# Patient Record
Sex: MALE | Race: BLACK OR AFRICAN AMERICAN | NOT HISPANIC OR LATINO | Employment: OTHER | URBAN - METROPOLITAN AREA
[De-identification: names, ages, dates, MRNs, and addresses within clinical notes are randomized per-mention and may not be internally consistent; named-entity substitution may affect disease eponyms.]

---

## 2018-01-20 ENCOUNTER — HOSPITAL ENCOUNTER (EMERGENCY)
Facility: HOSPITAL | Age: 58
Discharge: HOME | End: 2018-01-20
Attending: FAMILY MEDICINE
Payer: MEDICARE

## 2018-01-20 VITALS
DIASTOLIC BLOOD PRESSURE: 88 MMHG | TEMPERATURE: 98 F | BODY MASS INDEX: 17.61 KG/M2 | RESPIRATION RATE: 20 BRPM | HEIGHT: 72 IN | OXYGEN SATURATION: 100 % | HEART RATE: 97 BPM | WEIGHT: 130 LBS | SYSTOLIC BLOOD PRESSURE: 142 MMHG

## 2018-01-20 DIAGNOSIS — F10.920 ALCOHOLIC INTOXICATION WITHOUT COMPLICATION: Primary | ICD-10-CM

## 2018-01-20 LAB
ALBUMIN SERPL BCP-MCNC: 3.7 G/DL
ALP SERPL-CCNC: 160 U/L
ALT SERPL W/O P-5'-P-CCNC: 21 U/L
AMPHET+METHAMPHET UR QL: NEGATIVE
ANION GAP SERPL CALC-SCNC: 16 MMOL/L
AST SERPL-CCNC: 36 U/L
BARBITURATES UR QL SCN>200 NG/ML: NEGATIVE
BASOPHILS # BLD AUTO: 0.05 K/UL
BASOPHILS NFR BLD: 0.6 %
BENZODIAZ UR QL SCN>200 NG/ML: NEGATIVE
BILIRUB SERPL-MCNC: 0.1 MG/DL
BILIRUB UR QL STRIP: NEGATIVE
BUN SERPL-MCNC: <2 MG/DL
BZE UR QL SCN: NEGATIVE
CALCIUM SERPL-MCNC: 8.4 MG/DL
CANNABINOIDS UR QL SCN: NEGATIVE
CHLORIDE SERPL-SCNC: 102 MMOL/L
CLARITY UR REFRACT.AUTO: CLEAR
CO2 SERPL-SCNC: 21 MMOL/L
COLOR UR AUTO: YELLOW
CREAT SERPL-MCNC: 0.77 MG/DL
CREAT UR-MCNC: 42 MG/DL
DIFFERENTIAL METHOD: ABNORMAL
EOSINOPHIL # BLD AUTO: 0.1 K/UL
EOSINOPHIL NFR BLD: 1.3 %
ERYTHROCYTE [DISTWIDTH] IN BLOOD BY AUTOMATED COUNT: 23.5 %
EST. GFR  (AFRICAN AMERICAN): >60 ML/MIN/1.73 M^2
EST. GFR  (NON AFRICAN AMERICAN): >60 ML/MIN/1.73 M^2
ETHANOL SERPL-MCNC: 269 MG/DL
GLUCOSE SERPL-MCNC: 122 MG/DL
GLUCOSE UR QL STRIP: NEGATIVE
HCT VFR BLD AUTO: 30.7 %
HGB BLD-MCNC: 9.7 G/DL
HGB UR QL STRIP: NEGATIVE
KETONES UR QL STRIP: NEGATIVE
LEUKOCYTE ESTERASE UR QL STRIP: ABNORMAL
LYMPHOCYTES # BLD AUTO: 2.2 K/UL
LYMPHOCYTES NFR BLD: 26 %
MCH RBC QN AUTO: 21.4 PG
MCHC RBC AUTO-ENTMCNC: 31.6 G/DL
MCV RBC AUTO: 68 FL
METHADONE UR QL SCN>300 NG/ML: NEGATIVE
MICROSCOPIC COMMENT: NORMAL
MONOCYTES # BLD AUTO: 0.7 K/UL
MONOCYTES NFR BLD: 8.5 %
NEUTROPHILS # BLD AUTO: 5.2 K/UL
NEUTROPHILS NFR BLD: 63.4 %
NITRITE UR QL STRIP: NEGATIVE
OPIATES UR QL SCN: NEGATIVE
PCP UR QL SCN>25 NG/ML: NEGATIVE
PH UR STRIP: 6 [PH] (ref 5–8)
PLATELET # BLD AUTO: 448 K/UL
PMV BLD AUTO: 8.4 FL
POTASSIUM SERPL-SCNC: 3.5 MMOL/L
PROT SERPL-MCNC: 8.1 G/DL
PROT UR QL STRIP: NEGATIVE
RBC # BLD AUTO: 4.53 M/UL
RBC #/AREA URNS AUTO: 1 /HPF (ref 0–4)
SODIUM SERPL-SCNC: 139 MMOL/L
SP GR UR STRIP: 1.01 (ref 1–1.03)
TOXICOLOGY INFORMATION: NORMAL
URN SPEC COLLECT METH UR: ABNORMAL
UROBILINOGEN UR STRIP-ACNC: NEGATIVE EU/DL
WBC # BLD AUTO: 8.26 K/UL
WBC #/AREA URNS AUTO: 5 /HPF (ref 0–5)

## 2018-01-20 PROCEDURE — 85025 COMPLETE CBC W/AUTO DIFF WBC: CPT

## 2018-01-20 PROCEDURE — 80320 DRUG SCREEN QUANTALCOHOLS: CPT

## 2018-01-20 PROCEDURE — 80053 COMPREHEN METABOLIC PANEL: CPT

## 2018-01-20 PROCEDURE — 99283 EMERGENCY DEPT VISIT LOW MDM: CPT | Mod: 25

## 2018-01-20 PROCEDURE — 96360 HYDRATION IV INFUSION INIT: CPT

## 2018-01-20 PROCEDURE — 81000 URINALYSIS NONAUTO W/SCOPE: CPT | Mod: 59

## 2018-01-20 PROCEDURE — 80307 DRUG TEST PRSMV CHEM ANLYZR: CPT

## 2018-01-20 PROCEDURE — 25000003 PHARM REV CODE 250: Performed by: FAMILY MEDICINE

## 2018-01-20 RX ADMIN — SODIUM CHLORIDE 1000 ML: 0.9 INJECTION, SOLUTION INTRAVENOUS at 04:01

## 2018-01-20 NOTE — ED PROVIDER NOTES
Encounter Date: 1/20/2018       History     Chief Complaint   Patient presents with    Fall     2 falls today, pt denies injury or pain from either fall, and states that both times he fell, he stood up too fast and got dizzy      Patient has history of heavy alcohol consumption, his been having frequent falls yesterday.  She is brought in today for evaluation.  Patient has been drinking alcohol this morning and smells.  Denies any pain anywhere in his body.  No external injuries.      The history is provided by the patient.     Review of patient's allergies indicates:   Allergen Reactions    Asa [aspirin] Nausea Only     Past Medical History:   Diagnosis Date    Alcohol abuse     Hypertension     Seizures      History reviewed. No pertinent surgical history.  History reviewed. No pertinent family history.  Social History   Substance Use Topics    Smoking status: Current Every Day Smoker     Packs/day: 0.50    Smokeless tobacco: Never Used    Alcohol use Yes      Comment: 6 pack every day (3-4)     Review of Systems   Constitutional: Negative for activity change, appetite change, chills and fever.   HENT: Negative for congestion, ear discharge, ear pain, sinus pain and sinus pressure.    Eyes: Negative for pain, discharge, redness and itching.   Respiratory: Negative for cough, chest tightness, shortness of breath and wheezing.    Cardiovascular: Negative for chest pain and palpitations.   Gastrointestinal: Negative for abdominal distention, abdominal pain, diarrhea and vomiting.   Genitourinary: Negative for dysuria, flank pain and frequency.   Musculoskeletal: Negative for back pain, gait problem, neck pain and neck stiffness.   Skin: Negative for rash.   Neurological: Positive for weakness. Negative for dizziness, syncope, facial asymmetry, speech difficulty, light-headedness, numbness and headaches.   Psychiatric/Behavioral: Negative for behavioral problems and confusion.   All other systems reviewed and  are negative.      Physical Exam     Initial Vitals [01/20/18 1516]   BP Pulse Resp Temp SpO2   135/84 (!) 119 20 98.3 °F (36.8 °C) 99 %      MAP       101         Physical Exam    Nursing note and vitals reviewed.  Constitutional: He appears well-developed and well-nourished.   Patient under influence of alcohol.   HENT:   Head: Normocephalic.   Right Ear: External ear normal.   Left Ear: External ear normal.   Nose: Nose normal.   Mouth/Throat: Oropharynx is clear and moist.   Eyes: Conjunctivae and EOM are normal. Pupils are equal, round, and reactive to light.   Neck: Normal range of motion. Neck supple.   Cardiovascular: Normal rate, regular rhythm, normal heart sounds and intact distal pulses.   No murmur heard.  Pulmonary/Chest: Breath sounds normal. No respiratory distress. He has no wheezes. He has no rhonchi. He has no rales. He exhibits no tenderness.   Abdominal: Soft. Bowel sounds are normal. He exhibits no distension. There is no tenderness.   Musculoskeletal: Normal range of motion.   Neurological: He is alert and oriented to person, place, and time. He has normal strength and normal reflexes. He displays normal reflexes. No cranial nerve deficit or sensory deficit. Gait abnormal.   Skin: Skin is warm. Capillary refill takes less than 2 seconds. No rash noted. No erythema.         ED Course   Procedures  Labs Reviewed   CBC W/ AUTO DIFFERENTIAL   COMPREHENSIVE METABOLIC PANEL   ALCOHOL,MEDICAL (ETHANOL)   URINALYSIS   DRUG SCREEN PANEL, URINE EMERGENCY             Medical Decision Making:   ED Management:  Pt Falls contribute to alcoholism. No apparent injury. Advised to be conscious with alcoholism and f/u with PCP.                   ED Course      Clinical Impression:   The encounter diagnosis was Alcoholic intoxication without complication.    Disposition:   Disposition: Discharged  Condition: Jose Oquendo MD  01/25/18 3286

## 2018-01-20 NOTE — ED NOTES
Patient states he had 2 falls today.  He denies any LOC.  He states he just felt weak getting up.  He states he has not taken any of his home meds in over 2 years.  He states he has a hx of seizures, but has not had one is several years.  Daily ETOH use.   Patient admits to drinking approximately 4 beers today.

## 2019-02-07 ENCOUNTER — HOSPITAL ENCOUNTER (EMERGENCY)
Facility: HOSPITAL | Age: 59
Discharge: HOME | End: 2019-02-07
Attending: EMERGENCY MEDICINE
Payer: MEDICARE

## 2019-02-07 VITALS
BODY MASS INDEX: 18.96 KG/M2 | DIASTOLIC BLOOD PRESSURE: 100 MMHG | OXYGEN SATURATION: 100 % | SYSTOLIC BLOOD PRESSURE: 158 MMHG | TEMPERATURE: 99 F | HEIGHT: 72 IN | WEIGHT: 140 LBS | RESPIRATION RATE: 16 BRPM | HEART RATE: 103 BPM

## 2019-02-07 DIAGNOSIS — Z87.19 HISTORY OF ESOPHAGEAL STRICTURE: ICD-10-CM

## 2019-02-07 DIAGNOSIS — R13.10 DYSPHAGIA, UNSPECIFIED TYPE: Primary | ICD-10-CM

## 2019-02-07 PROCEDURE — 99281 EMR DPT VST MAYX REQ PHY/QHP: CPT

## 2019-02-08 NOTE — ED NOTES
DR. PAZ AWARE OF PT'S BLOOD PRESSURE. PT INSTRUCTED TO FOLLOW UP WITH PCP PER VERBAL ORDER PER DR. PAZ. PT V/U.

## 2019-02-08 NOTE — ED PROVIDER NOTES
Encounter Date: 2/7/2019    SCRIBE #1 NOTE: I, Tanner Rosa, am scribing for, and in the presence of,  Dr. Howell. I have scribed the entire note.       History     Chief Complaint   Patient presents with    Dysphagia     Reports difficulty swallowing over past few months. States food feels like it gets stuck in his throat. Denies feeling currently. States had procedure 1.5 years ago to fix same problem.      Time seen by provider: 11:24 PM    This is a 58 y.o. male w/ PMHx of esophogeal stricture who presents with complaint of dysphagia x 5 months. The patient reports he has been having trouble swallowing food as it gets stuck in his throat and he has to throw it back up. H states he is able to drink liquids. Denies abdominal pain or nausea.       The history is provided by the patient.     Review of patient's allergies indicates:   Allergen Reactions    Asa [aspirin] Nausea Only     Past Medical History:   Diagnosis Date    Alcohol abuse     Hypertension     Seizures      Past Surgical History:   Procedure Laterality Date    COLONOSCOPY N/A 5/5/2015    Performed by [facility] at [facility]    ESOPHAGOGASTRODUODENOSCOPY (EGD) N/A 5/5/2015    Performed by [facility] at [facility]     History reviewed. No pertinent family history.  Social History     Tobacco Use    Smoking status: Current Every Day Smoker     Packs/day: 0.50    Smokeless tobacco: Never Used   Substance Use Topics    Alcohol use: Yes     Comment: 6 pack every day (3-4)    Drug use: No     Review of Systems   Constitutional: Negative for fever.   HENT: Positive for trouble swallowing. Negative for facial swelling and sore throat.         Dysphagia   Eyes: Negative for pain and redness.   Respiratory: Negative for shortness of breath.    Cardiovascular: Negative for chest pain.   Gastrointestinal: Positive for vomiting. Negative for abdominal pain, diarrhea and nausea.   Genitourinary: Negative for dysuria and hematuria.    Musculoskeletal: Negative for back pain and neck pain.   Skin: Negative for rash.   Neurological: Negative for seizures and facial asymmetry.       Physical Exam     Initial Vitals [02/07/19 2157]   BP Pulse Resp Temp SpO2   (!) 172/96 100 17 98 °F (36.7 °C) 99 %      MAP       --         Physical Exam    Nursing note and vitals reviewed.  Constitutional: He appears well-developed and well-nourished. He is not diaphoretic. No distress.   HENT:   Head: Normocephalic and atraumatic.   Eyes: Conjunctivae and EOM are normal.   Neck: Normal range of motion. Neck supple.   Cardiovascular: Normal rate, regular rhythm and normal heart sounds.   Pulmonary/Chest: Breath sounds normal. No respiratory distress.   Abdominal: Soft. There is no tenderness.   Musculoskeletal: Normal range of motion. He exhibits no edema or tenderness.   Neurological: He is alert and oriented to person, place, and time. He has normal strength.   Skin: Skin is warm and dry.         ED Course   Procedures  Labs Reviewed - No data to display       Imaging Results    None          Medical Decision Making:   ED Management:  58-year-old male with a history of esophageal strictures, who says he has had difficulty with solid foods for the past few months.  He occasionally vomits when he attempts to eat.  Patient is able to tolerate liquids.  I have explained to him that he needs to see a gastroenterologist to have an EGD scheduled.  I will place an ambulatory referral for this.  In the meantime patient will stay a liquid or pureed diet.                      Clinical Impression:     1. Dysphagia, unspecified type    2. History of esophageal stricture        Disposition:   Disposition: Discharged  Condition: Stable       I, Dr. Fabrice Howell, personally performed the services described in this documentation. All medical record entries made by the scribe were at my direction and in my presence. I have reviewed the chart and agree that the record reflects my  personal performance and is accurate and complete. Fabrice Maher MD.  12:51 AM 02/08/2019                   Fabrice Maher MD  02/08/19 0052

## 2019-02-08 NOTE — ED NOTES
"PT C/O "IT FEELS LIKE MY FOOD GETS STUCK IN MY THROAT WHEN I SWALLOW" X5 MONTHS. PT DENIES SYMPTOMS ARE INTERMITTENT AND DENIES DIFFICULTY SWALLOWING AT THIS TIME. PT DENIES WORSENING OF SYMPTOMS RECENTLY. STATES "NOTHING IS WORSE. I JUST WANT TO GET CHECKED OUT." NO ACUTE DISTRESS NOTED.  "

## 2019-03-01 ENCOUNTER — OFFICE VISIT (OUTPATIENT)
Dept: GASTROENTEROLOGY | Facility: CLINIC | Age: 59
End: 2019-03-01
Payer: MEDICARE

## 2019-03-01 VITALS — WEIGHT: 108.69 LBS | BODY MASS INDEX: 14.74 KG/M2

## 2019-03-01 DIAGNOSIS — R91.1 PULMONARY NODULE: Primary | ICD-10-CM

## 2019-03-01 DIAGNOSIS — R13.10 DYSPHAGIA, UNSPECIFIED TYPE: ICD-10-CM

## 2019-03-01 DIAGNOSIS — R63.4 WEIGHT LOSS: ICD-10-CM

## 2019-03-01 PROCEDURE — 99204 PR OFFICE/OUTPT VISIT, NEW, LEVL IV, 45-59 MIN: ICD-10-PCS | Mod: S$PBB,,, | Performed by: NURSE PRACTITIONER

## 2019-03-01 PROCEDURE — 99215 OFFICE O/P EST HI 40 MIN: CPT | Mod: PBBFAC,PO | Performed by: NURSE PRACTITIONER

## 2019-03-01 PROCEDURE — 99999 PR PBB SHADOW E&M-EST. PATIENT-LVL V: ICD-10-PCS | Mod: PBBFAC,,, | Performed by: NURSE PRACTITIONER

## 2019-03-01 PROCEDURE — 99999 PR PBB SHADOW E&M-EST. PATIENT-LVL V: CPT | Mod: PBBFAC,,, | Performed by: NURSE PRACTITIONER

## 2019-03-01 PROCEDURE — 99204 OFFICE O/P NEW MOD 45 MIN: CPT | Mod: S$PBB,,, | Performed by: NURSE PRACTITIONER

## 2019-03-01 NOTE — PATIENT INSTRUCTIONS
EGD Prep Instructions    [facility] [address] Clinic Office [phone]  Endoscopy Lab [phone]    You are scheduled for an EGD with Dr. Schmidt on 3/4/2019 at [facility].  You will check in at Admit on the first floor of the hospital.    Nothing to eat of drink after midnight before the procedure.  You MAY brush your teeth.    You MAY take your blood pressure, heart, and seizure medication on the morning of the procedure, with a SIP of water.  Hold ALL other medications until after the procedure.    If you are on blood thinners THAT YOU HAVE BEEN INSTRUCTED TO HOLD BY YOUR DOCTOR FOR THIS PROCEDURE, then do NOT take this the morning of your EGD.  Do NOT stop these medications on your own, they must be approved to be held by your doctor.  Your EGD can NOT be done if you are on these medications.  Examples of blood thinners include: Coumadin, Aggrenox, Plavix, Pradaxa, Reapro, Pletal, Xarelto, Ticagrelor, Brilinta, Eliquis, and high dose aspirin (325 mg).  You do not have to stop baby aspirin 81 mg.    You will receive a call 2 days before your EGD to tell you the time to arrive.  If you have not received a call by the day before your procedure, call the Endoscopy Lab at [phone].

## 2019-03-01 NOTE — PROGRESS NOTES
Subjective:       Patient ID: Sachin Calloway is a 58 y.o. male.    Chief Complaint: trouble swallowing    HPI  Reports at least 3 months of dysphagia with food lodging when swallowing.  He will have to drink and have extra swallows for this to pass.  Happening with all solids.    Has had dysphagia in the past with EGD and dilation in 2015:  - Two benign appearing esophageal strictures. One                         at the proximal esophagus(20cm) and another at GE                         junction. Traversed with  gastroscope and                         then dilated up to 10mm with a TTS balloon.                        - White specked mucosa in distal esophagus.                         Biopsied.                        - Two small clean based gastric ulcers. Biopsied.  CT chest at that time:  The soft tissues and vascular structures at the base and neck are unremarkable.  The mediastinum and the heart and great vessels is significant for calcification of the aorta, aortic annulus, and coronary arteries.  The visualized portion of   the intra-abdominal content is unremarkable.  The osseous structures demonstrate mild degenerative change.    The trachea is patent and free of any intraluminal filling defects.  The lungs are well expanded.  There is a moderate amount of bilateral apical pleural thickening and/or scar right greater than left.  There is mild centrilobular emphysema.  There is a   0.3-cm left apical noncalcified pulmonary nodule. Per Fleischner Society guidelines; in a low risk patient, no follow up recommend. In a high risk patient/ smoker, consider 12 month CT chest follow up to exclude neoplasia.   There is no pleural or   pericardial fluid.    He never returned for follow up EGD or CT.  Does not have a PCP.    Denies nausea, vomiting, abdominal pain, SOB, cough, chest pain.  Weight loss of at least 30 pounds over the last 3 years.    No recent labs.    He used ibuprofen daily for headaches.     Denies black stools, blood with bowel movements or change in bowel habit.  Denies diarrhea or constipation.  He drinks alcohol daily and smokes cigarettes      Review of Systems   Constitutional: Positive for appetite change and unexpected weight change. Negative for activity change, fatigue and fever.   HENT: Positive for trouble swallowing. Negative for sore throat.    Respiratory: Negative.  Negative for cough, choking and shortness of breath.    Cardiovascular: Negative.  Negative for chest pain.   Gastrointestinal: Negative for abdominal pain, blood in stool, constipation, diarrhea, nausea and vomiting.   Genitourinary: Negative.  Negative for difficulty urinating.   Musculoskeletal: Negative.  Negative for neck pain and neck stiffness.   Skin: Negative.    Neurological: Positive for headaches. Negative for dizziness, syncope, weakness and light-headedness.   Psychiatric/Behavioral: Negative.        Objective:      Physical Exam   Constitutional: He is oriented to person, place, and time. He has a sickly appearance. No distress.   HENT:   Head: Normocephalic.   Eyes: No scleral icterus.   Neck: No thyromegaly present.   Cardiovascular: Normal rate.   Pulmonary/Chest: Effort normal and breath sounds normal. No stridor. No respiratory distress. He has no wheezes. He exhibits no tenderness.   Abdominal: Soft. Bowel sounds are normal. He exhibits no distension and no mass. There is no tenderness. There is no guarding.   Musculoskeletal: Normal range of motion.   Lymphadenopathy:     He has no cervical adenopathy.   Neurological: He is alert and oriented to person, place, and time.   Skin: Skin is warm and dry. He is not diaphoretic.   Psychiatric: He has a normal mood and affect. His behavior is normal. Judgment and thought content normal.       Assessment:       1. Pulmonary nodule    2. Weight loss    3. Dysphagia, unspecified type        Plan:         Sachin was seen today for trouble swallowing.    Diagnoses  and all orders for this visit:    Pulmonary nodule  -     CT Chest Without Contrast; Future    Weight loss  -     CT Chest Without Contrast; Future  -     CBC auto differential; Future  -     Comprehensive metabolic panel; Future  -     Case request GI: ESOPHAGOGASTRODUODENOSCOPY (EGD)    Dysphagia, unspecified type  -     Case request GI: ESOPHAGOGASTRODUODENOSCOPY (EGD)    Other orders  -     Cancel: Case request GI: ESOPHAGOGASTRODUODENOSCOPY (EGD)    I have explained the planned procedures to the patient.The risks, benefits and alternatives of the procedure were also explained in detail. Patient verbalized understanding, all questions were answered. The patient agrees to proceed as planned    Establish care with a PCP.    Follow up CT.    EGD.    Avoid NSAIDs.

## 2019-03-04 ENCOUNTER — HOSPITAL ENCOUNTER (OUTPATIENT)
Facility: HOSPITAL | Age: 59
Discharge: HOME | End: 2019-03-04
Attending: INTERNAL MEDICINE | Admitting: INTERNAL MEDICINE
Payer: MEDICARE

## 2019-03-04 ENCOUNTER — TELEPHONE (OUTPATIENT)
Dept: GASTROENTEROLOGY | Facility: CLINIC | Age: 59
End: 2019-03-04

## 2019-03-04 ENCOUNTER — ANESTHESIA EVENT (OUTPATIENT)
Dept: ENDOSCOPY | Facility: HOSPITAL | Age: 59
End: 2019-03-04
Payer: MEDICARE

## 2019-03-04 ENCOUNTER — ANESTHESIA (OUTPATIENT)
Dept: ENDOSCOPY | Facility: HOSPITAL | Age: 59
End: 2019-03-04
Payer: MEDICARE

## 2019-03-04 VITALS
OXYGEN SATURATION: 98 % | HEART RATE: 86 BPM | HEIGHT: 72 IN | TEMPERATURE: 98 F | RESPIRATION RATE: 20 BRPM | DIASTOLIC BLOOD PRESSURE: 73 MMHG | SYSTOLIC BLOOD PRESSURE: 117 MMHG | WEIGHT: 130 LBS | BODY MASS INDEX: 17.61 KG/M2

## 2019-03-04 DIAGNOSIS — R13.10 DYSPHAGIA: ICD-10-CM

## 2019-03-04 DIAGNOSIS — R13.10 DYSPHAGIA, UNSPECIFIED TYPE: Primary | ICD-10-CM

## 2019-03-04 PROCEDURE — 25000003 PHARM REV CODE 250: Performed by: INTERNAL MEDICINE

## 2019-03-04 PROCEDURE — C1726 CATH, BAL DIL, NON-VASCULAR: HCPCS | Performed by: INTERNAL MEDICINE

## 2019-03-04 PROCEDURE — 43249 ESOPH EGD DILATION <30 MM: CPT | Performed by: INTERNAL MEDICINE

## 2019-03-04 PROCEDURE — 43249 ESOPH EGD DILATION <30 MM: CPT | Mod: ,,, | Performed by: INTERNAL MEDICINE

## 2019-03-04 PROCEDURE — 37000008 HC ANESTHESIA 1ST 15 MINUTES: Performed by: INTERNAL MEDICINE

## 2019-03-04 PROCEDURE — 43249 PR EGD, FLEX, W/BALL DILATION, < 30MM: ICD-10-PCS | Mod: ,,, | Performed by: INTERNAL MEDICINE

## 2019-03-04 PROCEDURE — 63600175 PHARM REV CODE 636 W HCPCS: Performed by: NURSE ANESTHETIST, CERTIFIED REGISTERED

## 2019-03-04 RX ORDER — SODIUM CHLORIDE 0.9 % (FLUSH) 0.9 %
3 SYRINGE (ML) INJECTION
Status: DISCONTINUED | OUTPATIENT
Start: 2019-03-04 | End: 2019-03-04 | Stop reason: HOSPADM

## 2019-03-04 RX ORDER — PROPOFOL 10 MG/ML
VIAL (ML) INTRAVENOUS
Status: DISCONTINUED | OUTPATIENT
Start: 2019-03-04 | End: 2019-03-04

## 2019-03-04 RX ORDER — LIDOCAINE HCL/PF 100 MG/5ML
SYRINGE (ML) INTRAVENOUS
Status: DISCONTINUED | OUTPATIENT
Start: 2019-03-04 | End: 2019-03-04

## 2019-03-04 RX ORDER — SODIUM CHLORIDE 9 MG/ML
INJECTION, SOLUTION INTRAVENOUS CONTINUOUS
Status: DISCONTINUED | OUTPATIENT
Start: 2019-03-04 | End: 2019-03-04 | Stop reason: HOSPADM

## 2019-03-04 RX ADMIN — PROPOFOL 80 MG: 10 INJECTION, EMULSION INTRAVENOUS at 12:03

## 2019-03-04 RX ADMIN — PROPOFOL 20 MG: 10 INJECTION, EMULSION INTRAVENOUS at 12:03

## 2019-03-04 RX ADMIN — SODIUM CHLORIDE: 0.9 INJECTION, SOLUTION INTRAVENOUS at 11:03

## 2019-03-04 RX ADMIN — LIDOCAINE HYDROCHLORIDE 40 MG: 20 INJECTION, SOLUTION INTRAVENOUS at 12:03

## 2019-03-04 NOTE — PLAN OF CARE
Past Medical History:   Diagnosis Date    Alcohol abuse     Hypertension     Seizures      Dr. Schmidt visited at bedside prior to discharge, discussed findings and recommendations with patient and family member; all questions asked and answered. Verbalized understanding of information give. Handout provided at time of discharge. Recovery complete. Patient recovered to baseline. Discharge instructions reviewed with patient. VSS.

## 2019-03-04 NOTE — ANESTHESIA PREPROCEDURE EVALUATION
03/04/2019  Sachin Calloway is a 58 y.o., male presents for EGD under MAC.    Past Medical History:   Diagnosis Date    Alcohol abuse     Hypertension     Seizures      Past Surgical History:   Procedure Laterality Date    COLONOSCOPY N/A 5/5/2015    Performed by [facility] at [facility]    ESOPHAGOGASTRODUODENOSCOPY (EGD) N/A 5/5/2015    Performed by Mario Martínez MD at [facility] ENDO         Anesthesia Evaluation    I have reviewed the Patient Summary Reports.    I have reviewed the Nursing Notes.   I have reviewed the Medications.     Review of Systems  Anesthesia Hx:  No problems with previous Anesthesia    Cardiovascular:   Hypertension    Pulmonary:  Pulmonary Normal    Renal/:  Renal/ Normal     Hepatic/GI:  Hepatic/GI Normal    Neurological:   Seizures    Endocrine:  Endocrine Normal    Psych:   Psychiatric History          Physical Exam  General:  Well nourished    Airway/Jaw/Neck:  Airway Findings: Mouth Opening: Normal Tongue: Normal  General Airway Assessment: Adult  Mallampati: II  TM Distance: Normal, at least 6 cm       Chest/Lungs:  Chest/Lungs Findings: Clear to auscultation, Normal Respiratory Rate     Heart/Vascular:  Heart Findings: Rate: Normal  Rhythm: Regular Rhythm  Sounds: Normal             Anesthesia Plan  Type of Anesthesia, risks & benefits discussed:  Anesthesia Type:  MAC  Patient's Preference:   Intra-op Monitoring Plan: standard ASA monitors  Intra-op Monitoring Plan Comments:   Post Op Pain Control Plan: per primary service following discharge from PACU  Post Op Pain Control Plan Comments:   Induction:   IV  Beta Blocker:         Informed Consent: Patient understands risks and agrees with Anesthesia plan.  Questions answered. Anesthesia consent signed with patient.  ASA Score: 3     Day of Surgery Review of History & Physical:  There are no significant changes.           Ready For Surgery From Anesthesia Perspective.

## 2019-03-04 NOTE — TRANSFER OF CARE
Anesthesia Transfer of Care Note    Patient: Sachin Calloway    Procedure(s) Performed: Procedure(s) (LRB):  ESOPHAGOGASTRODUODENOSCOPY (EGD) (N/A)    Patient location: GI    Anesthesia Type: MAC    Transport from OR: Transported from OR on room air with adequate spontaneous ventilation    Post pain: adequate analgesia    Post assessment: no apparent anesthetic complications and tolerated procedure well    Post vital signs: stable    Level of consciousness: awake    Nausea/Vomiting: no nausea/vomiting    Complications: none    Transfer of care protocol was followed      Last vitals:   Visit Vitals  /87   Pulse 103   Temp 36.5 °C (97.7 °F)   Resp 19   Ht 6' (1.829 m)   Wt 59 kg (130 lb)   SpO2 99%   BMI 17.63 kg/m²

## 2019-03-04 NOTE — [employer] PATIENT INSTRUCTIONS
Discharge Summary/Instructions after an Endoscopic Procedure  Patient Name: Sachin Calloway  Patient MRN: [id number]  Patient YOB: 1960 Monday, March 04, 2019  Madhu Schmidt MD  RESTRICTIONS:  During your procedure today, you received medications for sedation.  These   medications may affect your judgment, balance and coordination.  Therefore,   for 24 hours, you have the following restrictions:   - DO NOT drive a car, operate machinery, make legal/financial decisions,   sign important papers or drink alcohol.    ACTIVITY:  Today: no heavy lifting, straining or running due to procedural   sedation/anesthesia.  The following day: return to full activity including work.  DIET:  Eat and drink normally unless instructed otherwise.     TREATMENT FOR COMMON SIDE EFFECTS:  - Mild abdominal pain, nausea, belching, bloating or excessive gas:  rest,   eat lightly and use a heating pad.  - Sore Throat: treat with throat lozenges and/or gargle with warm salt   water.  - Because air was used during the procedure, expelling large amounts of air   from your rectum or belching is normal.  - If a bowel prep was taken, you may not have a bowel movement for 1-3 days.    This is normal.  SYMPTOMS TO WATCH FOR AND REPORT TO YOUR PHYSICIAN:  1. Abdominal pain or bloating, other than gas cramps.  2. Chest pain.  3. Back pain.  4. Signs of infection such as: chills or fever occurring within 24 hours   after the procedure.  5. Rectal bleeding, which would show as bright red, maroon, or black stools.   (A tablespoon of blood from the rectum is not serious, especially if   hemorrhoids are present.)  6. Vomiting.  7. Weakness or dizziness.  GO DIRECTLY TO THE NEAREST EMERGENCY ROOM IF YOU HAVE ANY OF THE FOLLOWING:      Difficulty breathing              Chills and/or fever over 101 F   Persistent vomiting and/or vomiting blood   Severe abdominal pain   Severe chest pain   Black, tarry stools   Bleeding- more than one  tablespoon   Any other symptom or condition that you feel may need urgent attention  Your doctor recommends these additional instructions:  If any biopsies were taken, your doctors clinic will contact you in 1 to 2   weeks with any results.  - Discharge patient to home.   - Resume previous diet.   - Continue present medications.   - Repeat upper endoscopy in 1 week for retreatment.  For questions, problems or results please call your physician - Madhu Schmidt MD at Work:  [phone].  EMERGENCY PHONE NUMBER: [phone],  LAB RESULTS: [phone]  IF A COMPLICATION OR EMERGENCY SITUATION ARISES AND YOU ARE UNABLE TO REACH   YOUR PHYSICIAN - GO DIRECTLY TO THE EMERGENCY ROOM.  Madhu Schmidt MD  3/4/2019 12:38:09 PM  This report has been verified and signed electronically.  [employer]

## 2019-03-04 NOTE — H&P
Short Stay Endoscopy History and Physical    PCP - Primary Doctor No  Referring Physician - Merari Mota, YOLANDAP  [address] [id number]    Procedure - egd  ASA - per anesthesia  Mallampati - per anesthesia  History of Anesthesia problems - no  Family history Anesthesia problems -  no   Plan of anesthesia - General    HPI:  This is a 58 y.o. male here for evaluation of: dysphagia    Reflux - no  Dysphagia - no  Abdominal pain - no  Diarrhea - no    ROS:  Constitutional: No fevers, chills, No weight loss  CV: No chest pain  Pulm: No cough, No shortness of breath  Ophtho: No vision changes  GI: see HPI  Derm: No rash    Medical History:  has a past medical history of Alcohol abuse, Hypertension, and Seizures.    Surgical History:  has no past surgical history on file.    Family History: family history is not on file..    Social History:  reports that he has been smoking.  He has been smoking about 0.50 packs per day. he has never used smokeless tobacco. He reports that he drinks alcohol. He reports that he does not use drugs.    Review of patient's allergies indicates:   Allergen Reactions    Asa [aspirin] Nausea Only       Medications:   Medications Prior to Admission   Medication Sig Dispense Refill Last Dose    acetaminophen-codeine 300-30mg (TYLENOL #3) 300-30 mg Tab Take by mouth.   Unknown at Unknown time    ibuprofen (ADVIL,MOTRIN) 600 MG tablet Take 1 tablet (600 mg total) by mouth every 6 (six) hours as needed for Pain (take with food). 20 tablet 0 Unknown at Unknown time    polyethylene glycol (GOLYTELY,NULYTELY) 236-22.74-6.74 gram suspension Take as directed 1 Bottle 0 Not Taking       Physical Exam:    Vital Signs:   Vitals:    03/04/19 1116   BP: 127/87   Pulse: 103   Resp: 19   Temp: 97.7 °F (36.5 °C)       General Appearance: Well appearing in no acute distress    Labs:  Lab Results   Component Value Date    WBC 8.26 01/20/2018    HGB 9.7 (L) 01/20/2018    HCT 30.7 (L)  01/20/2018     (H) 01/20/2018    CHOL 148 03/27/2015    TRIG 94 03/27/2015    HDL 60 03/27/2015    ALT 21 01/20/2018    AST 36 01/20/2018     01/20/2018    K 3.5 01/20/2018     01/20/2018    CREATININE 0.77 01/20/2018    BUN <2 01/20/2018    CO2 21 (L) 01/20/2018    TSH 0.893 03/15/2015    PSA 0.27 03/27/2015    INR 1.0 03/15/2015       I have explained the risks and benefits of this endoscopic procedure to the patient including but not limited to bleeding, inflammation, infection, perforation, and death.      Madhu Schmidt MD

## 2019-03-04 NOTE — ANESTHESIA POSTPROCEDURE EVALUATION
Anesthesia Post Evaluation    Patient: Sachin Calloway    Procedure(s) Performed: Procedure(s) (LRB):  ESOPHAGOGASTRODUODENOSCOPY (EGD) (N/A)    Final Anesthesia Type: MAC  Patient location during evaluation: GI PACU  Patient participation: Yes- Able to Participate  Level of consciousness: awake and alert  Post-procedure vital signs: reviewed and stable  Pain management: adequate  Airway patency: patent  PONV status at discharge: No PONV  Anesthetic complications: no      Cardiovascular status: hemodynamically stable  Respiratory status: unassisted, spontaneous ventilation and room air  Hydration status: euvolemic  Follow-up not needed.        Visit Vitals  /87   Pulse 103   Temp 36.5 °C (97.7 °F)   Resp 19   Ht 6' (1.829 m)   Wt 59 kg (130 lb)   SpO2 99%   BMI 17.63 kg/m²       Pain/Aretha Score: No Data Recorded

## 2019-03-06 ENCOUNTER — HOSPITAL ENCOUNTER (OUTPATIENT)
Dept: RADIOLOGY | Facility: HOSPITAL | Age: 59
Discharge: HOME | End: 2019-03-06
Attending: NURSE PRACTITIONER
Payer: MEDICARE

## 2019-03-06 DIAGNOSIS — R91.1 PULMONARY NODULE: ICD-10-CM

## 2019-03-06 DIAGNOSIS — R63.4 WEIGHT LOSS: ICD-10-CM

## 2019-03-06 PROCEDURE — 71250 CT THORAX DX C-: CPT | Mod: TC

## 2019-03-06 PROCEDURE — 71250 CT CHEST WITHOUT CONTRAST: ICD-10-PCS | Mod: 26,,, | Performed by: RADIOLOGY

## 2019-03-06 PROCEDURE — 71250 CT THORAX DX C-: CPT | Mod: 26,,, | Performed by: RADIOLOGY

## 2019-03-13 ENCOUNTER — TELEPHONE (OUTPATIENT)
Dept: GASTROENTEROLOGY | Facility: CLINIC | Age: 59
End: 2019-03-13

## 2019-03-13 DIAGNOSIS — R18.8 OTHER ASCITES: Primary | ICD-10-CM

## 2019-03-13 NOTE — TELEPHONE ENCOUNTER
Reviewed CT scan with Dr. Pritchard.  He is scheduled for EGD with repeat dilation with Dr. Pritchard next week.  With no abdominal complaints, did not think we needed anything further re: chronic pancreas changes on CT (most likely from alcohol use).    He DOES need to get his labs drawn that I ordered and make an appt asap with a PCP.    I have reviewed with his son.  He will take him for labs.    Please make him an appt with a PCP ASAP(establish care, abnormal chest CT- emphysema, CAD, smoker, chronic alcohol use)  Prefers [facility].

## 2019-03-13 NOTE — TELEPHONE ENCOUNTER
Spoke with patients son and he stated that he would have to check his work schedule first or find someone to bring his dad. Son requested that I call his father to check his schedule. Called patients home number and a male answered and said that pt was not there. Attempted to contact son again and call went to voicemail.

## 2019-03-14 ENCOUNTER — TELEPHONE (OUTPATIENT)
Dept: ENDOSCOPY | Facility: HOSPITAL | Age: 59
End: 2019-03-14

## 2019-03-14 NOTE — TELEPHONE ENCOUNTER
Spoke with patient about arrival time @ 0830*.     NPO status reviewed: Patient must have nothing to eat after midnight.    Medications: Do not take Insulin or oral diabetic medications the day of the procedure.  Take as prescribed: heart, seizure and blood pressure medication in the morning with a sip of water (less than an ounce).  Take any breathing medications and bring inhalers to hospital with you Leave all valuables and jewelry at home.     Wear comfortable clothes to procedure to change into hospital gown You cannot drive for 24 hours after your procedure because you will receive sedation for your procedure to make you comfortable.  A ride must be provided at discharge.

## 2019-03-18 ENCOUNTER — ANESTHESIA EVENT (OUTPATIENT)
Dept: ENDOSCOPY | Facility: HOSPITAL | Age: 59
End: 2019-03-18
Payer: MEDICARE

## 2019-03-18 ENCOUNTER — ANESTHESIA (OUTPATIENT)
Dept: ENDOSCOPY | Facility: HOSPITAL | Age: 59
End: 2019-03-18
Payer: MEDICARE

## 2019-03-18 ENCOUNTER — HOSPITAL ENCOUNTER (OUTPATIENT)
Facility: HOSPITAL | Age: 59
Discharge: HOME | End: 2019-03-18
Attending: INTERNAL MEDICINE | Admitting: INTERNAL MEDICINE
Payer: MEDICARE

## 2019-03-18 VITALS
WEIGHT: 130 LBS | TEMPERATURE: 97 F | OXYGEN SATURATION: 96 % | HEART RATE: 92 BPM | BODY MASS INDEX: 17.61 KG/M2 | SYSTOLIC BLOOD PRESSURE: 145 MMHG | RESPIRATION RATE: 18 BRPM | HEIGHT: 72 IN | DIASTOLIC BLOOD PRESSURE: 93 MMHG

## 2019-03-18 DIAGNOSIS — R13.19 ESOPHAGEAL DYSPHAGIA: Primary | ICD-10-CM

## 2019-03-18 DIAGNOSIS — R13.10 DYSPHAGIA: ICD-10-CM

## 2019-03-18 PROCEDURE — 43249 PR EGD, FLEX, W/BALL DILATION, < 30MM: ICD-10-PCS | Mod: ,,, | Performed by: INTERNAL MEDICINE

## 2019-03-18 PROCEDURE — 37000009 HC ANESTHESIA EA ADD 15 MINS: Performed by: INTERNAL MEDICINE

## 2019-03-18 PROCEDURE — 43249 ESOPH EGD DILATION <30 MM: CPT | Performed by: INTERNAL MEDICINE

## 2019-03-18 PROCEDURE — 37000008 HC ANESTHESIA 1ST 15 MINUTES: Performed by: INTERNAL MEDICINE

## 2019-03-18 PROCEDURE — 43249 ESOPH EGD DILATION <30 MM: CPT | Mod: ,,, | Performed by: INTERNAL MEDICINE

## 2019-03-18 PROCEDURE — 25000003 PHARM REV CODE 250: Performed by: INTERNAL MEDICINE

## 2019-03-18 PROCEDURE — C1726 CATH, BAL DIL, NON-VASCULAR: HCPCS | Performed by: INTERNAL MEDICINE

## 2019-03-18 PROCEDURE — 63600175 PHARM REV CODE 636 W HCPCS: Performed by: NURSE ANESTHETIST, CERTIFIED REGISTERED

## 2019-03-18 RX ORDER — MIDAZOLAM HYDROCHLORIDE 1 MG/ML
INJECTION, SOLUTION INTRAMUSCULAR; INTRAVENOUS
Status: DISCONTINUED | OUTPATIENT
Start: 2019-03-18 | End: 2019-03-18

## 2019-03-18 RX ORDER — SODIUM CHLORIDE 0.9 % (FLUSH) 0.9 %
3 SYRINGE (ML) INJECTION
Status: DISCONTINUED | OUTPATIENT
Start: 2019-03-18 | End: 2019-03-18 | Stop reason: HOSPADM

## 2019-03-18 RX ORDER — PROPOFOL 10 MG/ML
VIAL (ML) INTRAVENOUS
Status: DISCONTINUED | OUTPATIENT
Start: 2019-03-18 | End: 2019-03-18

## 2019-03-18 RX ORDER — SODIUM CHLORIDE 9 MG/ML
INJECTION, SOLUTION INTRAVENOUS CONTINUOUS
Status: DISCONTINUED | OUTPATIENT
Start: 2019-03-18 | End: 2019-03-18 | Stop reason: HOSPADM

## 2019-03-18 RX ORDER — LIDOCAINE HCL/PF 100 MG/5ML
SYRINGE (ML) INTRAVENOUS
Status: DISCONTINUED | OUTPATIENT
Start: 2019-03-18 | End: 2019-03-18

## 2019-03-18 RX ADMIN — PROPOFOL 20 MG: 10 INJECTION, EMULSION INTRAVENOUS at 10:03

## 2019-03-18 RX ADMIN — LIDOCAINE HYDROCHLORIDE 60 MG: 20 INJECTION, SOLUTION INTRAVENOUS at 10:03

## 2019-03-18 RX ADMIN — SODIUM CHLORIDE: 0.9 INJECTION, SOLUTION INTRAVENOUS at 09:03

## 2019-03-18 RX ADMIN — PROPOFOL 50 MG: 10 INJECTION, EMULSION INTRAVENOUS at 10:03

## 2019-03-18 RX ADMIN — MIDAZOLAM 2 MG: 1 INJECTION INTRAMUSCULAR; INTRAVENOUS at 09:03

## 2019-03-18 NOTE — TRANSFER OF CARE
Anesthesia Transfer of Care Note    Patient: Sachin Calloway    Procedure(s) Performed: Procedure(s) (LRB):  ESOPHAGOGASTRODUODENOSCOPY (EGD) (N/A)    Patient location: GI    Anesthesia Type: MAC    Transport from OR: Transported from OR on room air with adequate spontaneous ventilation    Post pain: adequate analgesia    Post assessment: no apparent anesthetic complications and tolerated procedure well    Post vital signs: stable    Level of consciousness: awake, alert and oriented    Nausea/Vomiting: no nausea/vomiting    Complications: none    Transfer of care protocol was followed      Last vitals:   Visit Vitals  BP (!) 173/94 (BP Location: Left arm, Patient Position: Lying)   Pulse 97   Temp 36.2 °C (97.2 °F) (Skin)   Resp 16   Ht 6' (1.829 m)   Wt 59 kg (130 lb)   SpO2 100%   BMI 17.63 kg/m²

## 2019-03-18 NOTE — H&P
Short Stay Endoscopy History and Physical    PCP - Primary Doctor No  Referring Physician - Merari Mota, ESTEFANÍA  [address] [address]    Procedure - EGD/dilation  ASA - per anesthesia  Mallampati - per anesthesia  History of Anesthesia problems - no  Family history Anesthesia problems -  no   Plan of anesthesia - General    HPI:  This is a 58 y.o. male here for evaluation of: dysphagia - esophageal stricture    Reflux - no  Dysphagia - POS - improvement after last EGD, but now recurrence  Abdominal pain - no  Diarrhea - no    ROS:  Constitutional: No fevers, chills, No weight loss  CV: No chest pain  Pulm: No cough, No shortness of breath  Ophtho: No vision changes  GI: see HPI  Derm: No rash    Medical History:  has a past medical history of Alcohol abuse, Hypertension, and Seizures.    Surgical History:  has a past surgical history that includes EGD with dilitation (03/04/2019) and Esophagogastroduodenoscopy (N/A, 3/4/2019).    Family History: family history is not on file..    Social History:  reports that he has been smoking.  He has been smoking about 0.50 packs per day. he has never used smokeless tobacco. He reports that he drinks alcohol. He reports that he does not use drugs.    Review of patient's allergies indicates:   Allergen Reactions    Asa [aspirin] Nausea Only       Medications:   Medications Prior to Admission   Medication Sig Dispense Refill Last Dose    ibuprofen (ADVIL,MOTRIN) 600 MG tablet Take 1 tablet (600 mg total) by mouth every 6 (six) hours as needed for Pain (take with food). 20 tablet 0 Past Month at Unknown time    acetaminophen-codeine 300-30mg (TYLENOL #3) 300-30 mg Tab Take by mouth.   More than a month at Unknown time       Physical Exam:    Vital Signs: There were no vitals filed for this visit.    General Appearance: Well appearing in no acute distress  Eyes:    No scleral icterus  ENT: Neck supple  Lungs: CTA anteriorly  Heart:  Regular rate  Abdomen:  Soft, non tender, non distended with normal bowel sounds.  Extremities: No edema  Skin: No rash    Labs:  Lab Results   Component Value Date    WBC 8.26 01/20/2018    HGB 9.7 (L) 01/20/2018    HCT 30.7 (L) 01/20/2018     (H) 01/20/2018    CHOL 148 03/27/2015    TRIG 94 03/27/2015    HDL 60 03/27/2015    ALT 21 01/20/2018    AST 36 01/20/2018     01/20/2018    K 3.5 01/20/2018     01/20/2018    CREATININE 0.77 01/20/2018    BUN <2 01/20/2018    CO2 21 (L) 01/20/2018    TSH 0.893 03/15/2015    PSA 0.27 03/27/2015    INR 1.0 03/15/2015       I have explained the risks and benefits of this endoscopic procedure to the patient including but not limited to bleeding, inflammation, infection, perforation, and death.      Omer Pritchard MD

## 2019-03-18 NOTE — ANESTHESIA POSTPROCEDURE EVALUATION
Anesthesia Post Evaluation    Patient: Sachin Calloway    Procedure(s) Performed: Procedure(s) (LRB):  ESOPHAGOGASTRODUODENOSCOPY (EGD) (N/A)    Final Anesthesia Type: MAC  Patient location during evaluation: GI PACU  Patient participation: Yes- Able to Participate  Level of consciousness: awake and alert and oriented  Post-procedure vital signs: reviewed and stable  Pain management: adequate  Airway patency: patent  PONV status at discharge: No PONV  Anesthetic complications: no      Cardiovascular status: blood pressure returned to baseline and hemodynamically stable  Respiratory status: unassisted, room air and spontaneous ventilation  Hydration status: euvolemic  Follow-up not needed.        Visit Vitals  BP (!) 173/94 (BP Location: Left arm, Patient Position: Lying)   Pulse 97   Temp 36.2 °C (97.2 °F) (Skin)   Resp 16   Ht 6' (1.829 m)   Wt 59 kg (130 lb)   SpO2 100%   BMI 17.63 kg/m²       Pain/Aretha Score: Aretha Score: 9 (3/18/2019 10:17 AM)

## 2019-03-18 NOTE — [employer] PATIENT INSTRUCTIONS
Discharge Summary/Instructions after an Endoscopic Procedure  Patient Name: Sachin Calloway  Patient MRN: [id number]  Patient YOB: 1960 Monday, March 18, 2019  Omer Pritchard MD  RESTRICTIONS:  During your procedure today, you received medications for sedation.  These   medications may affect your judgment, balance and coordination.  Therefore,   for 24 hours, you have the following restrictions:   - DO NOT drive a car, operate machinery, make legal/financial decisions,   sign important papers or drink alcohol.    ACTIVITY:  Today: no heavy lifting, straining or running due to procedural   sedation/anesthesia.  The following day: return to full activity including work.  DIET:  Eat and drink normally unless instructed otherwise.     TREATMENT FOR COMMON SIDE EFFECTS:  - Mild abdominal pain, nausea, belching, bloating or excessive gas:  rest,   eat lightly and use a heating pad.  - Sore Throat: treat with throat lozenges and/or gargle with warm salt   water.  - Because air was used during the procedure, expelling large amounts of air   from your rectum or belching is normal.  - If a bowel prep was taken, you may not have a bowel movement for 1-3 days.    This is normal.  SYMPTOMS TO WATCH FOR AND REPORT TO YOUR PHYSICIAN:  1. Abdominal pain or bloating, other than gas cramps.  2. Chest pain.  3. Back pain.  4. Signs of infection such as: chills or fever occurring within 24 hours   after the procedure.  5. Rectal bleeding, which would show as bright red, maroon, or black stools.   (A tablespoon of blood from the rectum is not serious, especially if   hemorrhoids are present.)  6. Vomiting.  7. Weakness or dizziness.  GO DIRECTLY TO THE NEAREST EMERGENCY ROOM IF YOU HAVE ANY OF THE FOLLOWING:      Difficulty breathing              Chills and/or fever over 101 F   Persistent vomiting and/or vomiting blood   Severe abdominal pain   Severe chest pain   Black, tarry stools   Bleeding- more than one  tablespoon   Any other symptom or condition that you feel may need urgent attention  Your doctor recommends these additional instructions:  If any biopsies were taken, your doctors clinic will contact you in 1 to 2   weeks with any results.  - Discharge patient to home (ambulatory).   - Resume previous diet.   - Return to primary care physician as previously scheduled.   - Repeat upper endoscopy in 1 week for retreatment. Would consider savary   dilation (to address both proximal and distal rings at same session). Would   consider random esophageal biopsies as well to assess for EOE.  For questions, problems or results please call your physician - Omer Pritchard MD at Work:  [phone].  EMERGENCY PHONE NUMBER: [phone],  LAB RESULTS: [phone]  IF A COMPLICATION OR EMERGENCY SITUATION ARISES AND YOU ARE UNABLE TO REACH   YOUR PHYSICIAN - GO DIRECTLY TO THE EMERGENCY ROOM.  Omer Pritchard MD  3/18/2019 10:34:43 AM  This report has been verified and signed electronically.  [employer]

## 2019-03-18 NOTE — ANESTHESIA PREPROCEDURE EVALUATION
03/18/2019  Sachin Calloway is a 58 y.o., male for EGD under MAC    Past Medical History:   Diagnosis Date    Alcohol abuse     Hypertension     Seizures          Anesthesia Evaluation    I have reviewed the Patient Summary Reports.    I have reviewed the Nursing Notes.   I have reviewed the Medications.     Review of Systems  Social:  Alcohol Use, No Alcohol Use        Physical Exam  General:  Well nourished    Airway/Jaw/Neck:  Airway Findings: Mallampati: II      Chest/Lungs:  Chest/Lungs Clear    Heart/Vascular:  Heart Findings: Normal            Anesthesia Plan  Type of Anesthesia, risks & benefits discussed:  Anesthesia Type:  MAC  Patient's Preference:   Intra-op Monitoring Plan:   Intra-op Monitoring Plan Comments:   Post Op Pain Control Plan:   Post Op Pain Control Plan Comments:   Induction:    Beta Blocker:  Patient is not currently on a Beta-Blocker (No further documentation required).       Informed Consent:  Anesthesia consent signed with patient.  ASA Score: 3     Day of Surgery Review of History & Physical:            Ready For Surgery From Anesthesia Perspective.

## 2019-03-19 ENCOUNTER — TELEPHONE (OUTPATIENT)
Dept: GASTROENTEROLOGY | Facility: CLINIC | Age: 59
End: 2019-03-19

## 2019-03-19 DIAGNOSIS — K22.2 ESOPHAGEAL STENOSIS: Primary | ICD-10-CM

## 2019-03-22 NOTE — TELEPHONE ENCOUNTER
EGD Prep Instructions    [facility] [address] Clinic Office [phone]  Endoscopy Lab [phone]    You are scheduled for an EGD with Dr. Salinas on 03/29/2019 at 1000am at [facility].  You will check in at Admit on the first floor of the hospital.    You may not have anything to eat after 7pm and nothing to drink after midnight.  You can drink clear liquids between 7pm and midnight.    You MAY take your blood pressure, heart, and seizure medication on the morning of the procedure, with a SIP of water.  Hold ALL other medications until after the procedure.    If you are on blood thinners THAT YOU HAVE BEEN INSTRUCTED TO HOLD BY YOUR DOCTOR FOR THIS PROCEDURE, then do NOT take this the morning of your EGD.  Do NOT stop these medications on your own, they must be approved to be held by your doctor.  Your EGD can NOT be done if you are on these medications.  Examples of blood thinners include: Coumadin, Aggrenox, Plavix, Pradaxa, Reapro, Pletal, Xarelto, Ticagrelor, Brilinta, Eliquis, and high dose aspirin (325 mg).  You do not have to stop baby aspirin 81 mg.

## 2019-03-27 ENCOUNTER — TELEPHONE (OUTPATIENT)
Dept: ENDOSCOPY | Facility: HOSPITAL | Age: 59
End: 2019-03-27

## 2019-03-27 NOTE — TELEPHONE ENCOUNTER
Spoke with patient about ___1000__ arrival time. Clear liquids past 7pm the day before the procedure. NPO past midnight. Please take blood pressure, heart seizure medication the morning of the procedure. Hold all diabetic medication the morning of the procedure. Leave all valuable at home.  Please have a ride available the day of the procedure.

## 2019-03-29 ENCOUNTER — ANESTHESIA (OUTPATIENT)
Dept: ENDOSCOPY | Facility: HOSPITAL | Age: 59
End: 2019-03-29
Payer: MEDICARE

## 2019-03-29 ENCOUNTER — ANESTHESIA EVENT (OUTPATIENT)
Dept: ENDOSCOPY | Facility: HOSPITAL | Age: 59
End: 2019-03-29
Payer: MEDICARE

## 2019-03-29 ENCOUNTER — HOSPITAL ENCOUNTER (OUTPATIENT)
Facility: HOSPITAL | Age: 59
Discharge: HOME | End: 2019-03-29
Attending: INTERNAL MEDICINE | Admitting: INTERNAL MEDICINE
Payer: MEDICARE

## 2019-03-29 VITALS
WEIGHT: 138 LBS | HEART RATE: 91 BPM | TEMPERATURE: 97 F | OXYGEN SATURATION: 100 % | SYSTOLIC BLOOD PRESSURE: 130 MMHG | HEIGHT: 72 IN | RESPIRATION RATE: 14 BRPM | DIASTOLIC BLOOD PRESSURE: 88 MMHG | BODY MASS INDEX: 18.69 KG/M2

## 2019-03-29 DIAGNOSIS — R13.19 ESOPHAGEAL DYSPHAGIA: Primary | ICD-10-CM

## 2019-03-29 DIAGNOSIS — K22.2 ESOPHAGEAL STENOSIS: ICD-10-CM

## 2019-03-29 PROCEDURE — 27201089 HC SNARE, DISP (ANY): Performed by: INTERNAL MEDICINE

## 2019-03-29 PROCEDURE — 43249 ESOPH EGD DILATION <30 MM: CPT | Mod: 52,,, | Performed by: INTERNAL MEDICINE

## 2019-03-29 PROCEDURE — 43249 PR EGD, FLEX, W/BALL DILATION, < 30MM: ICD-10-PCS | Mod: 52,,, | Performed by: INTERNAL MEDICINE

## 2019-03-29 PROCEDURE — 88341 TISSUE SPECIMEN TO PATHOLOGY - SURGERY: ICD-10-PCS | Mod: 26,,, | Performed by: PATHOLOGY

## 2019-03-29 PROCEDURE — 88341 IMHCHEM/IMCYTCHM EA ADD ANTB: CPT | Performed by: PATHOLOGY

## 2019-03-29 PROCEDURE — 88312 SPECIAL STAINS GROUP 1: CPT | Mod: 26,,, | Performed by: PATHOLOGY

## 2019-03-29 PROCEDURE — 37000008 HC ANESTHESIA 1ST 15 MINUTES: Performed by: INTERNAL MEDICINE

## 2019-03-29 PROCEDURE — 88342 IMHCHEM/IMCYTCHM 1ST ANTB: CPT | Mod: 26,,, | Performed by: PATHOLOGY

## 2019-03-29 PROCEDURE — 43239 EGD BIOPSY SINGLE/MULTIPLE: CPT | Mod: 59,52,, | Performed by: INTERNAL MEDICINE

## 2019-03-29 PROCEDURE — C1726 CATH, BAL DIL, NON-VASCULAR: HCPCS | Performed by: INTERNAL MEDICINE

## 2019-03-29 PROCEDURE — 43239 PR EGD, FLEX, W/BIOPSY, SGL/MULTI: ICD-10-PCS | Mod: 59,52,, | Performed by: INTERNAL MEDICINE

## 2019-03-29 PROCEDURE — 88305 TISSUE EXAM BY PATHOLOGIST: CPT | Mod: 26,,, | Performed by: PATHOLOGY

## 2019-03-29 PROCEDURE — 88305 TISSUE SPECIMEN TO PATHOLOGY - SURGERY: ICD-10-PCS | Mod: 26,,, | Performed by: PATHOLOGY

## 2019-03-29 PROCEDURE — 43249 ESOPH EGD DILATION <30 MM: CPT | Performed by: INTERNAL MEDICINE

## 2019-03-29 PROCEDURE — 27201012 HC FORCEPS, HOT/COLD, DISP: Performed by: INTERNAL MEDICINE

## 2019-03-29 PROCEDURE — 63600175 PHARM REV CODE 636 W HCPCS: Performed by: NURSE ANESTHETIST, CERTIFIED REGISTERED

## 2019-03-29 PROCEDURE — 25000003 PHARM REV CODE 250: Performed by: INTERNAL MEDICINE

## 2019-03-29 PROCEDURE — 37000009 HC ANESTHESIA EA ADD 15 MINS: Performed by: INTERNAL MEDICINE

## 2019-03-29 PROCEDURE — 88312 TISSUE SPECIMEN TO PATHOLOGY - SURGERY: ICD-10-PCS | Mod: 26,,, | Performed by: PATHOLOGY

## 2019-03-29 PROCEDURE — 43255 EGD CONTROL BLEEDING ANY: CPT | Performed by: INTERNAL MEDICINE

## 2019-03-29 PROCEDURE — 88342 TISSUE SPECIMEN TO PATHOLOGY - SURGERY: ICD-10-PCS | Mod: 26,,, | Performed by: PATHOLOGY

## 2019-03-29 PROCEDURE — 88312 SPECIAL STAINS GROUP 1: CPT | Performed by: PATHOLOGY

## 2019-03-29 PROCEDURE — 88341 IMHCHEM/IMCYTCHM EA ADD ANTB: CPT | Mod: 26,,, | Performed by: PATHOLOGY

## 2019-03-29 PROCEDURE — 43239 EGD BIOPSY SINGLE/MULTIPLE: CPT | Performed by: INTERNAL MEDICINE

## 2019-03-29 PROCEDURE — 88305 TISSUE EXAM BY PATHOLOGIST: CPT | Performed by: PATHOLOGY

## 2019-03-29 RX ORDER — PROPOFOL 10 MG/ML
VIAL (ML) INTRAVENOUS CONTINUOUS PRN
Status: DISCONTINUED | OUTPATIENT
Start: 2019-03-29 | End: 2019-03-29

## 2019-03-29 RX ORDER — SODIUM CHLORIDE 0.9 % (FLUSH) 0.9 %
10 SYRINGE (ML) INJECTION
Status: DISCONTINUED | OUTPATIENT
Start: 2019-03-29 | End: 2019-03-29 | Stop reason: HOSPADM

## 2019-03-29 RX ORDER — PANTOPRAZOLE SODIUM 40 MG/1
40 TABLET, DELAYED RELEASE ORAL DAILY
Qty: 30 TABLET | Refills: 11 | Status: SHIPPED | OUTPATIENT
Start: 2019-03-29 | End: 2019-05-14

## 2019-03-29 RX ORDER — PROPOFOL 10 MG/ML
VIAL (ML) INTRAVENOUS
Status: DISCONTINUED | OUTPATIENT
Start: 2019-03-29 | End: 2019-03-29

## 2019-03-29 RX ORDER — MIDAZOLAM HYDROCHLORIDE 1 MG/ML
INJECTION, SOLUTION INTRAMUSCULAR; INTRAVENOUS
Status: DISCONTINUED | OUTPATIENT
Start: 2019-03-29 | End: 2019-03-29

## 2019-03-29 RX ORDER — SODIUM CHLORIDE 9 MG/ML
INJECTION, SOLUTION INTRAVENOUS CONTINUOUS
Status: DISCONTINUED | OUTPATIENT
Start: 2019-03-29 | End: 2019-03-29 | Stop reason: HOSPADM

## 2019-03-29 RX ORDER — LIDOCAINE HCL/PF 100 MG/5ML
SYRINGE (ML) INTRAVENOUS
Status: DISCONTINUED | OUTPATIENT
Start: 2019-03-29 | End: 2019-03-29

## 2019-03-29 RX ADMIN — MIDAZOLAM 2 MG: 1 INJECTION INTRAMUSCULAR; INTRAVENOUS at 11:03

## 2019-03-29 RX ADMIN — PROPOFOL 70 MG: 10 INJECTION, EMULSION INTRAVENOUS at 12:03

## 2019-03-29 RX ADMIN — SODIUM CHLORIDE: 0.9 INJECTION, SOLUTION INTRAVENOUS at 11:03

## 2019-03-29 RX ADMIN — LIDOCAINE HYDROCHLORIDE 60 MG: 20 INJECTION, SOLUTION INTRAVENOUS at 12:03

## 2019-03-29 RX ADMIN — PROPOFOL 150 MCG/KG/MIN: 10 INJECTION, EMULSION INTRAVENOUS at 12:03

## 2019-03-29 NOTE — TRANSFER OF CARE
Anesthesia Transfer of Care Note    Patient: Sachin Calloway    Procedure(s) Performed: Procedure(s) (LRB):  ESOPHAGOGASTRODUODENOSCOPY (EGD) (N/A)    Patient location: GI    Anesthesia Type: MAC    Transport from OR: Transported from OR on room air with adequate spontaneous ventilation    Post pain: adequate analgesia    Post assessment: no apparent anesthetic complications and tolerated procedure well    Post vital signs: stable    Level of consciousness: awake, alert and oriented    Nausea/Vomiting: no nausea/vomiting    Complications: none    Transfer of care protocol was followed      Last vitals:   Visit Vitals  BP (!) 163/80 (BP Location: Left arm, Patient Position: Lying)   Pulse 91   Temp 36.2 °C (97.2 °F) (Tympanic)   Resp 20   Ht 6' (1.829 m)   Wt 62.6 kg (138 lb)   SpO2 100%   BMI 18.72 kg/m²

## 2019-03-29 NOTE — ANESTHESIA POSTPROCEDURE EVALUATION
Anesthesia Post Evaluation    Patient: Sachin Calloway    Procedure(s) Performed: Procedure(s) (LRB):  ESOPHAGOGASTRODUODENOSCOPY (EGD) (N/A)    Final Anesthesia Type: MAC  Patient location during evaluation: GI PACU  Patient participation: Yes- Able to Participate  Level of consciousness: awake and alert and oriented  Post-procedure vital signs: reviewed and stable  Pain management: adequate  Airway patency: patent  PONV status at discharge: No PONV  Anesthetic complications: no      Cardiovascular status: blood pressure returned to baseline and hemodynamically stable  Respiratory status: unassisted, room air and spontaneous ventilation  Hydration status: euvolemic  Follow-up not needed.          Vitals Value Taken Time   /80 3/29/2019 11:16 AM   Temp 36.2 °C (97.2 °F) 3/29/2019 11:16 AM   Pulse 91 3/29/2019 11:16 AM   Resp 20 3/29/2019 11:16 AM   SpO2 100 % 3/29/2019 11:16 AM         No case tracking events are documented in the log.      Pain/Aretha Score: No data recorded

## 2019-03-29 NOTE — ANESTHESIA PREPROCEDURE EVALUATION
03/29/2019  Sachin Calloway is a 58 y.o., male for EGD under MAC    Past Medical History:   Diagnosis Date    Alcohol abuse     Hypertension     Seizures          Pre-op Assessment    I have reviewed the Patient Summary Reports.     I have reviewed the Nursing Notes.   I have reviewed the Medications.     Review of Systems  Social:  Alcohol Use, No Alcohol Use        Physical Exam  General:  Well nourished    Airway/Jaw/Neck:  Airway Findings: Mallampati: II      Chest/Lungs:  Chest/Lungs Clear    Heart/Vascular:  Heart Findings: Normal            Anesthesia Plan  Type of Anesthesia, risks & benefits discussed:  Anesthesia Type:  MAC  Patient's Preference:   Intra-op Monitoring Plan:   Intra-op Monitoring Plan Comments:   Post Op Pain Control Plan:   Post Op Pain Control Plan Comments:   Induction:    Beta Blocker:  Patient is not currently on a Beta-Blocker (No further documentation required).       Informed Consent:  Anesthesia consent signed with patient.  ASA Score: 3     Day of Surgery Review of History & Physical:            Ready For Surgery From Anesthesia Perspective.

## 2019-03-29 NOTE — DISCHARGE SUMMARY
Discharge Summary/Instructions after an Endoscopic Procedure    Patient Name: Sachin Calloway  Patient MRN: [id number]  Patient YOB: 1960 Friday, March 29, 2019  Dillan Salinas MD    RESTRICTIONS:  During your procedure today, you received medications for sedation.  These medications may affect your judgment, balance and coordination.  Therefore, for 24 hours, you have the following restrictions:     - DO NOT drive a car, operate machinery, make legal/financial decisions, sign important papers or drink alcohol.      ACTIVITY:  Today: no heavy lifting, straining or running due to procedural sedation/anesthesia.  The following day: return to full activity including work.    DIET:  Eat and drink normally unless instructed otherwise.     TREATMENT FOR COMMON SIDE EFFECTS:  - Mild abdominal pain, nausea, belching, bloating or excessive gas:  rest, eat lightly and use a heating pad.  - Sore Throat: treat with throat lozenges and/or gargle with warm salt water.  - Because air was used during the procedure, expelling large amounts of air from your rectum or belching is normal.  - If a bowel prep was taken, you may not have a bowel movement for 1-3 days.  This is normal.      SYMPTOMS TO WATCH FOR AND REPORT TO YOUR PHYSICIAN:  1. Abdominal pain or bloating, other than gas cramps.  2. Chest pain.  3. Back pain.  4. Signs of infection such as: chills or fever occurring within 24 hours after the procedure.  5. Rectal bleeding, which would show as bright red, maroon, or black stools. (A tablespoon of blood from the rectum is not serious, especially if hemorrhoids are present.)  6. Vomiting.  7. Weakness or dizziness.      GO DIRECTLY TO THE NEAREST EMERGENCY ROOM IF YOU HAVE ANY OF THE FOLLOWING:     Difficulty breathing              Chills and/or fever over 101 F   Persistent vomiting and/or vomiting blood   Severe abdominal pain   Severe chest pain   Black, tarry stools   Bleeding- more than one tablespoon   Any  other symptom or condition that you feel may need urgent attention    Your doctor recommends these additional instructions:  If any biopsies were taken, your doctors clinic will contact you in 1 to 2 weeks with any results.    - Discharge patient to home (ambulatory).   - Mechanical soft diet.   - Repeat upper endoscopy in 1 month for retreatment.   - Await pathology results.   - Use Protonix (pantoprazole) 40 mg PO daily.    For questions, problems or results please call your physician - Dillan Salinas MD at Work:  [phone].    EMERGENCY PHONE NUMBER: [phone],  LAB RESULTS: [phone]    IF A COMPLICATION OR EMERGENCY SITUATION ARISES AND YOU ARE UNABLE TO REACH YOUR PHYSICIAN - GO DIRECTLY TO THE EMERGENCY ROOM.

## 2019-03-29 NOTE — [employer] PATIENT INSTRUCTIONS
Discharge Summary/Instructions after an Endoscopic Procedure  Patient Name: Sachin Calloway  Patient MRN: [id number]  Patient YOB: 1960 Friday, March 29, 2019  Dillan Salinas MD  RESTRICTIONS:  During your procedure today, you received medications for sedation.  These   medications may affect your judgment, balance and coordination.  Therefore,   for 24 hours, you have the following restrictions:   - DO NOT drive a car, operate machinery, make legal/financial decisions,   sign important papers or drink alcohol.    ACTIVITY:  Today: no heavy lifting, straining or running due to procedural   sedation/anesthesia.  The following day: return to full activity including work.  DIET:  Eat and drink normally unless instructed otherwise.     TREATMENT FOR COMMON SIDE EFFECTS:  - Mild abdominal pain, nausea, belching, bloating or excessive gas:  rest,   eat lightly and use a heating pad.  - Sore Throat: treat with throat lozenges and/or gargle with warm salt   water.  - Because air was used during the procedure, expelling large amounts of air   from your rectum or belching is normal.  - If a bowel prep was taken, you may not have a bowel movement for 1-3 days.    This is normal.  SYMPTOMS TO WATCH FOR AND REPORT TO YOUR PHYSICIAN:  1. Abdominal pain or bloating, other than gas cramps.  2. Chest pain.  3. Back pain.  4. Signs of infection such as: chills or fever occurring within 24 hours   after the procedure.  5. Rectal bleeding, which would show as bright red, maroon, or black stools.   (A tablespoon of blood from the rectum is not serious, especially if   hemorrhoids are present.)  6. Vomiting.  7. Weakness or dizziness.  GO DIRECTLY TO THE NEAREST EMERGENCY ROOM IF YOU HAVE ANY OF THE FOLLOWING:      Difficulty breathing              Chills and/or fever over 101 F   Persistent vomiting and/or vomiting blood   Severe abdominal pain   Severe chest pain   Black, tarry stools   Bleeding- more than one  tablespoon   Any other symptom or condition that you feel may need urgent attention  Your doctor recommends these additional instructions:  If any biopsies were taken, your doctors clinic will contact you in 1 to 2   weeks with any results.  - Discharge patient to home (ambulatory).   - Mechanical soft diet.   - Repeat upper endoscopy in 1 month for retreatment.   - Await pathology results.   - Use Protonix (pantoprazole) 40 mg PO daily.  For questions, problems or results please call your physician - Dillan Salinas MD at Work:  [phone].  EMERGENCY PHONE NUMBER: [phone],  LAB RESULTS: [phone]  IF A COMPLICATION OR EMERGENCY SITUATION ARISES AND YOU ARE UNABLE TO REACH   YOUR PHYSICIAN - GO DIRECTLY TO THE EMERGENCY ROOM.  Dillan Salinas MD  3/29/2019 12:53:03 PM  This report has been verified and signed electronically.  [employer]

## 2019-03-29 NOTE — DISCHARGE INSTRUCTIONS
Soft Diet  Your healthcare provider has prescribed a soft diet (also called gastrointestinal soft diet). This means eating foods that are soft, low in fiber, and easy to digest. This diet is for people with digestive problems. A soft diet provides foods that are easy to chew and swallow. Foods should be bite-size and very soft or moist. Follow your healthcare providers specific instructions about what foods and drinks you may have. The general guidelines below can help you get started on this diet.       Beverages  OK: Milk, tea, coffee, fruit juices, carbonated beverages, nutrition shakes, and drinks (Note: Thin liquids may be hard to swallow. They may need to be thickened.)  Avoid: None, unless they need to be thickened  Breads and crackers  OK: Refined white, wheat, or seedless rye bread; carmen or soda crackers that have been moistened; plain rolls or bagels; very soft tortillas  Avoid: Whole-grain breads, rolls, or bagels with nuts, raisins, or seeds; crackers, croutons, taco shells  Cereals and grains  OK: Cooked cereals, plain dry cereals that have been moistened, plain macaroni, spaghetti, noodles, rice  Avoid: Whole-grain cereals and granola, or cereals containing bran, raisins, seeds or nuts; coconut; brown or wild rice  Desserts and sweets  OK: Moist cake; soft fruit pie with bottom crust only; soft cookies moistened in milk or other liquid; gelatin, custard, pudding, plain ice cream, plain sherbet, sugar, honey, clear jelly  Avoid: Pastries, desserts, and ice cream that have nuts, coconut, seeds, or dried fruit; popcorn; chips of any kind including potato chips and tortilla chips; jam, marmalade  Eggs and cheese  OK: Poached, soft boiled, or scrambled eggs; cottage cheese, ricotta cheese, cream cheese, cheese sauces, or cheese melted in other dishes  Avoid: Crisp fried eggs, cheese slices and cubes  Fruits  OK: Avocado, banana, baked peeled apple, applesauce, peeled ripe peaches or pears, canned fruit  (apricots, cherries, peaches, pears), melons  Avoid: Raw apple, dried fruits, coconut, pineapple, grapes, fruit jose, fruit snacks  Meat and fish  OK: All fresh meat, poultry, or fish that is cooked until tender  Avoid: Meat, fish, or poultry that is fried; tough or stringy meat including doss, sausage, bratwurst, jerky, corned beef  Other protein foods  OK: Tofu, baked beans, smooth peanut butter or other nut or seed butters  Avoid: Deep-fried tofu; crunchy peanut or other nut or seed butters; nuts or seeds that are whole or chopped  Soups  OK: All soups, but they may need to be thickened. Thin liquid may be too hard to swallow.  Avoid: Soups made with stringy meat pieces or chunky vegetables  Vegetables  OK: Peeled and well-cooked potatoes or sweet potatoes; fresh, cooked, canned, or frozen vegetables without seeds, skin, or coarse fiber  Avoid: Raw vegetables, deep-fried vegetables (such as tempura), and corn  Date Last Reviewed: 8/1/2016  © 4924-6206 [employer]. [address] [place] [address], [place] [address]. All rights reserved. This information is not intended as a substitute for professional medical care. Always follow your healthcare professional's instructions.

## 2019-03-29 NOTE — H&P
History & Physical - Short Stay  Gastroenterology      SUBJECTIVE:     Procedure: EGD    Chief Complaint/Indication for Procedure: Dysphagia    History of Present Illness:  Patient is a 58 y.o. male presents with dysphagia and esophageal stenosis dilated to 10 mm here for retreatment. Stated dysphagia improved.    PTA Medications   Medication Sig    acetaminophen-codeine 300-30mg (TYLENOL #3) 300-30 mg Tab Take by mouth.    ibuprofen (ADVIL,MOTRIN) 600 MG tablet Take 1 tablet (600 mg total) by mouth every 6 (six) hours as needed for Pain (take with food).       Review of patient's allergies indicates:   Allergen Reactions    Asa [aspirin] Nausea Only        Past Medical History:   Diagnosis Date    Alcohol abuse     Hypertension     Seizures      Past Surgical History:   Procedure Laterality Date    COLONOSCOPY      COLONOSCOPY N/A 5/5/2015    [facility] at [facility]    EGD with dilitation  03/04/2019    ESOPHAGOGASTRODUODENOSCOPY (EGD) N/A 3/18/2019    [facility] by [facility]ritchard [facility] at [facility]    ESOPHAGOGASTRODUODENOSCOPY (EGD) N/A 3/4/2019    [facility]formed by Madhu Schmidt [facility] at [facility]    ESOPHAGOGASTRODUODENOSCOPY (EGD) N/A 5/5/2015    Performed by [facility] at [facility]     Family History   Problem Relation Age of Onset    Cancer Father      Social History     Tobacco Use    Smoking status: Current Every Day Smoker     Packs/day: 0.50     Years: 40.00     Pack years: 20.00     Types: Cigarettes    Smokeless tobacco: Never Used   Substance Use Topics    Alcohol use: Yes     Comment: 6 pack every day (3-4)    Drug use: No       Review of Systems:  Constitutional: no fever or chills  Respiratory: no cough or shortness of breath  Cardiovascular: no chest pain or palpitations  Gastrointestinal: no nausea or vomiting, no abdominal pain or change in bowel habits    OBJECTIVE:     Vital Signs (Most Recent)  Temp: 97.2 °F (36.2 °C) (03/29/19 1116)  Pulse: 91 (03/29/19  1116)  Resp: 20 (03/29/19 1116)  BP: (!) 163/80 (03/29/19 1116)  SpO2: 100 % (03/29/19 1116)    Physical Exam:  General: well developed  Lungs:  normal respiratory effort  Heart: regular rate, S1, S2 normal  Abdomen: soft, non-tender non-distented; bowel sounds normal; no masses,  no organomegaly    Laboratory  CBC: No results for input(s): WBC, RBC, HGB, HCT, PLT, MCV, MCH, MCHC in the last 168 hours.  CMP: No results for input(s): GLU, CALCIUM, ALBUMIN, PROT, NA, K, CO2, CL, BUN, CREATININE, ALKPHOS, ALT, AST, BILITOT in the last 168 hours.  Coagulation: No results for input(s): LABPROT, INR, APTT in the last 168 hours.      Diagnostic Results:      ASSESSMENT/PLAN:     Dysphagia  Esophageal stenosis    Plan: EGD and possible esophageal dilation    Anesthesia Plan: MAC    ASA Grade: ASA 3 - Patient with moderate systemic disease with functional limitations     The impression and plan was discussed in detail with the patient and family. All questions have been answered and the patient voices understanding of our plan at this point. The risk of the procedure was discussed in detail which includes but not limited to bleeding, infection, perforation in some cases requiring surgery with its spectrum of complications.

## 2019-03-30 ENCOUNTER — TELEPHONE (OUTPATIENT)
Dept: ENDOSCOPY | Facility: HOSPITAL | Age: 59
End: 2019-03-30

## 2019-03-30 DIAGNOSIS — K22.2 ESOPHAGEAL STENOSIS: Primary | ICD-10-CM

## 2019-04-01 ENCOUNTER — TELEPHONE (OUTPATIENT)
Dept: GASTROENTEROLOGY | Facility: CLINIC | Age: 59
End: 2019-04-01

## 2019-04-01 NOTE — TELEPHONE ENCOUNTER
Post procedure Instructions: Repeat upper endoscopy in 1 month for retreatment. Please contact patient to schedule.

## 2019-04-03 ENCOUNTER — OUTSIDE PLACE OF SERVICE (OUTPATIENT)
Dept: CARDIOLOGY | Facility: CLINIC | Age: 59
End: 2019-04-03
Payer: MEDICARE

## 2019-04-03 PROCEDURE — 93010 ELECTROCARDIOGRAM REPORT: CPT | Mod: ,,, | Performed by: STUDENT IN AN ORGANIZED HEALTH CARE EDUCATION/TRAINING PROGRAM

## 2019-04-03 PROCEDURE — 93010 PR ELECTROCARDIOGRAM REPORT: ICD-10-PCS | Mod: ,,, | Performed by: STUDENT IN AN ORGANIZED HEALTH CARE EDUCATION/TRAINING PROGRAM

## 2019-04-09 ENCOUNTER — TELEPHONE (OUTPATIENT)
Dept: GASTROENTEROLOGY | Facility: CLINIC | Age: 59
End: 2019-04-09

## 2019-04-09 ENCOUNTER — TELEPHONE (OUTPATIENT)
Dept: ENDOSCOPY | Facility: HOSPITAL | Age: 59
End: 2019-04-09

## 2019-04-09 NOTE — TELEPHONE ENCOUNTER
----- Message from Dillan Salinas MD sent at 4/9/2019 10:08 AM CDT -----  Please let the patient know the biopsies of the esophagus are consistence with reflux. No evidence of eosinophilic esophagitis.

## 2019-04-25 ENCOUNTER — OFFICE VISIT (OUTPATIENT)
Dept: INTERNAL MEDICINE | Facility: CLINIC | Age: 59
End: 2019-04-25
Payer: MEDICARE

## 2019-04-25 VITALS
HEART RATE: 96 BPM | WEIGHT: 135.06 LBS | BODY MASS INDEX: 18.31 KG/M2 | SYSTOLIC BLOOD PRESSURE: 132 MMHG | DIASTOLIC BLOOD PRESSURE: 82 MMHG | OXYGEN SATURATION: 97 %

## 2019-04-25 DIAGNOSIS — R35.1 BENIGN PROSTATIC HYPERPLASIA WITH NOCTURIA: ICD-10-CM

## 2019-04-25 DIAGNOSIS — N49.2 CELLULITIS, SCROTUM: ICD-10-CM

## 2019-04-25 DIAGNOSIS — G40.909 SEIZURE DISORDER: ICD-10-CM

## 2019-04-25 DIAGNOSIS — J43.1 PANLOBULAR EMPHYSEMA: ICD-10-CM

## 2019-04-25 DIAGNOSIS — N40.1 BENIGN PROSTATIC HYPERPLASIA WITH NOCTURIA: ICD-10-CM

## 2019-04-25 DIAGNOSIS — R60.1 ANASARCA: Primary | ICD-10-CM

## 2019-04-25 DIAGNOSIS — R60.1 ANASARCA: ICD-10-CM

## 2019-04-25 DIAGNOSIS — F10.99 ALCOHOL RELATED DISORDER: ICD-10-CM

## 2019-04-25 PROCEDURE — 99213 OFFICE O/P EST LOW 20 MIN: CPT | Mod: PBBFAC,PN | Performed by: INTERNAL MEDICINE

## 2019-04-25 PROCEDURE — 99204 PR OFFICE/OUTPT VISIT, NEW, LEVL IV, 45-59 MIN: ICD-10-PCS | Mod: S$PBB,,, | Performed by: INTERNAL MEDICINE

## 2019-04-25 PROCEDURE — G0009 ADMIN PNEUMOCOCCAL VACCINE: HCPCS | Mod: PBBFAC,PN

## 2019-04-25 PROCEDURE — 99999 PR PBB SHADOW E&M-EST. PATIENT-LVL III: ICD-10-PCS | Mod: PBBFAC,,, | Performed by: INTERNAL MEDICINE

## 2019-04-25 PROCEDURE — 99204 OFFICE O/P NEW MOD 45 MIN: CPT | Mod: S$PBB,,, | Performed by: INTERNAL MEDICINE

## 2019-04-25 PROCEDURE — 99999 PR PBB SHADOW E&M-EST. PATIENT-LVL III: CPT | Mod: PBBFAC,,, | Performed by: INTERNAL MEDICINE

## 2019-04-25 RX ORDER — SPIRONOLACTONE 25 MG/1
25 TABLET ORAL DAILY
COMMUNITY
End: 2019-04-25

## 2019-04-25 RX ORDER — FUROSEMIDE 20 MG/1
20 TABLET ORAL 2 TIMES DAILY
COMMUNITY
End: 2019-04-25

## 2019-04-25 RX ORDER — FUROSEMIDE 80 MG/1
80 TABLET ORAL DAILY
Qty: 90 TABLET | Refills: 4 | Status: SHIPPED | OUTPATIENT
Start: 2019-04-25 | End: 2020-07-18

## 2019-04-25 RX ORDER — CEPHALEXIN 500 MG/1
500 CAPSULE ORAL EVERY 12 HOURS
Qty: 14 CAPSULE | Refills: 0 | Status: SHIPPED | OUTPATIENT
Start: 2019-04-25 | End: 2019-04-25 | Stop reason: SDUPTHER

## 2019-04-25 RX ORDER — SPIRONOLACTONE 50 MG/1
50 TABLET, FILM COATED ORAL DAILY
Qty: 90 TABLET | Refills: 3 | Status: SHIPPED | OUTPATIENT
Start: 2019-04-25 | End: 2019-04-25 | Stop reason: SDUPTHER

## 2019-04-25 RX ORDER — SPIRONOLACTONE 50 MG/1
50 TABLET, FILM COATED ORAL DAILY
Qty: 90 TABLET | Refills: 4 | Status: SHIPPED | OUTPATIENT
Start: 2019-04-25 | End: 2019-05-15 | Stop reason: SDUPTHER

## 2019-04-25 RX ORDER — CEPHALEXIN 500 MG/1
500 CAPSULE ORAL EVERY 12 HOURS
Qty: 14 CAPSULE | Refills: 4 | Status: SHIPPED | OUTPATIENT
Start: 2019-04-25 | End: 2019-05-30

## 2019-04-25 RX ORDER — FUROSEMIDE 80 MG/1
80 TABLET ORAL DAILY
Qty: 90 TABLET | Refills: 3 | Status: SHIPPED | OUTPATIENT
Start: 2019-04-25 | End: 2019-04-25 | Stop reason: SDUPTHER

## 2019-04-25 NOTE — PROGRESS NOTES
Subjective:       Patient ID: Sachin Calloway is a 58 y.o. male.    Chief Complaint: Fatigue (in lower extremity )    HPI  Establishing care.  Chart reviewed.  Pt c/o painful and swollen LEs.  Walks with a cane due to weakness.  Denies CP, SOB.  Thinks he has an infection on his scrotum which also is swollen.  Admits to 4 beers/d, smokes 1/2 ppd.  Has nocturia x 4.  No seizures in 2 years.  Review of Systems   All other systems reviewed and are negative.      Objective:      Physical Exam   Constitutional: He appears well-developed. No distress.   Appears cachectic   HENT:   Head: Normocephalic.   Eyes: EOM are normal. No scleral icterus.   Neck: Normal range of motion. No tracheal deviation present.   Cardiovascular: Normal rate, regular rhythm, normal heart sounds and intact distal pulses.   Pulmonary/Chest: Effort normal. No respiratory distress.   Poor BSs   Abdominal: He exhibits distension (ascites).   Genitourinary:   Genitourinary Comments: Scrotum with edema, cellulitis   Musculoskeletal: Normal range of motion. He exhibits edema (3+ LEs).   Neurological: He is alert.   Skin: Skin is warm and dry. No rash noted. He is not diaphoretic. No erythema.   Psychiatric: He has a normal mood and affect. His behavior is normal.   Vitals reviewed.      Assessment:       1. Anasarca    2. Seizure disorder    3. Panlobular emphysema    4. Benign prostatic hyperplasia with nocturia    5. Alcohol related disorder    6. Cellulitis, scrotum        Plan:       Sachin was seen today for fatigue.    Diagnoses and all orders for this visit:    Anasarca  -     CBC auto differential; Future  -     Comprehensive metabolic panel; Future  -     Hepatitis C antibody; Future  -     spironolactone (ALDACTONE) 50 MG tablet; Take 1 tablet (50 mg total) by mouth once daily.  -     furosemide (LASIX) 80 MG tablet; Take 1 tablet (80 mg total) by mouth once daily.    Seizure disorder    Panlobular emphysema  -     Pneumococcal Polysaccharide  Vaccine (23 Valent) (SQ/IM)  -     Ambulatory referral to Smoking Cessation Program    Benign prostatic hyperplasia with nocturia  -     Prostate Specific Antigen, Diagnostic; Future    Alcohol related disorder  -     CT Abdomen Pelvis  Without Contrast; Future    Cellulitis, scrotum  -     cephALEXin (KEFLEX) 500 MG capsule; Take 1 capsule (500 mg total) by mouth every 12 (twelve) hours. for 7 days      Follow up in about 1 week (around 5/2/2019).

## 2019-04-26 ENCOUNTER — HOSPITAL ENCOUNTER (OUTPATIENT)
Dept: RADIOLOGY | Facility: HOSPITAL | Age: 59
Discharge: HOME | End: 2019-04-26
Attending: INTERNAL MEDICINE
Payer: MEDICARE

## 2019-04-26 DIAGNOSIS — F10.99 ALCOHOL RELATED DISORDER: ICD-10-CM

## 2019-04-26 PROCEDURE — 74176 CT ABD & PELVIS W/O CONTRAST: CPT | Mod: TC,PO

## 2019-05-02 ENCOUNTER — OFFICE VISIT (OUTPATIENT)
Dept: INTERNAL MEDICINE | Facility: CLINIC | Age: 59
End: 2019-05-02
Payer: MEDICARE

## 2019-05-02 ENCOUNTER — LAB VISIT (OUTPATIENT)
Dept: LAB | Facility: HOSPITAL | Age: 59
End: 2019-05-02
Attending: INTERNAL MEDICINE
Payer: MEDICARE

## 2019-05-02 ENCOUNTER — TELEPHONE (OUTPATIENT)
Dept: INTERNAL MEDICINE | Facility: CLINIC | Age: 59
End: 2019-05-02

## 2019-05-02 VITALS
HEART RATE: 94 BPM | OXYGEN SATURATION: 96 % | WEIGHT: 127.75 LBS | BODY MASS INDEX: 17.3 KG/M2 | DIASTOLIC BLOOD PRESSURE: 84 MMHG | SYSTOLIC BLOOD PRESSURE: 122 MMHG | HEIGHT: 72 IN

## 2019-05-02 DIAGNOSIS — E87.6 HYPOKALEMIA: ICD-10-CM

## 2019-05-02 DIAGNOSIS — D50.9 MICROCYTIC ANEMIA: ICD-10-CM

## 2019-05-02 DIAGNOSIS — R63.4 WEIGHT LOSS: ICD-10-CM

## 2019-05-02 DIAGNOSIS — K70.31 ALCOHOLIC CIRRHOSIS OF LIVER WITH ASCITES: ICD-10-CM

## 2019-05-02 DIAGNOSIS — Z00.00 ROUTINE GENERAL MEDICAL EXAMINATION AT A HEALTH CARE FACILITY: Primary | ICD-10-CM

## 2019-05-02 DIAGNOSIS — R18.8 OTHER ASCITES: ICD-10-CM

## 2019-05-02 LAB
ALBUMIN SERPL BCP-MCNC: 2.1 G/DL (ref 3.5–5.2)
ALBUMIN SERPL BCP-MCNC: 2.1 G/DL (ref 3.5–5.2)
ALP SERPL-CCNC: 133 U/L (ref 38–126)
ALT SERPL W/O P-5'-P-CCNC: 20 U/L (ref 10–44)
ANION GAP SERPL CALC-SCNC: 6 MMOL/L (ref 8–16)
ANION GAP SERPL CALC-SCNC: 6 MMOL/L (ref 8–16)
ANISOCYTOSIS BLD QL SMEAR: SLIGHT
AST SERPL-CCNC: 28 U/L (ref 15–46)
BASOPHILS # BLD AUTO: 0.02 K/UL (ref 0–0.2)
BASOPHILS NFR BLD: 0.2 % (ref 0–1.9)
BILIRUB SERPL-MCNC: 0.6 MG/DL (ref 0.1–1)
BUN SERPL-MCNC: 7 MG/DL (ref 2–20)
BUN SERPL-MCNC: 7 MG/DL (ref 2–20)
CALCIUM SERPL-MCNC: 7.1 MG/DL (ref 8.7–10.5)
CALCIUM SERPL-MCNC: 7.1 MG/DL (ref 8.7–10.5)
CHLORIDE SERPL-SCNC: 93 MMOL/L (ref 95–110)
CHLORIDE SERPL-SCNC: 93 MMOL/L (ref 95–110)
CO2 SERPL-SCNC: 28 MMOL/L (ref 23–29)
CO2 SERPL-SCNC: 28 MMOL/L (ref 23–29)
CREAT SERPL-MCNC: 0.82 MG/DL (ref 0.5–1.4)
CREAT SERPL-MCNC: 0.82 MG/DL (ref 0.5–1.4)
DIFFERENTIAL METHOD: ABNORMAL
EOSINOPHIL # BLD AUTO: 0 K/UL (ref 0–0.5)
EOSINOPHIL NFR BLD: 0.1 % (ref 0–8)
ERYTHROCYTE [DISTWIDTH] IN BLOOD BY AUTOMATED COUNT: 14.4 % (ref 11.5–14.5)
EST. GFR  (AFRICAN AMERICAN): >60 ML/MIN/1.73 M^2
EST. GFR  (AFRICAN AMERICAN): >60 ML/MIN/1.73 M^2
EST. GFR  (NON AFRICAN AMERICAN): >60 ML/MIN/1.73 M^2
EST. GFR  (NON AFRICAN AMERICAN): >60 ML/MIN/1.73 M^2
GLUCOSE SERPL-MCNC: 91 MG/DL (ref 70–110)
GLUCOSE SERPL-MCNC: 91 MG/DL (ref 70–110)
HCT VFR BLD AUTO: 29.2 % (ref 40–54)
HGB BLD-MCNC: 10.1 G/DL (ref 14–18)
HYPOCHROMIA BLD QL SMEAR: ABNORMAL
INR PPP: 1.3 (ref 0.8–1.2)
IRON SERPL-MCNC: 38 UG/DL (ref 45–160)
LYMPHOCYTES # BLD AUTO: 1.5 K/UL (ref 1–4.8)
LYMPHOCYTES NFR BLD: 14.8 % (ref 18–48)
MCH RBC QN AUTO: 27.2 PG (ref 27–31)
MCHC RBC AUTO-ENTMCNC: 34.6 G/DL (ref 32–36)
MCV RBC AUTO: 79 FL (ref 82–98)
MONOCYTES # BLD AUTO: 0.8 K/UL (ref 0.3–1)
MONOCYTES NFR BLD: 8.1 % (ref 4–15)
NEUTROPHILS # BLD AUTO: 8 K/UL (ref 1.8–7.7)
NEUTROPHILS NFR BLD: 76.8 % (ref 38–73)
PHOSPHATE SERPL-MCNC: 3.5 MG/DL (ref 2.7–4.5)
PLATELET # BLD AUTO: 332 K/UL (ref 150–350)
PMV BLD AUTO: 9.9 FL (ref 9.2–12.9)
POIKILOCYTOSIS BLD QL SMEAR: SLIGHT
POTASSIUM SERPL-SCNC: 2.7 MMOL/L (ref 3.5–5.1)
POTASSIUM SERPL-SCNC: 2.7 MMOL/L (ref 3.5–5.1)
PROT SERPL-MCNC: 5.9 G/DL (ref 6–8.4)
PROTHROMBIN TIME: 14.3 SEC (ref 9–12.5)
RBC # BLD AUTO: 3.72 M/UL (ref 4.6–6.2)
SATURATED IRON: 29 % (ref 20–50)
SMUDGE CELLS BLD QL SMEAR: PRESENT
SODIUM SERPL-SCNC: 127 MMOL/L (ref 136–145)
SODIUM SERPL-SCNC: 127 MMOL/L (ref 136–145)
TARGETS BLD QL SMEAR: ABNORMAL
TOTAL IRON BINDING CAPACITY: 132 UG/DL (ref 250–450)
TRANSFERRIN SERPL-MCNC: 89 MG/DL (ref 200–375)
WBC # BLD AUTO: 10.41 K/UL (ref 3.9–12.7)

## 2019-05-02 PROCEDURE — 80069 RENAL FUNCTION PANEL: CPT | Mod: PO

## 2019-05-02 PROCEDURE — 99214 PR OFFICE/OUTPT VISIT, EST, LEVL IV, 30-39 MIN: ICD-10-PCS | Mod: S$PBB,,, | Performed by: INTERNAL MEDICINE

## 2019-05-02 PROCEDURE — 87340 HEPATITIS B SURFACE AG IA: CPT | Mod: PO

## 2019-05-02 PROCEDURE — 99999 PR PBB SHADOW E&M-EST. PATIENT-LVL IV: ICD-10-PCS | Mod: PBBFAC,,, | Performed by: INTERNAL MEDICINE

## 2019-05-02 PROCEDURE — 86706 HEP B SURFACE ANTIBODY: CPT | Mod: PO

## 2019-05-02 PROCEDURE — 99999 PR PBB SHADOW E&M-EST. PATIENT-LVL IV: CPT | Mod: PBBFAC,,, | Performed by: INTERNAL MEDICINE

## 2019-05-02 PROCEDURE — 83540 ASSAY OF IRON: CPT | Mod: PO

## 2019-05-02 PROCEDURE — 99214 OFFICE O/P EST MOD 30 MIN: CPT | Mod: PBBFAC,PN | Performed by: INTERNAL MEDICINE

## 2019-05-02 PROCEDURE — 80053 COMPREHEN METABOLIC PANEL: CPT | Mod: PO

## 2019-05-02 PROCEDURE — 85025 COMPLETE CBC W/AUTO DIFF WBC: CPT | Mod: PO

## 2019-05-02 PROCEDURE — 86803 HEPATITIS C AB TEST: CPT | Mod: PO

## 2019-05-02 PROCEDURE — 99214 OFFICE O/P EST MOD 30 MIN: CPT | Mod: S$PBB,,, | Performed by: INTERNAL MEDICINE

## 2019-05-02 PROCEDURE — 36415 COLL VENOUS BLD VENIPUNCTURE: CPT | Mod: PO

## 2019-05-02 PROCEDURE — 85610 PROTHROMBIN TIME: CPT | Mod: PO

## 2019-05-02 RX ORDER — POTASSIUM CHLORIDE 20 MEQ/1
20 TABLET, EXTENDED RELEASE ORAL DAILY
Qty: 90 TABLET | Refills: 3 | Status: SHIPPED | OUTPATIENT
Start: 2019-05-02

## 2019-05-02 NOTE — PROGRESS NOTES
Subjective:       Patient ID: Sachin Calloway is a 58 y.o. male.    Chief Complaint: Follow-up (1 week)    [facility]  Wellness check.  Chart reviewed.  Weight down 8 lbs/ 1 week.  Has much less LE edema, abdomen still protuberant.  Feels stronger.  No more EtOH, still smoking.  Review of Systems   All other systems reviewed and are negative.      Objective:      Physical Exam   Constitutional: He appears well-developed. No distress.   HENT:   Head: Normocephalic.   Eyes: EOM are normal. No scleral icterus.   Neck: Normal range of motion. No tracheal deviation present.   Cardiovascular: Normal rate, regular rhythm, normal heart sounds and intact distal pulses.   Pulmonary/Chest: Effort normal and breath sounds normal. No respiratory distress.   Abdominal: Soft. Bowel sounds are normal. He exhibits distension (anasarca). There is no tenderness.   Musculoskeletal: Normal range of motion. He exhibits edema (2+ LEs).   Neurological: He is alert.   Skin: Skin is warm and dry. No rash noted. He is not diaphoretic. No erythema.   Psychiatric: He has a normal mood and affect. His behavior is normal.   Vitals reviewed.      Assessment:       1. Routine general medical examination at a health care facility    2. Alcoholic cirrhosis of liver with ascites    3. Hypokalemia    4. Microcytic anemia        Plan:       Sachin was seen today for follow-up.    Diagnoses and all orders for this visit:    Routine general medical examination at a health care facility    Alcoholic cirrhosis of liver with ascites  -     Hepatitis B surface antibody; Future  -     Hepatitis B surface antigen; Future  -     Hepatitis C antibody; Future  -     Ambulatory referral to Gastroenterology    Hypokalemia  -     Renal function panel; Future    Microcytic anemia  -     CBC auto differential; Future  -     Iron and TIBC; Future      Follow up in about 1 month (around 6/2/2019).

## 2019-05-03 LAB
HBV SURFACE AB SER-ACNC: POSITIVE M[IU]/ML
HBV SURFACE AG SERPL QL IA: NEGATIVE
HCV AB SERPL QL IA: NEGATIVE

## 2019-05-04 ENCOUNTER — HOSPITAL ENCOUNTER (EMERGENCY)
Facility: HOSPITAL | Age: 59
Discharge: HOME | End: 2019-05-05
Attending: EMERGENCY MEDICINE
Payer: MEDICARE

## 2019-05-04 DIAGNOSIS — S76.012A STRAIN OF LEFT HIP, INITIAL ENCOUNTER: Primary | ICD-10-CM

## 2019-05-04 DIAGNOSIS — R52 PAIN: ICD-10-CM

## 2019-05-04 PROCEDURE — 96372 THER/PROPH/DIAG INJ SC/IM: CPT | Mod: ER

## 2019-05-04 PROCEDURE — 99283 EMERGENCY DEPT VISIT LOW MDM: CPT | Mod: 25,ER

## 2019-05-05 VITALS
DIASTOLIC BLOOD PRESSURE: 79 MMHG | BODY MASS INDEX: 18.96 KG/M2 | SYSTOLIC BLOOD PRESSURE: 130 MMHG | HEIGHT: 72 IN | WEIGHT: 140 LBS | HEART RATE: 90 BPM | OXYGEN SATURATION: 99 % | TEMPERATURE: 98 F | RESPIRATION RATE: 16 BRPM

## 2019-05-05 PROCEDURE — 63600175 PHARM REV CODE 636 W HCPCS: Mod: ER | Performed by: EMERGENCY MEDICINE

## 2019-05-05 PROCEDURE — 96372 THER/PROPH/DIAG INJ SC/IM: CPT | Mod: ER

## 2019-05-05 RX ORDER — KETOROLAC TROMETHAMINE 30 MG/ML
30 INJECTION, SOLUTION INTRAMUSCULAR; INTRAVENOUS
Status: COMPLETED | OUTPATIENT
Start: 2019-05-05 | End: 2019-05-05

## 2019-05-05 RX ORDER — KETOROLAC TROMETHAMINE 10 MG/1
10 TABLET, FILM COATED ORAL EVERY 8 HOURS
Qty: 15 TABLET | Refills: 0 | Status: SHIPPED | OUTPATIENT
Start: 2019-05-05 | End: 2019-06-05

## 2019-05-05 RX ADMIN — KETOROLAC TROMETHAMINE 30 MG: 30 INJECTION, SOLUTION INTRAMUSCULAR at 12:05

## 2019-05-05 NOTE — ED NOTES
Pt oob to wheel chair with assist by son and rn. Vss. D/c instructions given to son and patient both verbalize understanding

## 2019-05-05 NOTE — ED PROVIDER NOTES
Encounter Date: 5/4/2019       History     Chief Complaint   Patient presents with    Hip Pain     pt slipped down one step did not fall reports left hip pain exacab when attempting to stand no shortening external rotaion noted + movment + sensation     The history is provided by the patient and the EMS personnel.   Leg Pain    The incident occurred at home. The injury mechanism was torsion. The incident occurred just prior to arrival. The pain is present in the left hip. The quality of the pain is described as aching. The pain is at a severity of 3/10. The pain has been constant since onset. Pertinent negatives include no numbness, no inability to bear weight, no loss of motion, no muscle weakness and no loss of sensation. He reports no foreign bodies present. The symptoms are aggravated by bearing weight. He has tried nothing for the symptoms.     Review of patient's allergies indicates:   Allergen Reactions    Asa [aspirin] Nausea Only     Past Medical History:   Diagnosis Date    Alcohol abuse     Hypertension     Seizures      Past Surgical History:   Procedure Laterality Date    COLONOSCOPY      COLONOSCOPY N/A 5/5/2015    [facility]    EGD with dilitation  03/04/2019    ESOPHAGOGASTRODUODENOSCOPY (EGD) N/A 3/29/2019    [facility]inas [facility] at [facility]    ESOPHAGOGASTRODUODENOSCOPY (EGD) N/A 3/18/2019    [facility]chard [facility] at [facility]    ESOPHAGOGASTRODUODENOSCOPY (EGD) N/A 3/4/2019    [facility]formed by Madhu Schmidt [facility] at [facility]    ESOPHAGOGASTRODUODENOSCOPY (EGD) N/A 5/5/2015    Performed by [facility]nez [facility] at [facility]     Family History   Problem Relation Age of Onset    Cancer Father      Social History     Tobacco Use    Smoking status: Current Every Day Smoker     Packs/day: 0.50     Years: 40.00     Pack years: 20.00     Types: Cigarettes    Smokeless tobacco: Never Used   Substance Use Topics    Alcohol use: Yes      Comment: 6 pack every day (3-4)    Drug use: No     Review of Systems   Musculoskeletal: Positive for arthralgias.   Neurological: Negative for numbness.   All other systems reviewed and are negative.      Physical Exam     Initial Vitals [05/04/19 2341]   BP Pulse Resp Temp SpO2   129/82 95 18 97.8 °F (36.6 °C) 100 %      MAP       --         Physical Exam    Nursing note and vitals reviewed.  Constitutional: He appears well-developed and well-nourished.   HENT:   Head: Normocephalic and atraumatic.   Eyes: Conjunctivae and EOM are normal.   Neck: Normal range of motion. Neck supple.   Cardiovascular: Normal rate, regular rhythm and normal heart sounds.   Pulmonary/Chest: Breath sounds normal. He has no wheezes. He has no rhonchi. He has no rales.   Abdominal: Soft. There is no tenderness. There is no rebound and no guarding.   Musculoskeletal: Normal range of motion.        Left hip: Normal. He exhibits normal range of motion, normal strength, no tenderness, no bony tenderness, no swelling, no crepitus, no deformity and no laceration.   Neurological: He is alert and oriented to person, place, and time. GCS score is 15. GCS eye subscore is 4. GCS verbal subscore is 5. GCS motor subscore is 6.   Skin: Skin is warm and dry. Capillary refill takes less than 2 seconds.   Psychiatric: He has a normal mood and affect. His behavior is normal. Judgment and thought content normal.         ED Course   Procedures  Labs Reviewed - No data to display       Imaging Results          X-Ray Hip 2 View Left (Preliminary result)  Result time 05/05/19 00:20:27    ED Interpretation by Kellie Lee MD (05/05/19 00:20:27, [facility], Emergency Medicine)    No fracture or dislocation                            X-Rays:   Independently Interpreted Readings:   Other Readings:  No fracture or dislocation    Medical Decision Making:   Clinical Tests:   Radiological Study: Ordered and Reviewed                      Clinical  Impression:       ICD-10-CM ICD-9-CM   1. Strain of left hip, initial encounter S76.012A 843.9   2. Pain R52 780.96         Disposition:   Disposition: Discharged  Condition: Stable                        Kellie Lee MD  05/05/19 0035

## 2019-05-09 ENCOUNTER — TELEPHONE (OUTPATIENT)
Dept: ENDOSCOPY | Facility: HOSPITAL | Age: 59
End: 2019-05-09

## 2019-05-09 ENCOUNTER — OFFICE VISIT (OUTPATIENT)
Dept: GASTROENTEROLOGY | Facility: CLINIC | Age: 59
End: 2019-05-09
Payer: MEDICARE

## 2019-05-09 VITALS
DIASTOLIC BLOOD PRESSURE: 71 MMHG | SYSTOLIC BLOOD PRESSURE: 115 MMHG | BODY MASS INDEX: 15.53 KG/M2 | WEIGHT: 114.63 LBS | HEIGHT: 72 IN

## 2019-05-09 DIAGNOSIS — K70.31 ASCITES DUE TO ALCOHOLIC CIRRHOSIS: ICD-10-CM

## 2019-05-09 DIAGNOSIS — R13.10 DYSPHAGIA, UNSPECIFIED TYPE: ICD-10-CM

## 2019-05-09 DIAGNOSIS — K74.60 HEPATIC CIRRHOSIS, UNSPECIFIED HEPATIC CIRRHOSIS TYPE, UNSPECIFIED WHETHER ASCITES PRESENT: Primary | ICD-10-CM

## 2019-05-09 DIAGNOSIS — K22.2 ESOPHAGEAL STENOSIS: ICD-10-CM

## 2019-05-09 PROCEDURE — 99213 PR OFFICE/OUTPT VISIT, EST, LEVL III, 20-29 MIN: ICD-10-PCS | Mod: S$PBB,,, | Performed by: NURSE PRACTITIONER

## 2019-05-09 PROCEDURE — 99213 OFFICE O/P EST LOW 20 MIN: CPT | Mod: PBBFAC,PN | Performed by: NURSE PRACTITIONER

## 2019-05-09 PROCEDURE — 99213 OFFICE O/P EST LOW 20 MIN: CPT | Mod: S$PBB,,, | Performed by: NURSE PRACTITIONER

## 2019-05-09 PROCEDURE — 99999 PR PBB SHADOW E&M-EST. PATIENT-LVL III: CPT | Mod: PBBFAC,,, | Performed by: NURSE PRACTITIONER

## 2019-05-09 PROCEDURE — 99999 PR PBB SHADOW E&M-EST. PATIENT-LVL III: ICD-10-PCS | Mod: PBBFAC,,, | Performed by: NURSE PRACTITIONER

## 2019-05-09 NOTE — PROGRESS NOTES
Subjective:       Patient ID: Sachin Calloway is a 58 y.o. male.    Chief Complaint: Cirrhosis    HPI  Presents with ascites and findings consistent with cirrhosis on recent CT scan.  CT scan:  Thickening within the ascending and transverse colon concerning for infectious or inflammatory colitis.    Large volume of ascites.  Diffuse anasarca    See above for additional findings.  Evaluation of solid organ and vascular pathology is limited due to lack of IV contrast.    Up until one month ago, he drank at least 4 beers daily.  He has not had any alcohol by his report in one month.    He denies known history of liver disease.   He reports recent lower extremity edema improved with the addition of lasix and aldactone.  Potassium was low and supplement added.  He started this yesterday.    Denies nausea, vomiting, abdominal pain, decreased appetite, diarrhea, constipation, blood with bowel movements or black stools.    I saw him earlier this year with dysphagia complaints and he has had multiple EGDs since that time with sequential dilation.  He is scheduled for repeat EGD 5/20/19.  Most recent EGD 3/29/19:  - Normal oropharynx.                        - Benign-appearing esophageal stenosis. Dilated.                        - Abnormal esophageal motility, suspicious for                         aperistalsis.                        - Scarred (post esophagitis) mucosa in the                         esophagus. Biopsied.                        - Benign-appearing esophageal stenosis. Bleeding                         treated with tip of a snare.                        - Small hiatal hernia.                        - Gastritis.                        - Normal examined duodenum.  Pathology:  FINAL PATHOLOGIC DIAGNOSIS  1. Distal esophagus, biopsy:  - Mild acute esophagitis with focal mucosal ulceration, see comment  Comment: The specimen shows acute erosive esophagitis with attached fibrinous exudate. A GMS stain was  performed and  is negative for fungus. Specialized columnar epithelium diagnostic of Smallwood's esophagus is not  identified. Immunohistochemical stains for HSV I and CMV are negative for viral inclusions. There is no evidence  of dysplasia or malignancy.  2. Proximal esophagus, biopsy:  - Squamous mucosa with features of mild gastroesophageal reflux disease  Comment: Evidence of Smallwood's esophagus or eosinophilic esophagitis is not identified. Viral inclusions are not  identified. There is no evidence of dysplasia or malignancy.    He had colonoscopy in 2015 with diverticulosis.    Recent labs with normal WBC,  platelets, LFT.    Labs reviewed.  CBC:   Ref Range & Units 7d ago 13d ago   WBC 3.90 - 12.70 K/uL 10.41  11.64    RBC 4.60 - 6.20 M/uL 3.72Low   3.83Low     Hemoglobin 14.0 - 18.0 g/dL 10.1Low   10.7Low     Hematocrit 40.0 - 54.0 % 29.2Low   30.7Low     Mean Corpuscular Volume 82 - 98 fL 79Low   80Low     Mean Corpuscular Hemoglobin 27.0 - 31.0 pg 27.2  27.9    Mean Corpuscular Hemoglobin Conc 32.0 - 36.0 g/dL 34.6  34.9    RDW 11.5 - 14.5 % 14.4  14.8High     Platelets 150 - 350 K/uL 332  391High     MPV 9.2 - 12.9 fL 9.9  10.1      PT/INR:   Ref Range & Units 7d ago 4yr ago   Prothrombin Time 9.0 - 12.5 sec 14.3High   10.2    INR 0.8 - 1.2 1.3High   1.0 CM     CMP:   7d ago  (5/2/19) 7d ago  (5/2/19)    Sodium 136 - 145 mmol/L 127Low   127Low     Potassium 3.5 - 5.1 mmol/L 2.7Low Panic   2.7Low Panic     Comment: K   critical result(s) called and verbal readback obtained from   Pat Powell (L.P.N.), 05/02/2019 13:33    Chloride 95 - 110 mmol/L 93Low   93Low     CO2 23 - 29 mmol/L 28  28    Glucose 70 - 110 mg/dL 91  91    BUN, Bld 2 - 20 mg/dL 7  7    Creatinine 0.50 - 1.40 mg/dL 0.82  0.82    Calcium 8.7 - 10.5 mg/dL 7.1Low   7.1Low     Total Protein 6.0 - 8.4 g/dL 5.9Low      Albumin 3.5 - 5.2 g/dL 2.1Low   2.1Low     Total Bilirubin 0.1 - 1.0 mg/dL 0.6     Comment: For infants and newborns, interpretation of  results should be based   on gestational age, weight and in agreement with clinical   observations.   Premature Infant recommended reference ranges:   Up to 24 hours.............<8.0 mg/dL   Up to 48 hours............<12.0 mg/dL   3-5 days..................<15.0 mg/dL   6-29 days.................<15.0 mg/dL    Alkaline Phosphatase 38 - 126 U/L 133High      AST 15 - 46 U/L 28     ALT 10 - 44 U/L 20     Anion Gap 8 - 16 mmol/L 6Low   6Low       He and family member present deny any confusion, memory loss, falls.  NO fever.    Review of Systems   Constitutional: Negative.  Negative for appetite change, fatigue, fever and unexpected weight change.   HENT: Positive for trouble swallowing (improved with dilation, occurs less frequently and resolves more quickly).    Respiratory: Negative for shortness of breath.    Cardiovascular: Positive for leg swelling. Negative for chest pain.   Gastrointestinal: Positive for abdominal distention. Negative for abdominal pain, blood in stool, constipation, diarrhea, nausea and vomiting.   Genitourinary: Negative.    Skin: Negative.    Neurological: Negative.    Psychiatric/Behavioral: Negative.        Objective:      Physical Exam   Constitutional: He is oriented to person, place, and time. No distress.   thin   Eyes: No scleral icterus.   Cardiovascular: Normal rate.   Pulmonary/Chest: Effort normal.   Abdominal: Soft. Bowel sounds are normal. He exhibits distension and ascites. He exhibits no mass. There is no tenderness. There is no guarding.   Musculoskeletal: He exhibits edema.   Neurological: He is alert and oriented to person, place, and time.   Skin: Skin is warm and dry. He is not diaphoretic.   Psychiatric: He has a normal mood and affect. His behavior is normal. Thought content normal.   Vitals reviewed.      Assessment:       1. Hepatic cirrhosis, unspecified hepatic cirrhosis type, unspecified whether ascites present    2. Ascites due to alcoholic cirrhosis    3.  Dysphagia, unspecified type    4. Esophageal stenosis        Plan:         Sachin was seen today for cirrhosis.    Diagnoses and all orders for this visit:    Hepatic cirrhosis, unspecified hepatic cirrhosis type, unspecified whether ascites present  -     Comprehensive metabolic panel; Future  -     AFP tumor marker; Future  -     US Abdomen Complete; Future  -     Ambulatory Referral to Hepatology    Ascites due to alcoholic cirrhosis    Dysphagia, unspecified type    Esophageal stenosis    Will consider paracentesis after labs and US.      We have discussed the possibility of cirrhosis, the function of the liver, the need for complete abstinence from alcohol, the potential for liver transplant.    Referral to hepatology to establish with transplant/cirrhosis clinic.

## 2019-05-09 NOTE — LETTER
May 9, 2019      Juan Gatica MD  [address]  [address]  [place] [address]           [place] [facility]  [address]  [place] [address]  Phone: [phone]  Fax: [phone]          Patient: Sachin Calloway   MR Number: [id number]   YOB: 1960   Date of Visit: 5/9/2019       Dear Dr. Juan Gatica:    Thank you for referring Sachin Calloway to me for evaluation. Attached you will find relevant portions of my assessment and plan of care.    If you have questions, please do not hesitate to call me. I look forward to following Sachin Calloway along with you.    Sincerely,    Merari Mota, YOLANDA    Enclosure  CC:  No Recipients    If you would like to receive this communication electronically, please contact [email or web address] or [phone] to request more information on [employer] Link access.    For providers and/or their staff who would like to refer a patient to [facility], please contact us through our one-stop-shop provider referral line, [facility], at [phone].    If you feel you have received this communication in error or would no longer like to receive these types of communications, please e-mail [email or web address]

## 2019-05-09 NOTE — TELEPHONE ENCOUNTER
Left message instructing patient to call dept @ [phone] between 8am-4pm.    Arrival time to be given @ [phone]

## 2019-05-10 ENCOUNTER — TELEPHONE (OUTPATIENT)
Dept: ENDOSCOPY | Facility: HOSPITAL | Age: 59
End: 2019-05-10

## 2019-05-13 ENCOUNTER — ANESTHESIA (OUTPATIENT)
Dept: ENDOSCOPY | Facility: HOSPITAL | Age: 59
End: 2019-05-13
Payer: MEDICARE

## 2019-05-13 ENCOUNTER — HOSPITAL ENCOUNTER (OUTPATIENT)
Facility: HOSPITAL | Age: 59
Discharge: HOME | End: 2019-05-13
Attending: INTERNAL MEDICINE | Admitting: INTERNAL MEDICINE
Payer: MEDICARE

## 2019-05-13 ENCOUNTER — ANESTHESIA EVENT (OUTPATIENT)
Dept: ENDOSCOPY | Facility: HOSPITAL | Age: 59
End: 2019-05-13
Payer: MEDICARE

## 2019-05-13 VITALS
OXYGEN SATURATION: 100 % | WEIGHT: 140 LBS | RESPIRATION RATE: 16 BRPM | SYSTOLIC BLOOD PRESSURE: 134 MMHG | DIASTOLIC BLOOD PRESSURE: 87 MMHG | TEMPERATURE: 98 F | HEART RATE: 105 BPM | BODY MASS INDEX: 18.96 KG/M2 | HEIGHT: 72 IN

## 2019-05-13 DIAGNOSIS — K22.2 ESOPHAGEAL STRICTURE: ICD-10-CM

## 2019-05-13 LAB
ANION GAP SERPL CALC-SCNC: 6 MMOL/L (ref 8–16)
BUN SERPL-MCNC: 7 MG/DL (ref 6–20)
CALCIUM SERPL-MCNC: 8.1 MG/DL (ref 8.7–10.5)
CHLORIDE SERPL-SCNC: 97 MMOL/L (ref 95–110)
CO2 SERPL-SCNC: 24 MMOL/L (ref 23–29)
CREAT SERPL-MCNC: 0.7 MG/DL (ref 0.5–1.4)
EST. GFR  (AFRICAN AMERICAN): >60 ML/MIN/1.73 M^2
EST. GFR  (NON AFRICAN AMERICAN): >60 ML/MIN/1.73 M^2
GLUCOSE SERPL-MCNC: 83 MG/DL (ref 70–110)
POTASSIUM SERPL-SCNC: 4.8 MMOL/L (ref 3.5–5.1)
SODIUM SERPL-SCNC: 127 MMOL/L (ref 136–145)

## 2019-05-13 PROCEDURE — 27200946 HC BRUSH, CYTOLOGY: Performed by: INTERNAL MEDICINE

## 2019-05-13 PROCEDURE — 43239 EGD BIOPSY SINGLE/MULTIPLE: CPT | Mod: ,,, | Performed by: INTERNAL MEDICINE

## 2019-05-13 PROCEDURE — 88112 CYTOLOGY SPECIMEN- MEDICAL CYTOLOGY (FLUID/WASH/BRUSH): ICD-10-PCS | Mod: 26,,, | Performed by: PATHOLOGY

## 2019-05-13 PROCEDURE — 25000003 PHARM REV CODE 250: Performed by: INTERNAL MEDICINE

## 2019-05-13 PROCEDURE — 27201012 HC FORCEPS, HOT/COLD, DISP: Performed by: INTERNAL MEDICINE

## 2019-05-13 PROCEDURE — 43239 PR EGD, FLEX, W/BIOPSY, SGL/MULTI: ICD-10-PCS | Mod: ,,, | Performed by: INTERNAL MEDICINE

## 2019-05-13 PROCEDURE — 88305 TISSUE EXAM BY PATHOLOGIST: CPT | Performed by: PATHOLOGY

## 2019-05-13 PROCEDURE — 63600175 PHARM REV CODE 636 W HCPCS: Performed by: NURSE ANESTHETIST, CERTIFIED REGISTERED

## 2019-05-13 PROCEDURE — 88112 CYTOPATH CELL ENHANCE TECH: CPT | Performed by: PATHOLOGY

## 2019-05-13 PROCEDURE — 43239 EGD BIOPSY SINGLE/MULTIPLE: CPT | Performed by: INTERNAL MEDICINE

## 2019-05-13 PROCEDURE — 88305 TISSUE EXAM BY PATHOLOGIST: CPT | Mod: 26,,, | Performed by: PATHOLOGY

## 2019-05-13 PROCEDURE — 80048 BASIC METABOLIC PNL TOTAL CA: CPT

## 2019-05-13 PROCEDURE — 43235 EGD DIAGNOSTIC BRUSH WASH: CPT | Performed by: INTERNAL MEDICINE

## 2019-05-13 PROCEDURE — 88112 CYTOPATH CELL ENHANCE TECH: CPT | Mod: 26,,, | Performed by: PATHOLOGY

## 2019-05-13 PROCEDURE — 37000008 HC ANESTHESIA 1ST 15 MINUTES: Performed by: INTERNAL MEDICINE

## 2019-05-13 PROCEDURE — 88305 TISSUE SPECIMEN TO PATHOLOGY - SURGERY: ICD-10-PCS | Mod: 26,,, | Performed by: PATHOLOGY

## 2019-05-13 PROCEDURE — 36415 COLL VENOUS BLD VENIPUNCTURE: CPT

## 2019-05-13 PROCEDURE — 37000009 HC ANESTHESIA EA ADD 15 MINS: Performed by: INTERNAL MEDICINE

## 2019-05-13 RX ORDER — PROPOFOL 10 MG/ML
VIAL (ML) INTRAVENOUS
Status: DISCONTINUED | OUTPATIENT
Start: 2019-05-13 | End: 2019-05-13

## 2019-05-13 RX ORDER — PANTOPRAZOLE SODIUM 40 MG/1
40 TABLET, DELAYED RELEASE ORAL DAILY
Qty: 30 TABLET | Refills: 11 | Status: SHIPPED | OUTPATIENT
Start: 2019-05-13 | End: 2020-05-12

## 2019-05-13 RX ORDER — SODIUM CHLORIDE 0.9 % (FLUSH) 0.9 %
10 SYRINGE (ML) INJECTION
Status: DISCONTINUED | OUTPATIENT
Start: 2019-05-13 | End: 2019-05-13 | Stop reason: HOSPADM

## 2019-05-13 RX ORDER — SODIUM CHLORIDE 9 MG/ML
INJECTION, SOLUTION INTRAVENOUS CONTINUOUS
Status: DISCONTINUED | OUTPATIENT
Start: 2019-05-13 | End: 2019-05-13 | Stop reason: HOSPADM

## 2019-05-13 RX ORDER — PROPOFOL 10 MG/ML
VIAL (ML) INTRAVENOUS CONTINUOUS PRN
Status: DISCONTINUED | OUTPATIENT
Start: 2019-05-13 | End: 2019-05-13

## 2019-05-13 RX ORDER — MIDAZOLAM HYDROCHLORIDE 1 MG/ML
INJECTION, SOLUTION INTRAMUSCULAR; INTRAVENOUS
Status: DISCONTINUED | OUTPATIENT
Start: 2019-05-13 | End: 2019-05-13

## 2019-05-13 RX ORDER — LIDOCAINE HCL/PF 100 MG/5ML
SYRINGE (ML) INTRAVENOUS
Status: DISCONTINUED | OUTPATIENT
Start: 2019-05-13 | End: 2019-05-13

## 2019-05-13 RX ADMIN — SODIUM CHLORIDE: 0.9 INJECTION, SOLUTION INTRAVENOUS at 01:05

## 2019-05-13 RX ADMIN — MIDAZOLAM 1 MG: 1 INJECTION INTRAMUSCULAR; INTRAVENOUS at 01:05

## 2019-05-13 RX ADMIN — PROPOFOL 50 MG: 10 INJECTION, EMULSION INTRAVENOUS at 01:05

## 2019-05-13 RX ADMIN — PROPOFOL 125 MCG/KG/MIN: 10 INJECTION, EMULSION INTRAVENOUS at 01:05

## 2019-05-13 RX ADMIN — LIDOCAINE HYDROCHLORIDE 50 MG: 20 INJECTION, SOLUTION INTRAVENOUS at 01:05

## 2019-05-13 NOTE — PLAN OF CARE
PIV discontinued with catheter intact. PIV insertion site clean, dry, and intact with no signs/symptoms of redness or swelling. Pressure dressing applied with gauze and coban. Instructed patient to keep dressing on for at least 20-30 minutes and he verbalized understanding.

## 2019-05-13 NOTE — ANESTHESIA PREPROCEDURE EVALUATION
05/13/2019  Sachin Calloway is a 58 y.o., male for EGD under MAC    Past Medical History:   Diagnosis Date    Alcohol abuse     Hypertension     Seizures    cirrhosis  Ascites  anemia    Vitals:    05/13/19 1311   BP: 127/83   Pulse: (!) 113   Resp: 16   Temp: 36.4 °C (97.5 °F)         Pre-op Assessment    I have reviewed the Patient Summary Reports.     I have reviewed the Nursing Notes.   I have reviewed the Medications.     Review of Systems  Anesthesia Hx:   Denies Personal Hx of Anesthesia complications.   Social:  Alcohol Use, No Alcohol Use        Physical Exam  General:  Well nourished    Airway/Jaw/Neck:  Airway Findings: Mallampati: II      Chest/Lungs:  Chest/Lungs Clear    Heart/Vascular:  Heart Findings: Normal          Lab Results   Component Value Date    WBC 10.41 05/02/2019    HGB 10.1 (L) 05/02/2019    HCT 29.2 (L) 05/02/2019     05/02/2019    CHOL 148 03/27/2015    TRIG 94 03/27/2015    HDL 60 03/27/2015    ALT 20 05/02/2019    AST 28 05/02/2019     (L) 05/13/2019    K 4.8 05/13/2019    CL 97 05/13/2019    CREATININE 0.7 05/13/2019    BUN 7 05/13/2019    CO2 24 05/13/2019    TSH 0.893 03/15/2015    PSA 0.27 03/27/2015    INR 1.3 (H) 05/02/2019         Anesthesia Plan  Type of Anesthesia, risks & benefits discussed:  Anesthesia Type:  MAC  Patient's Preference:   Intra-op Monitoring Plan:   Intra-op Monitoring Plan Comments:   Post Op Pain Control Plan:   Post Op Pain Control Plan Comments:   Induction:    Beta Blocker:  Patient is not currently on a Beta-Blocker (No further documentation required).       Informed Consent: Patient understands risks and agrees with Anesthesia plan.  Questions answered. Anesthesia consent signed with patient.  ASA Score: 3     Day of Surgery Review of History & Physical:        Anesthesia Plan Notes: Pt has been hyponatremic for several weeks,  proceed with procedure as pt's stricture is interfering with oral intake. Pt will follow up with PCP today or tomorrow to address hyponatremia.  Potassium is normal.        Ready For Surgery From Anesthesia Perspective.

## 2019-05-13 NOTE — DISCHARGE SUMMARY
Discharge Summary/Instructions after an Endoscopic Procedure    Patient Name: Sachin Calloway  Patient MRN: [id number]  Patient YOB: 1960    Monday, May 13, 2019  Dillan Salinas MD    RESTRICTIONS:  During your procedure today, you received medications for sedation.  These medications may affect your judgment, balance and coordination.  Therefore, for 24 hours, you have the following restrictions:     - DO NOT drive a car, operate machinery, make legal/financial decisions, sign important papers or drink alcohol.      ACTIVITY:  Today: no heavy lifting, straining or running due to procedural sedation/anesthesia.  The following day: return to full activity including work.    DIET:  Eat and drink normally unless instructed otherwise.     TREATMENT FOR COMMON SIDE EFFECTS:  - Mild abdominal pain, nausea, belching, bloating or excessive gas:  rest, eat lightly and use a heating pad.  - Sore Throat: treat with throat lozenges and/or gargle with warm salt water.  - Because air was used during the procedure, expelling large amounts of air from your rectum or belching is normal.  - If a bowel prep was taken, you may not have a bowel movement for 1-3 days.  This is normal.      SYMPTOMS TO WATCH FOR AND REPORT TO YOUR PHYSICIAN:  1. Abdominal pain or bloating, other than gas cramps.  2. Chest pain.  3. Back pain.  4. Signs of infection such as: chills or fever occurring within 24 hours after the procedure.  5. Rectal bleeding, which would show as bright red, maroon, or black stools. (A tablespoon of blood from the rectum is not serious, especially if hemorrhoids are present.)  6. Vomiting.  7. Weakness or dizziness.      GO DIRECTLY TO THE NEAREST EMERGENCY ROOM IF YOU HAVE ANY OF THE FOLLOWING:     Difficulty breathing              Chills and/or fever over 101 F   Persistent vomiting and/or vomiting blood   Severe abdominal pain   Severe chest pain   Black, tarry stools   Bleeding- more than one tablespoon   Any  other symptom or condition that you feel may need urgent attention    Your doctor recommends these additional instructions:  If any biopsies were taken, your doctors clinic will contact you in 1 to 2 weeks with any results.    - Discharge patient to home (ambulatory).   - Await pathology results.   - No aspirin, ibuprofen, naproxen, or other non-steroidal anti-inflammatory drugs.   - Use Protonix (pantoprazole) 40 mg PO daily.   - Mechanical soft diet.   - Repeat upper endoscopy in 12 weeks to check healing.   - Awaiting brushing for Candidiasis.    For questions, problems or results please call your physician - Dillan Salinas MD at Work:  [phone].    EMERGENCY PHONE NUMBER: [phone],  LAB RESULTS: [phone]    IF A COMPLICATION OR EMERGENCY SITUATION ARISES AND YOU ARE UNABLE TO REACH YOUR PHYSICIAN - GO DIRECTLY TO THE EMERGENCY ROOM.

## 2019-05-13 NOTE — TRANSFER OF CARE
Anesthesia Transfer of Care Note    Patient: Sachin Calloway    Procedure(s) Performed: Procedure(s) (LRB):  EGD (ESOPHAGOGASTRODUODENOSCOPY) (N/A)    Patient location: GI    Anesthesia Type: MAC    Transport from OR: Transported from OR on room air with adequate spontaneous ventilation    Post pain: adequate analgesia    Post assessment: no apparent anesthetic complications    Post vital signs: stable    Level of consciousness: awake    Nausea/Vomiting: no nausea/vomiting    Complications: none    Transfer of care protocol was followed      Last vitals:   Visit Vitals  /83   Pulse (!) 113   Temp 36.4 °C (97.5 °F)   Resp 16   Ht 6' (1.829 m)   Wt 63.5 kg (140 lb)   SpO2 100%   BMI 18.99 kg/m²

## 2019-05-13 NOTE — [employer] PATIENT INSTRUCTIONS
Discharge Summary/Instructions after an Endoscopic Procedure  Patient Name: Sachin Calloway  Patient MRN: [id number]  Patient YOB: 1960  Monday, May 13, 2019  Dillan Salinas MD  RESTRICTIONS:  During your procedure today, you received medications for sedation.  These   medications may affect your judgment, balance and coordination.  Therefore,   for 24 hours, you have the following restrictions:   - DO NOT drive a car, operate machinery, make legal/financial decisions,   sign important papers or drink alcohol.    ACTIVITY:  Today: no heavy lifting, straining or running due to procedural   sedation/anesthesia.  The following day: return to full activity including work.  DIET:  Eat and drink normally unless instructed otherwise.     TREATMENT FOR COMMON SIDE EFFECTS:  - Mild abdominal pain, nausea, belching, bloating or excessive gas:  rest,   eat lightly and use a heating pad.  - Sore Throat: treat with throat lozenges and/or gargle with warm salt   water.  - Because air was used during the procedure, expelling large amounts of air   from your rectum or belching is normal.  - If a bowel prep was taken, you may not have a bowel movement for 1-3 days.    This is normal.  SYMPTOMS TO WATCH FOR AND REPORT TO YOUR PHYSICIAN:  1. Abdominal pain or bloating, other than gas cramps.  2. Chest pain.  3. Back pain.  4. Signs of infection such as: chills or fever occurring within 24 hours   after the procedure.  5. Rectal bleeding, which would show as bright red, maroon, or black stools.   (A tablespoon of blood from the rectum is not serious, especially if   hemorrhoids are present.)  6. Vomiting.  7. Weakness or dizziness.  GO DIRECTLY TO THE NEAREST EMERGENCY ROOM IF YOU HAVE ANY OF THE FOLLOWING:      Difficulty breathing              Chills and/or fever over 101 F   Persistent vomiting and/or vomiting blood   Severe abdominal pain   Severe chest pain   Black, tarry stools   Bleeding- more than one tablespoon   Any  other symptom or condition that you feel may need urgent attention  Your doctor recommends these additional instructions:  If any biopsies were taken, your doctors clinic will contact you in 1 to 2   weeks with any results.  - Discharge patient to home (ambulatory).   - Await pathology results.   - No aspirin, ibuprofen, naproxen, or other non-steroidal anti-inflammatory   drugs.   - Use Protonix (pantoprazole) 40 mg PO daily.   - Mechanical soft diet.   - Repeat upper endoscopy in 12 weeks to check healing.   - Awaiting brushing for Candidiasis.  For questions, problems or results please call your physician - Dillan Salinas MD at Work:  [phone].  EMERGENCY PHONE NUMBER: [phone],  LAB RESULTS: [phone]  IF A COMPLICATION OR EMERGENCY SITUATION ARISES AND YOU ARE UNABLE TO REACH   YOUR PHYSICIAN - GO DIRECTLY TO THE EMERGENCY ROOM.  Dillan Salinas MD  5/13/2019 2:07:26 PM  This report has been verified and signed electronically.  [employer]

## 2019-05-13 NOTE — PLAN OF CARE
Recovery complete. Patient recovered to baseline. Discharge instructions reviewed with patient and his family. All verbalized understanding. Patient's VSS and no complaints of pain or discomfort noted. Dr. Salinas spoke with the patient at bedside. Patient ready for discharge.

## 2019-05-13 NOTE — ANESTHESIA POSTPROCEDURE EVALUATION
Anesthesia Post Evaluation    Patient: Sachin Calloway    Procedure(s) Performed: Procedure(s) (LRB):  EGD (ESOPHAGOGASTRODUODENOSCOPY) (N/A)    Final Anesthesia Type: MAC  Patient location during evaluation: GI PACU  Patient participation: Yes- Able to Participate  Level of consciousness: awake and alert  Post-procedure vital signs: reviewed and stable  Pain management: adequate  Airway patency: patent  PONV status at discharge: No PONV  Anesthetic complications: no      Cardiovascular status: blood pressure returned to baseline and hemodynamically stable  Respiratory status: unassisted, spontaneous ventilation and room air  Hydration status: euvolemic  Follow-up not needed.          Vitals Value Taken Time   /83 5/13/2019  1:11 PM   Temp 36.4 °C (97.5 °F) 5/13/2019  1:11 PM   Pulse 113 5/13/2019  1:11 PM   Resp 16 5/13/2019  1:11 PM   SpO2 100 % 5/13/2019  1:11 PM         No case tracking events are documented in the log.      Pain/Aretha Score: No data recorded

## 2019-05-13 NOTE — H&P
History & Physical - Short Stay  Gastroenterology      SUBJECTIVE:     Procedure: EGD    Chief Complaint/Indication for Procedure: Dysphagia    History of Present Illness:  Patient is a 58 y.o. male presents with severe esophageal stenosis here for retreatment.     PTA Medications   Medication Sig    acetaminophen-codeine 300-30mg (TYLENOL #3) 300-30 mg Tab Take by mouth.    cephALEXin (KEFLEX) 500 MG capsule Take 1 capsule (500 mg total) by mouth every 12 (twelve) hours.    furosemide (LASIX) 80 MG tablet Take 1 tablet (80 mg total) by mouth once daily.    ketorolac (TORADOL) 10 mg tablet Take 1 tablet (10 mg total) by mouth every 8 (eight) hours.    pantoprazole (PROTONIX) 40 MG tablet Take 1 tablet (40 mg total) by mouth once daily.    potassium chloride SA (K-DUR,KLOR-CON) 20 MEQ tablet Take 1 tablet (20 mEq total) by mouth once daily.    spironolactone (ALDACTONE) 50 MG tablet Take 1 tablet (50 mg total) by mouth once daily.       Review of patient's allergies indicates:   Allergen Reactions    Asa [aspirin] Nausea Only        Past Medical History:   Diagnosis Date    Alcohol abuse     Hypertension     Seizures      Past Surgical History:   Procedure Laterality Date    COLONOSCOPY      COLONOSCOPY N/A 5/5/2015    [facility]    EGD with dilitation  03/04/2019    ESOPHAGOGASTRODUODENOSCOPY (EGD) N/A 3/29/2019    [facility] at [facility] END[facility]    ESOPHAGOGASTRODUODENOSCOPY (EGD) N/A 3/18/2019    [facility] by [facility]ritchard [facility] at [facility] END[facility]    ESOPHAGOGASTRODUODENOSCOPY (EGD) N/A 3/4/2019    [facility]formed by Madhu Schmidt [facility] at [facility] END[facility]    ESOPHAGOGASTRODUODENOSCOPY (EGD) N/A 5/5/2015    Per[facility] by [facility] at [facility] ENDO     Family History   Problem Relation Age of Onset    Cancer Father      Social History     Tobacco Use    Smoking status: Current Every Day Smoker     Packs/day: 0.50     Years: 40.00     Pack years: 20.00     Types:  Cigarettes    Smokeless tobacco: Never Used   Substance Use Topics    Alcohol use: Yes     Comment: 6 pack every day (3-4)    Drug use: No       Review of Systems:  Respiratory: no cough or shortness of breath  Cardiovascular: no chest pain or palpitations    OBJECTIVE:     Vital Signs (Most Recent)       Physical Exam:  General: appears older than stated age, cachectic  Lungs:  normal respiratory effort  Heart: regular rate, S1, S2 normal  Abdomen: normal findings: soft, non-tender and abnormal findings:  distended    Laboratory  CBC: No results for input(s): WBC, RBC, HGB, HCT, PLT, MCV, MCH, MCHC in the last 168 hours.  CMP:   Recent Labs   Lab 05/13/19  1212   GLU 83   CALCIUM 8.1*   *   K 4.8   CO2 24   CL 97   BUN 7   CREATININE 0.7     Coagulation: No results for input(s): LABPROT, INR, APTT in the last 168 hours.      Diagnostic Results:      ASSESSMENT/PLAN:     Esophageal stenosis    Plan: EGD    Anesthesia Plan: MAC    ASA Grade: ASA 3 - Patient with moderate systemic disease with functional limitations     The impression and plan was discussed in detail with the patient and family. All questions have been answered and the patient voices understanding of our plan at this point. The risk of the procedure was discussed in detail which includes but not limited to bleeding, infection, perforation in some cases requiring surgery with its spectrum of complications.

## 2019-05-14 ENCOUNTER — OFFICE VISIT (OUTPATIENT)
Dept: INTERNAL MEDICINE | Facility: CLINIC | Age: 59
End: 2019-05-14
Payer: MEDICARE

## 2019-05-14 VITALS
SYSTOLIC BLOOD PRESSURE: 108 MMHG | DIASTOLIC BLOOD PRESSURE: 64 MMHG | BODY MASS INDEX: 14.11 KG/M2 | WEIGHT: 104.19 LBS | HEART RATE: 100 BPM | HEIGHT: 72 IN | OXYGEN SATURATION: 98 %

## 2019-05-14 DIAGNOSIS — J43.1 PANLOBULAR EMPHYSEMA: ICD-10-CM

## 2019-05-14 DIAGNOSIS — G40.909 SEIZURE DISORDER: ICD-10-CM

## 2019-05-14 DIAGNOSIS — R60.1 ANASARCA: ICD-10-CM

## 2019-05-14 DIAGNOSIS — K70.31 ALCOHOLIC CIRRHOSIS OF LIVER WITH ASCITES: Primary | ICD-10-CM

## 2019-05-14 DIAGNOSIS — L89.152 PRESSURE INJURY OF SACRAL REGION, STAGE 2: ICD-10-CM

## 2019-05-14 PROCEDURE — 99213 OFFICE O/P EST LOW 20 MIN: CPT | Mod: PBBFAC,PN | Performed by: INTERNAL MEDICINE

## 2019-05-14 PROCEDURE — 99214 OFFICE O/P EST MOD 30 MIN: CPT | Mod: S$PBB,,, | Performed by: INTERNAL MEDICINE

## 2019-05-14 PROCEDURE — 99999 PR PBB SHADOW E&M-EST. PATIENT-LVL III: ICD-10-PCS | Mod: PBBFAC,,, | Performed by: INTERNAL MEDICINE

## 2019-05-14 PROCEDURE — 99999 PR PBB SHADOW E&M-EST. PATIENT-LVL III: CPT | Mod: PBBFAC,,, | Performed by: INTERNAL MEDICINE

## 2019-05-14 PROCEDURE — 99214 PR OFFICE/OUTPT VISIT, EST, LEVL IV, 30-39 MIN: ICD-10-PCS | Mod: S$PBB,,, | Performed by: INTERNAL MEDICINE

## 2019-05-14 NOTE — PROGRESS NOTES
Subjective:       Patient ID: Sachin Calloway is a 58 y.o. male.    Chief Complaint: Follow-up    HPI  Recheck. Chart reviewed.  Underwent an EGD yesterday,gastric bx pending.  No varices seen.  Weight down 23 lbs/ 12 d.  C/O development of a bedsore and blisters on R foot.  Feels weak.  No EtOH.  No T-C activity.  Review of Systems   All other systems reviewed and are negative.      Objective:      Physical Exam   Constitutional: No distress.   cachectic   HENT:   Head: Normocephalic.   Eyes: EOM are normal. No scleral icterus.   Neck: Normal range of motion. No tracheal deviation present.   Cardiovascular: Normal rate, regular rhythm, normal heart sounds and intact distal pulses.   Pulmonary/Chest: Effort normal. No respiratory distress.   Poor BSs   Abdominal: He exhibits distension (ascites).   Musculoskeletal: Normal range of motion. He exhibits edema (3+ to thighs).   Neurological: He is alert.   Skin: Skin is warm and dry. No rash noted. He is not diaphoretic. No erythema.   Stage 2 sacral decubitus  Burst and intact blisters R foot     Psychiatric: He has a normal mood and affect. His behavior is normal.   Vitals reviewed.      Assessment:       1. Alcoholic cirrhosis of liver with ascites    2. Anasarca    3. Seizure disorder    4. Panlobular emphysema    5. Pressure injury of sacral region, stage 2        Plan:       Sachin was seen today for follow-up.    Diagnoses and all orders for this visit:    Alcoholic cirrhosis of liver with ascites   Per GI    Anasarca   Cont rx    Seizure disorder   Quiescent    Panlobular emphysema   Stable    Pressure injury of sacral region, stage 2  -     Ambulatory referral to Home Health      Follow up if symptoms worsen or fail to improve.

## 2019-05-15 ENCOUNTER — TELEPHONE (OUTPATIENT)
Dept: INTERNAL MEDICINE | Facility: CLINIC | Age: 59
End: 2019-05-15

## 2019-05-15 ENCOUNTER — TELEPHONE (OUTPATIENT)
Dept: ENDOSCOPY | Facility: HOSPITAL | Age: 59
End: 2019-05-15

## 2019-05-15 DIAGNOSIS — R60.1 ANASARCA: ICD-10-CM

## 2019-05-15 DIAGNOSIS — R13.19 ESOPHAGEAL DYSPHAGIA: Primary | ICD-10-CM

## 2019-05-15 RX ORDER — SPIRONOLACTONE 50 MG/1
50 TABLET, FILM COATED ORAL DAILY
Qty: 90 TABLET | Refills: 4 | Status: ON HOLD | OUTPATIENT
Start: 2019-05-15 | End: 2019-06-25 | Stop reason: HOSPADM

## 2019-05-16 RX ORDER — FLUCONAZOLE 40 MG/ML
200 POWDER, FOR SUSPENSION ORAL DAILY
Qty: 70 ML | Refills: 0 | Status: SHIPPED | OUTPATIENT
Start: 2019-05-16 | End: 2019-05-30

## 2019-05-17 ENCOUNTER — TELEPHONE (OUTPATIENT)
Dept: ENDOSCOPY | Facility: HOSPITAL | Age: 59
End: 2019-05-17

## 2019-05-17 ENCOUNTER — HOSPITAL ENCOUNTER (OUTPATIENT)
Dept: RADIOLOGY | Facility: HOSPITAL | Age: 59
Discharge: HOME | End: 2019-05-17
Attending: NURSE PRACTITIONER
Payer: MEDICARE

## 2019-05-17 DIAGNOSIS — K74.60 HEPATIC CIRRHOSIS, UNSPECIFIED HEPATIC CIRRHOSIS TYPE, UNSPECIFIED WHETHER ASCITES PRESENT: ICD-10-CM

## 2019-05-17 PROCEDURE — 76700 US EXAM ABDOM COMPLETE: CPT | Mod: TC,PO

## 2019-05-17 NOTE — TELEPHONE ENCOUNTER
----- Message from Dillan Salinas MD sent at 5/17/2019  9:47 AM CDT -----  Please let the patient know the biopsies of the ulcer showed inflammation. The biopsies of the esophagus showed reflux esophagitis.

## 2019-05-17 NOTE — TELEPHONE ENCOUNTER
----- Message from Dillan Salinas MD sent at 5/16/2019 12:19 PM CDT -----  Please let the patient know the esophagus brushing showed Candida. I send a prescription for Diflucan 200 mg (5 ml ) orally for 14 days to [facility].  Thanks  Dillan Salinas MD

## 2019-05-21 ENCOUNTER — TELEPHONE (OUTPATIENT)
Dept: GASTROENTEROLOGY | Facility: CLINIC | Age: 59
End: 2019-05-21

## 2019-05-21 DIAGNOSIS — K70.31 ASCITES DUE TO ALCOHOLIC CIRRHOSIS: Primary | ICD-10-CM

## 2019-05-21 NOTE — TELEPHONE ENCOUNTER
Please call to give him number to schedule paracentesis.      Also, I referred to hepatology for cirrhosis.  Does he have that appt yet?  He needs to schedule if we can help him.

## 2019-05-22 ENCOUNTER — TELEPHONE (OUTPATIENT)
Dept: GASTROENTEROLOGY | Facility: CLINIC | Age: 59
End: 2019-05-22

## 2019-05-22 NOTE — TELEPHONE ENCOUNTER
Spoke with patients daughter and was able to give her the US results. Paracentesis test scheduled for Monday. Scheduling rep called patient and set up.

## 2019-05-23 ENCOUNTER — DOCUMENTATION ONLY (OUTPATIENT)
Dept: TRANSPLANT | Facility: CLINIC | Age: 59
End: 2019-05-23

## 2019-05-23 NOTE — LETTER
May 23, 2019    Sachin Calloway      Dear Dr. Calloway    Patient: Sachin Calloway   MR Number: [id number]   YOB: 1960     Thank you for the referral of Sachin Calloway to the [facility]. An initial appointment will be scheduled for your patient with one of our Hepatologists.      Thank you again for your trust in our program.  If there is anything we can do for you or your staff, please feel free to contact us.        Sincerely,        [facility]  [address] [place] [id number]  [phone]

## 2019-05-23 NOTE — NURSING
Pt records reviewed.   Pt will be referred to Hepatology.    Initial referral received  from the workque.   Referring Provider/diagnosis   COLLIN ROMAN   Diagnosis: Hepatic cirrhosis, unspecified hepatic cirrhosis type, unspecified whether ascites present     MELD        Referral letter sent to patient.

## 2019-05-24 ENCOUNTER — TELEPHONE (OUTPATIENT)
Dept: INTERVENTIONAL RADIOLOGY/VASCULAR | Facility: HOSPITAL | Age: 59
End: 2019-05-24

## 2019-05-24 ENCOUNTER — TELEPHONE (OUTPATIENT)
Dept: TRANSPLANT | Facility: CLINIC | Age: 59
End: 2019-05-24

## 2019-05-24 ENCOUNTER — TELEPHONE (OUTPATIENT)
Dept: FAMILY MEDICINE | Facility: CLINIC | Age: 59
End: 2019-05-24

## 2019-05-24 NOTE — TELEPHONE ENCOUNTER
----- Message from Georgette Singh sent at 5/24/2019 12:46 PM CDT -----  Regarding: REFERRAL  All, Mr. Calloway states that he does not have transportation to get to any of our [facility] locations offering Hep appointments, therefore; he declined scheduling at this time.    Georgette,    ----- Message -----  From: Alvina Mcintosh  Sent: 5/23/2019   4:19 PM  To: Hepatology Scheduling    Pt records reviewed.   Pt will be referred to Hepatology.    Initial referral received  from the workque.   Referring Provider/diagnosis   COLLIN ROMAN  Diagnosis: Hepatic cirrhosis, unspecified hepatic cirrhosis type, unspecified whether ascites present    MELD        Referral letter sent to patient.

## 2019-05-24 NOTE — TELEPHONE ENCOUNTER
----- Message from Amadeo Sheriff sent at 5/24/2019  8:13 AM CDT -----  Contact: lindsay with [facility] [phone]  Caller requested to speak with the nurse about changing the wound care orders for the patient right foot because its getting worse. Please call.

## 2019-05-27 ENCOUNTER — HOSPITAL ENCOUNTER (OUTPATIENT)
Facility: HOSPITAL | Age: 59
Discharge: HOME | End: 2019-05-27
Attending: INTERNAL MEDICINE | Admitting: INTERNAL MEDICINE
Payer: MEDICARE

## 2019-05-27 VITALS
HEART RATE: 105 BPM | HEIGHT: 72 IN | RESPIRATION RATE: 15 BRPM | WEIGHT: 104 LBS | DIASTOLIC BLOOD PRESSURE: 59 MMHG | TEMPERATURE: 98 F | SYSTOLIC BLOOD PRESSURE: 133 MMHG | BODY MASS INDEX: 14.09 KG/M2 | OXYGEN SATURATION: 100 %

## 2019-05-27 DIAGNOSIS — K70.31 ASCITES DUE TO ALCOHOLIC CIRRHOSIS: ICD-10-CM

## 2019-05-27 LAB
APPEARANCE FLD: NORMAL
BODY FLD TYPE: NORMAL
COLOR FLD: YELLOW
GRAM STN SPEC: NORMAL
GRAM STN SPEC: NORMAL
LYMPHOCYTES NFR FLD MANUAL: 14 %
MONOS+MACROS NFR FLD MANUAL: 54 %
NEUTROPHILS NFR FLD MANUAL: 32 %
WBC # FLD: 113 /CU MM

## 2019-05-27 PROCEDURE — 88112 CYTOLOGY SPECIMEN- MEDICAL CYTOLOGY (FLUID/WASH/BRUSH): ICD-10-PCS | Mod: 26,,, | Performed by: PATHOLOGY

## 2019-05-27 PROCEDURE — 89051 BODY FLUID CELL COUNT: CPT

## 2019-05-27 PROCEDURE — 87116 MYCOBACTERIA CULTURE: CPT

## 2019-05-27 PROCEDURE — 87102 FUNGUS ISOLATION CULTURE: CPT

## 2019-05-27 PROCEDURE — 88305 CYTOLOGY SPECIMEN- MEDICAL CYTOLOGY (FLUID/WASH/BRUSH): ICD-10-PCS | Mod: 26,,, | Performed by: PATHOLOGY

## 2019-05-27 PROCEDURE — 88305 TISSUE EXAM BY PATHOLOGIST: CPT | Performed by: PATHOLOGY

## 2019-05-27 PROCEDURE — 87206 SMEAR FLUORESCENT/ACID STAI: CPT

## 2019-05-27 PROCEDURE — 88112 CYTOPATH CELL ENHANCE TECH: CPT | Mod: 26,,, | Performed by: PATHOLOGY

## 2019-05-27 PROCEDURE — 87075 CULTR BACTERIA EXCEPT BLOOD: CPT

## 2019-05-27 PROCEDURE — 87070 CULTURE OTHR SPECIMN AEROBIC: CPT

## 2019-05-27 PROCEDURE — 88305 TISSUE EXAM BY PATHOLOGIST: CPT | Mod: 26,,, | Performed by: PATHOLOGY

## 2019-05-27 PROCEDURE — 87205 SMEAR GRAM STAIN: CPT

## 2019-05-27 NOTE — DISCHARGE SUMMARY
Interventional Radiology Discharge Summary      Hospital Course: No complications    Admit Date: 5/27/2019  Discharge Date: 05/27/2019     Instructions Given to Patient: Yes  Diet: Resume prior diet  Activity: Activity as tolerated    Description of Condition on Discharge: Stable  Vital Signs (Most Recent): Temp: 97.7 °F (36.5 °C) (05/27/19 0926)  Pulse: 105 (05/27/19 1005)  Resp: 15 (05/27/19 1005)  BP: (!) 133/59 (05/27/19 1005)  SpO2: 100 % (05/27/19 1005)    Discharge Disposition: Home    Discharge Diagnosis: Ascites s/p paracentesis today     Follow-up: With referring provider      [facility]  Pager [phone]

## 2019-05-27 NOTE — NURSING
Discharge instructions given to pt, given opportunity for questions, no questions asked. Pt discharged to home with belongings accompanied by family.

## 2019-05-27 NOTE — H&P
Interventional Radiology Pre-Procedure History & Physical      Chief Complaint/Reason for Referral: Ascites    History of Present Illness:  Sachin Calloway is a 58 y.o. male who presents with ascites. Referred to IR for paracentesis.    Past Medical History:   Diagnosis Date    Alcohol abuse     Cirrhosis     Edema     Hypertension     Seizures     Stricture esophagus      Past Surgical History:   Procedure Laterality Date    COLONOSCOPY      COLONOSCOPY N/A 5/5/2015    [facility] at [facility]    EGD (ESOPHAGOGASTRODUODENOSCOPY) N/A 5/13/2019    Per[facility] by [facility]inas MD at [facility] ENDO    EGD with dilitation  03/04/2019    ESOPHAGOGASTRODUODENOSCOPY (EGD) N/A 3/29/2019    Per[facility] by [facility] at [facility] ENDO    ESOPHAGOGASTRODUODENOSCOPY (EGD) N/A 3/18/2019    Performed by [facility]ritchard MD at [facility] ENDO    ESOPHAGOGASTRODUODENOSCOPY (EGD) N/A 3/4/2019    Performed by Madhu Schmidt MD at [facility] ENDO    ESOPHAGOGASTRODUODENOSCOPY (EGD) N/A 5/5/2015    Performed by Mario [facility]nez MD at [facility] ENDO       Allergies:   Review of patient's allergies indicates:   Allergen Reactions    Asa [aspirin] Nausea Only       Home Meds:   Prior to Admission medications    Medication Sig Start Date End Date Taking? Authorizing Provider   acetaminophen-codeine 300-30mg (TYLENOL #3) 300-30 mg Tab Take by mouth.    Historical Provider, MD   cephALEXin (KEFLEX) 500 MG capsule Take 1 capsule (500 mg total) by mouth every 12 (twelve) hours. 4/25/19 5/30/19  Juan Gatica MD   fluconazole 40 mg/ml (DIFLUCAN) 40 mg/mL suspension Take 5 mLs (200 mg total) by mouth once daily. for 14 days 5/16/19 5/30/19  Dillan Salinas MD   furosemide (LASIX) 80 MG tablet Take 1 tablet (80 mg total) by mouth once daily. 4/25/19 7/18/20  Juan Gatica MD   ketorolac (TORADOL) 10 mg tablet Take 1 tablet (10 mg total) by mouth every 8 (eight) hours. 5/5/19   Kellie Lee MD   pantoprazole (PROTONIX) 40  MG tablet Take 1 tablet (40 mg total) by mouth once daily. 5/13/19 5/12/20  Dillan Salinas MD   potassium chloride SA (K-DUR,KLOR-CON) 20 MEQ tablet Take 1 tablet (20 mEq total) by mouth once daily. 5/2/19   Juan Gatica MD   spironolactone (ALDACTONE) 50 MG tablet Take 1 tablet (50 mg total) by mouth once daily. 5/15/19 8/7/20  Juan Gatica MD       Anticoagulation/Antiplatelet Meds: no anticoagulation    Review of Systems:   Hematological: no known coagulopathies  Respiratory: no shortness of breath  Cardiovascular: no chest pain  Gastrointestinal: no abdominal pain  Genitourinary: no dysuria  Musculoskeletal: negative  Neurological: no TIA or stroke symptoms     Physical Exam:       General: WNWD, NAD  HEENT: Normocephalic, sclera anicteric, oropharynx clear  Neck: Supple, no palpable lymphadenopathy  Heart: RRR  Lungs: Symmetric excursions, breathing unlabored  Abd: Moderately distended, NT, soft  Extremities: BLE edema  Neuro: Gross nonfocal    Laboratory:  Lab Results   Component Value Date    INR 1.3 (H) 05/02/2019       Lab Results   Component Value Date    WBC 10.41 05/02/2019    HGB 10.1 (L) 05/02/2019    HCT 29.2 (L) 05/02/2019    MCV 79 (L) 05/02/2019     05/02/2019      Lab Results   Component Value Date    GLU 83 05/13/2019     (L) 05/13/2019    K 4.8 05/13/2019    CL 97 05/13/2019    CO2 24 05/13/2019    BUN 7 05/13/2019    CREATININE 0.7 05/13/2019    CALCIUM 8.1 (L) 05/13/2019    MG 2.1 03/18/2015    ALT 20 05/02/2019    AST 28 05/02/2019    ALBUMIN 2.1 (L) 05/02/2019    ALBUMIN 2.1 (L) 05/02/2019    BILITOT 0.6 05/02/2019     Assessment/Plan:  58 y.o. male with ascites for paracentesis today.    Sedation plan: None    Risks (including, but not limited to, pain, bleeding, infection, damage to nearby structures, failure to remove all of the fluid, and the need for additional procedures), benefits, and alternatives were discussed with the patient. All questions were answered to  the best of my abilities. The patient wishes to proceed with fluid. Written informed consent was obtained.      Roberto Carlos Diaz MD  [facility]  Pager [phone]

## 2019-05-27 NOTE — PROCEDURES
Interventional Radiology Post-Procedure Note    Pre Op Diagnosis: Ascitew  Post Op Diagnosis: Same    Procedure: Paracentesis    Procedure performed by: Joe    Written Informed Consent Obtained: Yes  Specimen Sent: Yes  Estimated Blood Loss: Minimal    Findings:   Moderate ascites. 2050 mL cloudy light yellow fluid removed from the RLQ. Specimen sent.    No immediate complications. Patient tolerated procedure well. Please see full dictated procedure report for additional details and recommendations.      Roberto Carlos Diaz MD  [facility]  Pager [phone]

## 2019-05-29 ENCOUNTER — TELEPHONE (OUTPATIENT)
Dept: GASTROENTEROLOGY | Facility: CLINIC | Age: 59
End: 2019-05-29

## 2019-05-29 LAB — BACTERIA SPEC AEROBE CULT: NO GROWTH

## 2019-05-29 NOTE — TELEPHONE ENCOUNTER
Cirrhosis with recent paracentesis.  Referral made to hepatology.  He can not get to this appt.  Please make him a FU appt at next available in [facility] for cirrhosis with ascites.

## 2019-05-31 ENCOUNTER — TELEPHONE (OUTPATIENT)
Dept: ADMINISTRATIVE | Facility: CLINIC | Age: 59
End: 2019-05-31

## 2019-06-03 LAB — BACTERIA SPEC ANAEROBE CULT: NORMAL

## 2019-06-05 ENCOUNTER — TELEPHONE (OUTPATIENT)
Dept: CARDIOLOGY | Facility: CLINIC | Age: 59
End: 2019-06-05

## 2019-06-05 ENCOUNTER — TELEPHONE (OUTPATIENT)
Dept: FAMILY MEDICINE | Facility: CLINIC | Age: 59
End: 2019-06-05

## 2019-06-05 ENCOUNTER — OFFICE VISIT (OUTPATIENT)
Dept: FAMILY MEDICINE | Facility: CLINIC | Age: 59
End: 2019-06-05
Payer: MEDICARE

## 2019-06-05 ENCOUNTER — EXTERNAL HOME HEALTH (OUTPATIENT)
Dept: HOME HEALTH SERVICES | Facility: HOSPITAL | Age: 59
End: 2019-06-05

## 2019-06-05 VITALS
BODY MASS INDEX: 14.09 KG/M2 | WEIGHT: 104.06 LBS | SYSTOLIC BLOOD PRESSURE: 84 MMHG | OXYGEN SATURATION: 95 % | TEMPERATURE: 99 F | HEART RATE: 94 BPM | DIASTOLIC BLOOD PRESSURE: 62 MMHG | HEIGHT: 72 IN

## 2019-06-05 DIAGNOSIS — K22.2 ESOPHAGEAL STENOSIS: ICD-10-CM

## 2019-06-05 DIAGNOSIS — S91.331D PENETRATING WOUND OF RIGHT FOOT, SUBSEQUENT ENCOUNTER: Primary | ICD-10-CM

## 2019-06-05 DIAGNOSIS — R60.1 ANASARCA: ICD-10-CM

## 2019-06-05 DIAGNOSIS — E43 SEVERE PROTEIN-CALORIE MALNUTRITION: ICD-10-CM

## 2019-06-05 DIAGNOSIS — I73.9 PAD (PERIPHERAL ARTERY DISEASE): ICD-10-CM

## 2019-06-05 DIAGNOSIS — R13.10 DYSPHAGIA, UNSPECIFIED TYPE: ICD-10-CM

## 2019-06-05 DIAGNOSIS — K70.31 ALCOHOLIC CIRRHOSIS OF LIVER WITH ASCITES: ICD-10-CM

## 2019-06-05 DIAGNOSIS — J43.1 PANLOBULAR EMPHYSEMA: ICD-10-CM

## 2019-06-05 DIAGNOSIS — L89.150 PRESSURE INJURY OF SACRAL REGION, UNSTAGEABLE: ICD-10-CM

## 2019-06-05 DIAGNOSIS — S42.034S CLOSED NONDISPLACED FRACTURE OF ACROMIAL END OF RIGHT CLAVICLE, SEQUELA: ICD-10-CM

## 2019-06-05 DIAGNOSIS — D50.9 MICROCYTIC ANEMIA: ICD-10-CM

## 2019-06-05 DIAGNOSIS — K22.2 ESOPHAGEAL STRICTURE: ICD-10-CM

## 2019-06-05 PROCEDURE — 99214 PR OFFICE/OUTPT VISIT, EST, LEVL IV, 30-39 MIN: ICD-10-PCS | Mod: S$GLB,,, | Performed by: FAMILY MEDICINE

## 2019-06-05 PROCEDURE — 99214 OFFICE O/P EST MOD 30 MIN: CPT | Mod: S$GLB,,, | Performed by: FAMILY MEDICINE

## 2019-06-05 NOTE — TELEPHONE ENCOUNTER
Good Morning!  Dr. Crane does not see patients for PAD, we do have two other providers, Dr. Gonzales and Dr. Allison, who we can refer the patient to for PAD    Is there any way we can fit the patient in to be seen sooner?

## 2019-06-05 NOTE — TELEPHONE ENCOUNTER
Good morning!    Dr. Jones has referred this patient for an evaluation for PAD. I was able to schedule the pt for 6/20/19, but would like to know if you have anything sooner available. If so, please contact pt's caregiver Meaghna at [phone]

## 2019-06-05 NOTE — TELEPHONE ENCOUNTER
There was a cancellation and I was able to get the pt scheduled to see Dr. Gonzales in [facility] pt message advising him about clinical vianey w/ Dr. Gonzales on 6/13

## 2019-06-05 NOTE — TELEPHONE ENCOUNTER
Thank you for your assistance! There is also a photo of the pt's right foot within today's office note.

## 2019-06-05 NOTE — PROGRESS NOTES
Subjective:      Patient ID: Sachin Calloway is a 58 y.o. male.    Chief Complaint: Foot Swelling (Alcoholic cirrhosis of liver with ascites, numbness & tingling in legs)      HPI   Here with deandreer in law; this pt worked at [employer] for 20 years, gone for 9 years;  Numb from knees down, both legs  Sees Dr Gatica; has appt with Dr Arias for first time  Had paracentesis last week by radiologist  Last visit with Dr Gatica May 14  Has home health [facility]  Has skin breakdown  Off alcohol  Numb from knees down  Knee cap apin, feels like lightening pain both  Can't put pressure on right feet due to bleeding seepage and swelling  Needs wound care  Has been using compression therapy  Smokes  Lives with son and this pt for years  Has wife living with a daughter, she works  drnks ensure and chicken noodle soup; appetite went down after paracentesis  Voids, has BM  Losing weight  SOB easily, no oxygen  Neck hurts  No MI or CVA  Vision ok  No cancer            Review of Systems   Constitutional: Negative for appetite change, fatigue, fever and unexpected weight change.   HENT: Positive for trouble swallowing. Negative for congestion, ear pain, sinus pressure and sore throat.    Eyes: Negative for pain and visual disturbance.   Respiratory: Positive for shortness of breath.    Cardiovascular: Positive for leg swelling. Negative for chest pain.   Gastrointestinal: Negative for abdominal pain, constipation and diarrhea.   Endocrine: Negative for polyuria.   Genitourinary: Negative for difficulty urinating and frequency.   Musculoskeletal: Positive for gait problem, joint swelling, neck pain and neck stiffness. Negative for arthralgias, back pain and myalgias.   Skin: Positive for color change, pallor, rash and wound.   Allergic/Immunologic: Negative.    Neurological: Positive for seizures and speech difficulty. Negative for syncope, weakness and headaches.   Hematological: Does not bruise/bleed easily.   Psychiatric/Behavioral:  Negative.  Negative for dysphoric mood and suicidal ideas. The patient is not nervous/anxious.    All other systems reviewed and are negative.    Objective:     Physical Exam   Constitutional: He is oriented to person, place, and time. He appears well-developed and well-nourished. No distress.   HENT:   Head: Normocephalic and atraumatic.   Right Ear: External ear normal.   Left Ear: External ear normal.   Mouth/Throat: Oropharynx is clear and moist. No oropharyngeal exudate.   Eyes: Pupils are equal, round, and reactive to light. Conjunctivae and EOM are normal. Right eye exhibits no discharge. Left eye exhibits no discharge. No scleral icterus.   Neck: Normal range of motion. Neck supple. No JVD present. No tracheal deviation present. No thyromegaly present.   Cardiovascular: Normal rate and regular rhythm. Exam reveals no gallop and no friction rub.   No murmur heard.  Pulmonary/Chest: Effort normal and breath sounds normal. No stridor. No respiratory distress. He has no wheezes. He has no rales. He exhibits no tenderness.   Abdominal: Soft. He exhibits no distension and no mass. There is no tenderness. There is no rebound and no guarding.   Musculoskeletal: He exhibits edema, tenderness and deformity.   See right foot photo   Lymphadenopathy:     He has no cervical adenopathy.   Neurological: He is alert and oriented to person, place, and time. He has normal reflexes. He displays normal reflexes. No cranial nerve deficit. He exhibits normal muscle tone. Coordination normal.   Skin: Skin is warm and dry. No rash noted. He is not diaphoretic. No erythema. No pallor.   Psychiatric: He has a normal mood and affect. His behavior is normal. Judgment and thought content normal.   Nursing note and vitals reviewed.    Assessment:     1. Penetrating wound of right foot, subsequent encounter    2. Panlobular emphysema    3. Alcoholic cirrhosis of liver with ascites    4. Anasarca    5. Esophageal stenosis    6. Dysphagia,  unspecified type    7. Esophageal stricture    8. Closed nondisplaced fracture of acromial end of right clavicle, sequela    9. Microcytic anemia    10. Pressure injury of sacral region, unstageable    11. PAD (peripheral artery disease)    12. Severe protein-calorie malnutrition       Plan:        Medication List           Accurate as of 6/5/19 11:59 PM. If you have any questions, ask your nurse or doctor.               CONTINUE taking these medications    acetaminophen-codeine 300-30mg 300-30 mg Tab  Commonly known as:  TYLENOL #3     furosemide 80 MG tablet  Commonly known as:  LASIX  Take 1 tablet (80 mg total) by mouth once daily.     pantoprazole 40 MG tablet  Commonly known as:  PROTONIX  Take 1 tablet (40 mg total) by mouth once daily.     potassium chloride SA 20 MEQ tablet  Commonly known as:  K-DUR,KLOR-CON  Take 1 tablet (20 mEq total) by mouth once daily.     spironolactone 50 MG tablet  Commonly known as:  ALDACTONE  Take 1 tablet (50 mg total) by mouth once daily.        STOP taking these medications    ketorolac 10 mg tablet  Commonly known as:  TORADOL  Stopped by:  Kana Jones MD          Penetrating wound of right foot, subsequent encounter  -     Ambulatory referral to Wound Clinic  -     Vitamin B12; Future; Expected date: 06/05/2019  -     Folate; Future; Expected date: 06/05/2019  -     TSH; Future; Expected date: 06/05/2019  -      Lower Extremity Arteries Right; Future; Expected date: 06/05/2019    Panlobular emphysema  -     Vitamin B12; Future; Expected date: 06/05/2019  -     Folate; Future; Expected date: 06/05/2019  -     TSH; Future; Expected date: 06/05/2019  -      Lower Extremity Arteries Right; Future; Expected date: 06/05/2019    Alcoholic cirrhosis of liver with ascites  -     Vitamin B12; Future; Expected date: 06/05/2019  -     Folate; Future; Expected date: 06/05/2019  -     TSH; Future; Expected date: 06/05/2019  -      Lower Extremity Arteries Right; Future;  Expected date: 06/05/2019    Anasarca  -     Vitamin B12; Future; Expected date: 06/05/2019  -     Folate; Future; Expected date: 06/05/2019  -     TSH; Future; Expected date: 06/05/2019  -      Lower Extremity Arteries Right; Future; Expected date: 06/05/2019    Esophageal stenosis  -     Vitamin B12; Future; Expected date: 06/05/2019  -     Folate; Future; Expected date: 06/05/2019  -     TSH; Future; Expected date: 06/05/2019  -      Lower Extremity Arteries Right; Future; Expected date: 06/05/2019    Dysphagia, unspecified type  -     Vitamin B12; Future; Expected date: 06/05/2019  -     Folate; Future; Expected date: 06/05/2019  -     TSH; Future; Expected date: 06/05/2019  -      Lower Extremity Arteries Right; Future; Expected date: 06/05/2019    Esophageal stricture  -     Vitamin B12; Future; Expected date: 06/05/2019  -     Folate; Future; Expected date: 06/05/2019  -     TSH; Future; Expected date: 06/05/2019  -      Lower Extremity Arteries Right; Future; Expected date: 06/05/2019    Closed nondisplaced fracture of acromial end of right clavicle, sequela  -     Vitamin B12; Future; Expected date: 06/05/2019  -     Folate; Future; Expected date: 06/05/2019  -     TSH; Future; Expected date: 06/05/2019  -      Lower Extremity Arteries Right; Future; Expected date: 06/05/2019    Microcytic anemia  -     Vitamin B12; Future; Expected date: 06/05/2019  -     Folate; Future; Expected date: 06/05/2019  -     TSH; Future; Expected date: 06/05/2019  -      Lower Extremity Arteries Right; Future; Expected date: 06/05/2019    Pressure injury of sacral region, unstageable  -     Vitamin B12; Future; Expected date: 06/05/2019  -     Folate; Future; Expected date: 06/05/2019  -     TSH; Future; Expected date: 06/05/2019  -      Lower Extremity Arteries Right; Future; Expected date: 06/05/2019    PAD (peripheral artery disease)  -     Vitamin B12; Future; Expected date: 06/05/2019  -     Folate; Future;  Expected date: 06/05/2019  -     TSH; Future; Expected date: 06/05/2019  -      Lower Extremity Arteries Right; Future; Expected date: 06/05/2019  -     Ambulatory referral to Cardiology    Severe protein-calorie malnutrition   -     Vitamin B12; Future; Expected date: 06/05/2019  -     Folate; Future; Expected date: 06/05/2019

## 2019-06-06 ENCOUNTER — HOSPITAL ENCOUNTER (OUTPATIENT)
Dept: RADIOLOGY | Facility: HOSPITAL | Age: 59
Discharge: HOME | End: 2019-06-06
Attending: FAMILY MEDICINE
Payer: MEDICARE

## 2019-06-06 DIAGNOSIS — R13.10 DYSPHAGIA, UNSPECIFIED TYPE: ICD-10-CM

## 2019-06-06 DIAGNOSIS — I73.9 PAD (PERIPHERAL ARTERY DISEASE): ICD-10-CM

## 2019-06-06 DIAGNOSIS — S91.331D PENETRATING WOUND OF RIGHT FOOT, SUBSEQUENT ENCOUNTER: ICD-10-CM

## 2019-06-06 DIAGNOSIS — K22.2 ESOPHAGEAL STRICTURE: ICD-10-CM

## 2019-06-06 DIAGNOSIS — L89.150 PRESSURE INJURY OF SACRAL REGION, UNSTAGEABLE: ICD-10-CM

## 2019-06-06 DIAGNOSIS — R60.1 ANASARCA: ICD-10-CM

## 2019-06-06 DIAGNOSIS — J43.1 PANLOBULAR EMPHYSEMA: ICD-10-CM

## 2019-06-06 DIAGNOSIS — K70.31 ALCOHOLIC CIRRHOSIS OF LIVER WITH ASCITES: ICD-10-CM

## 2019-06-06 DIAGNOSIS — K22.2 ESOPHAGEAL STENOSIS: ICD-10-CM

## 2019-06-06 DIAGNOSIS — S42.034S CLOSED NONDISPLACED FRACTURE OF ACROMIAL END OF RIGHT CLAVICLE, SEQUELA: ICD-10-CM

## 2019-06-06 DIAGNOSIS — D50.9 MICROCYTIC ANEMIA: ICD-10-CM

## 2019-06-06 PROCEDURE — 93926 LOWER EXTREMITY STUDY: CPT | Mod: TC,PO,RT

## 2019-06-06 NOTE — TELEPHONE ENCOUNTER
That's a good question; not tylenol;   I guess aleve 220 mg twice a day with food should be safe for the time being;

## 2019-06-07 ENCOUNTER — TELEPHONE (OUTPATIENT)
Dept: FAMILY MEDICINE | Facility: CLINIC | Age: 59
End: 2019-06-07

## 2019-06-07 DIAGNOSIS — I70.229 CRITICAL LOWER LIMB ISCHEMIA: Primary | ICD-10-CM

## 2019-06-07 NOTE — TELEPHONE ENCOUNTER
----- Message from Kana Jones MD sent at 6/7/2019  1:44 PM CDT -----  No flow to right leg; pt needs to be soon by cardiology; notify them of this report and help him get in soon

## 2019-06-07 NOTE — TELEPHONE ENCOUNTER
Royal Flores - Anyway to get a sooner cardiology apt?      Attempted to call pt.  No answer.  Unable to leave a message.

## 2019-06-10 ENCOUNTER — TELEPHONE (OUTPATIENT)
Dept: CARDIOLOGY | Facility: CLINIC | Age: 59
End: 2019-06-10

## 2019-06-10 ENCOUNTER — HOSPITAL ENCOUNTER (INPATIENT)
Facility: HOSPITAL | Age: 59
LOS: 13 days | Discharge: HOSPICE | DRG: 270 | End: 2019-06-25
Attending: INTERNAL MEDICINE | Admitting: INTERNAL MEDICINE
Payer: MEDICARE

## 2019-06-10 DIAGNOSIS — L89.153 PRESSURE INJURY OF SACRAL REGION, STAGE 3: ICD-10-CM

## 2019-06-10 DIAGNOSIS — D50.8 OTHER IRON DEFICIENCY ANEMIA: ICD-10-CM

## 2019-06-10 DIAGNOSIS — E83.51 HYPOCALCEMIA: ICD-10-CM

## 2019-06-10 DIAGNOSIS — D64.9 ANEMIA, UNSPECIFIED TYPE: ICD-10-CM

## 2019-06-10 DIAGNOSIS — K92.2 GASTROINTESTINAL HEMORRHAGE, UNSPECIFIED GASTROINTESTINAL HEMORRHAGE TYPE: ICD-10-CM

## 2019-06-10 DIAGNOSIS — D64.9 ANEMIA: ICD-10-CM

## 2019-06-10 DIAGNOSIS — K70.31 ALCOHOLIC CIRRHOSIS OF LIVER WITH ASCITES: ICD-10-CM

## 2019-06-10 DIAGNOSIS — S42.034S CLOSED NONDISPLACED FRACTURE OF ACROMIAL END OF RIGHT CLAVICLE, SEQUELA: ICD-10-CM

## 2019-06-10 DIAGNOSIS — E87.20 LACTIC ACIDOSIS: ICD-10-CM

## 2019-06-10 DIAGNOSIS — K92.2 GASTROINTESTINAL BLEEDING, LOWER: ICD-10-CM

## 2019-06-10 DIAGNOSIS — L89.152 PRESSURE INJURY OF SACRAL REGION, STAGE 2: ICD-10-CM

## 2019-06-10 DIAGNOSIS — R00.0 TACHYCARDIA: ICD-10-CM

## 2019-06-10 DIAGNOSIS — I73.9 PAD (PERIPHERAL ARTERY DISEASE): ICD-10-CM

## 2019-06-10 DIAGNOSIS — Z51.5 PALLIATIVE CARE ENCOUNTER: ICD-10-CM

## 2019-06-10 DIAGNOSIS — I70.229 CRITICAL LOWER LIMB ISCHEMIA: ICD-10-CM

## 2019-06-10 DIAGNOSIS — Z01.810 PREOP CARDIOVASCULAR EXAM: ICD-10-CM

## 2019-06-10 DIAGNOSIS — K92.0 GASTROINTESTINAL HEMORRHAGE WITH HEMATEMESIS: ICD-10-CM

## 2019-06-10 DIAGNOSIS — R94.31 QT PROLONGATION: ICD-10-CM

## 2019-06-10 DIAGNOSIS — Z71.89 GOALS OF CARE, COUNSELING/DISCUSSION: ICD-10-CM

## 2019-06-10 DIAGNOSIS — I46.9 CARDIAC ARREST: ICD-10-CM

## 2019-06-10 DIAGNOSIS — I96 GANGRENE OF RIGHT FOOT: Primary | ICD-10-CM

## 2019-06-10 LAB
ABO + RH BLD: NORMAL
ANION GAP SERPL CALC-SCNC: 11 MMOL/L (ref 8–16)
BASOPHILS # BLD AUTO: 0.03 K/UL (ref 0–0.2)
BASOPHILS NFR BLD: 0.2 % (ref 0–1.9)
BLD GP AB SCN CELLS X3 SERPL QL: NORMAL
BUN SERPL-MCNC: 16 MG/DL (ref 6–20)
CALCIUM SERPL-MCNC: 7.9 MG/DL (ref 8.7–10.5)
CHLORIDE SERPL-SCNC: 100 MMOL/L (ref 95–110)
CO2 SERPL-SCNC: 26 MMOL/L (ref 23–29)
CREAT SERPL-MCNC: 0.7 MG/DL (ref 0.5–1.4)
DIFFERENTIAL METHOD: ABNORMAL
EOSINOPHIL # BLD AUTO: 0 K/UL (ref 0–0.5)
EOSINOPHIL NFR BLD: 0 % (ref 0–8)
ERYTHROCYTE [DISTWIDTH] IN BLOOD BY AUTOMATED COUNT: 16.3 % (ref 11.5–14.5)
EST. GFR  (AFRICAN AMERICAN): >60 ML/MIN/1.73 M^2
EST. GFR  (NON AFRICAN AMERICAN): >60 ML/MIN/1.73 M^2
GLUCOSE SERPL-MCNC: 90 MG/DL (ref 70–110)
HCT VFR BLD AUTO: 29.7 % (ref 40–54)
HGB BLD-MCNC: 9.7 G/DL (ref 14–18)
LYMPHOCYTES # BLD AUTO: 1.7 K/UL (ref 1–4.8)
LYMPHOCYTES NFR BLD: 11.6 % (ref 18–48)
MCH RBC QN AUTO: 25.1 PG (ref 27–31)
MCHC RBC AUTO-ENTMCNC: 32.7 G/DL (ref 32–36)
MCV RBC AUTO: 77 FL (ref 82–98)
MONOCYTES # BLD AUTO: 1 K/UL (ref 0.3–1)
MONOCYTES NFR BLD: 6.5 % (ref 4–15)
NEUTROPHILS # BLD AUTO: 11.8 K/UL (ref 1.8–7.7)
NEUTROPHILS NFR BLD: 80.9 % (ref 38–73)
PLATELET # BLD AUTO: 405 K/UL (ref 150–350)
PMV BLD AUTO: 9.6 FL (ref 9.2–12.9)
POTASSIUM SERPL-SCNC: 3.2 MMOL/L (ref 3.5–5.1)
RBC # BLD AUTO: 3.87 M/UL (ref 4.6–6.2)
SODIUM SERPL-SCNC: 137 MMOL/L (ref 136–145)
WBC # BLD AUTO: 14.58 K/UL (ref 3.9–12.7)

## 2019-06-10 PROCEDURE — 75716 ARTERY X-RAYS ARMS/LEGS: CPT | Mod: 26,ICN,, | Performed by: INTERNAL MEDICINE

## 2019-06-10 PROCEDURE — 36247 INS CATH ABD/L-EXT ART 3RD: CPT | Mod: RT | Performed by: INTERNAL MEDICINE

## 2019-06-10 PROCEDURE — 85025 COMPLETE CBC W/AUTO DIFF WBC: CPT

## 2019-06-10 PROCEDURE — 63600175 PHARM REV CODE 636 W HCPCS: Performed by: INTERNAL MEDICINE

## 2019-06-10 PROCEDURE — 75625 CONTRAST EXAM ABDOMINL AORTA: CPT | Mod: 26,ICN,, | Performed by: INTERNAL MEDICINE

## 2019-06-10 PROCEDURE — 25500020 PHARM REV CODE 255: Performed by: INTERNAL MEDICINE

## 2019-06-10 PROCEDURE — C1769 GUIDE WIRE: HCPCS | Performed by: INTERNAL MEDICINE

## 2019-06-10 PROCEDURE — 75625 PR  ANGIO AORTOGRAM ABD SERIAL: ICD-10-PCS | Mod: 26,ICN,, | Performed by: INTERNAL MEDICINE

## 2019-06-10 PROCEDURE — 75625 CONTRAST EXAM ABDOMINL AORTA: CPT | Performed by: INTERNAL MEDICINE

## 2019-06-10 PROCEDURE — 93005 ELECTROCARDIOGRAM TRACING: CPT

## 2019-06-10 PROCEDURE — 36247 INS CATH ABD/L-EXT ART 3RD: CPT | Mod: RT,ICN,, | Performed by: INTERNAL MEDICINE

## 2019-06-10 PROCEDURE — 75716 PR  ANGIO EXTERMITY BILAT: ICD-10-PCS | Mod: 26,ICN,, | Performed by: INTERNAL MEDICINE

## 2019-06-10 PROCEDURE — 25000003 PHARM REV CODE 250: Performed by: INTERNAL MEDICINE

## 2019-06-10 PROCEDURE — 80048 BASIC METABOLIC PNL TOTAL CA: CPT

## 2019-06-10 PROCEDURE — 86901 BLOOD TYPING SEROLOGIC RH(D): CPT

## 2019-06-10 PROCEDURE — 75716 ARTERY X-RAYS ARMS/LEGS: CPT | Performed by: INTERNAL MEDICINE

## 2019-06-10 PROCEDURE — C1894 INTRO/SHEATH, NON-LASER: HCPCS | Performed by: INTERNAL MEDICINE

## 2019-06-10 PROCEDURE — 36247 PR PLACE CATH SUBSUBSELECT ART,ABD/PEL: ICD-10-PCS | Mod: RT,ICN,, | Performed by: INTERNAL MEDICINE

## 2019-06-10 RX ORDER — POTASSIUM CHLORIDE 20 MEQ/1
20 TABLET, EXTENDED RELEASE ORAL DAILY
Status: DISCONTINUED | OUTPATIENT
Start: 2019-06-11 | End: 2019-06-11

## 2019-06-10 RX ORDER — IODIXANOL 320 MG/ML
INJECTION, SOLUTION INTRAVASCULAR
Status: DISCONTINUED | OUTPATIENT
Start: 2019-06-10 | End: 2019-06-10 | Stop reason: HOSPADM

## 2019-06-10 RX ORDER — CLOPIDOGREL BISULFATE 75 MG/1
300 TABLET ORAL ONCE
Status: COMPLETED | OUTPATIENT
Start: 2019-06-10 | End: 2019-06-10

## 2019-06-10 RX ORDER — PANTOPRAZOLE SODIUM 40 MG/1
40 TABLET, DELAYED RELEASE ORAL DAILY
Status: DISCONTINUED | OUTPATIENT
Start: 2019-06-11 | End: 2019-06-17

## 2019-06-10 RX ORDER — HEPARIN SODIUM 1000 [USP'U]/ML
INJECTION, SOLUTION INTRAVENOUS; SUBCUTANEOUS
Status: DISCONTINUED | OUTPATIENT
Start: 2019-06-10 | End: 2019-06-10 | Stop reason: HOSPADM

## 2019-06-10 RX ORDER — MIDAZOLAM HYDROCHLORIDE 1 MG/ML
INJECTION, SOLUTION INTRAMUSCULAR; INTRAVENOUS
Status: DISCONTINUED | OUTPATIENT
Start: 2019-06-10 | End: 2019-06-10 | Stop reason: HOSPADM

## 2019-06-10 RX ORDER — ACETAMINOPHEN 325 MG/1
650 TABLET ORAL EVERY 4 HOURS PRN
Status: DISCONTINUED | OUTPATIENT
Start: 2019-06-10 | End: 2019-06-13

## 2019-06-10 RX ORDER — ONDANSETRON 2 MG/ML
4 INJECTION INTRAMUSCULAR; INTRAVENOUS EVERY 12 HOURS PRN
Status: DISCONTINUED | OUTPATIENT
Start: 2019-06-10 | End: 2019-06-25 | Stop reason: HOSPADM

## 2019-06-10 RX ORDER — FENTANYL CITRATE 50 UG/ML
INJECTION, SOLUTION INTRAMUSCULAR; INTRAVENOUS
Status: DISCONTINUED | OUTPATIENT
Start: 2019-06-10 | End: 2019-06-10 | Stop reason: HOSPADM

## 2019-06-10 RX ORDER — CLOPIDOGREL BISULFATE 75 MG/1
75 TABLET ORAL DAILY
Status: DISCONTINUED | OUTPATIENT
Start: 2019-06-11 | End: 2019-06-17

## 2019-06-10 RX ADMIN — SODIUM CHLORIDE 500 ML: 0.9 INJECTION, SOLUTION INTRAVENOUS at 03:06

## 2019-06-10 RX ADMIN — CLOPIDOGREL BISULFATE 300 MG: 75 TABLET ORAL at 04:06

## 2019-06-10 NOTE — Clinical Note
Angiography of the right lower extremity selectively performed with the catheter   and hand injected with 30 mL of contrast.

## 2019-06-10 NOTE — Clinical Note
100 ml injected throughout the case. 1480 mL total wasted during the case. 1580 mL total used in the case.

## 2019-06-10 NOTE — Clinical Note
The catheter is inserted to the  proximal right popliteal artery. is inserted in to the Protection filter deployed successfully  .

## 2019-06-10 NOTE — Clinical Note
The sheath is inserted into the left femoral artery.  Discontinue Regimen: Fluocinolone Plan: Stable overall. Worsens with use of shin guards on lower legs. Soccer will be ending in a few months. Will continue to monitor at this time. Follow up in 3 months. Initiate Treatment: Eucrisa bid (eyelid, facial skin, and body) Continue Regimen: Vanicream recommended bid\\nFree and clear regimen \\nLiquid hydroxyzine 1-2 TSP QHS\\nChildren’s Claritin or Zyrtec qam\\nTAC BID to affected areas on trunk and extremities PRN flares Detail Level: Simple

## 2019-06-10 NOTE — PLAN OF CARE
16:00 2 mL of air removed from radial vasc band.  No hematoma or bleeding noted.  +2 jonathon radial pulses palpated. Skin normal in color, warm to touch, < 3 sec cap refill.   Will continue to monitor pt.

## 2019-06-10 NOTE — PLAN OF CARE
15:05 Patient transferred to recovery cath lab slot 7 via stretcher with side rails up x2 .  Pt drowsy but able to follow commands. Pt is stable when connecting to cardiac monitors.  VSS. Right radial vasc band in place with 11ml of air in band c.d.i. no bleeding or hematoma noted. +2 jonathon radial pulses palpated.   Skin normal in color and warm to touch, < 3 sec cap refill.  Fall risk precautions given and patient acknowledges.  AIDET completed to pt.  Will continue to monitor patient.  Updated pt's wife in sx waiting room.

## 2019-06-10 NOTE — PROCEDURES
: Fabien Gonzales MD  Pre-procedure diagnosis: CLI  Post-procedure diagnosis: CLI    Post Procedure Note: Peripheral angiogram    The pt was brought to the cath lab and under sterile technique, right radial access was obtained without difficulty. Images were obtained in multiple views and on the right ostial SFA  with reconstitution at mid portion with single vessel r/o via peroneal with proximal stenosis and weak collaterals to DP and plantar arch. On the right there is a mid SFA  with 2 vessel r/o to foot with occluded PT. Please see full report for details. The pt tolerated the procedure well without complications. Radial band device used with successful hemostasis.     Vitals:    06/10/19 1350   BP: 128/78   BP Location: Left arm   Patient Position: Lying   Pulse: (!) 114   Resp: 20   Temp: 98 °F (36.7 °C)   TempSrc: Oral   SpO2: 98%   Weight: 47.2 kg (104 lb)   Height: 6' (1.829 m)         Gen: NAD  Ext: 2+ radial pulse, no evidence of hematoma  Estimated blood loss: < 50 cc    Plan:  -Post cath care per protocol  -plavix, no ASA due to allergy, and no statin due to liver cirrhosis  -staged intervention of RSFA  tomorrow

## 2019-06-10 NOTE — NURSING
Pt arrived to room via stretcher awake, alert and oriented x4. Pt denies pain at this time. No distress noted. Applied tele monitor.Vasc band removed by SHAINA Malcolm at bedside. Tegaderm and gauze dry and intact. No oozing nor hematoma noted. Discussed POC with pt.All questions answered. Verbalized understanding. Oriented to surroundings. Bed in lowest position. Side rails up x2. Instructed to call with any needs/concerns. Verbalized understanding. Will continue to monitor.

## 2019-06-10 NOTE — Clinical Note
Angiography performed of the middle suprarenal . Angiography performed via power injection 30 mL contrast at 15 mL/s.

## 2019-06-10 NOTE — PLAN OF CARE
17:00 Remaining air removed from right radial vasc band. Gauze and tegaderm dressing applied c.d.i.   No bleeding or hematoma noted. +2 jonathon radial pulses palpated. Skin normal in color, warm to touch, < 3 sec cap refill.   Pt tolerated well.  Will continue to monitor pt.

## 2019-06-10 NOTE — Clinical Note
Angiography of the left lower extremity selectively performed with the catheter   and hand injected with 30 mL of contrast.

## 2019-06-10 NOTE — PLAN OF CARE
17:05 Patient transferred to floor 430 on cardiac monitor.  VSS. No c/o pain.   Patient attached to tele monitor upon arrival in room. Right radial gauze/tegaderm site CDI. No bleeding or hematoma noted, +2 jonathon radial pulses and reviewed with ALICE Palomares at bedside.notified Alice Palomares patient reports pills need to be crushed.  Pt has difficulty swallowing pills. Wound pink dressing to sacrum and left hip from pre hospital both CDI.  All questions answered. Updated pt's wife in sx waiting room.

## 2019-06-10 NOTE — Clinical Note
The catheter is removed from the  proximal right popliteal artery. is inserted in to the Filter removed..

## 2019-06-10 NOTE — Clinical Note
200 ml injected throughout the case. 100 mL total wasted during the case. 300 mL total used in the case.

## 2019-06-10 NOTE — TELEPHONE ENCOUNTER
----- Message from Rita Shrestha sent at 6/10/2019 11:10 AM CDT -----  Patient's daughter-in-law, Meaghan, called.   No. [phone]     Patient called to confirm procedure today at 12:00.   Please call.

## 2019-06-10 NOTE — H&P
[facility]  Cardiology  History and Physical     Patient Name: Sachin Calloway  MRN: [id number]  Admission Date: 6/10/2019  Code Status: Prior   Attending Provider: Fabien Gonzales MD   Primary Care Physician: Juan Gatica MD  Principal Problem:CLI    Patient information was obtained from patient and spouse/SO.     Subjective:     Chief Complaint:  Non healing right foot wound     HPI:   57 y/o male with hx of alcoholic liver cirrhosis, HTN, former EtOH abuse, active tobacco use, PAD who presents for peripheral angiogram. Has had worsening right dorsal foot ulcer with necrotic appearing tissue and toes. LE arterial doppler with PAD. Denies CP, orthopnea, PND, syncope. Does not ambulate much and received paracentesis recently.     Past Medical History:   Diagnosis Date    Alcohol abuse     Cirrhosis     Edema     Hypertension     Seizures     Stricture esophagus        Past Surgical History:   Procedure Laterality Date    COLONOSCOPY      COLONOSCOPY N/A 5/5/2015    Performed by [facility]nez [facility] at [facility]    EGD (ESOPHAGOGASTRODUODENOSCOPY) N/A 5/13/2019    Performed by [facility]illan Salinas MD at [facility]    EGD with dilitation  03/04/2019    ESOPHAGOGASTRODUODENOSCOPY (EGD) N/A 3/29/2019    Performed by [facility]an [facility]inas [facility] at [facility] END[facility]    ESOPHAGOGASTRODUODENOSCOPY (EGD) N/A 3/18/2019    Performed by Omer Pritchard MD at [facility] END[facility]    ESOPHAGOGASTRODUODENOSCOPY (EGD) N/A 3/4/2019    Performed by Madhu Schmidt MD at [facility] END[facility]    ESOPHAGOGASTRODUODENOSCOPY (EGD) N/A 5/5/2015    Performed by Mario Martínez MD at [facility] ENDO       Review of patient's allergies indicates:   Allergen Reactions    Asa [aspirin] Nausea Only       No current facility-administered medications on file prior to encounter.      Current Outpatient Medications on File Prior to Encounter   Medication Sig    acetaminophen-codeine 300-30mg (TYLENOL #3) 300-30 mg Tab Take by mouth.    furosemide (LASIX) 80 MG  tablet Take 1 tablet (80 mg total) by mouth once daily.    pantoprazole (PROTONIX) 40 MG tablet Take 1 tablet (40 mg total) by mouth once daily.    potassium chloride SA (K-DUR,KLOR-CON) 20 MEQ tablet Take 1 tablet (20 mEq total) by mouth once daily.    spironolactone (ALDACTONE) 50 MG tablet Take 1 tablet (50 mg total) by mouth once daily.     Family History     Problem Relation (Age of Onset)    Cancer Father        Tobacco Use    Smoking status: Current Every Day Smoker     Packs/day: 0.50     Years: 40.00     Pack years: 20.00     Types: Cigarettes    Smokeless tobacco: Never Used   Substance and Sexual Activity    Alcohol use: Yes     Comment: 6 pack every day (3-4)    Drug use: No    Sexual activity: Not on file     Review of Systems   Constitution: Positive for malaise/fatigue.   HENT: Negative for congestion.    Eyes: Negative for blurred vision.   Cardiovascular: Positive for dyspnea on exertion and leg swelling. Negative for chest pain, claudication, cyanosis, irregular heartbeat, near-syncope, orthopnea, palpitations, paroxysmal nocturnal dyspnea and syncope.   Respiratory: Negative for shortness of breath.    Endocrine: Negative for polyuria.   Hematologic/Lymphatic: Negative for bleeding problem.   Skin: Positive for color change and poor wound healing. Negative for itching and rash.   Musculoskeletal: Negative for joint swelling, muscle cramps and muscle weakness.   Gastrointestinal: Positive for abdominal pain. Negative for hematemesis, hematochezia, melena, nausea and vomiting.   Genitourinary: Negative for dysuria and hematuria.   Neurological: Positive for weakness. Negative for dizziness, focal weakness, headaches, light-headedness and loss of balance.   Psychiatric/Behavioral: Negative for depression. The patient is not nervous/anxious.      Objective:     Vital Signs (Most Recent):  Temp: 98 °F (36.7 °C) (06/10/19 1350)  Pulse: (!) 114 (06/10/19 1350)  Resp: 20 (06/10/19 1350)  BP:  128/78 (06/10/19 1350)  SpO2: 98 %(room air.) (06/10/19 1350) Vital Signs (24h Range):  Temp:  [98 °F (36.7 °C)] 98 °F (36.7 °C)  Pulse:  [114] 114  Resp:  [20] 20  SpO2:  [98 %] 98 %  BP: (128)/(78) 128/78     Weight: 47.2 kg (104 lb)  Body mass index is 14.1 kg/m².    SpO2: 98 %(room air.)       No intake or output data in the 24 hours ending 06/10/19 1548    Lines/Drains/Airways     Peripheral Intravenous Line                 Peripheral IV - Single Lumen 06/10/19 1345 Left Forearm less than 1 day                Physical Exam   Constitutional: He is oriented to person, place, and time. He appears well-developed and well-nourished.   HENT:   Head: Normocephalic and atraumatic.   Neck: Neck supple. No JVD present.   Cardiovascular: Regular rhythm and normal heart sounds. Tachycardia present.   Pulses:       Carotid pulses are 2+ on the right side, and 2+ on the left side.       Radial pulses are 2+ on the right side, and 2+ on the left side.        Femoral pulses are 2+ on the right side, and 2+ on the left side.       Dorsalis pedis pulses are 2+ on the right side, and 2+ on the left side.        Posterior tibial pulses are 2+ on the right side, and 2+ on the left side.   Pulmonary/Chest: Effort normal and breath sounds normal.   Abdominal: Soft. Bowel sounds are normal.   Musculoskeletal: He exhibits no edema.   Neurological: He is alert and oriented to person, place, and time.   Skin: Skin is warm and dry.   Right large dorsal foot ulcer with necrotic appearing areas on toes   Psychiatric: He has a normal mood and affect. His behavior is normal. Thought content normal.       Significant Labs:   BMP:   Recent Labs   Lab 06/10/19  1327   GLU 90      K 3.2*      CO2 26   BUN 16   CREATININE 0.7   CALCIUM 7.9*   , CMP   Recent Labs   Lab 06/10/19  1327      K 3.2*      CO2 26   GLU 90   BUN 16   CREATININE 0.7   CALCIUM 7.9*   ANIONGAP 11   ESTGFRAFRICA >60   EGFRNONAA >60   , CBC   Recent  Labs   Lab 06/10/19  1327   WBC 14.58*   HGB 9.7*   HCT 29.7*   *   , INR No results for input(s): INR, PROTIME in the last 48 hours., Lipid Panel No results for input(s): CHOL, HDL, LDLCALC, TRIG, CHOLHDL in the last 48 hours. and Troponin No results for input(s): TROPONINI in the last 48 hours.      Assessment and Plan:     Active Diagnoses:    Diagnosis Date Noted POA    Critical lower limb ischemia [I99.8] 06/10/2019 Yes      Problems Resolved During this Admission:     Peripheral angiogram -diagnostic only  Right radial access  -The procedure was discussed with the pt along with the risks/benefits and the pt voiced understanding. All questions were answered and written consents obtained.            VTE Risk Mitigation (From admission, onward)        Ordered     heparin (porcine) injection  As needed (PRN)      06/10/19 5614          Fabien Gonzales MD  Cardiology   [facility]

## 2019-06-11 ENCOUNTER — CLINICAL SUPPORT (OUTPATIENT)
Dept: SMOKING CESSATION | Facility: CLINIC | Age: 59
End: 2019-06-11
Payer: COMMERCIAL

## 2019-06-11 DIAGNOSIS — F17.210 CIGARETTE SMOKER: Primary | ICD-10-CM

## 2019-06-11 PROBLEM — L89.223 PRESSURE ULCER OF LEFT HIP, STAGE 3: Status: ACTIVE | Noted: 2019-06-11

## 2019-06-11 PROBLEM — L89.159 SACRAL DECUBITUS ULCER: Status: ACTIVE | Noted: 2019-06-11

## 2019-06-11 PROBLEM — I96 GANGRENE OF RIGHT FOOT: Status: ACTIVE | Noted: 2019-06-11

## 2019-06-11 LAB
ALBUMIN SERPL BCP-MCNC: 1.4 G/DL (ref 3.5–5.2)
ALBUMIN SERPL BCP-MCNC: 1.4 G/DL (ref 3.5–5.2)
ALP SERPL-CCNC: 280 U/L (ref 55–135)
ALP SERPL-CCNC: 284 U/L (ref 55–135)
ALT SERPL W/O P-5'-P-CCNC: 33 U/L (ref 10–44)
ALT SERPL W/O P-5'-P-CCNC: 33 U/L (ref 10–44)
ANION GAP SERPL CALC-SCNC: 10 MMOL/L (ref 8–16)
ANION GAP SERPL CALC-SCNC: 10 MMOL/L (ref 8–16)
AST SERPL-CCNC: 29 U/L (ref 10–40)
AST SERPL-CCNC: 35 U/L (ref 10–40)
BILIRUB SERPL-MCNC: 0.7 MG/DL (ref 0.1–1)
BILIRUB SERPL-MCNC: 1.1 MG/DL (ref 0.1–1)
BUN SERPL-MCNC: 13 MG/DL (ref 6–20)
BUN SERPL-MCNC: 13 MG/DL (ref 6–20)
CALCIUM SERPL-MCNC: 7.6 MG/DL (ref 8.7–10.5)
CALCIUM SERPL-MCNC: 7.9 MG/DL (ref 8.7–10.5)
CHLORIDE SERPL-SCNC: 101 MMOL/L (ref 95–110)
CHLORIDE SERPL-SCNC: 106 MMOL/L (ref 95–110)
CO2 SERPL-SCNC: 22 MMOL/L (ref 23–29)
CO2 SERPL-SCNC: 27 MMOL/L (ref 23–29)
CREAT SERPL-MCNC: 0.6 MG/DL (ref 0.5–1.4)
CREAT SERPL-MCNC: 0.7 MG/DL (ref 0.5–1.4)
EST. GFR  (AFRICAN AMERICAN): >60 ML/MIN/1.73 M^2
EST. GFR  (AFRICAN AMERICAN): >60 ML/MIN/1.73 M^2
EST. GFR  (NON AFRICAN AMERICAN): >60 ML/MIN/1.73 M^2
EST. GFR  (NON AFRICAN AMERICAN): >60 ML/MIN/1.73 M^2
GLUCOSE SERPL-MCNC: 105 MG/DL (ref 70–110)
GLUCOSE SERPL-MCNC: 82 MG/DL (ref 70–110)
POC ACTIVATED CLOTTING TIME K: 180 SEC (ref 74–137)
POC ACTIVATED CLOTTING TIME K: 202 SEC (ref 74–137)
POC ACTIVATED CLOTTING TIME K: 208 SEC (ref 74–137)
POC ACTIVATED CLOTTING TIME K: 219 SEC (ref 74–137)
POC ACTIVATED CLOTTING TIME K: 224 SEC (ref 74–137)
POTASSIUM SERPL-SCNC: 3.9 MMOL/L (ref 3.5–5.1)
POTASSIUM SERPL-SCNC: 4.4 MMOL/L (ref 3.5–5.1)
PROT SERPL-MCNC: 5.8 G/DL (ref 6–8.4)
PROT SERPL-MCNC: 5.8 G/DL (ref 6–8.4)
SAMPLE: ABNORMAL
SODIUM SERPL-SCNC: 138 MMOL/L (ref 136–145)
SODIUM SERPL-SCNC: 138 MMOL/L (ref 136–145)

## 2019-06-11 PROCEDURE — 37228 PR TIB/PER REVASC W/TLA: ICD-10-PCS | Mod: 51,RT,, | Performed by: INTERNAL MEDICINE

## 2019-06-11 PROCEDURE — 37225 HC FEM/POPL REVAS W/ATHER: CPT | Mod: RT | Performed by: INTERNAL MEDICINE

## 2019-06-11 PROCEDURE — 25000003 PHARM REV CODE 250: Performed by: NURSE PRACTITIONER

## 2019-06-11 PROCEDURE — C1725 CATH, TRANSLUMIN NON-LASER: HCPCS | Performed by: INTERNAL MEDICINE

## 2019-06-11 PROCEDURE — 37228 HC TIB/PER REVASC W/TLA: CPT | Mod: RT | Performed by: INTERNAL MEDICINE

## 2019-06-11 PROCEDURE — C1753 CATH, INTRAVAS ULTRASOUND: HCPCS | Performed by: INTERNAL MEDICINE

## 2019-06-11 PROCEDURE — 99152 MOD SED SAME PHYS/QHP 5/>YRS: CPT | Mod: ,,, | Performed by: INTERNAL MEDICINE

## 2019-06-11 PROCEDURE — 99152 MOD SED SAME PHYS/QHP 5/>YRS: CPT | Performed by: INTERNAL MEDICINE

## 2019-06-11 PROCEDURE — 37225 PR FEM/POPL REVAS W/ATHERECTOMY: CPT | Mod: RT,,, | Performed by: INTERNAL MEDICINE

## 2019-06-11 PROCEDURE — 99152 PR MOD CONSCIOUS SEDATION, SAME PHYS, 5+ YRS, FIRST 15 MIN: ICD-10-PCS | Mod: ,,, | Performed by: INTERNAL MEDICINE

## 2019-06-11 PROCEDURE — 85347 COAGULATION TIME ACTIVATED: CPT | Performed by: INTERNAL MEDICINE

## 2019-06-11 PROCEDURE — C1887 CATHETER, GUIDING: HCPCS | Performed by: INTERNAL MEDICINE

## 2019-06-11 PROCEDURE — 25000003 PHARM REV CODE 250: Performed by: INTERNAL MEDICINE

## 2019-06-11 PROCEDURE — 25500020 PHARM REV CODE 255: Performed by: INTERNAL MEDICINE

## 2019-06-11 PROCEDURE — 37252 INTRVASC US NONCORONARY 1ST: CPT | Mod: RT,,, | Performed by: INTERNAL MEDICINE

## 2019-06-11 PROCEDURE — 80053 COMPREHEN METABOLIC PANEL: CPT

## 2019-06-11 PROCEDURE — 37252 PR IVUS, NON-CORONARY, 1ST VESSEL: ICD-10-PCS | Mod: RT,,, | Performed by: INTERNAL MEDICINE

## 2019-06-11 PROCEDURE — 99153 MOD SED SAME PHYS/QHP EA: CPT | Performed by: INTERNAL MEDICINE

## 2019-06-11 PROCEDURE — 63600175 PHARM REV CODE 636 W HCPCS: Performed by: INTERNAL MEDICINE

## 2019-06-11 PROCEDURE — C1884 EMBOLIZATION PROTECT SYST: HCPCS | Performed by: INTERNAL MEDICINE

## 2019-06-11 PROCEDURE — C2623 CATH, TRANSLUMIN, DRUG-COAT: HCPCS | Performed by: INTERNAL MEDICINE

## 2019-06-11 PROCEDURE — 99407 BEHAV CHNG SMOKING > 10 MIN: CPT | Mod: S$GLB,,,

## 2019-06-11 PROCEDURE — 99407 PR TOBACCO USE CESSATION INTENSIVE >10 MINUTES: ICD-10-PCS | Mod: S$GLB,,,

## 2019-06-11 PROCEDURE — 37225 PR FEM/POPL REVAS W/ATHERECTOMY: ICD-10-PCS | Mod: RT,,, | Performed by: INTERNAL MEDICINE

## 2019-06-11 PROCEDURE — 37252 INTRVASC US NONCORONARY 1ST: CPT | Mod: RT | Performed by: INTERNAL MEDICINE

## 2019-06-11 PROCEDURE — 36415 COLL VENOUS BLD VENIPUNCTURE: CPT

## 2019-06-11 PROCEDURE — C1894 INTRO/SHEATH, NON-LASER: HCPCS | Performed by: INTERNAL MEDICINE

## 2019-06-11 PROCEDURE — 27201423 OPTIME MED/SURG SUP & DEVICES STERILE SUPPLY: Performed by: INTERNAL MEDICINE

## 2019-06-11 PROCEDURE — C1769 GUIDE WIRE: HCPCS | Performed by: INTERNAL MEDICINE

## 2019-06-11 PROCEDURE — 37228 PR TIB/PER REVASC W/TLA: CPT | Mod: 51,RT,, | Performed by: INTERNAL MEDICINE

## 2019-06-11 RX ORDER — POTASSIUM CHLORIDE 20 MEQ/1
60 TABLET, EXTENDED RELEASE ORAL ONCE
Status: DISCONTINUED | OUTPATIENT
Start: 2019-06-11 | End: 2019-06-11

## 2019-06-11 RX ORDER — ATORVASTATIN CALCIUM 20 MG/1
20 TABLET, FILM COATED ORAL DAILY
Status: DISCONTINUED | OUTPATIENT
Start: 2019-06-11 | End: 2019-06-19

## 2019-06-11 RX ORDER — SPIRONOLACTONE 25 MG/1
50 TABLET ORAL DAILY
Status: DISCONTINUED | OUTPATIENT
Start: 2019-06-11 | End: 2019-06-18

## 2019-06-11 RX ORDER — FENTANYL CITRATE 50 UG/ML
INJECTION, SOLUTION INTRAMUSCULAR; INTRAVENOUS
Status: DISCONTINUED | OUTPATIENT
Start: 2019-06-11 | End: 2019-06-11 | Stop reason: HOSPADM

## 2019-06-11 RX ORDER — VERAPAMIL HYDROCHLORIDE 2.5 MG/ML
INJECTION, SOLUTION INTRAVENOUS
Status: DISCONTINUED | OUTPATIENT
Start: 2019-06-11 | End: 2019-06-11 | Stop reason: HOSPADM

## 2019-06-11 RX ORDER — POTASSIUM CHLORIDE 20 MEQ/15ML
20 SOLUTION ORAL DAILY
Status: DISCONTINUED | OUTPATIENT
Start: 2019-06-11 | End: 2019-06-19

## 2019-06-11 RX ORDER — POTASSIUM CHLORIDE 20 MEQ/15ML
60 SOLUTION ORAL ONCE
Status: COMPLETED | OUTPATIENT
Start: 2019-06-11 | End: 2019-06-11

## 2019-06-11 RX ORDER — HEPARIN SODIUM 1000 [USP'U]/ML
INJECTION, SOLUTION INTRAVENOUS; SUBCUTANEOUS
Status: DISCONTINUED | OUTPATIENT
Start: 2019-06-11 | End: 2019-06-11 | Stop reason: HOSPADM

## 2019-06-11 RX ORDER — IODIXANOL 320 MG/ML
INJECTION, SOLUTION INTRAVASCULAR
Status: DISCONTINUED | OUTPATIENT
Start: 2019-06-11 | End: 2019-06-11 | Stop reason: HOSPADM

## 2019-06-11 RX ORDER — DIPHENHYDRAMINE HYDROCHLORIDE 50 MG/ML
INJECTION INTRAMUSCULAR; INTRAVENOUS
Status: DISCONTINUED | OUTPATIENT
Start: 2019-06-11 | End: 2019-06-11 | Stop reason: HOSPADM

## 2019-06-11 RX ORDER — MIDAZOLAM HYDROCHLORIDE 1 MG/ML
INJECTION, SOLUTION INTRAMUSCULAR; INTRAVENOUS
Status: DISCONTINUED | OUTPATIENT
Start: 2019-06-11 | End: 2019-06-11 | Stop reason: HOSPADM

## 2019-06-11 RX ADMIN — SODIUM CHLORIDE 250 ML: 0.9 INJECTION, SOLUTION INTRAVENOUS at 05:06

## 2019-06-11 RX ADMIN — POTASSIUM CHLORIDE 60 MEQ: 20 SOLUTION ORAL at 09:06

## 2019-06-11 RX ADMIN — POTASSIUM CHLORIDE 20 MEQ: 20 SOLUTION ORAL at 09:06

## 2019-06-11 RX ADMIN — CLOPIDOGREL BISULFATE 75 MG: 75 TABLET ORAL at 09:06

## 2019-06-11 RX ADMIN — SPIRONOLACTONE 50 MG: 25 TABLET, FILM COATED ORAL at 09:06

## 2019-06-11 RX ADMIN — PANTOPRAZOLE SODIUM 40 MG: 40 TABLET, DELAYED RELEASE ORAL at 09:06

## 2019-06-11 NOTE — PLAN OF CARE
Problem: Skin Injury Risk Increased  Goal: Skin Health and Integrity  Outcome: Ongoing (interventions implemented as appropriate)  Continue to turn q2 hours via shift. No s/s new pressure ulcer by \the end of shift.

## 2019-06-11 NOTE — NURSING
Pt arrived to room via stretcher from Cath Lab awake, alert and oriented x4. Pt denies pain at this time. No distress noted. Applied tele monitor. Dressing to right wrist and left groin dry and intact, no hematoma nor oozing noted.Discussed POC with pt and spouse. All questions answered. Verbalized understanding. Bed in lowest position. Side rails up x2. Instructed to call with any needs/concerns. Verbalized understanding. Will continue to monitor.

## 2019-06-11 NOTE — CONSULTS
[facility]  Podiatry  Consult Note    Patient Name: Sachin Calloway  MRN: [id number]  Admission Date: 6/10/2019  Hospital Length of Stay: 0 days  Attending Physician: Fabien Gonzales MD  Primary Care Provider: Juan Gatica MD     Inpatient consult to Podiatry  Consult performed by: Zurdo Monroe DPM  Consult ordered by: Fabien Gonzales MD        Subjective:     History of Present Illness:  57 y/o male with hx of alcoholic liver cirrhosis, HTN, former EtOH abuse, active tobacco use, PAD who was admitted for peripheral angiogram per Dr. Gonzales. Accompanied by his wife at bedside who acts as historian for patient. Patient has history of large amount of fluid in abdomen that caused his right lower extremity arteries to be occluded. Wound right foot began as a blister which was treated per a community physician. Patient was seen by Dr. Gomez and referred to Dr. Gonzales for revascularization. Relates his right foot does not hurt today.    Scheduled Meds:   clopidogrel  75 mg Oral Daily    pantoprazole  40 mg Oral Daily    potassium chloride SA  20 mEq Oral Daily     Continuous Infusions:  PRN Meds:acetaminophen, ondansetron    Review of patient's allergies indicates:   Allergen Reactions    Asa [aspirin] Nausea Only        Past Medical History:   Diagnosis Date    Alcohol abuse     Cirrhosis     Edema     Hypertension     Seizures     Stricture esophagus      Past Surgical History:   Procedure Laterality Date    COLONOSCOPY      COLONOSCOPY N/A 5/5/2015    Performed by [facility] at [facility] ENDO    EGD (ESOPHAGOGASTRODUODENOSCOPY) N/A 5/13/2019    Performed by Dillan Salinas MD at [facility] ENDO    EGD with dilitation  03/04/2019    ESOPHAGOGASTRODUODENOSCOPY (EGD) N/A 3/29/2019    Performed by [facility]an [facility] at [facility] ENDO    ESOPHAGOGASTRODUODENOSCOPY (EGD) N/A 3/18/2019    Performed by Omer Pritchard MD at [facility] ENDO    ESOPHAGOGASTRODUODENOSCOPY (EGD) N/A 3/4/2019     Performed by Madhu [facility] at [facility] ENDO    ESOPHAGOGASTRODUODENOSCOPY (EGD) N/A 5/5/2015    Performed by Mario [facility]ínez [facility] at [facility] ENDO       Family History     Problem Relation (Age of Onset)    Cancer Father        Tobacco Use    Smoking status: Current Every Day Smoker     Packs/day: 0.50     Years: 40.00     Pack years: 20.00     Types: Cigarettes    Smokeless tobacco: Never Used   Substance and Sexual Activity    Alcohol use: Yes     Comment: 6 pack every day (3-4)    Drug use: No    Sexual activity: Not on file     Review of Systems   Constitutional: Positive for fatigue. Negative for chills and fever.   HENT: Negative for congestion and hearing loss.    Respiratory: Negative for cough and shortness of breath.    Cardiovascular: Negative for chest pain.   Gastrointestinal: Negative for diarrhea, nausea and vomiting.   Skin: Positive for color change and wound.   Neurological: Positive for weakness. Negative for dizziness.   Psychiatric/Behavioral: Negative for agitation and confusion.     Objective:     Vital Signs (Most Recent):  Temp: 97.5 °F (36.4 °C) (06/11/19 0519)  Pulse: 99 (06/11/19 0519)  Resp: 16 (06/11/19 0519)  BP: 128/84 (06/11/19 0519)  SpO2: 95 % (06/11/19 0519) Vital Signs (24h Range):  Temp:  [97 °F (36.1 °C)-98.1 °F (36.7 °C)] 97.5 °F (36.4 °C)  Pulse:  [] 99  Resp:  [14-20] 16  SpO2:  [95 %-100 %] 95 %  BP: (119-153)/(64-93) 128/84     Weight: 48.9 kg (107 lb 12.9 oz)  Body mass index is 14.62 kg/m².    Foot Exam    General  General Appearance: appears stated age and healthy   Orientation: alert and oriented to person, place, and time   Affect: appropriate       Right Foot/Ankle     Inspection and Palpation  Skin Exam: skin changes;     Neurovascular  Dorsalis pedis: absent  Posterior tibial: absent      Left Foot/Ankle      Inspection and Palpation  Skin Exam: skin intact; no callus, no drainage, no maceration, no ulcer and no erythema     Neurovascular  Dorsalis pedis:  absent  Posterior tibial: 1+            Laboratory:  A1C: No results for input(s): HGBA1C in the last 4320 hours.  Blood Cultures: No results for input(s): LABBLOO in the last 48 hours.  CBC:   Recent Labs   Lab 06/10/19  1327   WBC 14.58*   RBC 3.87*   HGB 9.7*   HCT 29.7*   *   MCV 77*   MCH 25.1*   MCHC 32.7     CMP:   Recent Labs   Lab 06/10/19  1327   GLU 90   CALCIUM 7.9*      K 3.2*   CO2 26      BUN 16   CREATININE 0.7     CRP: No results for input(s): CRP in the last 168 hours.  ESR: No results for input(s): SEDRATE in the last 168 hours.  Wound Cultures:   Recent Labs   Lab 05/27/19  0953   LABAERO No growth       Diagnostic Results:  I have reviewed all pertinent imaging results/findings within the past 24 hours.    Clinical Findings:  There was eschar formation and cyanosis of toes 1-5 right consistent with gangrene. Note necrotic appearing skin extending along dorsum of the foot to ankle level with clear zone of demarcation. No surrounding erythema, no fluctuance or crepitance.                Assessment/Plan:     Gangrene of right foot  Betadine wet to dry dressing daily per nursing. Moderate amount of serous drainage was noted concerning for diabetic wet gangrene. Due to large amount of tissue loss patient is high risk for infection and would recommend ID consultation. X-ray right foot ordered to assess for gas or bony destructive changes. Post revascularization recommend observation to further assess zone of demarcation prior to any definitive amputation.    Critical lower limb ischemia  Post angiogram 6/10/19 with plan for revascularization attempt per Dr. Gonzales 6/12/19.        Thank you for your consult. I will follow-up with patient. Please contact us if you have any additional questions.    Zurdo Monroe DPM  Podiatry  [facility]

## 2019-06-11 NOTE — PLAN OF CARE
Consulted for dry vs wet gangrene in a patient with PAD  He has a leucocytosis and his wound appears to have a component of wet gangrene in addition to dry  Would draw blood cultures and then initiate broad spectrum antibiotics with Vanc and Piptazo   Podiatry is already following    Full consult to follow

## 2019-06-11 NOTE — HPI
59 y/o male with hx of alcoholic liver cirrhosis, HTN, former EtOH abuse, active tobacco use, PAD who was admitted for peripheral angiogram per Dr. Gonzales. Accompanied by his wife at bedside who acts as historian for patient. Patient has history of large amount of fluid in abdomen that caused his right lower extremity arteries to be occluded. Wound right foot began as a blister which was treated per a community physician. Patient was seen by Dr. Gomez and referred to Dr. Gonzales for revascularization. Relates his right foot does not hurt today.

## 2019-06-11 NOTE — PLAN OF CARE
1545 pt transferred to recovery cath lab, bay 4 via stretcher, side-rails up x2.  Pt connected to cardiac monitor.  Vital signs stable.  No complaints of pain.  Left groin 6 [place] sheath in place connected to pressure line. Site covered with gauze/tegaderm, intact/dry with no bleeding or hematoma.  Doppler bilateral pedal pulses.  Skin warm to touch, normal in color.  Neuro intact:  Sleepy, but arrousable .  Updated patient's wife.  Will continue to monitor patient.

## 2019-06-11 NOTE — SUBJECTIVE & OBJECTIVE
Scheduled Meds:   clopidogrel  75 mg Oral Daily    pantoprazole  40 mg Oral Daily    potassium chloride SA  20 mEq Oral Daily     Continuous Infusions:  PRN Meds:acetaminophen, ondansetron    Review of patient's allergies indicates:   Allergen Reactions    Asa [aspirin] Nausea Only        Past Medical History:   Diagnosis Date    Alcohol abuse     Cirrhosis     Edema     Hypertension     Seizures     Stricture esophagus      Past Surgical History:   Procedure Laterality Date    COLONOSCOPY      COLONOSCOPY N/A 5/5/2015    [facility]formed by [facility]    EGD (ESOPHAGOGASTRODUODENOSCOPY) N/A 5/13/2019    Performed by [facility]an [facility]inas [facility] at [facility] ENDO    EGD with dilitation  03/04/2019    ESOPHAGOGASTRODUODENOSCOPY (EGD) N/A 3/29/2019    Performed by [facility]an [facility] at [facility] ENDO    ESOPHAGOGASTRODUODENOSCOPY (EGD) N/A 3/18/2019    Performed by Omer Pritchard MD at [facility] ENDO    ESOPHAGOGASTRODUODENOSCOPY (EGD) N/A 3/4/2019    Performed by Madhu Schmidt MD at [facility] ENDO    ESOPHAGOGASTRODUODENOSCOPY (EGD) N/A 5/5/2015    Performed by Mario [facility]ínez MD at [facility] ENDO       Family History     Problem Relation (Age of Onset)    Cancer Father        Tobacco Use    Smoking status: Current Every Day Smoker     Packs/day: 0.50     Years: 40.00     Pack years: 20.00     Types: Cigarettes    Smokeless tobacco: Never Used   Substance and Sexual Activity    Alcohol use: Yes     Comment: 6 pack every day (3-4)    Drug use: No    Sexual activity: Not on file     Review of Systems   Constitutional: Positive for fatigue. Negative for chills and fever.   HENT: Negative for congestion and hearing loss.    Respiratory: Negative for cough and shortness of breath.    Cardiovascular: Negative for chest pain.   Gastrointestinal: Negative for diarrhea, nausea and vomiting.   Skin: Positive for color change and wound.   Neurological: Positive for weakness. Negative for dizziness.    Psychiatric/Behavioral: Negative for agitation and confusion.     Objective:     Vital Signs (Most Recent):  Temp: 97.5 °F (36.4 °C) (06/11/19 0519)  Pulse: 99 (06/11/19 0519)  Resp: 16 (06/11/19 0519)  BP: 128/84 (06/11/19 0519)  SpO2: 95 % (06/11/19 0519) Vital Signs (24h Range):  Temp:  [97 °F (36.1 °C)-98.1 °F (36.7 °C)] 97.5 °F (36.4 °C)  Pulse:  [] 99  Resp:  [14-20] 16  SpO2:  [95 %-100 %] 95 %  BP: (119-153)/(64-93) 128/84     Weight: 48.9 kg (107 lb 12.9 oz)  Body mass index is 14.62 kg/m².    Foot Exam    General  General Appearance: appears stated age and healthy   Orientation: alert and oriented to person, place, and time   Affect: appropriate       Right Foot/Ankle     Inspection and Palpation  Skin Exam: skin changes;     Neurovascular  Dorsalis pedis: absent  Posterior tibial: absent      Left Foot/Ankle      Inspection and Palpation  Skin Exam: skin intact; no callus, no drainage, no maceration, no ulcer and no erythema     Neurovascular  Dorsalis pedis: absent  Posterior tibial: 1+            Laboratory:  A1C: No results for input(s): HGBA1C in the last 4320 hours.  Blood Cultures: No results for input(s): LABBLOO in the last 48 hours.  CBC:   Recent Labs   Lab 06/10/19  1327   WBC 14.58*   RBC 3.87*   HGB 9.7*   HCT 29.7*   *   MCV 77*   MCH 25.1*   MCHC 32.7     CMP:   Recent Labs   Lab 06/10/19  1327   GLU 90   CALCIUM 7.9*      K 3.2*   CO2 26      BUN 16   CREATININE 0.7     CRP: No results for input(s): CRP in the last 168 hours.  ESR: No results for input(s): SEDRATE in the last 168 hours.  Wound Cultures:   Recent Labs   Lab 05/27/19  0953   LABAERO No growth       Diagnostic Results:  I have reviewed all pertinent imaging results/findings within the past 24 hours.    Clinical Findings:  There was eschar formation and cyanosis of toes 1-5 right consistent with gangrene. Note necrotic appearing skin extending along dorsum of the foot to ankle level with clear zone  of demarcation. No surrounding erythema, no fluctuance or crepitance.

## 2019-06-11 NOTE — PROCEDURES
: Fabien Gonzales MD  Pre-procedure diagnosis: CLI  Post-procedure diagnosis: CLI    Post Procedure Note: atherectomy and PTA with DCB    The pt was brought to the cath lab and under sterile technique, LCFA access was obtained without difficulty. Images were obtained in multiple views and successful RSFA  atherectomy followed by PTA with DCB (also to Pop). Also successful PTA to proximal peroneal with excellent results. Please see full report for details. The pt tolerated the procedure well without complications. Manual pressure held with successful hemostasis.     Vitals:    06/11/19 0754 06/11/19 0757 06/11/19 1152 06/11/19 1158   BP:  (!) 140/75 121/79    BP Location:   Left arm    Patient Position:  Lying Lying    Pulse: 99 101 (!) 118 104   Resp:  19 18    Temp:  96.8 °F (36 °C)     TempSrc:  Oral     SpO2:       Weight:       Height:             Gen: NAD  Ext: 1+ fem pulses, no evidence of hematoma  Estimated blood loss: < 50 cc    Plan:  -Post cath care per protocol  -Cont plavix, will consider ASA  -Start moderate dose statin  -Will likely need intervention of AT/DP

## 2019-06-11 NOTE — INTERVAL H&P NOTE
The patient has been examined and the H&P has been reviewed:    I concur with the findings and no changes have occurred since H&P was written.    Anesthesia/Surgery risks, benefits and alternative options discussed and understood by patient/family.          Active Hospital Problems    Diagnosis  POA    Critical lower limb ischemia [I99.8]  Yes     Priority: High    Gangrene of right foot [I96]  Yes    Alcoholic cirrhosis of liver with ascites [K70.31]  Yes      Resolved Hospital Problems   No resolved problems to display.

## 2019-06-11 NOTE — PLAN OF CARE
VN cued into pt's room for introduction with pt's permission.  VN role explained and informed pt and wife that VN would be working with bedside nurse and rest of care team.  Fall risk and bed alarm protocol education provided.  Instructed pt to call for assistance.  Pt aware and agreeable.  Allowed time for questions.  No acute distress noted.  Will continue to be available as needed.

## 2019-06-11 NOTE — CONSULTS
Podiatry is following pt. In surgery now with Christian, possible treatment tomorrow with Mabel. Dr. Monroe has wound care orders in place.

## 2019-06-11 NOTE — PROGRESS NOTES
Consult is required sacral and hip wound, these wounds not addressed on original consult order.     Pt in procedure, will assess tomorrow, wound care orders given per MAGGY Grullon NP.

## 2019-06-12 PROBLEM — E88.09 HYPOALBUMINEMIA: Status: ACTIVE | Noted: 2019-06-12

## 2019-06-12 PROBLEM — E43 SEVERE PROTEIN-CALORIE MALNUTRITION: Status: ACTIVE | Noted: 2019-06-12

## 2019-06-12 LAB
AORTIC ROOT ANNULUS: 2.42 CM
AORTIC VALVE CUSP SEPERATION: 1.04 CM
AV INDEX (PROSTH): 1.14
AV MEAN GRADIENT: 1.54 MMHG
AV PEAK GRADIENT: 3.53 MMHG
AV VALVE AREA: 3.56 CM2
AV VELOCITY RATIO: 0.92
BASOPHILS # BLD AUTO: 0.02 K/UL (ref 0–0.2)
BASOPHILS NFR BLD: 0.1 % (ref 0–1.9)
BSA FOR ECHO PROCEDURE: 1.55 M2
CHOLEST SERPL-MCNC: 68 MG/DL (ref 120–199)
CHOLEST/HDLC SERPL: 3.4 {RATIO} (ref 2–5)
CV ECHO LV RWT: 0.44 CM
DIFFERENTIAL METHOD: ABNORMAL
DOP CALC AO PEAK VEL: 0.94 M/S
DOP CALC AO VTI: 13.5 CM
DOP CALC LVOT AREA: 3.11 CM2
DOP CALC LVOT DIAMETER: 1.99 CM
DOP CALC LVOT PEAK VEL: 0.87 M/S
DOP CALC LVOT STROKE VOLUME: 48 CM3
DOP CALCLVOT PEAK VEL VTI: 15.44 CM
ECHO LV POSTERIOR WALL: 0.9 CM (ref 0.6–1.1)
EOSINOPHIL # BLD AUTO: 0 K/UL (ref 0–0.5)
EOSINOPHIL NFR BLD: 0 % (ref 0–8)
ERYTHROCYTE [DISTWIDTH] IN BLOOD BY AUTOMATED COUNT: 16.5 % (ref 11.5–14.5)
FRACTIONAL SHORTENING: 21 % (ref 28–44)
HCT VFR BLD AUTO: 25.9 % (ref 40–54)
HDLC SERPL-MCNC: 20 MG/DL (ref 40–75)
HDLC SERPL: 29.4 % (ref 20–50)
HGB BLD-MCNC: 8.3 G/DL (ref 14–18)
INTERVENTRICULAR SEPTUM: 0.67 CM (ref 0.6–1.1)
LDLC SERPL CALC-MCNC: 33.8 MG/DL (ref 63–159)
LEFT ATRIUM SIZE: 1.21 CM
LEFT INTERNAL DIMENSION IN SYSTOLE: 3.26 CM (ref 2.1–4)
LEFT VENTRICLE DIASTOLIC VOLUME INDEX: 45.46 ML/M2
LEFT VENTRICLE DIASTOLIC VOLUME: 74.48 ML
LEFT VENTRICLE MASS INDEX: 58.2 G/M2
LEFT VENTRICLE SYSTOLIC VOLUME INDEX: 26.1 ML/M2
LEFT VENTRICLE SYSTOLIC VOLUME: 42.69 ML
LEFT VENTRICULAR INTERNAL DIMENSION IN DIASTOLE: 4.11 CM (ref 3.5–6)
LEFT VENTRICULAR MASS: 95.3 G
LYMPHOCYTES # BLD AUTO: 2.1 K/UL (ref 1–4.8)
LYMPHOCYTES NFR BLD: 13.6 % (ref 18–48)
MCH RBC QN AUTO: 25 PG (ref 27–31)
MCHC RBC AUTO-ENTMCNC: 32 G/DL (ref 32–36)
MCV RBC AUTO: 78 FL (ref 82–98)
MONOCYTES # BLD AUTO: 0.9 K/UL (ref 0.3–1)
MONOCYTES NFR BLD: 5.6 % (ref 4–15)
NEUTROPHILS # BLD AUTO: 12.1 K/UL (ref 1.8–7.7)
NEUTROPHILS NFR BLD: 79.7 % (ref 38–73)
NONHDLC SERPL-MCNC: 48 MG/DL
PISA TR MAX VEL: 2.43 M/S
PLATELET # BLD AUTO: 354 K/UL (ref 150–350)
PMV BLD AUTO: 9.6 FL (ref 9.2–12.9)
RBC # BLD AUTO: 3.32 M/UL (ref 4.6–6.2)
RIGHT VENTRICULAR END-DIASTOLIC DIMENSION: 2.7 CM
TR MAX PG: 23.62 MMHG
TRIGL SERPL-MCNC: 71 MG/DL (ref 30–150)
WBC # BLD AUTO: 15.17 K/UL (ref 3.9–12.7)

## 2019-06-12 PROCEDURE — 97166 OT EVAL MOD COMPLEX 45 MIN: CPT

## 2019-06-12 PROCEDURE — 97802 MEDICAL NUTRITION INDIV IN: CPT

## 2019-06-12 PROCEDURE — 36415 COLL VENOUS BLD VENIPUNCTURE: CPT

## 2019-06-12 PROCEDURE — 25000003 PHARM REV CODE 250: Performed by: NURSE PRACTITIONER

## 2019-06-12 PROCEDURE — 87040 BLOOD CULTURE FOR BACTERIA: CPT

## 2019-06-12 PROCEDURE — 97530 THERAPEUTIC ACTIVITIES: CPT

## 2019-06-12 PROCEDURE — 97161 PT EVAL LOW COMPLEX 20 MIN: CPT

## 2019-06-12 PROCEDURE — 80061 LIPID PANEL: CPT

## 2019-06-12 PROCEDURE — 85025 COMPLETE CBC W/AUTO DIFF WBC: CPT

## 2019-06-12 PROCEDURE — 25000003 PHARM REV CODE 250: Performed by: INTERNAL MEDICINE

## 2019-06-12 PROCEDURE — 63600175 PHARM REV CODE 636 W HCPCS: Performed by: NURSE PRACTITIONER

## 2019-06-12 PROCEDURE — 11000001 HC ACUTE MED/SURG PRIVATE ROOM

## 2019-06-12 RX ORDER — ONDANSETRON 4 MG/1
4 TABLET, ORALLY DISINTEGRATING ORAL EVERY 6 HOURS PRN
Status: DISCONTINUED | OUTPATIENT
Start: 2019-06-12 | End: 2019-06-25 | Stop reason: HOSPADM

## 2019-06-12 RX ORDER — VANCOMYCIN HCL IN 5 % DEXTROSE 1G/250ML
20 PLASTIC BAG, INJECTION (ML) INTRAVENOUS
Status: DISCONTINUED | OUTPATIENT
Start: 2019-06-12 | End: 2019-06-12

## 2019-06-12 RX ORDER — ENOXAPARIN SODIUM 100 MG/ML
40 INJECTION SUBCUTANEOUS EVERY 24 HOURS
Status: DISCONTINUED | OUTPATIENT
Start: 2019-06-12 | End: 2019-06-17

## 2019-06-12 RX ORDER — POLYETHYLENE GLYCOL 3350 17 G/17G
17 POWDER, FOR SOLUTION ORAL 2 TIMES DAILY PRN
Status: DISCONTINUED | OUTPATIENT
Start: 2019-06-12 | End: 2019-06-25 | Stop reason: HOSPADM

## 2019-06-12 RX ORDER — SIMETHICONE 125 MG
125 TABLET,CHEWABLE ORAL EVERY 6 HOURS PRN
Status: DISCONTINUED | OUTPATIENT
Start: 2019-06-12 | End: 2019-06-25 | Stop reason: HOSPADM

## 2019-06-12 RX ORDER — MAG HYDROX/ALUMINUM HYD/SIMETH 200-200-20
30 SUSPENSION, ORAL (FINAL DOSE FORM) ORAL EVERY 6 HOURS PRN
Status: DISCONTINUED | OUTPATIENT
Start: 2019-06-12 | End: 2019-06-25 | Stop reason: HOSPADM

## 2019-06-12 RX ORDER — ASPIRIN 81 MG/1
81 TABLET ORAL DAILY
Status: DISCONTINUED | OUTPATIENT
Start: 2019-06-12 | End: 2019-06-14

## 2019-06-12 RX ORDER — OXYCODONE HYDROCHLORIDE 5 MG/1
5 TABLET ORAL EVERY 4 HOURS PRN
Status: DISCONTINUED | OUTPATIENT
Start: 2019-06-12 | End: 2019-06-25 | Stop reason: HOSPADM

## 2019-06-12 RX ADMIN — POTASSIUM CHLORIDE 20 MEQ: 20 SOLUTION ORAL at 10:06

## 2019-06-12 RX ADMIN — PANTOPRAZOLE SODIUM 40 MG: 40 TABLET, DELAYED RELEASE ORAL at 10:06

## 2019-06-12 RX ADMIN — ENOXAPARIN SODIUM 40 MG: 100 INJECTION SUBCUTANEOUS at 06:06

## 2019-06-12 RX ADMIN — OXYCODONE HYDROCHLORIDE 5 MG: 5 TABLET ORAL at 08:06

## 2019-06-12 RX ADMIN — SPIRONOLACTONE 50 MG: 25 TABLET, FILM COATED ORAL at 10:06

## 2019-06-12 RX ADMIN — VANCOMYCIN HYDROCHLORIDE 750 MG: 750 INJECTION, POWDER, LYOPHILIZED, FOR SOLUTION INTRAVENOUS at 10:06

## 2019-06-12 RX ADMIN — PIPERACILLIN AND TAZOBACTAM 4.5 G: 4; .5 INJECTION, POWDER, LYOPHILIZED, FOR SOLUTION INTRAVENOUS; PARENTERAL at 11:06

## 2019-06-12 RX ADMIN — PIPERACILLIN AND TAZOBACTAM 4.5 G: 4; .5 INJECTION, POWDER, LYOPHILIZED, FOR SOLUTION INTRAVENOUS; PARENTERAL at 06:06

## 2019-06-12 RX ADMIN — CLOPIDOGREL BISULFATE 75 MG: 75 TABLET ORAL at 10:06

## 2019-06-12 RX ADMIN — VANCOMYCIN HYDROCHLORIDE 1250 MG: 1.25 INJECTION, POWDER, LYOPHILIZED, FOR SOLUTION INTRAVENOUS at 10:06

## 2019-06-12 RX ADMIN — ASPIRIN 81 MG: 81 TABLET, COATED ORAL at 11:06

## 2019-06-12 RX ADMIN — ATORVASTATIN CALCIUM 20 MG: 20 TABLET, FILM COATED ORAL at 10:06

## 2019-06-12 NOTE — PT/OT/SLP EVAL
Physical Therapy Evaluation    Patient Name:  Sachin Calloway   MRN:  [id number]    Recommendations:     Discharge Recommendations:  home with home health   Discharge Equipment Recommendations: none   Barriers to discharge: None    Assessment:     Sachin Calloway is a 58 y.o. male admitted with a medical diagnosis of Critical lower limb ischemia.  He presents with the following impairments/functional limitations:  weakness, gait instability, decreased upper extremity function, decreased lower extremity function, decreased ROM, impaired cardiopulmonary response to activity, impaired functional mobilty, impaired self care skills, pain, impaired skin, impaired balance, impaired endurance(need to check Wt bearing status R LE) . Patient able to come from supine <> sit with mod to min assist. EOB sitting ~ 12 min fair static balance. Patient able to assist with scooting with wt bearing thru LLE sideway at EOB. Will need to check wt bearing status RLE prior to transfer or gait training.    Rehab Prognosis: Fair; patient would benefit from acute skilled PT services to address these deficits and reach maximum level of function.    Recent Surgery: Procedure(s) (LRB):  PTA, PERIPHERAL VESSEL (N/A)  Atherectomy, Peripheral Blood Vessel (N/A)  ULTRASOUND, INTRAVASCULAR (N/A)  Filter Protection, Peripheral  PTA, Superficial Femoral Artery  PTA, Peroneal 1 Day Post-Op    Plan:     During this hospitalization, patient to be seen 5 x/week to address the identified rehab impairments via gait training, therapeutic activities, therapeutic exercises and progress toward the following goals:    · Plan of Care Expires:  07/12/19    Subjective     Chief Complaint: generalize pain greater in both LE  Patient/Family Comments/goals: get well and go home  Pain/Comfort:  · Pain Rating 1: other (see comments)(did not rate on scale body aches generalized)  · Location - Orientation 1: generalized  · Pain Addressed 1: Distraction, Reposition, Cessation  of Activity    Patients cultural, spiritual, Tenriism conflicts given the current situation:      Living Environment:  Lives with spouse and other family [facility]   Prior to admission, patients level of function was needs assistance.  Equipment used at home: wheelchair, bedside commode, walker, rolling, hospital bed.  DME owned (not currently used): none.  Upon discharge, patient will have assistance from family.    Objective:     Communicated with primary nurse prior to session.  Patient found HOB elevated with telemetry, oxygen, peripheral IV  upon PT entry to room.    General Precautions: Standard, fall   Orthopedic Precautions:N/A   Braces: N/A     Exams:  · RLE ROM: WFL except knee and ankle  · RLE Strength: isolated mvts intact at all joints 3/5 to +3/5 by obsersation   · LLE ROM:  WFL except knee and ankle  · LLE Strength: isolated mvts intact at all joints 3/5 to -4/5 by observation    Functional Mobility:  · Bed Mobility:     · Scooting: minimum assistance and moderate assistance  · Supine to Sit: minimum assistance  · Sit to Supine: minimum assistance  · Balance: Static sitting fair       Therapeutic Activities and Exercises:   Sat EOB able to work on scooting with wt bearing on LLE to assist with buttock lift. Needed min to mod assist      AM-PAC 6 CLICK MOBILITY  Total Score:10     Patient left HOB elevated with all lines intact, call button in reach and family present.    GOALS:   Multidisciplinary Problems     Physical Therapy Goals        Problem: Physical Therapy Goal    Goal Priority Disciplines Outcome Goal Variances Interventions   Physical Therapy Goal     PT, PT/OT Ongoing (interventions implemented as appropriate)     Description:  Goals to be met by: 2019     Patient will increase functional independence with mobility by performin. Supine to sit with Modified [place]  2. Sit to stand transfer with Minimal Assistance  3. Bed to chair transfer with Contact Guard Assistance using  Rolling Walker  4. Gait  x 20 feet with Contact Guard Assistance using Rolling Walker.                       History:     Past Medical History:   Diagnosis Date    Alcohol abuse     Cirrhosis     Edema     Hypertension     Seizures     Stricture esophagus        Past Surgical History:   Procedure Laterality Date    Angiogram, Lower Arterial, Bilateral Bilateral 6/10/2019    Performed [facility] Fabien Gonzales [facility]EP    Aortogram, Abdominal N/A 6/10/2019    Performed by Fabien Gonzales [facility] at [facility] CATH LAB/EP    Atherectomy, Peripheral Blood Vessel N/A 6/11/2019    Performed by Fabien Gonzales MD at [facility] CATH LAB/EP    COLONOSCOPY      COLONOSCOPY N/A 5/5/2015    Performed by Mario Martínez MD at [facility] ENDO    EGD (ESOPHAGOGASTRODUODENOSCOPY) N/A 5/13/2019    Performed by Dillan Salinas MD at [facility] ENDO    EGD with dilitation  03/04/2019    ESOPHAGOGASTRODUODENOSCOPY (EGD) N/A 3/29/2019    Performed by Dillan Salinas MD at [facility] ENDO    ESOPHAGOGASTRODUODENOSCOPY (EGD) N/A 3/18/2019    Performed by Omer Pritchard MD at [facility] ENDO    ESOPHAGOGASTRODUODENOSCOPY (EGD) N/A 3/4/2019    Performed by Madhu Schmidt MD at [facility] ENDO    ESOPHAGOGASTRODUODENOSCOPY (EGD) N/A 5/5/2015    Performed by Mario Martínez MD at [facility] ENDO    Filter Protection, Peripheral  6/11/2019    Performed by Fabien Gonzales MD at [facility] CATH LAB/EP    PTA, PERIPHERAL VESSEL N/A 6/11/2019    Performed by Fabien Gonzales MD at [facility] CATH LAB/EP    PTA, Peroneal  6/11/2019    Performed by Fabien Gonzales MD at [facility] CATH LAB/EP    PTA, Superficial Femoral Artery  6/11/2019    Performed by Fabien Gonzales MD at [facility] CATH LAB/EP    ULTRASOUND, INTRAVASCULAR N/A 6/11/2019    Performed by Fabien Gonzales MD at [facility] CATH LAB/EP       Time Tracking:     PT Received On: 06/12/19  PT Start Time: 1100     PT Stop Time: 1125  PT Total Time (min): 25 min     Billable Minutes: Evaluation 10 and Therapeutic  Activity 15      Juan Díaz, PT  06/12/2019

## 2019-06-12 NOTE — PT/OT/SLP EVAL
"Occupational Therapy   Evaluation/treatment    Name: Sachin Calloway  MRN: [id number]  Admitting Diagnosis:  Critical lower limb ischemia 1 Day Post-Op   s/p Procedure(s):  PTA, PERIPHERAL VESSEL  Atherectomy, Peripheral Blood Vessel  ULTRASOUND, INTRAVASCULAR  Filter Protection, Peripheral  PTA, Superficial Femoral Artery  PTA, Peroneal     Recommendations:     Discharge Recommendations: home with home health, home health OT  Discharge Equipment Recommendations:  none  Barriers to discharge:  None    Assessment:     Sachin Calloway is a 58 y.o. male with a medical diagnosis of Critical lower limb ischemia.  He presents with general weakness and frail disposition.  He would benefit from HHOT at discharge.  DME  In place at home.  Continue with OT POc.   . Performance deficits affecting function: weakness, impaired endurance, impaired self care skills, impaired functional mobilty, gait instability, impaired balance, decreased upper extremity function, decreased lower extremity function, decreased safety awareness, impaired skin, impaired coordination.      Rehab Prognosis: Good; patient would benefit from acute skilled OT services to address these deficits and reach maximum level of function.       Plan:     Patient to be seen 5 x/week to address the above listed problems via self-care/home management, therapeutic exercises, therapeutic activities  · Plan of Care Expires: 07/12/19  · Plan of Care Reviewed with: patient, spouse    Subjective     Chief Complaint: none  Patient/Family Comments/goals: home with    Occupational Profile:  Living Environment: lives with son in [facility] with 1 step; plans on getting "ramp" constructed per wife's report; has tub/shower combo but sponge bathes 2/2 R  foot wound.  Previous level of function: wife reported pt ambulated with rollator bed>bathroom> den area with no assist; pt. performed toileting without help but required assist for sponge bathe and dressing tasks 2/2 R foot wound.  Son " does all IADLS and drives; wife goes over to son's house to assist pt but says she works and cannot help all the time.  He is home with son's rad' while son at work during the day. Pt gets HHOT/HHPT and HH.  Roles and Routines: sedentary.  Equipment Used at Home:  hospital bed, bedside commode, wheelchair, rollator  Assistance upon Discharge: son, his firima' and pt.'s wife    Pain/Comfort:  · Pain Rating 1: 0/10  · Pain Rating Post-Intervention 1: 0/10    Patients cultural, spiritual, Druze conflicts given the current situation: no    Objective:     Communicated with: nurse prior to session.  Patient found right sideline with wedge with bed alarm, peripheral IV, oxygen, telemetry upon OT entry to room.    General Precautions: Standard, fall   Orthopedic Precautions:N/A   Braces: N/A     Occupational Performance:    Bed Mobility:    · Patient completed Rolling/Turning to Left with  minimum assistance and with side rail  · Patient completed Rolling/Turning to Right with minimum assistance and with side rail  · Patient completed Scooting/Bridging with minimum assistance  · Patient completed Supine to Sit with minimum assistance  · Patient completed Sit to Supine with minimum assistance and with side rail    Functional Mobility/Transfers:  · Patient completed Sit <> Stand Transfer with moderate assistance  with  rolling walker   · Functional Mobility: ambulated with min assist with RW for few steps forward, backwards, sideways toward HOB; pt. very frail  and weak; R foot with football dressing and shoe cover on foot and slight decreased WB'ing to foot 2/2 discomfort.     Activities of Daily Living:  · Feeding:  setup opening containers .  · Grooming: NT .  · Lower Body Dressing: total assistance sock L foot; shoe cover R foot 2/2 football dressing  · Toileting: incontinent of BM in bed with rolling activity total assist     Cognitive/Visual Perceptual:  Cognitive/Psychosocial Skills:     -       Oriented to:  Person, Place and Time   -       Follows Commands/attention:Follows one-step commands  -       Communication: clear/fluent  -       Memory: NT  -       Safety awareness/insight to disability: impaired   -       Mood/Affect/Coping skills/emotional control: Cooperative  Visual/Perceptual:      -Intact .    Physical Exam:  Balance:    -       sitting:  static:  fair plus  dynamic;  fair -  standing;  poor   dynamic: absent  Postural examination/scapula alignment:    -       Rounded shoulders  -       Forward head  -       Posterior pelvic tilt  -       Abnormal trunk flexion  Skin integrity: Wound sacral, L hip with mepilex; R foot football dressing; quarter size scabbed wound R knee and reddened are L knee  Dominant hand:    -       Right    Upper Extremity Range of Motion:     -       Right Upper Extremity:  Limited shld AROM ~ 80 otherwise distal AROM wfl  -       Left Upper Extremity:  same  Upper Extremity Strength:    -       Right Upper Extremity: Deficits: shld 2+/5; distally 4-/5  -       Left Upper Extremity: Deficits: sme   Strength:    -       Right Upper Extremity: Deficits: 4-/5  -       Left Upper Extremity: Deficits: 4-/5    AMPAC 6 Click ADL:  AMPAC Total Score: 14    Treatment & Education:  Role of OT and POC  fx ambulation with Rw next to bed with min assist; rolling in bed with rail and min assist.  Education:    Patient left right sidelying with all lines intact, call button in reach, bed alarm on, nusre notified and wife and infectious disease MD present    GOALS:   Multidisciplinary Problems     Occupational Therapy Goals        Problem: Occupational Therapy Goal    Goal Priority Disciplines Outcome Interventions   Occupational Therapy Goal     OT, PT/OT Ongoing (interventions implemented as appropriate)    Description:  Goals to be met by: 7/12/2019    Patient will increase functional independence with ADLs by performing:    Grooming while standing at sink with Stand-by  Assistance.  Toileting from bedside commode with Stand-by Assistance for hygiene and clothing management.   Rolling to Bilateral with Modified [place].   Supine to sit with Modified [place].  Step transfer with Stand-by Assistance  Toilet transfer to bedside commode with Stand-by Assistance.  Increased functional strength to WFL for ADLS.  Upper extremity exercise program 10 reps per handout, with supervision.                      History:     Past Medical History:   Diagnosis Date    Alcohol abuse     Cirrhosis     Edema     Hypertension     Seizures     Stricture esophagus        Past Surgical History:   Procedure Laterality Date    Angiogram, Lower Arterial, Bilateral Bilateral 6/10/2019    Performed [facility] Gonzales [facility] at [facility] CATH LAB/EP    Aortogram, Abdominal N/A 6/10/2019    Performed by [facility] at [facility] CATH LAB/EP    Atherectomy, Peripheral Blood Vessel N/A 6/11/2019    Performed by [facility] at [facility] CATH LAB/EP    COLONOSCOPY      COLONOSCOPY N/A 5/5/2015    Performed by Mario Martínez MD at [facility] ENDO    EGD (ESOPHAGOGASTRODUODENOSCOPY) N/A 5/13/2019    Performed by Dillan Salinas MD at [facility] ENDO    EGD with dilitation  03/04/2019    ESOPHAGOGASTRODUODENOSCOPY (EGD) N/A 3/29/2019    Performed by Dillan [facility]inas [facility] at [facility] ENDO    ESOPHAGOGASTRODUODENOSCOPY (EGD) N/A 3/18/2019    Performed by Omer Pritchard MD at [facility] ENDO    ESOPHAGOGASTRODUODENOSCOPY (EGD) N/A 3/4/2019    Performed by Madhu Schmidt MD at [facility] ENDO    ESOPHAGOGASTRODUODENOSCOPY (EGD) N/A 5/5/2015    Performed by Mario Martínez MD at [facility] ENDO    Filter Protection, Peripheral  6/11/2019    Performed by Fabien Gonzales [facility] at [facility] CATH LAB/EP    PTA, PERIPHERAL VESSEL N/A 6/11/2019    Performed by Fabien Gonzales MD at [facility] CATH LAB/EP    PTA, Peroneal  6/11/2019    Performed by Fabien Gonzales MD at [facility] CATH LAB/EP    PTA, Superficial Femoral Artery  6/11/2019     Performed by [facility] at [facility] CATH LAB/EP    ULTRASOUND, INTRAVASCULAR N/A 6/11/2019    Performed by Fabien Gonzales MD at [facility] CATH LAB/EP       Time Tracking:     OT Date of Treatment: 06/12/19  OT Start Time: 1444  OT Stop Time: 1507  OT Total Time (min): 23 min    Billable Minutes:Evaluation 15  Therapeutic Activity 8  Total Time 23    Loyda Pearson OT  6/12/2019

## 2019-06-12 NOTE — PLAN OF CARE
Problem: Adult Inpatient Plan of Care  Goal: Plan of Care Review  Outcome: Ongoing (interventions implemented as appropriate)  Recommendation:   1. Add Darrian bid & beneprotein tid.  2. Encourage intake of meals & Boost Plus.    Goals:   Pt will consume at least 50-75% intake at meals.   2. Pt will gain weight  Nutrition Goal Status: new

## 2019-06-12 NOTE — CONSULTS
[facility]  Adult Nutrition  Consult Note    SUMMARY     Recommendations    Recommendation:   1. Add Darrian bid & beneprotein tid.  2. Encourage intake of meals & Boost Plus.    Goals:   Pt will consume at least 50-75% intake at meals.   2. Pt will gain weight  Nutrition Goal Status: new    Reason for Assessment  Reason For Assessment: consult(wt loss, wounds)  Diagnosis: (critical lower limb ischemia)  Relevant Medical History: seizures, HTN, alcohol abuse, esophagus stricture, cirrhosis, edema  General Information Comments: Pt on Cardiac diet with Boost Plus. many family members in room. Was bale to speak to wife. Reports used to be ~230#. Had 3 different durgeries fro difficluty swallowing. Drinks Ensure at home. Reprots eating good at meals & drinkig some supplements. Pt asleep at visit. NFPE completed today - severe temple, calvicle & upper & lower extrmity wasting with moderate orbital wasting.   Nutrition Discharge Planning: pt to d/c on Cardiac diett    Nutrition Risk Screen  Nutrition Risk Screen: large or nonhealing wound, burn or pressure injury    Nutrition/Diet History  Food Preferences: no Congregation or cultural food prefs identified  Spiritual, Cultural Beliefs, Judaism Practices, Values that Affect Care: no  Factors Affecting Nutritional Intake: decreased appetite    Anthropometrics  Temp: 97.3 °F (36.3 °C)  Height Method: Stated  Height: 6' (182.9 cm)  Height (inches): 72 in  Weight Method: Bed Scale  Weight: 48.9 kg (107 lb 12.9 oz)  Weight (lb): 107.81 lb  Ideal Body Weight (IBW), Male: 178 lb  % Ideal Body Weight, Male (lb): 60.57 lb  BMI (Calculated): 14.7  BMI Grade: less than 16 protein-energy malnutrition grade III  Usual Body Weight (UBW), k kg  % Usual Body Weight: 47.12  % Weight Change From Usual Weight: -52.98 %     Lab/Procedures/Meds  Pertinent Labs Reviewed: reviewed  Pertinent Labs Comments: Ca 7.6L, Alb 1.4L  Pertinent Medications Reviewed:  reviewed  Pertinent Medications Comments: aspirin    Estimated/Assessed Needs  Weight Used For Calorie Calculations: 80.9 kg (178 lb 5.6 oz)(IBW)  Energy Calorie Requirements (kcal): 4088-7657 (25-30 kcal/kg)  Energy Need Method: Kcal/kg  Protein Requirements: 81g (1.0g/kg)  Weight Used For Protein Calculations: 80.9 kg (178 lb 5.6 oz)(IBW)  Estimated Fluid Requirement Method: RDA Method  RDA Method (mL): 2022     Nutrition Prescription Ordered  Current Diet Order: Cardiac  Oral Nutrition Supplement: Boost Plus    Evaluation of Received Nutrient/Fluid Intake  I/O: reviewed  Energy Calories Required: not meeting needs  Protein Required: not meeting needs  Fluid Required: not meeting needs  Comments: LBM 6/10  % Intake of Estimated Energy Needs: 25 - 50 %  % Meal Intake: 25 - 50 %    Nutrition Risk  Level of Risk/Frequency of Follow-up: (2xweekly)     Assessment and Plan    Severe protein-calorie malnutrition  Malnutrition in the context of Chronic Illness/Injury    Related to (etiology):  dysphagia    Signs and Symptoms (as evidenced by):  Energy Intake: <50% of estimated energy requirement for several months  Body Fat Depletion: severe depletion of orbitals, triceps and thoracic and lumbar region   Muscle Mass Depletion: severe depletion of temples, clavicle region, scapular region, interosseous muscle and lower extremities   Weight Loss: greater than 20% in 1 year     Interventions:  Commercial beverage    Nutrition Diagnosis Status:  New       Monitor and Evaluation  Food and Nutrient Intake: food and beverage intake  Food and Nutrient Adminstration: diet order  Physical Activity and Function: nutrition-related ADLs and IADLs  Anthropometric Measurements: weight  Biochemical Data, Medical Tests and Procedures: electrolyte and renal panel  Nutrition-Focused Physical Findings: overall appearance     Malnutrition Assessment  Malnutrition Type: chronic illness  Weight Loss (Malnutrition): greater than 20% in 1  year  Energy Intake (Malnutrition): less than or equal to 50% for greater than or equal to 5 days   Orbital Region (Subcutaneous Fat Loss): moderate depletion  Upper Arm Region (Subcutaneous Fat Loss): severe depletion  Thoracic and Lumbar Region: severe depletion   Red Level Region (Muscle Loss): severe depletion  Clavicle Bone Region (Muscle Loss): severe depletion  Clavicle and Acromion Bone Region (Muscle Loss): severe depletion  Scapular Bone Region (Muscle Loss): severe depletion  Patellar Region (Muscle Loss): severe depletion  Anterior Thigh Region (Muscle Loss): severe depletion  Posterior Calf Region (Muscle Loss): severe depletion   Subcutaneous Fat Loss (Final Summary): severe protein-calorie malnutrition  Muscle Loss Evaluation (Final Summary): severe protein-calorie malnutrition    Severe Weight Loss (Malnutrition): greater than 20% in 1 year    Nutrition Follow-Up  RD Follow-up?: Yes

## 2019-06-12 NOTE — PLAN OF CARE
Problem: Physical Therapy Goal  Goal: Physical Therapy Goal  Goals to be met by: 2019     Patient will increase functional independence with mobility by performin. Supine to sit with Modified [place]  2. Sit to stand transfer with Minimal Assistance  3. Bed to chair transfer with Contact Guard Assistance using Rolling Walker  4. Gait  x 20 feet with Contact Guard Assistance using Rolling Walker.     Outcome: Ongoing (interventions implemented as appropriate)  Recommend Home with HH  No DME needs apparent

## 2019-06-12 NOTE — PLAN OF CARE
D/c planning discussed, pt lives at his son's home where he receives home care through [facility] for wound care three times a week, PT/OT and aide twice a week.  Pt has a hospital bed at home and needs assistance to transfer from bed to w/c, does not ambulate. At time of d/c he plans on returning home with continued wound care with home mickey and assistance from family.  He will require no additional DME.    Discharge planning brochure provided. White board updated with CM name & contact info.  Pt encouraged to call with any questions or needs. CM will continue to follow patient throughout the transitions of care, and assist with any discharge needs.       06/12/19 9678   Discharge Assessment   Assessment Type Discharge Planning Assessment   Confirmed/corrected address and phone number on facesheet? Yes   Assessment information obtained from? Patient;Caregiver   Expected Length of Stay (days) 4   Communicated expected length of stay with patient/caregiver yes   Prior to hospitilization cognitive status: Alert/Oriented   Prior to hospitalization functional status: Wheelchair Bound;Assistive Equipment;Needs Assistance   Current cognitive status: Alert/Oriented   Current Functional Status: Needs Assistance;Assistive Equipment;Wheelchair Bound   Lives With child(eduardo), adult   Able to Return to Prior Arrangements yes   Is patient able to care for self after discharge? No   Who are your caregiver(s) and their phone number(s)?   (lupe spouse [phone])   Patient's perception of discharge disposition home health   Readmission Within the Last 30 Days no previous admission in last 30 days   Patient currently being followed by outpatient case management? No   Patient currently receives any other outside agency services? No   Equipment Currently Used at Home hospital bed;walker, rolling;bedside commode;wheelchair   Do you have any problems affording any of your prescribed medications? No   Is the patient taking  medications as prescribed? yes   Does the patient have transportation home? Yes   Transportation Anticipated family or friend will provide   Does the patient receive services at the [facility]madin Clinic? No   Discharge Plan A Home with family;Home Health   DME Needed Upon Discharge  none   Patient/Family in Agreement with Plan yes       Estrella Ontiveros RN    [phone]

## 2019-06-12 NOTE — PROGRESS NOTES
[facility]  Cardiology  Progress Note    Patient Name: Sachin Calloway  MRN: [id number]  Admission Date: 6/10/2019  Hospital Length of Stay: 0 days  Code Status: Prior   Attending Physician: Fabien Gonzales MD   Primary Care Physician: Juan Gatica MD  Expected Discharge Date:   Principal Problem:Critical lower limb ischemia    Subjective:     Hospital Course:   6/10/2019 Admitted for elective LE angiogram due to nonhealing wound to right foot. Taken to cath lab for the procedure via right radial access with following results:    · Severe bilateral PAD with CLI of right foot  · On the right there is an ostial SFA  which is calcified and reconstitutes via profunda collaterals distally. There is single vessel runoff via peroneal which has a severe proximal stenosis and distally supplies collaterals to a plantar arch. No identifiable DP, although there is a proximal portion of AT  · On the left there is a severe stenosis at proximal SFA with mid SFA  with reconstitution distally. There is two vessel runoff via AT into a DP and peroneal which supplies collaterals to a plantar arch  · Staged intervention of LSFA and peroneal first. Will then consider staged intervention of RATA and if unsuccessful will consider AV flow reversal     6/11/2019 NPO for RLE intervention today   6/12/2019 Taken to cath lab yesterday for intervention via LCFA access with following results:    · LCFA antegrade access with crossover sheath used  · Successful RSFA  intervention with atherectomy followed by IVUS guided PTA with 4.0 balloon in POP and mid to distal SFA and 5.0 balloon in ostial and proximal SFA. This was followed by PTA with DCB to POP/mid and distal SFA with 4.0 balloon and 5.0 balloon ostial/mid SFA all with excellent results  · Successful PTA to proximal peroneal with 3.0 x 60 balloon with excellent result  · Will likely need staged intervention of AT/DP and if unsuccessful, AV flow  reversal  · Plavix/ASA/statin  · Continue aggressive wound care and management per Podiatry    Tolerated procedure well and recovered on telemetry unit. LCFA access site with no active bleeding, hematoma or ecchymosis. Podiatry consulted with plans for surgery once demarcation occurs. ID consulted with recs for blood cultures along with empiric antibiotics with IV Vanc and Zosyn         Review of Systems   Constitution: Negative for chills, decreased appetite, diaphoresis and fever.   Cardiovascular: Negative for chest pain, claudication, cyanosis, dyspnea on exertion, irregular heartbeat, leg swelling, near-syncope, orthopnea, palpitations, paroxysmal nocturnal dyspnea and syncope.   Respiratory: Negative for cough, hemoptysis, shortness of breath and wheezing.    Gastrointestinal: Negative for bloating, abdominal pain, constipation, diarrhea, melena, nausea and vomiting.   Neurological: Negative for dizziness and weakness.     Objective:     Vital Signs (Most Recent):  Temp: 98.3 °F (36.8 °C) (06/12/19 0804)  Pulse: (!) 116 (06/12/19 1151)  Resp: 18 (06/12/19 0804)  BP: 119/74 (06/12/19 0804)  SpO2: 100 % (06/12/19 0804) Vital Signs (24h Range):  Temp:  [95.2 °F (35.1 °C)-98.9 °F (37.2 °C)] 98.3 °F (36.8 °C)  Pulse:  [] 116  Resp:  [13-25] 18  SpO2:  [97 %-100 %] 100 %  BP: (107-177)/() 119/74     Weight: 48.9 kg (107 lb 12.9 oz)  Body mass index is 14.62 kg/m².     SpO2: 100 %  O2 Device (Oxygen Therapy): nasal cannula      Intake/Output Summary (Last 24 hours) at 6/12/2019 1207  Last data filed at 6/12/2019 0730  Gross per 24 hour   Intake --   Output 250 ml   Net -250 ml       Lines/Drains/Airways     Peripheral Intravenous Line                 Peripheral IV - Single Lumen 06/10/19 1345 Left Forearm 1 day                Physical Exam   Constitutional: He is oriented to person, place, and time. He appears cachectic. He has a sickly appearance. He appears ill.   Cardiovascular: Normal rate and regular  rhythm. Exam reveals no gallop.   No murmur heard.  Pulmonary/Chest: Effort normal and breath sounds normal. No respiratory distress. He has no wheezes.   Abdominal: Soft. Bowel sounds are normal. He exhibits no distension. There is no tenderness.   Musculoskeletal: He exhibits no edema.   Neurological: He is alert and oriented to person, place, and time.   Skin: Skin is warm and dry.       Significant Labs:     Recent Labs   Lab 06/12/19  0814   WBC 15.17*   RBC 3.32*   HGB 8.3*   HCT 25.9*   *   MCV 78*   MCH 25.0*   MCHC 32.0       Significant Imaging:     TTE 6/12/2019 ordered with pending results       Assessment and Plan:     Brief HPI: Seen on AM NP rounds with wife at the bedside. Complains of pain to his buttocks with lying flat. Denies any chest pain or SOB. Reviewed angiogram findings from yesterday. Discussed POC as detailed below with patient and wife-all verbalized understanding and agrees with POC     * Critical lower limb ischemia  -BLE angiogram with bilateral SFA CTOs with 2 vessel r/o on the left and single vessel via peroneal on right  -successful revascularization of RLE 6/11 with RSFA, right pop and right peroneal PTA with DCB; will likely need PTA of right AT and PT but will hold for now given back to back contrast exposure  -continue statin and Plavix; previous documented allergy to Aspirin with nausea and stomach irritation; deemed to be side effect rather than allergy therefore will start on baby ASA      Hypoalbuminemia  -related to poor nutritional status  -albumin 1.4  -will consult dietian; will order boost TID     Severe protein-calorie malnutrition  -consult dietian  -will liberalize diet     Pressure ulcer of left hip, stage 3  -wound care RN and General surgery consulted     Sacral decubitus ulcer  -POA  -related to debility and prolonged bedrest  -Wound care RN consulted as well as General surgery     Gangrene of right foot  -Podiatry and ID consulted  -plan for amputation  once demarcation occurs per Podiatry note  -blood cultures today and started on IV Vanc and Zosyn empirically per ID recs  -await further recs from ID and Podiatry; anticipate need for placement upon discharge     Alcoholic cirrhosis of liver with ascites  -AST/ALT WNL with elevated alk phos  -continue Aldactone at home dose        VTE Risk Mitigation (From admission, onward)    None          MAURICE French, ANP  Cardiology  [facility]

## 2019-06-12 NOTE — PROGRESS NOTES
Individual Follow-Up Form    6/12/2019 (Late entry.  Date of encounter: 6/12/2019)    Quit Date: To be determined    Clinical Status of Patient: Inpatient    Length of Service: 30 minutes    Comments: Smoking cessation education and materials provided.  Pt states that he started smoking at the age of 16 and that he smokes 0.5 packs of cigarettes per day on average.  He also states that he is ready to quit smoking, and he was enrolled I the [employer] during this encounter.     Diagnosis: F17.210    Next Visit: Ambulatory referral to Smoking Cessation program following hospital discharge.

## 2019-06-12 NOTE — ASSESSMENT & PLAN NOTE
-POA  -related to debility and prolonged bedrest  -Wound care RN consulted as well as General surgery

## 2019-06-12 NOTE — PLAN OF CARE
18:45-18:51  Patient transferred to floor 430 on cardiac monitor.  VSS. No c/o pain.   Patient attached to tele monitor upon arrival in room. Left groin gauze/tegaderm site CDI. No bleeding or hematoma noted, +2 jonathon radial pulses dopplered at bedside with Rivera and reviewed with SHAINA Palomares at bedside.  All questions answered. Updated pt's wife in  room.

## 2019-06-12 NOTE — NURSING
Dr Crane paged to nurse Malgorzata's phone for nurse stated difficult to get pulseox. Will be available if needed

## 2019-06-12 NOTE — NURSING
Cued into patient's room. Patient is alert and oriented with wife at bedside. No shortness of breathe notified. Does not appear to be in distress.  O2 is in progress. Will monitor and be available as needed

## 2019-06-12 NOTE — PLAN OF CARE
Problem: Adult Inpatient Plan of Care  Goal: Plan of Care Review  Outcome: Revised  Hourly's ended 22:45 last night. Pt stable. Bear warmer removed Temp WDL. No hematoma or bleeding noted. Pt position off wounds. Bed alarm on, call light in reach and spouse at the bedside.

## 2019-06-12 NOTE — PLAN OF CARE
VN rounds: VN cued into pt's room with pt's permission. Family at bedside. Pt resting in bed. VN instructed pt to call for assistance. NAD noted. Allowed time for questions. Bedside nurse in room administering medications. Will cont to be available and intervene as needed.        06/12/19 1006   Type of Frequent Check   Type Patient Rounds;Other (see comments)  (VN rounds)   Safety/Activity   Patient Rounds visualized patient;call light in patient/parent reach   Safety Promotion/Fall Prevention Fall Risk reviewed with patient/family;assistive device/personal item within reach;instructed to call staff for mobility;family to remain at bedside   Positioning   Body Position side-lying, left   Pain/Comfort/Sleep   Preferred Pain Scale FACES (Angelo-Mckeon FACES Pain Rating Scale)   FACES Pain Rating: Rest 0-->no hurt   Sleep/Rest/Relaxation awake;no problem identified   Assessments (Pre/Post)   Level of Consciousness (AVPU) alert

## 2019-06-12 NOTE — ASSESSMENT & PLAN NOTE
-Podiatry and ID consulted  -plan for amputation once demarcation occurs per Podiatry note  -blood cultures today and started on IV Vanc and Zosyn empirically per ID recs  -await further recs from ID and Podiatry; anticipate need for placement upon discharge

## 2019-06-12 NOTE — PLAN OF CARE
Problem: Occupational Therapy Goal  Goal: Occupational Therapy Goal  Goals to be met by: 7/12/2019    Patient will increase functional independence with ADLs by performing:    Grooming while standing at sink with Stand-by Assistance.  Toileting from bedside commode with Stand-by Assistance for hygiene and clothing management.   Rolling to Bilateral with Modified [place].   Supine to sit with Modified [place].  Step transfer with Stand-by Assistance  Toilet transfer to bedside commode with Stand-by Assistance.  Increased functional strength to WFL for ADLS.  Upper extremity exercise program 10 reps per handout, with supervision.    Outcome: Ongoing (interventions implemented as appropriate)  OT initial eval completed and treatment initiated.  Pt. presents with general weakness and frail disposition.  He would benefit from HHOT at discharge.  DME  In place at home.  Continue with OT POc.

## 2019-06-12 NOTE — NURSING
Cued into patient's room for evening rounds. Patient is alert and oriented. Denies pain. At this time. Bed alarm is activated and is on at this time. Education on signs and symptoms of bleeding and to notify the nurse immediately. Education on fall precautions. Verbalized understanding. Plan of care reviewed. Time allowed for any questions concerning care. Will continue to assist as needed

## 2019-06-12 NOTE — ASSESSMENT & PLAN NOTE
-BLE angiogram with bilateral SFA CTOs with 2 vessel r/o on the left and single vessel via peroneal on right  -successful revascularization of RLE 6/11 with RSFA, right pop and right peroneal PTA with DCB; will likely need PTA of right AT and PT but will hold for now given back to back contrast exposure  -continue statin and Plavix; previous documented allergy to Aspirin with nausea and stomach irritation; deemed to be side effect rather than allergy therefore will start on baby ASA

## 2019-06-12 NOTE — HOSPITAL COURSE
6/10/2019 Admitted for elective LE angiogram due to nonhealing wound to right foot. Taken to cath lab for the procedure via right radial access with following results:    · Severe bilateral PAD with CLI of right foot  · On the right there is an ostial SFA  which is calcified and reconstitutes via profunda collaterals distally. There is single vessel runoff via peroneal which has a severe proximal stenosis and distally supplies collaterals to a plantar arch. No identifiable DP, although there is a proximal portion of AT  · On the left there is a severe stenosis at proximal SFA with mid SFA  with reconstitution distally. There is two vessel runoff via AT into a DP and peroneal which supplies collaterals to a plantar arch  · Staged intervention of LSFA and peroneal first. Will then consider staged intervention of RATA and if unsuccessful will consider AV flow reversal     6/11/2019 NPO for RLE intervention today   6/12/2019 Taken to cath lab yesterday for intervention via LCFA access with following results:    · LCFA antegrade access with crossover sheath used  · Successful RSFA  intervention with atherectomy followed by IVUS guided PTA with 4.0 balloon in POP and mid to distal SFA and 5.0 balloon in ostial and proximal SFA. This was followed by PTA with DCB to POP/mid and distal SFA with 4.0 balloon and 5.0 balloon ostial/mid SFA all with excellent results  · Successful PTA to proximal peroneal with 3.0 x 60 balloon with excellent result  · Will likely need staged intervention of AT/DP and if unsuccessful, AV flow reversal  · Plavix/ASA/statin  · Continue aggressive wound care and management per Podiatry    Tolerated procedure well and recovered on telemetry unit. LCFA access site with no active bleeding, hematoma or ecchymosis. Podiatry consulted with plans for surgery once demarcation occurs. ID consulted with recs for blood cultures along with empiric antibiotics with IV Vanc and Zosyn   6/13/2019 H&H 7.7  & 24.3 with WBC up to 16.11 from 15.17. BMP with unchanged creatinine. Blood cultures with prelim results NGTD. HR and BP stable. Dietian consulted with recs noted-will liberalize diet and order supplements. Remains on IV Vanc and Zosyn for now. PT/OT on board   6/14/2019 WBC down slightly to 15.4 this AM. H&H down slightly to 7.1&22.8. BP stable. Episode of ST with HR up to 140s this AM nonsustained and patient asymptomatic. ASA discontinued. Will consult GI due to downtrending H&H   6/15/2019 PAD with CLI requiring intervention and likely surgery. Continue medical management. No intervention planned until patient more stable. Continue antibiotics for gangrene and possible Osteo-blood cultures with NGTD. ID and podiatry following. Anemia. GI onboard and plan EGD/Colonscopy Monday. hgb 7.3 today. Transfuse if <7.   6/16/2019 Remains on IV abx currently. MRI with no evidence of osteomyelitis. ID and podiatry following. H&H down to 6.9 & 22.0 will transfuse with 2units PRBCs. Plan for EGD/Colonscopy Monday 6/17/2019 WBCs up to 18K this AM. H&H improved to 10.4&32.0 this AM. BMP stable. NPO for EGD/colonoscopy today   6/18/2019 PEA arrest yesterday post EGD with ROSC after CPR and a few rounds of CPR. Given additional 2 units of PRBCs in Endoscopy given findings on EGD and based on discussion with GI. Intubated per anesthesia and admitted to ICU with SBP 120s-130s upon arrival. Post admission more alert and pulling at ETT therefore placed on Precedex drip. BP trended down to 90s with initiation of Neosynephrine drip. Lactic acid 4.5 and repeat H&H 15.4&46.0 likely erroneous immediately after PRBC. Serial H&H overnight with H&H 12.5&36.1 and down to 10.2&30.3 this AM. Remains intubated-Pulmonary consulted. GI on board with plans for CT of abdomen with IR/TIPS procedure today by IR. onversation with wife per IR in regards to procedure with high risk for hemodynamic instability during the procedure although no other  alternative currently and family with desire for aggressive treatment.   6/19/2019 Remains intubated on Precedex and Aaron. IR procedure yesterday with DIPS. GI note recommend surgical evaluation. Additional PRBCs yesterday. H&H this MA 11.2&33.1 with slight trend down to 10.7&31.4 later in morning. HR and BP stable on pressors. K+ 3.6 BUN 9 creatinine .8 Mg 1.7  6/20/2019 Remains intubated and sedated. On Aaron with stable BP currently-wean in process. H&H 10.9&32.8 this AM and stable over the past 24 hours-will change H&Hs to Q12hrs. BMP with K+ 3.4 BUN 10 creatinine .7  down from 383  down from 206 albumin 1.4. Negative 6.9 liters since admission. General surgery (Dr. 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 BRENNON Kimball-consult placed. Pulmonary on board with evaluation for extubation in process   6/21/2019 Extubated yesterday and stable for respiratory standpoint overnight. Remains on IV Aaron with wean in process-down to .5mcg/kg/min from 1.25mcg/kg/min yesterday. Serial H&H in process with decrease in H&H this morning to 8.2&25.4 from 10.0&31.4 yesterday. BMP WNL. AST and ALT trending down. IR reconsulted for evaluation of repeat procedure for duodenal varix. Will consult [facility] Medicine for assistance with management of multiple medical issues   6/22/2019 transfused 1 u 6/21/019, H/H stable, IM/palliative care service had long discussion regarding his overall prognosis. Family meeting will take place today. Continue with serial H/H. Abx per ID. No longer requiring pressors. Palliative paracentesis if needed.   6/23/2019: patient agreed to DNR status. Transfer to floor then DC home with comfort care

## 2019-06-12 NOTE — ASSESSMENT & PLAN NOTE
Malnutrition in the context of Chronic Illness/Injury    Related to (etiology):  dysphagia    Signs and Symptoms (as evidenced by):  Energy Intake: <50% of estimated energy requirement for several months  Body Fat Depletion: severe depletion of orbitals, triceps and thoracic and lumbar region   Muscle Mass Depletion: severe depletion of temples, clavicle region, scapular region, interosseous muscle and lower extremities   Weight Loss: greater than 20% in 1 year     Interventions:  Commercial beverage    Nutrition Diagnosis Status:  Continues

## 2019-06-12 NOTE — CONSULTS
[facility] Consult     Primary Team: Cardiology   Consultant Attending: Tammie   Date of Admit: 6/10/2019    Reason for Consult     Wet vs dry gangrene to the foot     History of Present Illness   Sachin Calloway is a 58 y.o. male with a relevant history of alcoholic liver cirrhosis, HTN, former EtOH abuse, active tobacco use, PAD who presents for peripheral angiogram    The patient presented to [facility] on 6/10/2019 with a primary complaint of non healing right foot wound with doppler showing PAD. Had arthrectomy and PTA with good results on 6/11. Was assessed by podiatry who felt that the right foot appearance was concerning for gangrene with superimposed infection    Although he was afebrile and had no pain, his WBC was elevated and has remained elevated. Broad spec antibiotics were started and blood cultures drawn     Review of Systems (positives in bold):  Constitutional: wt loss, fever, chills, night sweats, fatigue, malaise  HEENT: dysphonia, epistaxis, tinnitus, vertigo, otalgia, otorrhea, visual changes, lacrimation, changes in hearing, rhinorrhea, sore throat  Urogenital: frequency, nocturia, dysuria, hematuria, retention, incontinence, discharge  Neurological: headaches, syncope, weakness, numbness, paresthesia, dysequilibrium, falls, amnesia, dysarthria, facial assymetry  Gastrointestinal: anorexia, nausea, vomiting, melena, hematochezia, abdominal pain, hematemesis, diarrhea, constipation, dyspepsia, dysphagia, odynophagia, dysgeusia  Respiratory: dyspnea, cough, sputum production, wheeze, hemoptysis, cyanosis, apnea  Integumentary: hypo/hyperpigmentation, rash, lesions, pruritus, alopecia, nail changes  Cardiovascular: chest pain, orthopnea, cyanosis, edema, claudication, syncope, palpitations  Hematological: bleeding, bruising, lymphadenopathy  Psych: insomnia, mood changes, hallucinations, anxiety  Reproductive: erectile dysfunction in men, abnormal uterine bleeding in women, changes in  libido  Immune/Allergy: urticaria, localized pain/erythema/warmth/swelling  Musculoskeletal: arthralgia, myalgia, joint swelling, stiffness, wasting, deformity, weakness, back pain  Endocrine: gynecomastia, galactorrhea, weight gain, heat/cold intolerance, polyphagia, polydipsia, polyuria    Allergies:  Review of patient's allergies indicates:  No Known Allergies    Medications:   In-Hospital Scheduled Medications:   aspirin  81 mg Oral Daily    atorvastatin  20 mg Oral Daily    clopidogrel  75 mg Oral Daily    collagenase   Topical (Top) Daily    enoxaparin  40 mg Subcutaneous Daily    pantoprazole  40 mg Oral Daily    piperacillin-tazobactam (ZOSYN) IVPB  4.5 g Intravenous Q8H    potassium chloride 10%  20 mEq Oral Daily    spironolactone  50 mg Oral Daily    vancomycin (VANCOCIN) IVPB  750 mg Intravenous Q12H      In-Hospital PRN Medications:  acetaminophen, aluminum-magnesium hydroxide-simethicone, ondansetron, ondansetron, oxyCODONE, polyethylene glycol, simethicone   In-Hospital IV Infusion Medications:     Home Medications:  Prior to Admission medications    Medication Sig Start Date End Date Taking? Authorizing Provider   acetaminophen-codeine 300-30mg (TYLENOL #3) 300-30 mg Tab Take by mouth.   Yes Historical Provider, MD   furosemide (LASIX) 80 MG tablet Take 1 tablet (80 mg total) by mouth once daily. 4/25/19 7/18/20 Yes Juan Gatica MD   pantoprazole (PROTONIX) 40 MG tablet Take 1 tablet (40 mg total) by mouth once daily. 5/13/19 5/12/20 Yes Dillan Salinas MD   potassium chloride SA (K-DUR,KLOR-CON) 20 MEQ tablet Take 1 tablet (20 mEq total) by mouth once daily. 5/2/19  Yes Juan Gatica MD   spironolactone (ALDACTONE) 50 MG tablet Take 1 tablet (50 mg total) by mouth once daily. 5/15/19 8/7/20 Yes Juan Gatica MD     Antibiotics and Day Number of Therapy:  Piptazo and Vanc for 1 day     Past Medical History:  Past Medical History:   Diagnosis Date    Alcohol abuse     Cirrhosis      Edema     Hypertension     Seizures     Stricture esophagus      Past Surgical History/ObGyn Hx if gender appropriate:  Past Surgical History:   Procedure Laterality Date    Angiogram, Lower Arterial, Bilateral Bilateral 6/10/2019    [facility]formed by [facility] CATH LAB/EP    Aortogram, Abdominal N/A 6/10/2019    Performed by [facility] at [facility] CATH LAB/EP    Atherectomy, Peripheral Blood Vessel N/A 6/11/2019    Performed by [facility] at [facility] CATH LAB/EP    COLONOSCOPY      COLONOSCOPY N/A 5/5/2015    Performed by Mario Martínez MD at [facility] ENDO    EGD (ESOPHAGOGASTRODUODENOSCOPY) N/A 5/13/2019    Performed by Dillan Salinas [facility] at [facility] ENDO    EGD with dilitation  03/04/2019    ESOPHAGOGASTRODUODENOSCOPY (EGD) N/A 3/29/2019    Performed by [facility]illan Salinas [facility] at [facility] ENDO    ESOPHAGOGASTRODUODENOSCOPY (EGD) N/A 3/18/2019    Performed by Omer Pritchard MD at [facility] ENDO    ESOPHAGOGASTRODUODENOSCOPY (EGD) N/A 3/4/2019    Performed by Madhu Schmidt MD at [facility] ENDO    ESOPHAGOGASTRODUODENOSCOPY (EGD) N/A 5/5/2015    Performed by Mario Martínez MD at [facility] ENDO    Filter Protection, Peripheral  6/11/2019    Performed by Fab[facility] Gonzales [facility] at [facility] CATH LAB/EP    PTA, PERIPHERAL VESSEL N/A 6/11/2019    Performed by Fabien Gonzales [facility] at [facility] CATH LAB/EP    PTA, Peroneal  6/11/2019    Performed by Fabien Gonzales [facility] at [facility] CATH LAB/EP    PTA, Superficial Femoral Artery  6/11/2019    Performed by Fab[facility] Gonzales [facility] at [facility] CATH LAB/EP    ULTRASOUND, INTRAVASCULAR N/A 6/11/2019    Performed by Fab[facility] Gonzales [facility] at [facility] CATH LAB/EP     Family History:  Family History   Problem Relation Age of Onset    Cancer Father      Social History:  Social History     Tobacco Use    Smoking status: Current Every Day Smoker     Packs/day: 0.50     Years: 43.00     Pack years: 21.50     Types: Cigarettes     Start date: 1976    Smokeless tobacco: Never Used     Tobacco comment: Pt enrolled in [employer].  Ambulatory referral to Smoking Cessation program.   Substance Use Topics    Alcohol use: Yes     Comment: 6 pack every day (3-4)    Drug use: No     Objective   Last 24 Hour Vital Signs:  BP  Min: 107/65  Max: 169/106  Temp  Av.1 °F (36.2 °C)  Min: 95.2 °F (35.1 °C)  Max: 98.9 °F (37.2 °C)  Pulse  Av.1  Min: 98  Max: 123  Resp  Av.5  Min: 13  Max: 25  SpO2  Av.5 %  Min: 97 %  Max: 100 %  Height  Av' (182.9 cm)  Min: 6' (182.9 cm)  Max: 6' (182.9 cm)  Weight  Av.9 kg (107 lb 12.9 oz)  Min: 48.9 kg (107 lb 12.9 oz)  Max: 48.9 kg (107 lb 12.9 oz)    Lines, Drains, Airway:  None     Physical Examination:  Examination  General: ill appearing, comfortable   HEENT: normal oral mucosa, normal dentition, conjunctiva normal, pupils normal, extraocular motion normal  Neck: no thyromegaly, no JVD   Cardiac: no murmurs, pulse regular    Pulmonary/Chest: chest clear, no respiratory distress   GI/Rectal: no organomegaly, no masses, non tender, normal bowel sounds, rectal exam deferred   : deferred   Msk: normal motor screening exam, shallow clean ulcers on hip and sacrum   Vascular: normal peripheral perfusion   Lymph nodes: none palpated  Skin/ Extremities: right foot dry necrotic extremities with wet areas proximally and malodor    Neurology/ Mental status: alert oriented     Laboratory:  CBC:   Lab Results   Component Value Date    WBC 15.17 (H) 2019    HGB 8.3 (L) 2019     (H) 2019    MCV 78 (L) 2019    RDW 16.5 (H) 2019     Estimated Creatinine Clearance: 91.7 mL/min (based on SCr of 0.6 mg/dL).    Microbiology Data:  Negative blood cultures     Other Results:  EKG    Radiology:  No gas noted in xray of right foot     Assessment     Sachin Calloway is a 58 y.o. male with alcoholic liver cirrhosis and PAD s/p right foot angioplasty who presented with gangrene of the right foot and leukocytosis. Vancomycin and  piptazo have been started.     In a patient with cirrhosis who requires paracentesis, SBP would be another possible cause of infection leading to an elevated WBC but this seems less likely than the more obvious source of the right foot.      Recommendations     Gangrene rt foot  - continue piptazo and vancomycin for now  - if leukocytosis trends down and blood cultures negative, can deescalate  - will await surgical plan and imaging to help decide on route and duration of antibiotic therapy    Thank you for this consult. We will follow.      Kimber Flood  Fellow, [facility]

## 2019-06-12 NOTE — HPI
57 y/o male with hx of alcoholic liver cirrhosis, HTN, former EtOH abuse, active tobacco use, PAD who presents for peripheral angiogram. Has had worsening right dorsal foot ulcer with necrotic appearing tissue and toes. LE arterial doppler with PAD. Denies CP, orthopnea, PND, syncope. Does not ambulate much and received paracentesis recently.

## 2019-06-12 NOTE — PLAN OF CARE
Pt resting comfortably. Nurse performing hourly assessment. Not able to obtain O2. Pt is cool to touch. Respiratory at the bedside to assist. Nurse spoke with Dr. rCane for order to place bear warmer. Will continue to monitor.

## 2019-06-12 NOTE — SUBJECTIVE & OBJECTIVE
Review of Systems   Constitution: Negative for chills, decreased appetite, diaphoresis and fever.   Cardiovascular: Negative for chest pain, claudication, cyanosis, dyspnea on exertion, irregular heartbeat, leg swelling, near-syncope, orthopnea, palpitations, paroxysmal nocturnal dyspnea and syncope.   Respiratory: Negative for cough, hemoptysis, shortness of breath and wheezing.    Gastrointestinal: Negative for bloating, abdominal pain, constipation, diarrhea, melena, nausea and vomiting.   Neurological: Negative for dizziness and weakness.     Objective:     Vital Signs (Most Recent):  Temp: 98.3 °F (36.8 °C) (06/12/19 0804)  Pulse: (!) 116 (06/12/19 1151)  Resp: 18 (06/12/19 0804)  BP: 119/74 (06/12/19 0804)  SpO2: 100 % (06/12/19 0804) Vital Signs (24h Range):  Temp:  [95.2 °F (35.1 °C)-98.9 °F (37.2 °C)] 98.3 °F (36.8 °C)  Pulse:  [] 116  Resp:  [13-25] 18  SpO2:  [97 %-100 %] 100 %  BP: (107-177)/() 119/74     Weight: 48.9 kg (107 lb 12.9 oz)  Body mass index is 14.62 kg/m².     SpO2: 100 %  O2 Device (Oxygen Therapy): nasal cannula      Intake/Output Summary (Last 24 hours) at 6/12/2019 1207  Last data filed at 6/12/2019 0730  Gross per 24 hour   Intake --   Output 250 ml   Net -250 ml       Lines/Drains/Airways     Peripheral Intravenous Line                 Peripheral IV - Single Lumen 06/10/19 1345 Left Forearm 1 day                Physical Exam   Constitutional: He is oriented to person, place, and time. He appears cachectic. He has a sickly appearance. He appears ill.   Cardiovascular: Normal rate and regular rhythm. Exam reveals no gallop.   No murmur heard.  Pulmonary/Chest: Effort normal and breath sounds normal. No respiratory distress. He has no wheezes.   Abdominal: Soft. Bowel sounds are normal. He exhibits no distension. There is no tenderness.   Musculoskeletal: He exhibits no edema.   Neurological: He is alert and oriented to person, place, and time.   Skin: Skin is warm and  dry.       Significant Labs:     Recent Labs   Lab 06/12/19  0814   WBC 15.17*   RBC 3.32*   HGB 8.3*   HCT 25.9*   *   MCV 78*   MCH 25.0*   MCHC 32.0       Significant Imaging:     TTE 6/12/2019 ordered with pending results

## 2019-06-13 LAB
ANION GAP SERPL CALC-SCNC: 9 MMOL/L (ref 8–16)
BASOPHILS # BLD AUTO: 0.02 K/UL (ref 0–0.2)
BASOPHILS NFR BLD: 0.1 % (ref 0–1.9)
BUN SERPL-MCNC: 12 MG/DL (ref 6–20)
CALCIUM SERPL-MCNC: 7.6 MG/DL (ref 8.7–10.5)
CHLORIDE SERPL-SCNC: 104 MMOL/L (ref 95–110)
CO2 SERPL-SCNC: 25 MMOL/L (ref 23–29)
CREAT SERPL-MCNC: 0.8 MG/DL (ref 0.5–1.4)
DIFFERENTIAL METHOD: ABNORMAL
EOSINOPHIL # BLD AUTO: 0 K/UL (ref 0–0.5)
EOSINOPHIL NFR BLD: 0 % (ref 0–8)
ERYTHROCYTE [DISTWIDTH] IN BLOOD BY AUTOMATED COUNT: 17.8 % (ref 11.5–14.5)
EST. GFR  (AFRICAN AMERICAN): >60 ML/MIN/1.73 M^2
EST. GFR  (NON AFRICAN AMERICAN): >60 ML/MIN/1.73 M^2
GLUCOSE SERPL-MCNC: 77 MG/DL (ref 70–110)
HCT VFR BLD AUTO: 24.3 % (ref 40–54)
HGB BLD-MCNC: 7.7 G/DL (ref 14–18)
LYMPHOCYTES # BLD AUTO: 2.3 K/UL (ref 1–4.8)
LYMPHOCYTES NFR BLD: 14 % (ref 18–48)
MCH RBC QN AUTO: 25.3 PG (ref 27–31)
MCHC RBC AUTO-ENTMCNC: 31.7 G/DL (ref 32–36)
MCV RBC AUTO: 80 FL (ref 82–98)
MONOCYTES # BLD AUTO: 1.3 K/UL (ref 0.3–1)
MONOCYTES NFR BLD: 7.8 % (ref 4–15)
NEUTROPHILS # BLD AUTO: 12.4 K/UL (ref 1.8–7.7)
NEUTROPHILS NFR BLD: 77.2 % (ref 38–73)
PLATELET # BLD AUTO: 342 K/UL (ref 150–350)
PMV BLD AUTO: 9.8 FL (ref 9.2–12.9)
POTASSIUM SERPL-SCNC: 4.2 MMOL/L (ref 3.5–5.1)
RBC # BLD AUTO: 3.04 M/UL (ref 4.6–6.2)
SODIUM SERPL-SCNC: 138 MMOL/L (ref 136–145)
VANCOMYCIN TROUGH SERPL-MCNC: 12 UG/ML (ref 10–22)
WBC # BLD AUTO: 16.11 K/UL (ref 3.9–12.7)

## 2019-06-13 PROCEDURE — 63600175 PHARM REV CODE 636 W HCPCS: Performed by: NURSE PRACTITIONER

## 2019-06-13 PROCEDURE — 36415 COLL VENOUS BLD VENIPUNCTURE: CPT

## 2019-06-13 PROCEDURE — 97535 SELF CARE MNGMENT TRAINING: CPT

## 2019-06-13 PROCEDURE — 25000003 PHARM REV CODE 250: Performed by: INTERNAL MEDICINE

## 2019-06-13 PROCEDURE — 97116 GAIT TRAINING THERAPY: CPT

## 2019-06-13 PROCEDURE — 92610 EVALUATE SWALLOWING FUNCTION: CPT

## 2019-06-13 PROCEDURE — 85025 COMPLETE CBC W/AUTO DIFF WBC: CPT

## 2019-06-13 PROCEDURE — 63600175 PHARM REV CODE 636 W HCPCS: Performed by: INTERNAL MEDICINE

## 2019-06-13 PROCEDURE — 97530 THERAPEUTIC ACTIVITIES: CPT

## 2019-06-13 PROCEDURE — 80048 BASIC METABOLIC PNL TOTAL CA: CPT

## 2019-06-13 PROCEDURE — 99232 PR SUBSEQUENT HOSPITAL CARE,LEVL II: ICD-10-PCS | Mod: ,,, | Performed by: INTERNAL MEDICINE

## 2019-06-13 PROCEDURE — 80202 ASSAY OF VANCOMYCIN: CPT

## 2019-06-13 PROCEDURE — 99232 SBSQ HOSP IP/OBS MODERATE 35: CPT | Mod: ,,, | Performed by: INTERNAL MEDICINE

## 2019-06-13 PROCEDURE — 25000003 PHARM REV CODE 250: Performed by: NURSE PRACTITIONER

## 2019-06-13 PROCEDURE — 11000001 HC ACUTE MED/SURG PRIVATE ROOM

## 2019-06-13 RX ORDER — ACETAMINOPHEN 650 MG/20.3ML
650 LIQUID ORAL EVERY 4 HOURS PRN
Status: DISPENSED | OUTPATIENT
Start: 2019-06-13 | End: 2019-06-20

## 2019-06-13 RX ADMIN — SPIRONOLACTONE 50 MG: 25 TABLET, FILM COATED ORAL at 08:06

## 2019-06-13 RX ADMIN — OXYCODONE HYDROCHLORIDE 5 MG: 5 TABLET ORAL at 09:06

## 2019-06-13 RX ADMIN — PIPERACILLIN AND TAZOBACTAM 4.5 G: 4; .5 INJECTION, POWDER, LYOPHILIZED, FOR SOLUTION INTRAVENOUS; PARENTERAL at 01:06

## 2019-06-13 RX ADMIN — ATORVASTATIN CALCIUM 20 MG: 20 TABLET, FILM COATED ORAL at 08:06

## 2019-06-13 RX ADMIN — PIPERACILLIN AND TAZOBACTAM 4.5 G: 4; .5 INJECTION, POWDER, LYOPHILIZED, FOR SOLUTION INTRAVENOUS; PARENTERAL at 05:06

## 2019-06-13 RX ADMIN — POTASSIUM CHLORIDE 20 MEQ: 20 SOLUTION ORAL at 08:06

## 2019-06-13 RX ADMIN — VANCOMYCIN HYDROCHLORIDE 1250 MG: 1.25 INJECTION, POWDER, LYOPHILIZED, FOR SOLUTION INTRAVENOUS at 11:06

## 2019-06-13 RX ADMIN — OXYCODONE HYDROCHLORIDE 5 MG: 5 TABLET ORAL at 05:06

## 2019-06-13 RX ADMIN — CLOPIDOGREL BISULFATE 75 MG: 75 TABLET ORAL at 08:06

## 2019-06-13 RX ADMIN — ASPIRIN 81 MG: 81 TABLET, COATED ORAL at 08:06

## 2019-06-13 RX ADMIN — ENOXAPARIN SODIUM 40 MG: 100 INJECTION SUBCUTANEOUS at 05:06

## 2019-06-13 RX ADMIN — PANTOPRAZOLE SODIUM 40 MG: 40 TABLET, DELAYED RELEASE ORAL at 08:06

## 2019-06-13 RX ADMIN — ACETAMINOPHEN 650 MG: 650 SOLUTION ORAL at 01:06

## 2019-06-13 RX ADMIN — PIPERACILLIN AND TAZOBACTAM 4.5 G: 4; .5 INJECTION, POWDER, LYOPHILIZED, FOR SOLUTION INTRAVENOUS; PARENTERAL at 08:06

## 2019-06-13 NOTE — ASSESSMENT & PLAN NOTE
-POA  -related to debility and prolonged bedrest  -Wound care RN consulted as well as General surgery (recs noted)

## 2019-06-13 NOTE — PLAN OF CARE
Problem: Occupational Therapy Goal  Goal: Occupational Therapy Goal  Goals to be met by: 7/12/2019    Patient will increase functional independence with ADLs by performing:    Grooming while standing at sink with Stand-by Assistance.  Toileting from bedside commode with Stand-by Assistance for hygiene and clothing management.   Rolling to Bilateral with Modified [place].   Supine to sit with Modified [place].  Step transfer with Stand-by Assistance  Toilet transfer to bedside commode with Stand-by Assistance.  Increased functional strength to WFL for ADLS.  Upper extremity exercise program 10 reps per handout, with supervision.     Outcome: Ongoing (interventions implemented as appropriate)  Pt progressing towards goals but demonstrates limited endurance in stance. Cont OT  POC

## 2019-06-13 NOTE — PLAN OF CARE
Problem: Adult Inpatient Plan of Care  Goal: Plan of Care Review  Outcome: Ongoing (interventions implemented as appropriate)  Plan of care reviewed with patient and wife.Pt [id number]. Continues to let staff know his needs. Continues on IV ABT  With 0 adverse side effects noted. Dressing in place to right foot gangrene foot. Pt denies pain when asked. Wife at bedside and assist with care with pt.Verbalizes to staff understanding. NSR on monitor with 0 red alarms noted.  No acute distress observed at this time. Side rails x2, bed in low position, call bell within reach. Bed alarm in place for patient safety. Will relay to oncoming nurse to monitor for changes in condition.

## 2019-06-13 NOTE — PT/OT/SLP PROGRESS
Occupational Therapy   Treatment    Name: Sachin Calloway  MRN: [id number]  Admitting Diagnosis:  Critical lower limb ischemia  2 Days Post-Op    Recommendations:     Discharge Recommendations: home with home health, home health OT  Discharge Equipment Recommendations:  none  Barriers to discharge:       Assessment:     Sachin Calloway is a 58 y.o. male with a medical diagnosis of Critical lower limb ischemia.  He presents with deconditioning, generalized weakness and limited AROM BUE shoulder flex. Performance deficits affecting function are weakness, impaired endurance, impaired self care skills, impaired sensation, impaired functional mobilty, gait instability, impaired balance, decreased coordination, decreased upper extremity function, decreased lower extremity function, decreased ROM, impaired cardiopulmonary response to activity, orthopedic precautions.     Rehab Prognosis:  Fair; patient would benefit from acute skilled OT services to address these deficits and reach maximum level of function.       Plan:     Patient to be seen 5 x/week to address the above listed problems via self-care/home management, therapeutic activities, therapeutic exercises  · Plan of Care Expires: 07/12/19  · Plan of Care Reviewed with: patient, spouse, family    Subjective     Pain/Comfort:  · Pain Rating 1: 0/10    Objective:     Communicated with: nsg2 prior to session.  Patient found HOB elevated with bed alarm, oxygen, telemetry upon OT entry to room.    General Precautions: Standard, fall   Orthopedic Precautions:(BLE WBAT)   Braces: N/A     Occupational Performance:     Bed Mobility:    · Patient completed Rolling/Turning to Left with  minimum assistance  · Patient completed Scooting/Bridging with minimum assistance  · Patient completed Supine to Sit with minimum assistance  · Patient completed Sit to Supine with minimum assistance     Functional Mobility/Transfers:  · Patient completed Sit <> Stand Transfer with contact guard  assistance  with  rolling walker   Functional Mobility: Pt with fair - dynamic seated and standing balance.      Activities of Daily Living:  · Lower Body Dressing: total assistance to don L sock and R scrub bootie      [facility] 6 Click ADL: 14    Treatment & Education:  Pt performing skills as listed above. Pt completed 1x10 reps shoulder flexion seated EOB through available AROM ~0-110*. He demonstrates limited endurance and required increased v/c for RW management to reduce WB to RLE as pt c/o that it was uncomfortable to walk on RLE.  Patient left HOB elevated with all lines intact, call button in reach, bed alarm on, nsg notified and family presentEducation:      GOALS:   Multidisciplinary Problems     Occupational Therapy Goals        Problem: Occupational Therapy Goal    Goal Priority Disciplines Outcome Interventions   Occupational Therapy Goal     OT, PT/OT Ongoing (interventions implemented as appropriate)    Description:  Goals to be met by: 7/12/2019    Patient will increase functional independence with ADLs by performing:    Grooming while standing at sink with Stand-by Assistance.  Toileting from bedside commode with Stand-by Assistance for hygiene and clothing management.   Rolling to Bilateral with Modified [place].   Supine to sit with Modified [place].  Step transfer with Stand-by Assistance  Toilet transfer to bedside commode with Stand-by Assistance.  Increased functional strength to WFL for ADLS.  Upper extremity exercise program 10 reps per handout, with supervision.                      Time Tracking:     OT Date of Treatment: 06/13/19  OT Start Time: 1333  OT Stop Time: 1357  OT Total Time (min): 24 min    Billable Minutes:Therapeutic Activity 12 Co Tx PT    Alanna Fairchild OT  6/13/2019

## 2019-06-13 NOTE — SUBJECTIVE & OBJECTIVE
Review of Systems   Constitution: Positive for chills and malaise/fatigue. Negative for decreased appetite, diaphoresis and fever.   Cardiovascular: Negative for chest pain, claudication, cyanosis, dyspnea on exertion, irregular heartbeat, leg swelling, near-syncope, orthopnea, palpitations, paroxysmal nocturnal dyspnea and syncope.   Respiratory: Negative for cough, hemoptysis, shortness of breath and wheezing.    Gastrointestinal: Negative for bloating, abdominal pain, constipation, diarrhea, melena, nausea and vomiting.   Neurological: Negative for dizziness and weakness.     Objective:     Vital Signs (Most Recent):  Temp: 96.4 °F (35.8 °C) (06/13/19 1334)  Pulse: 94 (06/13/19 1334)  Resp: 18 (06/13/19 1334)  BP: 139/89 (06/13/19 1334)  SpO2: 100 % (06/13/19 0837) Vital Signs (24h Range):  Temp:  [96.3 °F (35.7 °C)-98 °F (36.7 °C)] 96.4 °F (35.8 °C)  Pulse:  [] 94  Resp:  [16-18] 18  SpO2:  [100 %] 100 %  BP: (105-183)/(60-89) 139/89     Weight: 48.9 kg (107 lb 12.9 oz)  Body mass index is 14.62 kg/m².     SpO2: 100 %  O2 Device (Oxygen Therapy): nasal cannula      Intake/Output Summary (Last 24 hours) at 6/13/2019 1353  Last data filed at 6/12/2019 2244  Gross per 24 hour   Intake --   Output 250 ml   Net -250 ml       Lines/Drains/Airways     Peripheral Intravenous Line                 Peripheral IV - Single Lumen 06/10/19 1345 Left Forearm 3 days                Physical Exam   Constitutional: He appears cachectic. He has a sickly appearance. He appears ill.   Cardiovascular: Normal rate and regular rhythm. Exam reveals no gallop.   No murmur heard.  Pulmonary/Chest: Effort normal and breath sounds normal.       Significant Labs:     Recent Labs   Lab 06/13/19 0454   WBC 16.11*   RBC 3.04*   HGB 7.7*   HCT 24.3*      MCV 80*   MCH 25.3*   MCHC 31.7*     Recent Labs   Lab 06/13/19  0454      K 4.2      CO2 25   BUN 12   CREATININE 0.8       Significant Imaging:     TTE  6/12/2019      · Low normal left ventricular systolic function. The estimated ejection fraction is 50%  · Indeterminate left ventricular diastolic function.  · Normal right ventricular systolic function.  · Poor endocardial windows. Limited visualization

## 2019-06-13 NOTE — PLAN OF CARE
Problem: SLP Goal  Goal: SLP Goal  Short Term Goals:  1. Pt will complete a clinical swallow evaluation to determine safest diet.  2. Pt will tolerate mechanical soft (finely chopped meats) and thin liquid diet with no overt s/s of aspiration.  3. SLP will recommend calorie count via dietician consult to ensure Pt is meeting adequate nutrition/hydration needs orally.   Outcome: Ongoing (interventions implemented as appropriate)  Orders for clinical swallow eval received this date, [facility]. Will continue to follow closely.

## 2019-06-13 NOTE — PLAN OF CARE
Problem: Adult Inpatient Plan of Care  Goal: Plan of Care Review  Outcome: Ongoing (interventions implemented as appropriate)  Patient safety maintained. Medications administered as ordered. Assistance provided as needed for turning and ADL's. Monitoring pain and treating as needed. Monitoring pulses on lower extremities with doppler.

## 2019-06-13 NOTE — PROGRESS NOTES
[facility]  Cardiology  Progress Note    Patient Name: Sachin Calloway  MRN: [id number]  Admission Date: 6/10/2019  Hospital Length of Stay: 1 days  Code Status: Prior   Attending Physician: Fabien Gonzales MD   Primary Care Physician: Juan Gatica MD  Expected Discharge Date:   Principal Problem:Critical lower limb ischemia    Subjective:     Hospital Course:   6/10/2019 Admitted for elective LE angiogram due to nonhealing wound to right foot. Taken to cath lab for the procedure via right radial access with following results:    · Severe bilateral PAD with CLI of right foot  · On the right there is an ostial SFA  which is calcified and reconstitutes via profunda collaterals distally. There is single vessel runoff via peroneal which has a severe proximal stenosis and distally supplies collaterals to a plantar arch. No identifiable DP, although there is a proximal portion of AT  · On the left there is a severe stenosis at proximal SFA with mid SFA  with reconstitution distally. There is two vessel runoff via AT into a DP and peroneal which supplies collaterals to a plantar arch  · Staged intervention of LSFA and peroneal first. Will then consider staged intervention of RATA and if unsuccessful will consider AV flow reversal     6/11/2019 NPO for RLE intervention today   6/12/2019 Taken to cath lab yesterday for intervention via LCFA access with following results:    · LCFA antegrade access with crossover sheath used  · Successful RSFA  intervention with atherectomy followed by IVUS guided PTA with 4.0 balloon in POP and mid to distal SFA and 5.0 balloon in ostial and proximal SFA. This was followed by PTA with DCB to POP/mid and distal SFA with 4.0 balloon and 5.0 balloon ostial/mid SFA all with excellent results  · Successful PTA to proximal peroneal with 3.0 x 60 balloon with excellent result  · Will likely need staged intervention of AT/DP and if unsuccessful, AV flow  reversal  · Plavix/ASA/statin  · Continue aggressive wound care and management per Podiatry    Tolerated procedure well and recovered on telemetry unit. LCFA access site with no active bleeding, hematoma or ecchymosis. Podiatry consulted with plans for surgery once demarcation occurs. ID consulted with recs for blood cultures along with empiric antibiotics with IV Vanc and Zosyn   6/13/2019 H&H 7.7 & 24.3 with WBC up to 16.11 from 15.17. BMP with unchanged creatinine. Blood cultures with prelim results NGTD. HR and BP stable. Dietian consulted with recs noted-will liberalize diet and order supplements. Remains on IV Vanc and Zosyn for now. PT/OT on board           Review of Systems   Constitution: Positive for chills and malaise/fatigue. Negative for decreased appetite, diaphoresis and fever.   Cardiovascular: Negative for chest pain, claudication, cyanosis, dyspnea on exertion, irregular heartbeat, leg swelling, near-syncope, orthopnea, palpitations, paroxysmal nocturnal dyspnea and syncope.   Respiratory: Negative for cough, hemoptysis, shortness of breath and wheezing.    Gastrointestinal: Negative for bloating, abdominal pain, constipation, diarrhea, melena, nausea and vomiting.   Neurological: Negative for dizziness and weakness.     Objective:     Vital Signs (Most Recent):  Temp: 96.4 °F (35.8 °C) (06/13/19 1334)  Pulse: 94 (06/13/19 1334)  Resp: 18 (06/13/19 1334)  BP: 139/89 (06/13/19 1334)  SpO2: 100 % (06/13/19 0837) Vital Signs (24h Range):  Temp:  [96.3 °F (35.7 °C)-98 °F (36.7 °C)] 96.4 °F (35.8 °C)  Pulse:  [] 94  Resp:  [16-18] 18  SpO2:  [100 %] 100 %  BP: (105-183)/(60-89) 139/89     Weight: 48.9 kg (107 lb 12.9 oz)  Body mass index is 14.62 kg/m².     SpO2: 100 %  O2 Device (Oxygen Therapy): nasal cannula      Intake/Output Summary (Last 24 hours) at 6/13/2019 1353  Last data filed at 6/12/2019 2244  Gross per 24 hour   Intake --   Output 250 ml   Net -250 ml       Lines/Drains/Airways      Peripheral Intravenous Line                 Peripheral IV - Single Lumen 06/10/19 1345 Left Forearm 3 days                Physical Exam   Constitutional: He appears cachectic. He has a sickly appearance. He appears ill.   Cardiovascular: Normal rate and regular rhythm. Exam reveals no gallop.   No murmur heard.  Pulmonary/Chest: Effort normal and breath sounds normal.       Significant Labs:     Recent Labs   Lab 06/13/19  0454   WBC 16.11*   RBC 3.04*   HGB 7.7*   HCT 24.3*      MCV 80*   MCH 25.3*   MCHC 31.7*     Recent Labs   Lab 06/13/19  0454      K 4.2      CO2 25   BUN 12   CREATININE 0.8       Significant Imaging:     TTE 6/12/2019      · Low normal left ventricular systolic function. The estimated ejection fraction is 50%  · Indeterminate left ventricular diastolic function.  · Normal right ventricular systolic function.  · Poor endocardial windows. Limited visualization    Assessment and Plan:     Brief HPI: Seen on AM NP rounds rounds and again on rounds with Dr. Crane. Denies any complaints currently. Discussed POC as detailed below-verbalized understanding and agrees with POC     * Critical lower limb ischemia  -BLE angiogram with bilateral SFA CTOs with 2 vessel r/o on the left and single vessel via peroneal on right  -successful revascularization of RLE 6/11 with RSFA, right pop and right peroneal PTA with DCB; will likely need PTA of right AT; anticipate procedure early next week vs outpatient depending on other chronic medical conditions   -continue ASA, statin and Plavix    Hypoalbuminemia  -related to poor nutritional status  -albumin 1.4  -dietian consulted and recs noted     Severe protein-calorie malnutrition  -consult dietian  -diet changed to regular  -on Darrian BID and Benefiber TID     Pressure ulcer of left hip, stage 3  -wound care RN and General surgery consulted     Sacral decubitus ulcer  -POA  -related to debility and prolonged bedrest  -Wound care RN consulted as  well as General surgery (recs noted)     Gangrene of right foot  -Podiatry and ID consulted  -plan for amputation once demarcation occurs per Podiatry note  -blood cultures with prelim results with NGTD; continue  IV Vanc and Zosyn for now per ID  recs  -await further recs from ID and Podiatry; anticipate need for placement upon discharge     Alcoholic cirrhosis of liver with ascites  -AST/ALT WNL with elevated alk phos  -continue Aldactone at home dose        VTE Risk Mitigation (From admission, onward)        Ordered     enoxaparin injection 40 mg  Daily      06/12/19 1324     IP VTE HIGH RISK PATIENT  Once      06/12/19 1324          MAURICE French, ANP  Cardiology  [facility]

## 2019-06-13 NOTE — PLAN OF CARE
1906H Patient is awake and orientedx4. Care plan explained to patient and spouse; they verbalized understanding. On room air, O2 saturation at 100%. Hooked to heart monitor running Sinus Tachycardia at 107bpm. Patient has a 22G left forearm flushed with blood return and saline locked. Maintained on cardiac diet.Urinal in reach. Complained of pain in left lower extremity rated a 5/10. The abdomen is rounded. Patient denies being short of breath and had a nonproductive cough. Due medications given. Encouraged/assisted to turn every 2 hours as tolerated. Maintained on fall risk precaution. Bed in lowest position, bed alarm on, call light/personal items within reach and instructed to call for help when needed. Will continue to monitor.    0020H: Call MD to switch tylenol from tablets to liquid. Patient swallows fine but prefers his medications crushed, and liquid medications

## 2019-06-13 NOTE — PLAN OF CARE
Problem: Adult Inpatient Plan of Care  Goal: Plan of Care Review  Outcome: Revised  Chart review done.

## 2019-06-13 NOTE — ASSESSMENT & PLAN NOTE
-BLE angiogram with bilateral SFA CTOs with 2 vessel r/o on the left and single vessel via peroneal on right  -successful revascularization of RLE 6/11 with RSFA, right pop and right peroneal PTA with DCB; will likely need PTA of right AT; anticipate procedure early next week vs outpatient depending on other chronic medical conditions   -continue ASA, statin and Plavix

## 2019-06-13 NOTE — ASSESSMENT & PLAN NOTE
-Podiatry and ID consulted  -plan for amputation once demarcation occurs per Podiatry note  -blood cultures with prelim results with NGTD; continue  IV Vanc and Zosyn for now per ID  recs  -await further recs from ID and Podiatry; anticipate need for placement upon discharge

## 2019-06-13 NOTE — PROGRESS NOTES
Pharmacokinetic Initial Assessment: IV Vancomycin    Assessment/Plan:    Initiate intravenous vancomycin with loading dose of 1250 mg once followed by a maintenance dose of vancomycin 1250mg IV every 24 hours  Desired empiric serum trough concentration is 10 to 20 mcg/mL.  Draw vancomycin trough level 30 min prior to third dose on 6/13 at approximately 2100   Pharmacy will continue to follow and monitor vancomycin.      Please contact pharmacy at extension [phone] with any questions regarding this assessment.     Thank you for the consult,   To [place]     Patient brief summary:  Sachin Calloway is a 58 y.o. male initiated on antimicrobial therapy with IV Vancomycin for treatment of suspected skin & soft tissue    Drug Allergies:   Review of patient's allergies indicates:  No Known Allergies    Actual Body Weight:   48.9kg    Renal Function:   Estimated Creatinine Clearance: 68.8 mL/min (based on SCr of 0.8 mg/dL).,     Dialysis Method (if applicable):        CBC (last 72 hours):  Recent Labs   Lab Result Units 06/10/19  1327 06/12/19  0814 06/13/19  0454   WBC K/uL 14.58* 15.17* 16.11*   Hemoglobin g/dL 9.7* 8.3* 7.7*   Hematocrit % 29.7* 25.9* 24.3*   Platelets K/uL 405* 354* 342   Gran% % 80.9* 79.7* 77.2*   Lymph% % 11.6* 13.6* 14.0*   Mono% % 6.5 5.6 7.8   Eosinophil% % 0.0 0.0 0.0   Basophil% % 0.2 0.1 0.1   Differential Method  Automated Automated Automated       Metabolic Panel (last 72 hours):  Recent Labs   Lab Result Units 06/10/19  1327 06/11/19  1041 06/11/19  1719 06/13/19  0454   Sodium mmol/L 137 138 138 138   Potassium mmol/L 3.2* 3.9 4.4 4.2   Chloride mmol/L 100 101 106 104   CO2 mmol/L 26 27 22* 25   Glucose mg/dL 90 105 82 77   BUN, Bld mg/dL 16 13 13 12   Creatinine mg/dL 0.7 0.7 0.6 0.8   Albumin g/dL  --  1.4* 1.4*  --    Total Bilirubin mg/dL  --  0.7 1.1*  --    Alkaline Phosphatase U/L  --  284* 280*  --    AST U/L  --  29 35  --    ALT U/L  --  33 33  --        Drug levels (last 3  results):  No results for input(s): VANCOMYCINRA, VANCOMYCINPE, VANCOMYCINTR in the last 72 hours.    Microbiologic Results:  Microbiology Results (last 7 days)       Procedure Component Value Units Date/Time    Blood culture [[id number]] Collected:  06/12/19 0815    Order Status:  Completed Specimen:  Blood Updated:  06/12/19 1715     Blood Culture, Routine No Growth to date    Blood culture [[id number]] Collected:  06/12/19 0814    Order Status:  Completed Specimen:  Blood Updated:  06/12/19 1715     Blood Culture, Routine No Growth to date

## 2019-06-13 NOTE — PT/OT/SLP EVAL
Speech Language Pathology Evaluation  Bedside Swallow    Patient Name:  Sachin Calloway   MRN:  [id number]  Admitting Diagnosis: Critical lower limb ischemia    Recommendations:                 General Recommendations:  Ongoing swallow evaluation to ensure safety with meal  Diet recommendations:  Mechanical soft, Finely chopped meat, Thin   Aspiration Precautions: 1 bite/sip at a time, Alternating bites/sips, Eliminate distractions, HOB to 90 degrees, Meds whole 1 at a time, Remain upright 30 minutes post meal and Small bites/sips   General Precautions: Standard, other (see comments)(mechanical soft diet (minced/moist meats))  Communication strategies:  provide increased time to answer and go to room if call light pushed    History:   Chief Complaint:  Non healing right foot wound      HPI:   57 y/o male with hx of alcoholic liver cirrhosis, HTN, former EtOH abuse, active tobacco use, PAD who presents for peripheral angiogram. Has had worsening right dorsal foot ulcer with necrotic appearing tissue and toes. LE arterial doppler with PAD. Denies CP, orthopnea, PND, syncope. Does not ambulate much and received paracentesis recently.   Past Medical History:   Diagnosis Date    Alcohol abuse     Cirrhosis     Edema     Hypertension     Seizures     Stricture esophagus        Past Surgical History:   Procedure Laterality Date    Angiogram, Lower Arterial, Bilateral Bilateral 6/10/2019    Performed by [facility] at [facility] CATH LAB/EP    Aortogram, Abdominal N/A 6/10/2019    [facility] by [facility] at [facility] CATH LAB/EP    Atherectomy, Peripheral Blood Vessel N/A 6/11/2019    [facility]formed by [facility] at [facility] CATH LAB/EP    COLONOSCOPY      COLONOSCOPY N/A 5/5/2015    Performed by Mario Martínez MD at [facility] ENDO    EGD (ESOPHAGOGASTRODUODENOSCOPY) N/A 5/13/2019    Performed by Dillan Salinas MD at [facility] ENDO    EGD with dilitation  03/04/2019    ESOPHAGOGASTRODUODENOSCOPY (EGD) N/A  "3/29/2019    [facility]formed [facility] at [facility] ENDO    ESOPHAGOGASTRODUODENOSCOPY (EGD) N/A 3/18/2019    Performed by Omer Pritchard MD at [facility] ENDO    ESOPHAGOGASTRODUODENOSCOPY (EGD) N/A 3/4/2019    Performed by Madhu Schmidt MD at [facility] ENDO    ESOPHAGOGASTRODUODENOSCOPY (EGD) N/A 5/5/2015    Performed by Mario [facility]ínez MD at [facility] ENDO    Filter Protection, Peripheral  6/11/2019    Performed by [facility] at [facility] CATH LAB/EP    PTA, PERIPHERAL VESSEL N/A 6/11/2019    Performed by [facility] at [facility] CATH LAB/EP    PTA, Peroneal  6/11/2019    Performed by [facility] at [facility] CATH LAB/EP    PTA, Superficial Femoral Artery  6/11/2019    Performed by [facility] at [facility] CATH LAB/EP    ULTRASOUND, INTRAVASCULAR N/A 6/11/2019    Performed by [facility] at [facility] CATH LAB/EP       Social History: Patient lives with wife at home.    Prior Intubation HX:  n/a    Modified Barium Swallow: None per EMR    Chest X-Rays: None on file    Prior diet: Regular and thin       Subjective     Pt seen for clinical swallow evaluation on this date. Family at bedside. Pt awake, alert, and oriented to self and place. Case hx obtained from wife by verbal report.  Patient goals: "I just get full so quick."    Pain/Comfort:  · Pain Rating 1: 0/10    Objective:   Pt seen at bedside. Pt required positioning by SLP to be upright while feeding. Pt was awake, alert, and oriented to self, place, and situation. Pt agreeable to po trials with SLP. Pt was able to feed self with min assist by SLP.    Oral Musculature Evaluation  · Oral Musculature: general weakness  · Structural Abnormalities: none observed   · Dentition: edentulous  · Secretion Management: adequate  · Mucosal Quality: adequate  · Mandibular Strength and Mobility: impaired  · Oral Labial Strength and Mobility: impaired pursing  · Lingual Strength and Mobility: impaired strength  · Velar Elevation: WFL  · Buccal Strength " and Mobility: decreased tone  · Volitional Cough: elicited   · Volitional Swallow: elicited   · Voice Prior to PO Intake: clear; low volume  · Oral Musculature Comments: None    Bedside Swallow Eval:   Consistencies Assessed:  · Thin liquids Ice chips x1 Water by cup x1 Water by straw x3  · Puree Pudding x1  · Soft solids Peaches x1  · Solids 1/4 carmen cracker x1     Oral Phase:   · Decreased closure around utensil  · Prolonged mastication  · Mild oral residue  · Mild lingual residue (cleared with verbal cue for liquid wash by SLP)  · Slow oral transit time    Pharyngeal Phase:   · decreased hyolaryngeal excursion to palpation  · no overt clinical signs/symptoms of aspiration  · no overt clinical signs/symptoms of pharyngeal dysphagia  · multiple spontaneous swallows    Compensatory Strategies  · None    Treatment: SLP to f/u with patient to ensure diet tolerance and implementation of compensatory swallow strategies.     Assessment:     Sachin Calloway is a 58 y.o. male with an SLP diagnosis of oral dysphagia.  He presents with prolonged mastication, slowed oral transit time, decreased hyolaryngeal elevation/excursion per neck palpitation, delayed initiating of pharyngeal swallow, and mild oral and lingual residue after the swallow.     Goals:   Multidisciplinary Problems     SLP Goals        Problem: SLP Goal    Goal Priority Disciplines Outcome   SLP Goal     SLP    Description:  Short Term Goals:  1. Pt will complete a clinical swallow evaluation to determine safest diet.  2. Pt will tolerate mechanical soft (finely chopped meats) and thin liquid diet with no overt s/s of aspiration.  3. SLP will recommend calorie count via dietician consult to ensure Pt is meeting adequate nutrition/hydration needs orally.                    Plan:     · Patient to be seen:  3 x/week   · Plan of Care expires:  07/12/19  · Plan of Care reviewed with:  patient, spouse   · SLP Follow-Up:  Yes       Discharge recommendations:  (Home  with family)   Barriers to Discharge:  None    Time Tracking:     SLP Treatment Date:   06/13/19  Speech Start Time:  1025  Speech Stop Time:  1050     Speech Total Time (min):  25 min    Billable Minutes:    Swallow eval: 10 minutes   Self Care/Home Management Training: 15 minutes         EMILY Mackenzie, [facility]  06/13/2019

## 2019-06-13 NOTE — PROGRESS NOTES
[facility] Consult     Primary Team: Cardiology   Consultant Attending: Tammie   Date of Admit: 6/10/2019    History      Sachin Calloway is a 58 y.o. male with a relevant history of alcoholic liver cirrhosis, HTN, former EtOH abuse, active tobacco use, PAD who presents for peripheral angiogram    The patient presented to [facility] on 6/10/2019 with a primary complaint of non healing right foot wound with doppler showing PAD. Had arthrectomy and PTA with good results on 6/11. Was assessed by podiatry who felt that the right foot appearance was concerning for gangrene with superimposed infection    Although he was afebrile and had no pain, his WBC was elevated and has remained elevated. Broad spec antibiotics were started and blood cultures drawn     Interval events/subjective      WBC still elevated with no fever. Had tachycardia until 730pm last night but no hypotension      No new complaints and denies any abd pain, no cough, no diarrhoea, no dysuria    Still has some lower extrem pain rt>lt    Allergies:  Review of patient's allergies indicates:  No Known Allergies    Medications:   In-Hospital Scheduled Medications:   aspirin  81 mg Oral Daily    atorvastatin  20 mg Oral Daily    clopidogrel  75 mg Oral Daily    collagenase   Topical (Top) Daily    enoxaparin  40 mg Subcutaneous Daily    pantoprazole  40 mg Oral Daily    piperacillin-tazobactam (ZOSYN) IVPB  4.5 g Intravenous Q8H    potassium chloride 10%  20 mEq Oral Daily    spironolactone  50 mg Oral Daily    vancomycin (VANCOCIN) IVPB  1,250 mg Intravenous Q24H      In-Hospital PRN Medications:  acetaminophen, aluminum-magnesium hydroxide-simethicone, ondansetron, ondansetron, oxyCODONE, polyethylene glycol, simethicone   In-Hospital IV Infusion Medications:     Home Medications:  Prior to Admission medications    Medication Sig Start Date End Date Taking? Authorizing Provider   acetaminophen-codeine 300-30mg (TYLENOL #3) 300-30 mg Tab  Take by mouth.   Yes Loretta Raya MD   furosemide (LASIX) 80 MG tablet Take 1 tablet (80 mg total) by mouth once daily. 4/25/19 7/18/20 Yes Juan Gatica MD   pantoprazole (PROTONIX) 40 MG tablet Take 1 tablet (40 mg total) by mouth once daily. 5/13/19 5/12/20 Yes Dillan Salinas MD   potassium chloride SA (K-DUR,KLOR-CON) 20 MEQ tablet Take 1 tablet (20 mEq total) by mouth once daily. 5/2/19  Yes Juan Gatica MD   spironolactone (ALDACTONE) 50 MG tablet Take 1 tablet (50 mg total) by mouth once daily. 5/15/19 8/7/20 Yes Juan Gatica MD     Antibiotics and Day Number of Therapy:  Piptazo and Vanc for 1 day     Past Medical History:  Past Medical History:   Diagnosis Date    Alcohol abuse     Cirrhosis     Edema     Hypertension     Seizures     Stricture esophagus      Past Surgical History/ObGyn Hx if gender appropriate:  Past Surgical History:   Procedure Laterality Date    Angiogram, Lower Arterial, Bilateral Bilateral 6/10/2019    [facility] CATH LAB/EP    Aortogram, Abdominal N/A 6/10/2019    [facility] at [facility] CATH LAB/EP    Atherectomy, Peripheral Blood Vessel N/A 6/11/2019    [facility] at [facility] CATH LAB/EP    COLONOSCOPY      COLONOSCOPY N/A 5/5/2015    [facility]formed by [facility] at [facility] ENDO    EGD (ESOPHAGOGASTRODUODENOSCOPY) N/A 5/13/2019    [facility] by [facility] at [facility] ENDO    EGD with dilitation  03/04/2019    ESOPHAGOGASTRODUODENOSCOPY (EGD) N/A 3/29/2019    [facility]formed by Dlilan [facility] MD at [facility] ENDO    ESOPHAGOGASTRODUODENOSCOPY (EGD) N/A 3/18/2019    Performed by Omer Pritchard MD at [facility] ENDO    ESOPHAGOGASTRODUODENOSCOPY (EGD) N/A 3/4/2019    Performed by Madhu Schmidt MD at [facility] ENDO    ESOPHAGOGASTRODUODENOSCOPY (EGD) N/A 5/5/2015    Performed by [facility] at [facility] ENDO    Filter Protection, Peripheral  6/11/2019    Performed by [facility] at  [facility] CATH LAB/EP    PTA, PERIPHERAL VESSEL N/A 2019    Performed by [facility] at [facility] CATH LAB/EP    PTA, Peroneal  2019    Performed by [facility] MD at [facility] CATH LAB/EP    PTA, Superficial Femoral Artery  2019    Performed by [facility] Gonzales MD at [facility] CATH LAB/EP    ULTRASOUND, INTRAVASCULAR N/A 2019    Performed by Fabien Gonzales MD at [facility] CATH LAB/EP     Family History:  Family History   Problem Relation Age of Onset    Cancer Father      Social History:  Social History     Tobacco Use    Smoking status: Current Every Day Smoker     Packs/day: 0.50     Years: 43.00     Pack years: 21.50     Types: Cigarettes     Start date:     Smokeless tobacco: Never Used    Tobacco comment: Pt enrolled in [employer].  Ambulatory referral to Smoking Cessation program.   Substance Use Topics    Alcohol use: Yes     Comment: 6 pack every day (3-4)    Drug use: No     Objective   Last 24 Hour Vital Signs:  BP  Min: 105/64  Max: 183/77  Temp  Av.2 °F (36.2 °C)  Min: 96.3 °F (35.7 °C)  Max: 98 °F (36.7 °C)  Pulse  Av.7  Min: 88  Max: 113  Resp  Av.7  Min: 16  Max: 18  SpO2  Av %  Min: 100 %  Max: 100 %  Height  Av' (182.9 cm)  Min: 6' (182.9 cm)  Max: 6' (182.9 cm)  Weight  Av.9 kg (107 lb 12.9 oz)  Min: 48.9 kg (107 lb 12.9 oz)  Max: 48.9 kg (107 lb 12.9 oz)    Lines, Drains, Airway:  None     Physical Examination:  Examination  General: ill appearing, comfortable   HEENT: normal oral mucosa, normal dentition, conjunctiva normal, pupils normal, extraocular motion normal  Neck: no thyromegaly, no JVD   Cardiac: no murmurs, pulse regular    Pulmonary/Chest: chest clear, no respiratory distress   GI/Rectal: no organomegaly, no masses, NON tender, normal bowel sounds, rectal exam deferred   : deferred   Msk: normal motor screening exam, shallow clean ulcers on hip and sacrum   Vascular: normal peripheral perfusion   Lymph nodes: none  palpated  Skin/ Extremities: right foot dry necrotic extremities with wet areas proximally and malodor    Neurology/ Mental status: alert oriented     Laboratory:  CBC:   Lab Results   Component Value Date    WBC 16.11 (H) 06/13/2019    HGB 7.7 (L) 06/13/2019     06/13/2019    MCV 80 (L) 06/13/2019    RDW 17.8 (H) 06/13/2019     Estimated Creatinine Clearance: 68.8 mL/min (based on SCr of 0.8 mg/dL).    Microbiology Data:  Negative blood cultures     Other Results:  EKG    Radiology:  No gas noted in xray of right foot     Assessment     Sachin Calloway is a 58 y.o. male with alcoholic liver cirrhosis and PAD s/p right foot angioplasty who presented with gangrene of the right foot and leukocytosis. Vancomycin and piptazo have been started.     In a patient with cirrhosis who requires paracentesis, SBP would be another possible cause of infection leading to an elevated WBC but this seems less likely than the more obvious source of the right foot. Also he has no abdominal pain or tenderness.     Thus far, it seems no urgent inpatient plan for podiatry procedures/revascularization.       Recommendations     Gangrene rt foot  - reluctant to recommend discharge with po antibiotic regimen with uptrending leukocytosis and no other obvious source beside the foot, so continue piptazo and vancomycin for now  - needs MRI of right foot to diagnose osteo and help decide on duration of antibiotic therapy   - also awaiting surgical plan to help decide on route and duration of antibiotic therapy  - await vanc trough    Thank you for this consult. We will follow.      Kimber Flood  Fellow, [facility]

## 2019-06-13 NOTE — PHYSICIAN QUERY
PT Name: Sachin Calloway  MR #: [id number]    Physician Query Form - Non-Pressure Ulcer Clarification      CDS/: Yuly Benavidez RN, CCDS              Contact information: [email or web address]    This form is a permanent document in the medical record.     Query Date: June 13, 2019    By submitting this query, we are merely seeking further clarification of documentation. Please utilize your independent clinical judgment when addressing the question(s) below.    The Medical Record contains the following:   Indicator   Supporting Clinical Findings Location in Medical Record   X Non-pressure Ulcer . Has had worsening right dorsal foot ulcer with necrotic appearing tissue and toes    Admitted for elective LE angiogram due to nonhealing wound to right foot. 6/10 h/p      6/11 prog note    Wound Care Consult      Radiology Findings      Acute/Chronic Illness     X Treatment: atherectomy and PTA with DCB 6/11 procedure note    Other:        Provider, Please specify the severity of Right dorsal foot ulcer.  [   ] Skin breakdown only   [   ] Exposed fat layer   [   ] Muscle involvement without evidence of necrosis   [   ] Muscle necrosis   [ x  ] Bone involvement without evidence of necrosis   [   ] Bone necrosis   [   ] Other depth (please specify):   [   ] Other Integumentary Diagnosis (please specify):   [  ] Clinically Undetermined       Please document in your progress notes daily for the duration of treatment, until resolved, and include in your discharge summary.

## 2019-06-13 NOTE — PLAN OF CARE
Problem: Physical Therapy Goal  Goal: Physical Therapy Goal  Goals to be met by: 2019     Patient will increase functional independence with mobility by performin. Supine to sit with Modified [place]  2. Sit to stand transfer with Minimal Assistance  3. Bed to chair transfer with Contact Guard Assistance using Rolling Walker  4. Gait  x 20 feet with Contact Guard Assistance using Rolling Walker.      Outcome: Ongoing (interventions implemented as appropriate)  Recommend Home with HH

## 2019-06-13 NOTE — CONSULTS
Today`s Date: 6/12/2019     Admit Date: 6/10/2019    Admitting Physician: Fabien Gonzales MD    Patient`s Name: Sachin Calloway , 58 y.o. male    Reason for consultation  Wound care  Patient Active Problem List:     Clavicle fracture     Seizure disorder     Dysphagia     Esophageal stenosis     Alcoholic cirrhosis of liver with ascites     Microcytic anemia     Esophageal stricture     Panlobular emphysema     Anasarca     Critical lower limb ischemia     Gangrene of right foot     Sacral decubitus ulcer     Pressure ulcer of left hip, stage 3     Severe protein-calorie malnutrition     Hypoalbuminemia      Past Medical History:  No date: Alcohol abuse  No date: Cirrhosis  No date: Edema  No date: Hypertension  No date: Seizures  No date: Stricture esophagus    Past Surgical History:  6/10/2019: Angiogram, Lower Arterial, Bilateral; Bilateral      Comment:  Performed by Fabien Gonzales MD at [facility] CATH LAB/EP  6/10/2019: Aortogram, Abdominal; N/A      Comment:  Performed by Fabien Gonzales MD at [facility] CATH LAB/EP  6/11/2019: Atherectomy, Peripheral Blood Vessel; N/A      Comment:  Performed by Fabien Gonzales MD at [facility] CATH LAB/EP  No date: COLONOSCOPY  5/5/2015: COLONOSCOPY; N/A      Comment:  Performed by Mario Martínez MD at [facility] ENDO  5/13/2019: EGD (ESOPHAGOGASTRODUODENOSCOPY); N/A      Comment:  Performed by Dillan Salinas MD at [facility] ENDO  03/04/2019: EGD with dilitation  3/29/2019: ESOPHAGOGASTRODUODENOSCOPY (EGD); N/A      Comment:  Performed by Dillan Salinas MD at [facility] ENDO  3/18/2019: ESOPHAGOGASTRODUODENOSCOPY (EGD); N/A      Comment:  Performed by Omer Pritchard MD at [facility] ENDO  3/4/2019: ESOPHAGOGASTRODUODENOSCOPY (EGD); N/A      Comment:  Performed by Madhu Schmidt MD at [facility] ENDO  5/5/2015: ESOPHAGOGASTRODUODENOSCOPY (EGD); N/A      Comment:  Performed by Mario Martínez MD at [facility] ENDO  6/11/2019: Filter Protection, Peripheral      Comment:  Performed by Fabien Gonzales MD at  [facility] CATH LAB/EP  6/11/2019: PTA, PERIPHERAL VESSEL; N/A      Comment:  Performed by Fabien Gonzales MD at [facility] CATH LAB/EP  6/11/2019: PTA, Peroneal      Comment:  Performed by Fabien Gonzales MD at [facility] CATH LAB/EP  6/11/2019: PTA, Superficial Femoral Artery      Comment:  Performed by Fabien Gonzales MD at [facility] CATH LAB/EP  6/11/2019: ULTRASOUND, INTRAVASCULAR; N/A      Comment:  Performed by Fabien Gonzales MD at [facility] CATH LAB/EP    Prior to Admission medications :  Medication acetaminophen-codeine 300-30mg (TYLENOL #3) 300-30 mg Tab, Sig Take by mouth., Start Date , End Date , Taking? Yes, Authorizing Provider Loretta Raya MD    Medication furosemide (LASIX) 80 MG tablet, Sig Take 1 tablet (80 mg total) by mouth once daily., Start Date 4/25/19, End Date 7/18/20, Taking? Yes, Authorizing Provider Juan Gatica MD    Medication pantoprazole (PROTONIX) 40 MG tablet, Sig Take 1 tablet (40 mg total) by mouth once daily., Start Date 5/13/19, End Date 5/12/20, Taking? Yes, Authorizing Provider Dillan Salinas MD    Medication potassium chloride SA (K-DUR,KLOR-CON) 20 MEQ tablet, Sig Take 1 tablet (20 mEq total) by mouth once daily., Start Date 5/2/19, End Date , Taking? Yes, Authorizing Provider Juan Gatica MD    Medication spironolactone (ALDACTONE) 50 MG tablet, Sig Take 1 tablet (50 mg total) by mouth once daily., Start Date 5/15/19, End Date 8/7/20, Taking? Yes, Authorizing Provider Juan Gatica MD      No current facility-administered medications on file prior to encounter.   Current Outpatient Medications on File Prior to Encounter:  acetaminophen-codeine 300-30mg (TYLENOL #3) 300-30 mg Tab, Take by mouth., Disp: , Rfl:   furosemide (LASIX) 80 MG tablet, Take 1 tablet (80 mg total) by mouth once daily., Disp: 90 tablet, Rfl: 4  pantoprazole (PROTONIX) 40 MG tablet, Take 1 tablet (40 mg total) by mouth once daily., Disp: 30 tablet, Rfl: 11  potassium chloride SA (K-DUR,KLOR-CON) 20 MEQ  tablet, Take 1 tablet (20 mEq total) by mouth once daily., Disp: 90 tablet, Rfl: 3  spironolactone (ALDACTONE) 50 MG tablet, Take 1 tablet (50 mg total) by mouth once daily., Disp: 90 tablet, Rfl: 4         Review of patient's allergies indicates:  No Known Allergies    Social History:   reports that he has been smoking cigarettes.  He started smoking about 43 years ago. He has a 21.50 pack-year smoking history. He has never used smokeless tobacco. He reports that he drinks alcohol. He reports that he does not use drugs.     Review of patient's family history indicates:  Problem: Cancer      Relation: Father          Age of Onset: (Not Specified)      PHYSICAL EXAMINATION  Temp:  [95.2 °F (35.1 °C)-98.3 °F (36.8 °C)] 98 °F (36.7 °C)  Pulse:  [111-123] 111  Resp:  [16-18] 18  SpO2:  [97 %-100 %] 100 %  BP: (107-156)/(62-93) 119/67    General Condition:   alert x 3    Head & Neck  Anemia: None  Jaundice: None  Neck vein: Not distended  Carotid Bruits: none  Lymph nodes: none palpable  Thyroid: normal    Chest: normal    Heart: normal    Rt. Breast: not examined  Lt. Breast: not examined  Axillary lymph nodes: none    Abdomen: Soft,  None tender with no palpable mass or organ  Hernia: none    Rectal: Defered    Extremities: gangrenous right foot    Vascular: no palpable pulses right foot    Specific focus Examination  Stage 2 sacral decubitus ulcer, stage  3 left hip decubitus ulcer    Imp: sacral decubitus, infected left hip decubitus ulcer.    Plan: apply santyl and xeroform locally daily.

## 2019-06-13 NOTE — PT/OT/SLP PROGRESS
Physical Therapy Treatment    Patient Name:  Sachin Calloway   MRN:  [id number]    Recommendations:     Discharge Recommendations:  home with home health   Discharge Equipment Recommendations: none   Barriers to discharge: None    Assessment:     Sachin Calloway is a 58 y.o. male admitted with a medical diagnosis of Critical lower limb ischemia.  He presents with the following impairments/functional limitations:  weakness, gait instability, decreased upper extremity function, decreased ROM, decreased lower extremity function, impaired balance, impaired endurance, pain, impaired skin, edema, impaired functional mobilty, impaired self care skills . Patient able to ambulate with RW WBAT BLE and min to CG assist short distance 2/2 pain R foodt.    Rehab Prognosis: Good; patient would benefit from acute skilled PT services to address these deficits and reach maximum level of function.    Recent Surgery: Procedure(s) (LRB):  PTA, PERIPHERAL VESSEL (N/A)  Atherectomy, Peripheral Blood Vessel (N/A)  ULTRASOUND, INTRAVASCULAR (N/A)  Filter Protection, Peripheral  PTA, Superficial Femoral Artery  PTA, Peroneal 2 Days Post-Op    Plan:     During this hospitalization, patient to be seen 5 x/week to address the identified rehab impairments via gait training, therapeutic activities, therapeutic exercises and progress toward the following goals:    · Plan of Care Expires:  07/12/19    Subjective     Chief Complaint: paint with wt bearing RLE  Patient/Family Comments/goals: get well and go home  Pain/Comfort:  · Pain Rating 1: 0/10  · Pain Rating Post-Intervention 1: 0/10      Objective:     Communicated with primary nurse prior to session.  Patient found HOB elevated with bed alarm, oxygen, telemetry upon PT entry to room.     General Precautions: Standard, fall   Orthopedic Precautions:N/A   Braces: N/A     Functional Mobility:  · Bed Mobility:     · Supine to Sit: minimum assistance  · Sit to Supine: minimum assistance  · Transfers:      · Sit to Stand:  contact guard assistance and minimum assistance with rolling walker  · Gait: ~ 5ft with RW and min assist  · Balance: poor + to fair - with RW      AM-PAC 6 CLICK MOBILITY  Turning over in bed (including adjusting bedclothes, sheets and blankets)?: 3  Sitting down on and standing up from a chair with arms (e.g., wheelchair, bedside commode, etc.): 3  Moving from lying on back to sitting on the side of the bed?: 3  Moving to and from a bed to a chair (including a wheelchair)?: 2  Need to walk in hospital room?: 2  Climbing 3-5 steps with a railing?: 1  Basic Mobility Total Score: 14       Therapeutic Activities and Exercises:   na    Patient left HOB elevated with all lines intact, call button in reach and family present..    GOALS:   Multidisciplinary Problems     Physical Therapy Goals        Problem: Physical Therapy Goal    Goal Priority Disciplines Outcome Goal Variances Interventions   Physical Therapy Goal     PT, PT/OT Ongoing (interventions implemented as appropriate)     Description:  Goals to be met by: 2019     Patient will increase functional independence with mobility by performin. Supine to sit with Modified [place]  2. Sit to stand transfer with Minimal Assistance  3. Bed to chair transfer with Contact Guard Assistance using Rolling Walker  4. Gait  x 20 feet with Contact Guard Assistance using Rolling Walker.                       Time Tracking:     PT Received On: 19  PT Start Time: 1333     PT Stop Time: 1400  PT Total Time (min): 27 min     Billable Minutes: Gait Training 13    Treatment Type: Other (see comments)(Evaluation)  PT/PTA: PT           Juan Díaz, PT  2019

## 2019-06-14 ENCOUNTER — ANESTHESIA (OUTPATIENT)
Dept: CARDIOLOGY | Facility: HOSPITAL | Age: 59
DRG: 270 | End: 2019-06-14
Payer: MEDICARE

## 2019-06-14 ENCOUNTER — ANESTHESIA EVENT (OUTPATIENT)
Dept: CARDIOLOGY | Facility: HOSPITAL | Age: 59
DRG: 270 | End: 2019-06-14
Payer: MEDICARE

## 2019-06-14 PROBLEM — D64.9 ANEMIA: Status: ACTIVE | Noted: 2019-05-02

## 2019-06-14 LAB
ABO + RH BLD: NORMAL
ANION GAP SERPL CALC-SCNC: 6 MMOL/L (ref 8–16)
BASOPHILS # BLD AUTO: 0.02 K/UL (ref 0–0.2)
BASOPHILS NFR BLD: 0.1 % (ref 0–1.9)
BLD GP AB SCN CELLS X3 SERPL QL: NORMAL
BUN SERPL-MCNC: 11 MG/DL (ref 6–20)
CALCIUM SERPL-MCNC: 7.4 MG/DL (ref 8.7–10.5)
CHLORIDE SERPL-SCNC: 105 MMOL/L (ref 95–110)
CO2 SERPL-SCNC: 24 MMOL/L (ref 23–29)
CREAT SERPL-MCNC: 0.7 MG/DL (ref 0.5–1.4)
DIFFERENTIAL METHOD: ABNORMAL
EOSINOPHIL # BLD AUTO: 0.1 K/UL (ref 0–0.5)
EOSINOPHIL NFR BLD: 0.4 % (ref 0–8)
ERYTHROCYTE [DISTWIDTH] IN BLOOD BY AUTOMATED COUNT: 17.9 % (ref 11.5–14.5)
EST. GFR  (AFRICAN AMERICAN): >60 ML/MIN/1.73 M^2
EST. GFR  (NON AFRICAN AMERICAN): >60 ML/MIN/1.73 M^2
GLUCOSE SERPL-MCNC: 89 MG/DL (ref 70–110)
HCT VFR BLD AUTO: 22.8 % (ref 40–54)
HGB BLD-MCNC: 7.1 G/DL (ref 14–18)
LYMPHOCYTES # BLD AUTO: 1.6 K/UL (ref 1–4.8)
LYMPHOCYTES NFR BLD: 10.4 % (ref 18–48)
MCH RBC QN AUTO: 24.7 PG (ref 27–31)
MCHC RBC AUTO-ENTMCNC: 31.1 G/DL (ref 32–36)
MCV RBC AUTO: 79 FL (ref 82–98)
MONOCYTES # BLD AUTO: 1.3 K/UL (ref 0.3–1)
MONOCYTES NFR BLD: 8.2 % (ref 4–15)
NEUTROPHILS # BLD AUTO: 12.4 K/UL (ref 1.8–7.7)
NEUTROPHILS NFR BLD: 80.2 % (ref 38–73)
PLATELET # BLD AUTO: 312 K/UL (ref 150–350)
PMV BLD AUTO: 9.8 FL (ref 9.2–12.9)
POTASSIUM SERPL-SCNC: 4.1 MMOL/L (ref 3.5–5.1)
RBC # BLD AUTO: 2.87 M/UL (ref 4.6–6.2)
SODIUM SERPL-SCNC: 135 MMOL/L (ref 136–145)
WBC # BLD AUTO: 15.48 K/UL (ref 3.9–12.7)

## 2019-06-14 PROCEDURE — 25000003 PHARM REV CODE 250: Performed by: NURSE PRACTITIONER

## 2019-06-14 PROCEDURE — 36000 PLACE NEEDLE IN VEIN: CPT | Performed by: STUDENT IN AN ORGANIZED HEALTH CARE EDUCATION/TRAINING PROGRAM

## 2019-06-14 PROCEDURE — 80048 BASIC METABOLIC PNL TOTAL CA: CPT

## 2019-06-14 PROCEDURE — 99222 1ST HOSP IP/OBS MODERATE 55: CPT | Mod: ,,, | Performed by: INTERNAL MEDICINE

## 2019-06-14 PROCEDURE — 25000003 PHARM REV CODE 250: Performed by: INTERNAL MEDICINE

## 2019-06-14 PROCEDURE — 97803 MED NUTRITION INDIV SUBSEQ: CPT

## 2019-06-14 PROCEDURE — 63600175 PHARM REV CODE 636 W HCPCS: Performed by: NURSE PRACTITIONER

## 2019-06-14 PROCEDURE — 36415 COLL VENOUS BLD VENIPUNCTURE: CPT

## 2019-06-14 PROCEDURE — 11000001 HC ACUTE MED/SURG PRIVATE ROOM

## 2019-06-14 PROCEDURE — 63600175 PHARM REV CODE 636 W HCPCS: Performed by: INTERNAL MEDICINE

## 2019-06-14 PROCEDURE — 99232 SBSQ HOSP IP/OBS MODERATE 35: CPT | Mod: ,,, | Performed by: INTERNAL MEDICINE

## 2019-06-14 PROCEDURE — 86901 BLOOD TYPING SEROLOGIC RH(D): CPT

## 2019-06-14 PROCEDURE — 99232 PR SUBSEQUENT HOSPITAL CARE,LEVL II: ICD-10-PCS | Mod: ,,, | Performed by: PODIATRIST

## 2019-06-14 PROCEDURE — 86920 COMPATIBILITY TEST SPIN: CPT

## 2019-06-14 PROCEDURE — 85025 COMPLETE CBC W/AUTO DIFF WBC: CPT

## 2019-06-14 PROCEDURE — 99232 SBSQ HOSP IP/OBS MODERATE 35: CPT | Mod: ,,, | Performed by: PODIATRIST

## 2019-06-14 PROCEDURE — 99222 PR INITIAL HOSPITAL CARE,LEVL II: ICD-10-PCS | Mod: ,,, | Performed by: INTERNAL MEDICINE

## 2019-06-14 PROCEDURE — 99232 PR SUBSEQUENT HOSPITAL CARE,LEVL II: ICD-10-PCS | Mod: ,,, | Performed by: INTERNAL MEDICINE

## 2019-06-14 RX ORDER — POLYETHYLENE GLYCOL 3350, SODIUM SULFATE ANHYDROUS, SODIUM BICARBONATE, SODIUM CHLORIDE, POTASSIUM CHLORIDE 236; 22.74; 6.74; 5.86; 2.97 G/4L; G/4L; G/4L; G/4L; G/4L
4000 POWDER, FOR SOLUTION ORAL ONCE
Status: COMPLETED | OUTPATIENT
Start: 2019-06-16 | End: 2019-06-16

## 2019-06-14 RX ADMIN — PIPERACILLIN AND TAZOBACTAM 4.5 G: 4; .5 INJECTION, POWDER, LYOPHILIZED, FOR SOLUTION INTRAVENOUS; PARENTERAL at 01:06

## 2019-06-14 RX ADMIN — SPIRONOLACTONE 50 MG: 25 TABLET, FILM COATED ORAL at 10:06

## 2019-06-14 RX ADMIN — PIPERACILLIN AND TAZOBACTAM 4.5 G: 4; .5 INJECTION, POWDER, LYOPHILIZED, FOR SOLUTION INTRAVENOUS; PARENTERAL at 06:06

## 2019-06-14 RX ADMIN — PANTOPRAZOLE SODIUM 40 MG: 40 TABLET, DELAYED RELEASE ORAL at 10:06

## 2019-06-14 RX ADMIN — ATORVASTATIN CALCIUM 20 MG: 20 TABLET, FILM COATED ORAL at 10:06

## 2019-06-14 RX ADMIN — CLOPIDOGREL BISULFATE 75 MG: 75 TABLET ORAL at 10:06

## 2019-06-14 RX ADMIN — POTASSIUM CHLORIDE 20 MEQ: 20 SOLUTION ORAL at 10:06

## 2019-06-14 RX ADMIN — ENOXAPARIN SODIUM 40 MG: 100 INJECTION SUBCUTANEOUS at 06:06

## 2019-06-14 RX ADMIN — OXYCODONE HYDROCHLORIDE 5 MG: 5 TABLET ORAL at 10:06

## 2019-06-14 RX ADMIN — OXYCODONE HYDROCHLORIDE 5 MG: 5 TABLET ORAL at 04:06

## 2019-06-14 RX ADMIN — VANCOMYCIN HYDROCHLORIDE 1250 MG: 1.25 INJECTION, POWDER, LYOPHILIZED, FOR SOLUTION INTRAVENOUS at 10:06

## 2019-06-14 NOTE — PROGRESS NOTES
[facility]  Cardiology  Progress Note    Patient Name: Sachin Calloway  MRN: [id number]  Admission Date: 6/10/2019  Hospital Length of Stay: 2 days  Code Status: Prior   Attending Physician: Fabien Gonzales MD   Primary Care Physician: Juan Gatica MD  Expected Discharge Date:   Principal Problem:Critical lower limb ischemia    Subjective:     Hospital Course:   6/10/2019 Admitted for elective LE angiogram due to nonhealing wound to right foot. Taken to cath lab for the procedure via right radial access with following results:    · Severe bilateral PAD with CLI of right foot  · On the right there is an ostial SFA  which is calcified and reconstitutes via profunda collaterals distally. There is single vessel runoff via peroneal which has a severe proximal stenosis and distally supplies collaterals to a plantar arch. No identifiable DP, although there is a proximal portion of AT  · On the left there is a severe stenosis at proximal SFA with mid SFA  with reconstitution distally. There is two vessel runoff via AT into a DP and peroneal which supplies collaterals to a plantar arch  · Staged intervention of LSFA and peroneal first. Will then consider staged intervention of RATA and if unsuccessful will consider AV flow reversal     6/11/2019 NPO for RLE intervention today   6/12/2019 Taken to cath lab yesterday for intervention via LCFA access with following results:    · LCFA antegrade access with crossover sheath used  · Successful RSFA  intervention with atherectomy followed by IVUS guided PTA with 4.0 balloon in POP and mid to distal SFA and 5.0 balloon in ostial and proximal SFA. This was followed by PTA with DCB to POP/mid and distal SFA with 4.0 balloon and 5.0 balloon ostial/mid SFA all with excellent results  · Successful PTA to proximal peroneal with 3.0 x 60 balloon with excellent result  · Will likely need staged intervention of AT/DP and if unsuccessful, AV flow  reversal  · Plavix/ASA/statin  · Continue aggressive wound care and management per Podiatry    Tolerated procedure well and recovered on telemetry unit. LCFA access site with no active bleeding, hematoma or ecchymosis. Podiatry consulted with plans for surgery once demarcation occurs. ID consulted with recs for blood cultures along with empiric antibiotics with IV Vanc and Zosyn   6/13/2019 H&H 7.7 & 24.3 with WBC up to 16.11 from 15.17. BMP with unchanged creatinine. Blood cultures with prelim results NGTD. HR and BP stable. Dietian consulted with recs noted-will liberalize diet and order supplements. Remains on IV Vanc and Zosyn for now. PT/OT on board   6/14/2019 WBC down slightly to 15.4 this AM. H&H down slightly to 7.1&22.8. BP stable. Episode of ST with HR up to 140s this AM nonsustained and patient asymptomatic. ASA discontinued. Will consult GI due to downtrending H&H         Review of Systems   Constitution: Negative for chills, decreased appetite, diaphoresis and fever.   Cardiovascular: Negative for chest pain, claudication, cyanosis, dyspnea on exertion, irregular heartbeat, leg swelling, near-syncope, orthopnea, palpitations, paroxysmal nocturnal dyspnea and syncope.   Respiratory: Negative for cough, hemoptysis, shortness of breath and wheezing.    Gastrointestinal: Negative for bloating, abdominal pain, constipation, diarrhea, melena, nausea and vomiting.   Neurological: Negative for dizziness and weakness.     Objective:     Vital Signs (Most Recent):  Temp: 97.6 °F (36.4 °C) (06/14/19 1235)  Pulse: 104 (06/14/19 1235)  Resp: 18 (06/14/19 1235)  BP: 130/78 (06/14/19 1235)  SpO2: 100 % (06/13/19 0837) Vital Signs (24h Range):  Temp:  [96.2 °F (35.7 °C)-97.6 °F (36.4 °C)] 97.6 °F (36.4 °C)  Pulse:  [] 104  Resp:  [14-18] 18  BP: (103-130)/(64-82) 130/78     Weight: 48.9 kg (107 lb 12.9 oz)  Body mass index is 14.62 kg/m².     SpO2: 100 %  O2 Device (Oxygen Therapy): nasal  cannula      Intake/Output Summary (Last 24 hours) at 6/14/2019 1432  Last data filed at 6/14/2019 1200  Gross per 24 hour   Intake 1655 ml   Output 1050 ml   Net 605 ml       Lines/Drains/Airways     Peripheral Intravenous Line                 Peripheral IV - Single Lumen 06/10/19 1345 Left Forearm 4 days                Physical Exam   Constitutional: He is oriented to person, place, and time. He appears cachectic. He has a sickly appearance. He appears ill. No distress.   Cardiovascular: Normal rate and regular rhythm. Exam reveals no gallop.   No murmur heard.  Pulmonary/Chest: Effort normal and breath sounds normal. No respiratory distress. He has no wheezes.   Abdominal: Soft. Bowel sounds are normal. He exhibits no distension. There is no tenderness.   Neurological: He is alert and oriented to person, place, and time.   Skin: Skin is warm and dry.       Significant Labs:     Recent Labs   Lab 06/14/19  0506   WBC 15.48*   RBC 2.87*   HGB 7.1*   HCT 22.8*      MCV 79*   MCH 24.7*   MCHC 31.1*     Recent Labs   Lab 06/14/19  0506   CALCIUM 7.4*   *   K 4.1   CO2 24      BUN 11   CREATININE 0.7       Significant Imaging:     TTE 6/12/2019    · Low normal left ventricular systolic function. The estimated ejection fraction is 50%  · Indeterminate left ventricular diastolic function.  · Normal right ventricular systolic function.  · Poor endocardial windows. Limited visualization    Assessment and Plan:     Brief HPI: Seen on AM NP rounds with wife at the bedside and again later today on rounds with Dr. Crane. Denies any complaints. Discussed POC as detailed below-verbalized understanding and agrees with POC     * Critical lower limb ischemia  -BLE angiogram with bilateral SFA CTOs with 2 vessel r/o on the left and single vessel via peroneal on right  -successful revascularization of RLE 6/11 with RSFA, right pop and right peroneal PTA with DCB; will likely need PTA of right AT; anticipate  procedure early next week vs outpatient depending on other chronic medical conditions   -continue statin and Plavix; will hold off on ASA for now     Hypoalbuminemia  -related to poor nutritional status  -last albumin 1.4  -dietian consulted and recs noted     Severe protein-calorie malnutrition  -consult dietian  -diet changed to regular  -on Darrian BID and Benefiber TID     Pressure ulcer of left hip, stage 3  -wound care RN and General surgery consulted     Sacral decubitus ulcer  -POA  -related to debility and prolonged bedrest  -Wound care RN consulted as well as General surgery (recs noted)     Gangrene of right foot  -Podiatry and ID consulted  -case discussed with Podiatry with initial plans for TMA once demarcation occurs per review of note; ongoing discussions with Podiatry with high risk for limb loss; will continue to follow Podiatry recs  -blood cultures with prelim results with NGTD; continue  IV Vanc and Zosyn for now per ID  recs  -anticipate need for placement upon discharge     Anemia  -H&H 7.1&22.8 this AM; down slight from 7&24 this AM  -slow down trend since earlier this week of 9.7&29.7  -baseline 9-10/29-30 since early 218  -anemia multifactoral and related to slow GI blood loss vs infectious etiology vs malnutrition  -will consult GI  -hold ASA for now and continue Plavix only     Alcoholic cirrhosis of liver with ascites  -AST/ALT WNL with elevated alk phos  -continue Aldactone at home dose  -abdominal ultrasound from 5/2019 reviewed  -will consult GI         VTE Risk Mitigation (From admission, onward)        Ordered     enoxaparin injection 40 mg  Daily      06/12/19 1324     IP VTE HIGH RISK PATIENT  Once      06/12/19 1324          Rosi Grullon, APRN, ANP  Cardiology  [facility]

## 2019-06-14 NOTE — PLAN OF CARE
Problem: Adult Inpatient Plan of Care  Goal: Plan of Care Review  Outcome: Ongoing (interventions implemented as appropriate)  VN unable to round due to 2130 cut off for rounding.  Chart review complete.

## 2019-06-14 NOTE — PT/OT/SLP PROGRESS
Physical Therapy      Patient Name:  Sachin Calloway   MRN:  [id number]    Patient appears emotional upset reports he and spouse spoke with podiatrist possible amputation in future. Refused PT treatment 2/2 increase pain complaints. Will follow up as able.     Juan Díaz, PT

## 2019-06-14 NOTE — PROGRESS NOTES
[facility]  Adult Nutrition  Progress Note    SUMMARY       Recommendations    Recommendation:   1. Contiinue to encourage good intake of meals & supplements.    Goals:  1. Pt will consume at least 50-75% intake at meals.   2. Pt will gain weight  Nutrition Goal Status: progressing towards goal    Reason for Assessment  Reason For Assessment: RD follow-up  Diagnosis: (critical lower limb ischemia)  Relevant Medical History: seizures, HTN, alcohol abuse, esophagus stricture, cirrhosis, edema  General Information Comments: Pt on [facility] Soft diet with beneprotein, Darrian, & Boost Plus. Pt with 50-75% intake ar recent meals.  Pt was eating at visit. NFPE completed -severe temple, clavicle,& upper & lower extremity wasting with moderate orbital wasting.   Nutrition Discharge Planning: pt to d/c on Cardiac diett    Nutrition Risk Screen  Nutrition Risk Screen: large or nonhealing wound, burn or pressure injury    Nutrition/Diet History  Food Preferences: no Scientologist or cultural food prefs identified  Spiritual, Cultural Beliefs, Hindu Practices, Values that Affect Care: no  Factors Affecting Nutritional Intake: decreased appetite    Anthropometrics  Temp: 97.3 °F (36.3 °C)  Height Method: Stated  Height: 6' (182.9 cm)  Height (inches): 72 in  Weight Method: Bed Scale  Weight: 48.9 kg (107 lb 12.9 oz)  Weight (lb): 107.81 lb  Ideal Body Weight (IBW), Male: 178 lb  % Ideal Body Weight, Male (lb): 60.57 lb  BMI (Calculated): 14.7  BMI Grade: less than 16 protein-energy malnutrition grade III  Usual Body Weight (UBW), k kg  % Usual Body Weight: 47.12  % Weight Change From Usual Weight: -52.98 %     Lab/Procedures/Meds  Pertinent Labs Reviewed: reviewed  Pertinent Labs Comments: Na 135L, Ca 7.4L, Alb 1.4L  Pertinent Medications Reviewed: reviewed  Pertinent Medications Comments: aspirin, pantoprazole    Estimated/Assessed Needs  Weight Used For Calorie Calculations: 80.9 kg (178 lb 5.6  oz)(IBW)  Energy Calorie Requirements (kcal): 6898-9081 (25-30 kcal/kg)  Energy Need Method: Kcal/kg  Protein Requirements: 81g (1.0g/kg)  Weight Used For Protein Calculations: 80.9 kg (178 lb 5.6 oz)(IBW)  Estimated Fluid Requirement Method: RDA Method  RDA Method (mL): 2022     Nutrition Prescription Ordered  Current Diet Order: [facility] Soft   Oral Nutrition Supplement: Boost Plus, beneprotein, Darrian    Evaluation of Received Nutrient/Fluid Intake  I/O: 2125/1150  Energy Calories Required: not meeting needs  Protein Required: not meeting needs  Fluid Required: not meeting needs  Comments: LBM 6/14  % Intake of Estimated Energy Needs: 50 - 75 %  % Meal Intake: 50 - 75 %    Nutrition Risk  Level of Risk/Frequency of Follow-up: (2xweekly)     Assessment and Plan    Severe protein-calorie malnutrition  Malnutrition in the context of Chronic Illness/Injury    Related to (etiology):  dysphagia    Signs and Symptoms (as evidenced by):  Energy Intake: <50% of estimated energy requirement for several months  Body Fat Depletion: severe depletion of orbitals, triceps and thoracic and lumbar region   Muscle Mass Depletion: severe depletion of temples, clavicle region, scapular region, interosseous muscle and lower extremities   Weight Loss: greater than 20% in 1 year     Interventions:  Commercial beverage    Nutrition Diagnosis Status:  Continues       Monitor and Evaluation  Food and Nutrient Intake: food and beverage intake  Food and Nutrient Adminstration: diet order  Physical Activity and Function: nutrition-related ADLs and IADLs  Anthropometric Measurements: weight  Biochemical Data, Medical Tests and Procedures: electrolyte and renal panel  Nutrition-Focused Physical Findings: overall appearance     Malnutrition Assessment  Malnutrition Type: chronic illness  Weight Loss (Malnutrition): greater than 20% in 1 year  Energy Intake (Malnutrition): less than or equal to 50% for greater than or equal to 5 days   Orbital Region  (Subcutaneous Fat Loss): moderate depletion  Upper Arm Region (Subcutaneous Fat Loss): severe depletion  Thoracic and Lumbar Region: severe depletion   [place] Region (Muscle Loss): severe depletion  Clavicle Bone Region (Muscle Loss): severe depletion  Clavicle and Acromion Bone Region (Muscle Loss): severe depletion  Scapular Bone Region (Muscle Loss): severe depletion  Patellar Region (Muscle Loss): severe depletion  Anterior Thigh Region (Muscle Loss): severe depletion  Posterior Calf Region (Muscle Loss): severe depletion   Subcutaneous Fat Loss (Final Summary): severe protein-calorie malnutrition  Muscle Loss Evaluation (Final Summary): severe protein-calorie malnutrition    Severe Weight Loss (Malnutrition): greater than 20% in 1 year    Nutrition Follow-Up  RD Follow-up?: Yes

## 2019-06-14 NOTE — PROGRESS NOTES
[facility]  Podiatry  Progress Note    Patient Name: Sachin Calloway  MRN: [id number]  Admission Date: 6/10/2019  Hospital Length of Stay: 2 days  Attending Physician: Fabien Gonzales MD  Primary Care Provider: Juan Gatica MD     Subjective:     Interval History: Seen at bedside. Relates right foot pain has increased.     Follow-up For: Procedure(s) (LRB):  PTA, PERIPHERAL VESSEL (N/A)  Atherectomy, Peripheral Blood Vessel (N/A)  ULTRASOUND, INTRAVASCULAR (N/A)  Filter Protection, Peripheral  PTA, Superficial Femoral Artery  PTA, Peroneal    Post-Operative Day: 3 Days Post-Op    Scheduled Meds:   atorvastatin  20 mg Oral Daily    clopidogrel  75 mg Oral Daily    collagenase   Topical (Top) Daily    enoxaparin  40 mg Subcutaneous Daily    pantoprazole  40 mg Oral Daily    piperacillin-tazobactam (ZOSYN) IVPB  4.5 g Intravenous Q8H    potassium chloride 10%  20 mEq Oral Daily    spironolactone  50 mg Oral Daily    vancomycin (VANCOCIN) IVPB  1,250 mg Intravenous Q24H     Continuous Infusions:  PRN Meds:acetaminophen, aluminum-magnesium hydroxide-simethicone, ondansetron, ondansetron, oxyCODONE, polyethylene glycol, simethicone    Review of Systems   Constitutional: Positive for fatigue. Negative for chills and fever.   HENT: Negative for congestion and hearing loss.    Respiratory: Negative for cough and shortness of breath.    Cardiovascular: Negative for chest pain.   Gastrointestinal: Negative for diarrhea, nausea and vomiting.   Skin: Positive for color change and wound.   Neurological: Positive for weakness. Negative for dizziness.   Psychiatric/Behavioral: Negative for agitation and confusion.     Objective:     Vital Signs (Most Recent):  Temp: 97.6 °F (36.4 °C) (06/14/19 1235)  Pulse: 104 (06/14/19 1235)  Resp: 18 (06/14/19 1235)  BP: 130/78 (06/14/19 1235)  SpO2: 100 % (06/13/19 0837) Vital Signs (24h Range):  Temp:  [96.2 °F (35.7 °C)-97.6 °F (36.4 °C)] 97.6 °F (36.4 °C)  Pulse:   [] 104  Resp:  [14-18] 18  BP: (103-139)/(64-89) 130/78     Weight: 48.9 kg (107 lb 12.9 oz)  Body mass index is 14.62 kg/m².    Foot Exam    General  General Appearance: appears stated age and healthy   Orientation: alert and oriented to person, place, and time   Affect: appropriate       Right Foot/Ankle     Inspection and Palpation  Skin Exam: skin changes;     Neurovascular  Dorsalis pedis: absent  Posterior tibial: absent      Left Foot/Ankle      Inspection and Palpation  Skin Exam: skin intact; no callus, no drainage, no maceration, no ulcer and no erythema     Neurovascular  Dorsalis pedis: absent  Posterior tibial: 1+            Laboratory:  Blood Cultures: No results for input(s): LABBLOO in the last 48 hours.  CBC:   Recent Labs   Lab 06/14/19  0506   WBC 15.48*   RBC 2.87*   HGB 7.1*   HCT 22.8*      MCV 79*   MCH 24.7*   MCHC 31.1*     CMP:   Recent Labs   Lab 06/11/19  1719  06/14/19  0506   GLU 82   < > 89   CALCIUM 7.6*   < > 7.4*   ALBUMIN 1.4*  --   --    PROT 5.8*  --   --       < > 135*   K 4.4   < > 4.1   CO2 22*   < > 24      < > 105   BUN 13   < > 11   CREATININE 0.6   < > 0.7   ALKPHOS 280*  --   --    ALT 33  --   --    AST 35  --   --    BILITOT 1.1*  --   --     < > = values in this interval not displayed.     CRP: No results for input(s): CRP in the last 168 hours.  ESR: No results for input(s): SEDRATE in the last 168 hours.    Diagnostic Results:  MRI right foot pending    Clinical Findings:  There was extensive necrosis of skin/eschar formation of the dorsum of the foot and plantar to the toes. Plantar skin appears viable, pink and warm. No palpable fluctuance or crepitance, no erythema.                Assessment/Plan:     * Critical lower limb ischemia  Post partial revascularization per Dr. Gonzales pending further attempts next week?    Severe protein-calorie malnutrition  Per Primary team.    Pressure ulcer of left hip, stage 3  Managed per   Reagan.    Gangrene of right foot  Betadine wet to dry dressing daily per nursing. Gangrene appears stable and fully demarcated, however ordered MRI to assess for deep infection or osteomyelitis. Patient would benefit from revascularization soon. Plan for possible TMA/midfoot amputation Monday if required. Recommend to continue Vancomycin and Zosyn. Persistent leukocytosis would make it seem likely that patient should get amputation with debridement, although at this point may benefit equally more from BKA? Discussed with patient and he would like to discuss further with his wife.    Alcoholic cirrhosis of liver with ascites  Per Primary Team        Zurdo Monroe, GLORIAM  Podiatry  [facility]

## 2019-06-14 NOTE — PLAN OF CARE
Problem: Oral Intake Inadequate  Goal: Improved Oral Intake  Outcome: Ongoing (interventions implemented as appropriate)  Recommendation:   1. Contiinue to encourage good intake of meals & supplements.    Goals:  1. Pt will consume at least 50-75% intake at meals.   2. Pt will gain weight  Nutrition Goal Status: progressing towards goal

## 2019-06-14 NOTE — NURSING
Blood bank called stated that pt had 3 units of blood on reserve for pt procedure, unable to provide the 3 units RBC because it would deplete the amount of blood available for other patients. Royce CALLES notified, states to decrease the order for blood to 2 units RBC on reserve for pt oncoming procedure. Blood bank tech notified to change the RBC ordered to 2 units.

## 2019-06-14 NOTE — ASSESSMENT & PLAN NOTE
-AST/ALT WNL with elevated alk phos  -continue Aldactone at home dose  -abdominal ultrasound from 5/2019 reviewed  -will consult GI

## 2019-06-14 NOTE — ASSESSMENT & PLAN NOTE
-H&H 7.1&22.8 this AM; down slight from 7&24 this AM  -slow down trend since earlier this week of 9.7&29.7  -baseline 9-10/29-30 since early 218  -anemia multifactoral and related to slow GI blood loss vs infectious etiology vs malnutrition  -will consult GI  -hold ASA for now and continue Plavix only

## 2019-06-14 NOTE — PLAN OF CARE
Problem: Adult Inpatient Plan of Care  Goal: Plan of Care Review  Outcome: Ongoing (interventions implemented as appropriate)  Plan of care reviewed with patient and wife. Pt AA0x3. Continues on 02 with 0 episodes of SOB. Continues on effective pain med regimen. Contiues Verbalizes to staff understanding. NSR on monitor with 0 red alarms noted.  No acute distress observed at this time. Side rails x2, bed in low position, call bell within reach. Bed alarm in place for patient safety. Will relay to oncoming nurse to monitor for changes in condition.

## 2019-06-14 NOTE — PLAN OF CARE
Problem: Adult Inpatient Plan of Care  Goal: Plan of Care Review  Outcome: Ongoing (interventions implemented as appropriate)     06/14/19 0302   Plan of Care Review   Plan of Care Reviewed With patient       Tele: NSR, irreg HR 70,  No alarms.     Bed in lowest position, wheels locked, non skid socks, ID band worn, personal items and call bell with in reach, bed alarm set.

## 2019-06-14 NOTE — ASSESSMENT & PLAN NOTE
Betadine wet to dry dressing daily per nursing. Gangrene appears stable and fully demarcated, however ordered MRI to assess for deep infection or osteomyelitis. Patient would benefit from revascularization soon. Plan for possible TMA/midfoot amputation Monday if required. Recommend to continue Vancomycin and Zosyn. Persistent leukocytosis would make it seem likely that patient should get amputation with debridement, although at this point may benefit equally more from BKA? Discussed with patient and he would like to discuss further with his wife.

## 2019-06-14 NOTE — ASSESSMENT & PLAN NOTE
-Podiatry and ID consulted  -case discussed with Podiatry with initial plans for TMA once demarcation occurs per review of note; ongoing discussions with Podiatry with high risk for limb loss; will continue to follow Podiatry recs  -blood cultures with prelim results with NGTD; continue  IV Vanc and Zosyn for now per ID  recs  -anticipate need for placement upon discharge

## 2019-06-14 NOTE — PROGRESS NOTES
[facility] Infectious Disease Consult     Primary Team: Cardiology   Consultant Attending: Anum   Date of Admit: 6/10/2019    History      Sachin Calloway is a 58 y.o. male with a relevant history of alcoholic liver cirrhosis, HTN, former EtOH abuse, active tobacco use, PAD who presents for peripheral angiogram    The patient presented to [facility] on 6/10/2019 with a primary complaint of non healing right foot wound with doppler showing PAD. Had arthrectomy and PTA with good results on 6/11. Was assessed by podiatry who felt that the right foot appearance was concerning for gangrene with superimposed infection    Although he was afebrile and had no pain, his WBC was elevated and has remained elevated. Broad spec antibiotics were started and blood cultures drawn     Interval events/subjective      WBC still elevated with no fever    No new complaints and denies any abd pain, no cough, no diarrhoea, no dysuria    Still has some lower extrem pain rt>lt    Allergies:  Review of patient's allergies indicates:  No Known Allergies    Medications:   In-Hospital Scheduled Medications:   atorvastatin  20 mg Oral Daily    clopidogrel  75 mg Oral Daily    collagenase   Topical (Top) Daily    enoxaparin  40 mg Subcutaneous Daily    pantoprazole  40 mg Oral Daily    piperacillin-tazobactam (ZOSYN) IVPB  4.5 g Intravenous Q8H    potassium chloride 10%  20 mEq Oral Daily    spironolactone  50 mg Oral Daily    vancomycin (VANCOCIN) IVPB  1,250 mg Intravenous Q24H      In-Hospital PRN Medications:  acetaminophen, aluminum-magnesium hydroxide-simethicone, ondansetron, ondansetron, oxyCODONE, polyethylene glycol, simethicone   In-Hospital IV Infusion Medications:     Home Medications:  Prior to Admission medications    Medication Sig Start Date End Date Taking? Authorizing Provider   acetaminophen-codeine 300-30mg (TYLENOL #3) 300-30 mg Tab Take by mouth.   Yes Historical Provider, MD   furosemide (LASIX) 80 MG tablet Take 1 tablet  (80 mg total) by mouth once daily. 4/25/19 7/18/20 Yes Juan Gatica MD   pantoprazole (PROTONIX) 40 MG tablet Take 1 tablet (40 mg total) by mouth once daily. 5/13/19 5/12/20 Yes Dillan Salinas MD   potassium chloride SA (K-DUR,KLOR-CON) 20 MEQ tablet Take 1 tablet (20 mEq total) by mouth once daily. 5/2/19  Yes Juan Gatica MD   spironolactone (ALDACTONE) 50 MG tablet Take 1 tablet (50 mg total) by mouth once daily. 5/15/19 8/7/20 Yes Juan Gatica MD     Antibiotics and Day Number of Therapy:  Piptazo and Vanc for 1 day     Past Medical History:  Past Medical History:   Diagnosis Date    Alcohol abuse     Cirrhosis     Edema     Hypertension     Seizures     Stricture esophagus      Past Surgical History/ObGyn Hx if gender appropriate:  Past Surgical History:   Procedure Laterality Date    Angiogram, Lower Arterial, Bilateral Bilateral 6/10/2019    Performed by Fabien Gonzales MD at [facility] CATH LAB/EP    Aortogram, Abdominal N/A 6/10/2019    Performed by Fabien Gonzales MD at [facility] CATH LAB/EP    Atherectomy, Peripheral Blood Vessel N/A 6/11/2019    Performed by Fabien Gonzales MD at [facility] CATH LAB/EP    COLONOSCOPY      COLONOSCOPY N/A 5/5/2015    Performed by Mario Martínez MD at [facility] ENDO    EGD (ESOPHAGOGASTRODUODENOSCOPY) N/A 5/13/2019    Performed by Dillan Salinas MD at [facility] ENDO    EGD with dilitation  03/04/2019    ESOPHAGOGASTRODUODENOSCOPY (EGD) N/A 3/29/2019    Performed by Dillan Salinas MD at [facility] ENDO    ESOPHAGOGASTRODUODENOSCOPY (EGD) N/A 3/18/2019    Performed by Omer Pritchard MD at [facility] ENDO    ESOPHAGOGASTRODUODENOSCOPY (EGD) N/A 3/4/2019    Performed by Madhu Schmidt MD at [facility] ENDO    ESOPHAGOGASTRODUODENOSCOPY (EGD) N/A 5/5/2015    Performed by Mario Martínez MD at [facility] ENDO    Filter Protection, Peripheral  6/11/2019    Performed by Fabien Gonzales MD at [facility] CATH LAB/EP    PTA, PERIPHERAL VESSEL N/A 6/11/2019    Performed by Fabien Gonzales,  MD at [facility] CATH LAB/EP    PTA, Peroneal  2019    Performed by Fabien Gonzales MD at [facility] CATH LAB/EP    PTA, Superficial Femoral Artery  2019    Performed by Fabien Gonzales MD at [facility] CATH LAB/EP    ULTRASOUND, INTRAVASCULAR N/A 2019    Performed by Fabien Gonzales MD at [facility] CATH LAB/EP     Family History:  Family History   Problem Relation Age of Onset    Cancer Father      Social History:  Social History     Tobacco Use    Smoking status: Current Every Day Smoker     Packs/day: 0.50     Years: 43.00     Pack years: 21.50     Types: Cigarettes     Start date:     Smokeless tobacco: Never Used    Tobacco comment: Pt enrolled in [employer].  Ambulatory referral to Smoking Cessation program.   Substance Use Topics    Alcohol use: Yes     Comment: 6 pack every day (3-4)    Drug use: No     Objective   Last 24 Hour Vital Signs:  BP  Min: 103/64  Max: 130/78  Temp  Av.9 °F (36.1 °C)  Min: 96.2 °F (35.7 °C)  Max: 97.6 °F (36.4 °C)  Pulse  Av.5  Min: 90  Max: 107  Resp  Avg: 15.3  Min: 14  Max: 18    Lines, Drains, Airway:  None     Physical Examination:  Examination  General: ill appearing, comfortable   HEENT: normal oral mucosa, normal dentition, conjunctiva normal, pupils normal, extraocular motion normal  Neck: no thyromegaly, no JVD   Cardiac: no murmurs, pulse regular    Pulmonary/Chest: chest clear, no respiratory distress   GI/Rectal: no organomegaly, no masses, NON tender, normal bowel sounds, rectal exam deferred   : deferred   Msk: normal motor screening exam, shallow clean ulcers on hip and sacrum   Vascular: normal peripheral perfusion   Lymph nodes: none palpated  Skin/ Extremities: right foot dry necrotic extremities with wet areas proximally and malodor, line of demarcation involves the entire dorsum of the right foot     Neurology/ Mental status: alert oriented     Laboratory:  CBC:   Lab Results   Component Value Date    WBC 15.48 (H) 2019    HGB  7.1 (L) 06/14/2019     06/14/2019    MCV 79 (L) 06/14/2019    RDW 17.9 (H) 06/14/2019     Estimated Creatinine Clearance: 78.6 mL/min (based on SCr of 0.7 mg/dL).    Microbiology Data:  Negative blood cultures     Vanc trough 6/13/19  12    Radiology:  No gas noted in xray of right foot     Assessment     Sachin Calloway is a 58 y.o. male with alcoholic liver cirrhosis and PAD s/p right foot angioplasty who presented with gangrene of the right foot and leukocytosis. Vancomycin and piptazo have been started.     In a patient with cirrhosis who requires paracentesis, SBP would be another possible cause of infection leading to an elevated WBC but this seems less likely than the more obvious source of the right foot. Also he has no abdominal pain or tenderness.     Based on the appearance of the right foot and the line of demarcation extending to involve the entire dorsum of the right foot, an amputation may be needed, but pending definitive surgical plan assessing whether there is underlying osteo would be useful to determine antibiotic duration.       Recommendations     Gangrene rt foot  - continue piptazo 4.5g q8  - continue 1.25g qday vanc   - needs MRI of right foot to assess for osteo and help decide on duration of antibiotic therapy  - awaiting revascularization and subsequent surgical plan which will also factor into the decision about route and duration of antibiotic therapy    Thank you for this consult. We will follow.      Kimber Flood  Fellow, [facility]

## 2019-06-14 NOTE — SUBJECTIVE & OBJECTIVE
Subjective:     Interval History: Seen at bedside. Relates right foot pain has increased.     Follow-up For: Procedure(s) (LRB):  PTA, PERIPHERAL VESSEL (N/A)  Atherectomy, Peripheral Blood Vessel (N/A)  ULTRASOUND, INTRAVASCULAR (N/A)  Filter Protection, Peripheral  PTA, Superficial Femoral Artery  PTA, Peroneal    Post-Operative Day: 3 Days Post-Op    Scheduled Meds:   atorvastatin  20 mg Oral Daily    clopidogrel  75 mg Oral Daily    collagenase   Topical (Top) Daily    enoxaparin  40 mg Subcutaneous Daily    pantoprazole  40 mg Oral Daily    piperacillin-tazobactam (ZOSYN) IVPB  4.5 g Intravenous Q8H    potassium chloride 10%  20 mEq Oral Daily    spironolactone  50 mg Oral Daily    vancomycin (VANCOCIN) IVPB  1,250 mg Intravenous Q24H     Continuous Infusions:  PRN Meds:acetaminophen, aluminum-magnesium hydroxide-simethicone, ondansetron, ondansetron, oxyCODONE, polyethylene glycol, simethicone    Review of Systems   Constitutional: Positive for fatigue. Negative for chills and fever.   HENT: Negative for congestion and hearing loss.    Respiratory: Negative for cough and shortness of breath.    Cardiovascular: Negative for chest pain.   Gastrointestinal: Negative for diarrhea, nausea and vomiting.   Skin: Positive for color change and wound.   Neurological: Positive for weakness. Negative for dizziness.   Psychiatric/Behavioral: Negative for agitation and confusion.     Objective:     Vital Signs (Most Recent):  Temp: 97.6 °F (36.4 °C) (06/14/19 1235)  Pulse: 104 (06/14/19 1235)  Resp: 18 (06/14/19 1235)  BP: 130/78 (06/14/19 1235)  SpO2: 100 % (06/13/19 0837) Vital Signs (24h Range):  Temp:  [96.2 °F (35.7 °C)-97.6 °F (36.4 °C)] 97.6 °F (36.4 °C)  Pulse:  [] 104  Resp:  [14-18] 18  BP: (103-139)/(64-89) 130/78     Weight: 48.9 kg (107 lb 12.9 oz)  Body mass index is 14.62 kg/m².    Foot Exam    General  General Appearance: appears stated age and healthy   Orientation: alert and oriented to  person, place, and time   Affect: appropriate       Right Foot/Ankle     Inspection and Palpation  Skin Exam: skin changes;     Neurovascular  Dorsalis pedis: absent  Posterior tibial: absent      Left Foot/Ankle      Inspection and Palpation  Skin Exam: skin intact; no callus, no drainage, no maceration, no ulcer and no erythema     Neurovascular  Dorsalis pedis: absent  Posterior tibial: 1+            Laboratory:  Blood Cultures: No results for input(s): LABBLOO in the last 48 hours.  CBC:   Recent Labs   Lab 06/14/19  0506   WBC 15.48*   RBC 2.87*   HGB 7.1*   HCT 22.8*      MCV 79*   MCH 24.7*   MCHC 31.1*     CMP:   Recent Labs   Lab 06/11/19  1719  06/14/19  0506   GLU 82   < > 89   CALCIUM 7.6*   < > 7.4*   ALBUMIN 1.4*  --   --    PROT 5.8*  --   --       < > 135*   K 4.4   < > 4.1   CO2 22*   < > 24      < > 105   BUN 13   < > 11   CREATININE 0.6   < > 0.7   ALKPHOS 280*  --   --    ALT 33  --   --    AST 35  --   --    BILITOT 1.1*  --   --     < > = values in this interval not displayed.     CRP: No results for input(s): CRP in the last 168 hours.  ESR: No results for input(s): SEDRATE in the last 168 hours.    Diagnostic Results:  MRI right foot pending    Clinical Findings:  There was extensive necrosis of skin/eschar formation of the dorsum of the foot and plantar to the toes. Plantar skin appears viable, pink and warm. No palpable fluctuance or crepitance, no erythema.

## 2019-06-14 NOTE — PROGRESS NOTES
Pharmacokinetic Assessment Follow Up: IV Vancomycin    Vancomycin serum concentration assessment(s):    The trough level was drawned correctly and can be used to guide therapy at this time. The measurement is within the desired definitive target range of 10 to 15 mcg/mL.    Vancomycin Regimen Plan:    Continue regimen to Vancomycin 1250 mg IV every 24hour with next serum trough concentration measured at 2200 prior to 3rd dose on 6/16     Pharmacy will continue to follow and monitor vancomycin.    Please contact pharmacy at extension [phone] for questions regarding this assessment.    Thank you for the consult,   To Dum[place]     Patient brief summary:  Sachin Calloway is a 58 y.o. male initiated on antimicrobial therapy with IV Vancomycin for treatment of suspected skin & soft tissue    The patient received a loading dose, followed by the current treatment regimen: vancomycin 1250 mg IV every 24 hours    Drug Allergies:   Review of patient's allergies indicates:  No Known Allergies    Actual Body Weight:   48.9    Renal Function:   Estimated Creatinine Clearance: 78.6 mL/min (based on SCr of 0.7 mg/dL).,     Dialysis Method (if applicable):        CBC (last 72 hours):  Recent Labs   Lab Result Units 06/12/19  0814 06/13/19  0454 06/14/19  0506   WBC K/uL 15.17* 16.11* 15.48*   Hemoglobin g/dL 8.3* 7.7* 7.1*   Hematocrit % 25.9* 24.3* 22.8*   Platelets K/uL 354* 342 312   Gran% % 79.7* 77.2* 80.2*   Lymph% % 13.6* 14.0* 10.4*   Mono% % 5.6 7.8 8.2   Eosinophil% % 0.0 0.0 0.4   Basophil% % 0.1 0.1 0.1   Differential Method  Automated Automated Automated       Metabolic Panel (last 72 hours):  Recent Labs   Lab Result Units 06/11/19  1041 06/11/19  1719 06/13/19  0454 06/14/19  0506   Sodium mmol/L 138 138 138 135*   Potassium mmol/L 3.9 4.4 4.2 4.1   Chloride mmol/L 101 106 104 105   CO2 mmol/L 27 22* 25 24   Glucose mg/dL 105 82 77 89   BUN, Bld mg/dL 13 13 12 11   Creatinine mg/dL 0.7 0.6 0.8 0.7   Albumin g/dL  1.4* 1.4*  --   --    Total Bilirubin mg/dL 0.7 1.1*  --   --    Alkaline Phosphatase U/L 284* 280*  --   --    AST U/L 29 35  --   --    ALT U/L 33 33  --   --        Vancomycin Administrations:  vancomycin given in the last 96 hours                     vancomycin (VANCOCIN) 1,250 mg in dextrose 5 % 250 mL IVPB (mg) 1,250 mg New Bag 06/13/19 2317    vancomycin 750 mg in dextrose 5 % 250 mL IVPB (ready to mix system) (mg) 750 mg New Bag 06/12/19 2244    vancomycin (VANCOCIN) 1,250 mg in dextrose 5 % 250 mL IVPB (mg) 1,250 mg New Bag 06/12/19 1002                      Drug levels (last 3 results):  Recent Labs   Lab Result Units 06/13/19  2309   Vancomycin-Trough ug/mL 12.0       Microbiologic Results:  Microbiology Results (last 7 days)       Procedure Component Value Units Date/Time    Blood culture [[id number]] Collected:  06/12/19 0815    Order Status:  Completed Specimen:  Blood Updated:  06/13/19 1212     Blood Culture, Routine No Growth to date     Blood Culture, Routine No Growth to date    Blood culture [[id number]] Collected:  06/12/19 0814    Order Status:  Completed Specimen:  Blood Updated:  06/13/19 1212     Blood Culture, Routine No Growth to date     Blood Culture, Routine No Growth to date

## 2019-06-14 NOTE — ASSESSMENT & PLAN NOTE
-BLE angiogram with bilateral SFA CTOs with 2 vessel r/o on the left and single vessel via peroneal on right  -successful revascularization of RLE 6/11 with RSFA, right pop and right peroneal PTA with DCB; will likely need PTA of right AT; anticipate procedure early next week vs outpatient depending on other chronic medical conditions   -continue statin and Plavix; will hold off on ASA for now

## 2019-06-14 NOTE — PT/OT/SLP PROGRESS
Occupational Therapy      Patient Name:  Sachin Calloway   MRN:  [id number]    1:48 PM Patient not seen today secondary to declining therapies as pt reports increased pain in foot, reported emotional distress 2/2 possible amputation in future. Will follow-up as able.    Alanna Fairchild OT  6/14/2019

## 2019-06-14 NOTE — SUBJECTIVE & OBJECTIVE
Review of Systems   Constitution: Negative for chills, decreased appetite, diaphoresis and fever.   Cardiovascular: Negative for chest pain, claudication, cyanosis, dyspnea on exertion, irregular heartbeat, leg swelling, near-syncope, orthopnea, palpitations, paroxysmal nocturnal dyspnea and syncope.   Respiratory: Negative for cough, hemoptysis, shortness of breath and wheezing.    Gastrointestinal: Negative for bloating, abdominal pain, constipation, diarrhea, melena, nausea and vomiting.   Neurological: Negative for dizziness and weakness.     Objective:     Vital Signs (Most Recent):  Temp: 97.6 °F (36.4 °C) (06/14/19 1235)  Pulse: 104 (06/14/19 1235)  Resp: 18 (06/14/19 1235)  BP: 130/78 (06/14/19 1235)  SpO2: 100 % (06/13/19 0837) Vital Signs (24h Range):  Temp:  [96.2 °F (35.7 °C)-97.6 °F (36.4 °C)] 97.6 °F (36.4 °C)  Pulse:  [] 104  Resp:  [14-18] 18  BP: (103-130)/(64-82) 130/78     Weight: 48.9 kg (107 lb 12.9 oz)  Body mass index is 14.62 kg/m².     SpO2: 100 %  O2 Device (Oxygen Therapy): nasal cannula      Intake/Output Summary (Last 24 hours) at 6/14/2019 1432  Last data filed at 6/14/2019 1200  Gross per 24 hour   Intake 1655 ml   Output 1050 ml   Net 605 ml       Lines/Drains/Airways     Peripheral Intravenous Line                 Peripheral IV - Single Lumen 06/10/19 1345 Left Forearm 4 days                Physical Exam   Constitutional: He is oriented to person, place, and time. He appears cachectic. He has a sickly appearance. He appears ill. No distress.   Cardiovascular: Normal rate and regular rhythm. Exam reveals no gallop.   No murmur heard.  Pulmonary/Chest: Effort normal and breath sounds normal. No respiratory distress. He has no wheezes.   Abdominal: Soft. Bowel sounds are normal. He exhibits no distension. There is no tenderness.   Neurological: He is alert and oriented to person, place, and time.   Skin: Skin is warm and dry.       Significant Labs:     Recent Labs   Lab  06/14/19  0506   WBC 15.48*   RBC 2.87*   HGB 7.1*   HCT 22.8*      MCV 79*   MCH 24.7*   MCHC 31.1*     Recent Labs   Lab 06/14/19  0506   CALCIUM 7.4*   *   K 4.1   CO2 24      BUN 11   CREATININE 0.7       Significant Imaging:     TTE 6/12/2019    · Low normal left ventricular systolic function. The estimated ejection fraction is 50%  · Indeterminate left ventricular diastolic function.  · Normal right ventricular systolic function.  · Poor endocardial windows. Limited visualization

## 2019-06-15 LAB
BASOPHILS # BLD AUTO: 0.05 K/UL (ref 0–0.2)
BASOPHILS NFR BLD: 0.3 % (ref 0–1.9)
DIFFERENTIAL METHOD: ABNORMAL
EOSINOPHIL # BLD AUTO: 0 K/UL (ref 0–0.5)
EOSINOPHIL NFR BLD: 0 % (ref 0–8)
ERYTHROCYTE [DISTWIDTH] IN BLOOD BY AUTOMATED COUNT: 18.9 % (ref 11.5–14.5)
HCT VFR BLD AUTO: 23.7 % (ref 40–54)
HGB BLD-MCNC: 7.3 G/DL (ref 14–18)
LYMPHOCYTES # BLD AUTO: 1.7 K/UL (ref 1–4.8)
LYMPHOCYTES NFR BLD: 9 % (ref 18–48)
MCH RBC QN AUTO: 24.9 PG (ref 27–31)
MCHC RBC AUTO-ENTMCNC: 30.8 G/DL (ref 32–36)
MCV RBC AUTO: 81 FL (ref 82–98)
MONOCYTES # BLD AUTO: 1.5 K/UL (ref 0.3–1)
MONOCYTES NFR BLD: 8 % (ref 4–15)
NEUTROPHILS # BLD AUTO: 15.4 K/UL (ref 1.8–7.7)
NEUTROPHILS NFR BLD: 82.2 % (ref 38–73)
PLATELET # BLD AUTO: 341 K/UL (ref 150–350)
PMV BLD AUTO: 9.6 FL (ref 9.2–12.9)
POCT GLUCOSE: 117 MG/DL (ref 70–110)
POCT GLUCOSE: 76 MG/DL (ref 70–110)
POCT GLUCOSE: 77 MG/DL (ref 70–110)
POCT GLUCOSE: 85 MG/DL (ref 70–110)
POCT GLUCOSE: 91 MG/DL (ref 70–110)
RBC # BLD AUTO: 2.93 M/UL (ref 4.6–6.2)
WBC # BLD AUTO: 18.79 K/UL (ref 3.9–12.7)

## 2019-06-15 PROCEDURE — 63600175 PHARM REV CODE 636 W HCPCS: Performed by: INTERNAL MEDICINE

## 2019-06-15 PROCEDURE — 25000003 PHARM REV CODE 250: Performed by: NURSE PRACTITIONER

## 2019-06-15 PROCEDURE — 25000003 PHARM REV CODE 250: Performed by: INTERNAL MEDICINE

## 2019-06-15 PROCEDURE — 11000001 HC ACUTE MED/SURG PRIVATE ROOM

## 2019-06-15 PROCEDURE — 36415 COLL VENOUS BLD VENIPUNCTURE: CPT

## 2019-06-15 PROCEDURE — 63600175 PHARM REV CODE 636 W HCPCS: Performed by: NURSE PRACTITIONER

## 2019-06-15 PROCEDURE — 99232 SBSQ HOSP IP/OBS MODERATE 35: CPT | Mod: ,,, | Performed by: INTERNAL MEDICINE

## 2019-06-15 PROCEDURE — 99232 PR SUBSEQUENT HOSPITAL CARE,LEVL II: ICD-10-PCS | Mod: ,,, | Performed by: INTERNAL MEDICINE

## 2019-06-15 PROCEDURE — 85025 COMPLETE CBC W/AUTO DIFF WBC: CPT

## 2019-06-15 RX ADMIN — VANCOMYCIN HYDROCHLORIDE 1250 MG: 1.25 INJECTION, POWDER, LYOPHILIZED, FOR SOLUTION INTRAVENOUS at 10:06

## 2019-06-15 RX ADMIN — ENOXAPARIN SODIUM 40 MG: 100 INJECTION SUBCUTANEOUS at 06:06

## 2019-06-15 RX ADMIN — POTASSIUM CHLORIDE 20 MEQ: 20 SOLUTION ORAL at 08:06

## 2019-06-15 RX ADMIN — ATORVASTATIN CALCIUM 20 MG: 20 TABLET, FILM COATED ORAL at 08:06

## 2019-06-15 RX ADMIN — COLLAGENASE SANTYL: 250 OINTMENT TOPICAL at 08:06

## 2019-06-15 RX ADMIN — PIPERACILLIN AND TAZOBACTAM 4.5 G: 4; .5 INJECTION, POWDER, LYOPHILIZED, FOR SOLUTION INTRAVENOUS; PARENTERAL at 06:06

## 2019-06-15 RX ADMIN — OXYCODONE HYDROCHLORIDE 5 MG: 5 TABLET ORAL at 08:06

## 2019-06-15 RX ADMIN — SPIRONOLACTONE 50 MG: 25 TABLET, FILM COATED ORAL at 08:06

## 2019-06-15 RX ADMIN — PIPERACILLIN AND TAZOBACTAM 4.5 G: 4; .5 INJECTION, POWDER, LYOPHILIZED, FOR SOLUTION INTRAVENOUS; PARENTERAL at 12:06

## 2019-06-15 RX ADMIN — PANTOPRAZOLE SODIUM 40 MG: 40 TABLET, DELAYED RELEASE ORAL at 08:06

## 2019-06-15 RX ADMIN — CLOPIDOGREL BISULFATE 75 MG: 75 TABLET ORAL at 08:06

## 2019-06-15 RX ADMIN — PIPERACILLIN AND TAZOBACTAM 4.5 G: 4; .5 INJECTION, POWDER, LYOPHILIZED, FOR SOLUTION INTRAVENOUS; PARENTERAL at 08:06

## 2019-06-15 NOTE — PLAN OF CARE
Cued into patient's room.  Permission received per patient to turn camera to view patient.  Introduced as VN for night shift that will be working with floor nurse and nursing assistant.  Family member at bedside.  Educated patient on VN's role in patient care. Plan of care reviewed with patient. Education per flowsheet.  Opportunity given for questions and questions answered.  No complaints.  Instructed to call for assistance.  Will cont to monitor.

## 2019-06-15 NOTE — PLAN OF CARE
VN note: VN cued into patient's room for introduction. VN informed patient that VN would be working closely along side bedside nurse, PCT, and the rest of care team and making rounds throughout the shift. He verbalized understanding. Allowed time for questions. VN will continue to be available to patient and intervene prn.       06/15/19 1103   Type of Frequent Check   Type Patient Rounds;Telemetry Monitoring   Safety/Activity   Patient Rounds bed in low position;visualized patient   Safety Promotion/Fall Prevention instructed to call staff for mobility;side rails raised x 2   Activity Management activity adjusted per tolerance   Positioning   Body Position supine   Head of Bed (HOB) HOB at 60-90 degrees   Assessments (Pre/Post)   Level of Consciousness (AVPU) alert

## 2019-06-15 NOTE — PT/OT/SLP PROGRESS
Physical Therapy      Patient Name:  Sachin Calloway   MRN:  [id number]    Patient not seen today secondary to Unavailable (Comment), pt eating breakfast, requesting PT at later time, will return as available. .    Aamir Fried, PTA

## 2019-06-15 NOTE — NURSING
1905H Patient is awake and orientedx4. Care plan explained to patient, and wife; they verbalized understanding. On 2L via cannula, O2 saturation at 100%. Hooked to heart monitor running normal sinus rhythm at 99bpm. Patient has a 22G left hand flushed and saline locked. Maintained on mechanical soft diet. Complained of pain in the left lower extremity rated a 8/10; percelone given patient states the pain is now a 0. The abdomen is rounded. Patient denies shortness of breath and had a nonproductive cough; Due medications given. Encouraged to turn every 2 hours as tolerated. Maintained on fall risk precaution. Bed in lowest position, bed alarm on, call light/personal items within reach and instructed to call for help when needed. Will continue to monitor.

## 2019-06-15 NOTE — ANESTHESIA PROCEDURE NOTES
Peripheral IV Insertion    Diagnosis: I99.8 Other disorder of circulatory system and I87.9 Disorder of vein, unspecified.    Patient location during procedure: floor  Procedure start time: 6/14/2019 2:20 AM  Timeout: 6/14/2019 10:20 PM  Procedure end time: 6/14/2019 10:35 PM  Staffing  Resident/CRNA: Roberto Carlos Mayers MD  Performed: resident/CRNA   Preanesthetic Checklist  Completed: patient identified, site marked, pre-op evaluation, timeout performed, IV checked, risks and benefits discussed and monitors and equipment checkedPeripheral IV Insertion  Skin Prep: chlorhexidine gluconate  Local Infiltration: lidocaine  Orientation: right  Location: hand  Catheter Size: 22 G  Catheter placement by Ultrasound guidance. Heme positive aspiration all ports.  Vessel Caliber: small, unable to visualize, compressibility normal  Vascular Doppler:  not doneInsertion Attempts: 4 or more  Assessment  Dressing: secured with tape and tegaderm, secured with tape and tegaderm  Patient: Tolerated wellLine did not flush easily

## 2019-06-15 NOTE — SUBJECTIVE & OBJECTIVE
Past Medical History:   Diagnosis Date    Alcohol abuse     Cirrhosis     Edema     Hypertension     Seizures     Stricture esophagus        Past Surgical History:   Procedure Laterality Date    Angiogram, Lower Arterial, Bilateral Bilateral 6/10/2019    Performed by [facility] Gonzales [facility] at [facility] CATH LAB/EP    Aortogram, Abdominal N/A 6/10/2019    Performed by [facility] at [facility] CATH LAB/EP    Atherectomy, Peripheral Blood Vessel N/A 6/11/2019    Performed by [facility] at [facility] CATH LAB/EP    COLONOSCOPY      COLONOSCOPY N/A 5/5/2015    Performed by Mario Mart[facility]nez MD at [facility] ENDO    EGD (ESOPHAGOGASTRODUODENOSCOPY) N/A 5/13/2019    Performed by Dillan Salinas MD at [facility] ENDO    EGD with dilitation  03/04/2019    ESOPHAGOGASTRODUODENOSCOPY (EGD) N/A 3/29/2019    [facility]formed by [facility] at [facility] ENDO    ESOPHAGOGASTRODUODENOSCOPY (EGD) N/A 3/18/2019    Performed by mOer rPitchard MD at [facility] ENDO    ESOPHAGOGASTRODUODENOSCOPY (EGD) N/A 3/4/2019    Performed by Madhu Schmidt MD at [facility] ENDO    ESOPHAGOGASTRODUODENOSCOPY (EGD) N/A 5/5/2015    [facility]formed by [facility] at [facility] ENDO    Filter Protection, Peripheral  6/11/2019    Performed by [facility] CATH LAB/EP    PTA, PERIPHERAL VESSEL N/A 6/11/2019    Performed by [facility] CATH LAB/EP    PTA, Peroneal  6/11/2019    Performed by [facility] CATH LAB/EP    PTA, Superficial Femoral Artery  6/11/2019    Performed by [facility] CATH LAB/EP    ULTRASOUND, INTRAVASCULAR N/A 6/11/2019    Performed by [facility] Gonzales [facility] at [facility] CATH LAB/EP       Review of patient's allergies indicates:  No Known Allergies  Family History     Problem Relation (Age of Onset)    Cancer Father        Tobacco Use    Smoking status: Current Every Day Smoker     Packs/day: 0.50     Years: 43.00     Pack years: 21.50     Types: Cigarettes     Start date: 1976     Smokeless tobacco: Never Used    Tobacco comment: Pt enrolled in [employer].  Ambulatory referral to Smoking Cessation program.   Substance and Sexual Activity    Alcohol use: Yes     Comment: 6 pack every day (3-4)    Drug use: No    Sexual activity: Not on file     Review of Systems   Constitutional: Positive for appetite change and unexpected weight change. Negative for chills and fever.   HENT: Negative for postnasal drip and trouble swallowing.    Eyes: Negative for pain and visual disturbance.   Respiratory: Negative for choking and shortness of breath.    Cardiovascular: Negative for chest pain and leg swelling.   Gastrointestinal: Negative for abdominal distention, abdominal pain, anal bleeding, blood in stool, rectal pain and vomiting.   Endocrine: Negative for cold intolerance and heat intolerance.   Genitourinary: Negative for difficulty urinating and hematuria.   Musculoskeletal: Negative for gait problem and joint swelling.   Neurological: Negative for dizziness and numbness.   Hematological: Negative for adenopathy. Does not bruise/bleed easily.   Psychiatric/Behavioral: Negative for agitation and confusion.     Objective:     Vital Signs (Most Recent):  Temp: 97.5 °F (36.4 °C) (06/14/19 2059)  Pulse: 97 (06/14/19 2059)  Resp: 16 (06/14/19 2059)  BP: 138/88 (06/14/19 2059)  SpO2: 100 % (06/13/19 0837) Vital Signs (24h Range):  Temp:  [96.5 °F (35.8 °C)-97.6 °F (36.4 °C)] 97.5 °F (36.4 °C)  Pulse:  [] 97  Resp:  [14-18] 16  BP: (115-138)/(76-88) 138/88     Weight: 48.9 kg (107 lb 12.9 oz) (06/12/19 1400)  Body mass index is 14.62 kg/m².      Intake/Output Summary (Last 24 hours) at 6/14/2019 2141  Last data filed at 6/14/2019 1845  Gross per 24 hour   Intake 1105 ml   Output 400 ml   Net 705 ml       Lines/Drains/Airways     Peripheral Intravenous Line                 Peripheral IV - Single Lumen 06/10/19 1345 Left Forearm 4 days                Physical Exam   Constitutional: He is  oriented to person, place, and time. He appears well-developed and well-nourished. No distress.   HENT:   Head: Normocephalic and atraumatic.   Eyes: Conjunctivae are normal. No scleral icterus.   Neck: No tracheal deviation present. No thyromegaly present.   Cardiovascular: Normal rate, regular rhythm and normal heart sounds. Exam reveals no gallop and no friction rub.   Pulmonary/Chest: Effort normal and breath sounds normal. He has no wheezes. He has no rales.   Abdominal: Soft. Bowel sounds are normal. There is no tenderness. There is no rebound and no guarding.   Musculoskeletal: Normal range of motion. He exhibits no edema or tenderness.   Neurological: He is alert and oriented to person, place, and time.   Skin: He is not diaphoretic.   Psychiatric: He has a normal mood and affect. His behavior is normal.   Nursing note and vitals reviewed.      Significant Labs:  CBC:   Recent Labs   Lab 06/13/19  0454 06/14/19  0506   WBC 16.11* 15.48*   HGB 7.7* 7.1*   HCT 24.3* 22.8*    312       Significant Imaging:  Imaging results within the past 24 hours have been reviewed.

## 2019-06-15 NOTE — ASSESSMENT & PLAN NOTE
- monitor hct  - plan for egd/colonoscopy on Monday  - d/w cardiology  - ppi  - d/w family as well at bedside

## 2019-06-15 NOTE — CONSULTS
[facility]  Gastroenterology  Consult Note    Patient Name: Sachin Calloway  MRN: [id number]  Admission Date: 6/10/2019  Hospital Length of Stay: 2 days  Code Status: Prior   Attending Provider: Fabien Gonzales MD   Consulting Provider: Bill Arias MD  Primary Care Physician: Juan Gatica MD  Principal Problem:Critical lower limb ischemia    Inpatient consult to [facility]  Consult performed by: Bill Arias MD  Consult ordered by: MAURICE Hartmann, ANP  Reason for consult: Anemia, weight loss        Subjective:     HPI:  This is a 59yo male with a PMHx of etoh cirrhosis here noted to have lower extremity ischemia with worsening anemia. He is noted to have worsening anemia, severe, microcytic. No overt bleeding. He has had multiple egds in the past noting diffuse gastritis and a gastric ulcer. No overt bleeding. He has had a severe amount of weight loss.     The following portions of the patient's history were reviewed and updated as appropriate: allergies, current medications, past family history, past medical history, past social history, past surgical history and problem list.      Past Medical History:   Diagnosis Date    Alcohol abuse     Cirrhosis     Edema     Hypertension     Seizures     Stricture esophagus        Past Surgical History:   Procedure Laterality Date    Angiogram, Lower Arterial, Bilateral Bilateral 6/10/2019    Performed by [facility] at [facility] CATH LAB/EP    Aortogram, Abdominal N/A 6/10/2019    Performed by [facility] Gonzales MD at [facility] CATH LAB/EP    Atherectomy, Peripheral Blood Vessel N/A 6/11/2019    Performed by [facility] Gonzales MD at [facility] CATH LAB/EP    COLONOSCOPY      COLONOSCOPY N/A 5/5/2015    Performed by Mario Martínez MD at [facility] ENDO    EGD (ESOPHAGOGASTRODUODENOSCOPY) N/A 5/13/2019    Performed by Dillan Salinas MD at [facility] ENDO    EGD with dilitation  03/04/2019    ESOPHAGOGASTRODUODENOSCOPY (EGD) N/A  3/29/2019    [facility] at [facility] ENDO    ESOPHAGOGASTRODUODENOSCOPY (EGD) N/A 3/18/2019    Performed by Omer Pritchard MD at [facility] ENDO    ESOPHAGOGASTRODUODENOSCOPY (EGD) N/A 3/4/2019    Performed by Madhu Schmidt MD at [facility] ENDO    ESOPHAGOGASTRODUODENOSCOPY (EGD) N/A 5/5/2015    Performed by Mario Martínez MD at [facility] ENDO    Filter Protection, Peripheral  6/11/2019    Performed by [facility] Gonzales MD at [facility] CATH LAB/EP    PTA, PERIPHERAL VESSEL N/A 6/11/2019    Performed by Fabien Gonzales MD at [facility] CATH LAB/EP    PTA, Peroneal  6/11/2019    Performed by [facility] Gonzales MD at [facility] CATH LAB/EP    PTA, Superficial Femoral Artery  6/11/2019    Performed by [facility] Gonzales MD at [facility] CATH LAB/EP    ULTRASOUND, INTRAVASCULAR N/A 6/11/2019    Performed by Fabien Gonzales MD at [facility] CATH LAB/EP       Review of patient's allergies indicates:  No Known Allergies  Family History     Problem Relation (Age of Onset)    Cancer Father        Tobacco Use    Smoking status: Current Every Day Smoker     Packs/day: 0.50     Years: 43.00     Pack years: 21.50     Types: Cigarettes     Start date: 1976    Smokeless tobacco: Never Used    Tobacco comment: Pt enrolled in [employer].  Ambulatory referral to Smoking Cessation program.   Substance and Sexual Activity    Alcohol use: Yes     Comment: 6 pack every day (3-4)    Drug use: No    Sexual activity: Not on file     Review of Systems   Constitutional: Positive for appetite change and unexpected weight change. Negative for chills and fever.   HENT: Negative for postnasal drip and trouble swallowing.    Eyes: Negative for pain and visual disturbance.   Respiratory: Negative for choking and shortness of breath.    Cardiovascular: Negative for chest pain and leg swelling.   Gastrointestinal: Negative for abdominal distention, abdominal pain, anal bleeding, blood in stool, rectal pain and vomiting.   Endocrine: Negative for cold  intolerance and heat intolerance.   Genitourinary: Negative for difficulty urinating and hematuria.   Musculoskeletal: Negative for gait problem and joint swelling.   Neurological: Negative for dizziness and numbness.   Hematological: Negative for adenopathy. Does not bruise/bleed easily.   Psychiatric/Behavioral: Negative for agitation and confusion.     Objective:     Vital Signs (Most Recent):  Temp: 97.5 °F (36.4 °C) (06/14/19 2059)  Pulse: 97 (06/14/19 2059)  Resp: 16 (06/14/19 2059)  BP: 138/88 (06/14/19 2059)  SpO2: 100 % (06/13/19 0837) Vital Signs (24h Range):  Temp:  [96.5 °F (35.8 °C)-97.6 °F (36.4 °C)] 97.5 °F (36.4 °C)  Pulse:  [] 97  Resp:  [14-18] 16  BP: (115-138)/(76-88) 138/88     Weight: 48.9 kg (107 lb 12.9 oz) (06/12/19 1400)  Body mass index is 14.62 kg/m².      Intake/Output Summary (Last 24 hours) at 6/14/2019 2141  Last data filed at 6/14/2019 1845  Gross per 24 hour   Intake 1105 ml   Output 400 ml   Net 705 ml       Lines/Drains/Airways     Peripheral Intravenous Line                 Peripheral IV - Single Lumen 06/10/19 1345 Left Forearm 4 days                Physical Exam   Constitutional: He is oriented to person, place, and time. He appears well-developed and well-nourished. No distress.   HENT:   Head: Normocephalic and atraumatic.   Eyes: Conjunctivae are normal. No scleral icterus.   Neck: No tracheal deviation present. No thyromegaly present.   Cardiovascular: Normal rate, regular rhythm and normal heart sounds. Exam reveals no gallop and no friction rub.   Pulmonary/Chest: Effort normal and breath sounds normal. He has no wheezes. He has no rales.   Abdominal: Soft. Bowel sounds are normal. There is no tenderness. There is no rebound and no guarding.   Musculoskeletal: Normal range of motion. He exhibits no edema or tenderness.   Neurological: He is alert and oriented to person, place, and time.   Skin: He is not diaphoretic.   Psychiatric: He has a normal mood and affect.  His behavior is normal.   Nursing note and vitals reviewed.      Significant Labs:  CBC:   Recent Labs   Lab 06/13/19  0454 06/14/19  0506   WBC 16.11* 15.48*   HGB 7.7* 7.1*   HCT 24.3* 22.8*    312       Significant Imaging:  Imaging results within the past 24 hours have been reviewed.    Assessment/Plan:     Anemia  - monitor hct  - plan for egd/colonoscopy on Monday  - d/w cardiology  - ppi  - d/w family as well at bedside        Thank you for your consult. I will follow-up with patient. Please contact us if you have any additional questions.    Bill Arias MD  Gastroenterology  [facility]

## 2019-06-15 NOTE — PROGRESS NOTES
Surgery follow up  /78 (BP Location: Left arm, Patient Position: Lying)   Pulse 97   Temp 98.2 °F (36.8 °C) (Oral)   Resp 16   Ht 6' (1.829 m)   Wt 48.9 kg (107 lb 12.9 oz)   SpO2 100%   BMI 14.62 kg/m²   I/O last 3 completed shifts:  In: 2050 [P.O.:1250; IV Piggyback:800]  Out: 1100 [Urine:1100]  I/O this shift:  In: 250 [P.O.:250]  Out: 200 [Urine:200]  Pictures of foot noted, ischaemic gangrene right foot , will need BKA for definite treatment.

## 2019-06-15 NOTE — HPI
This is a 59yo male with a PMHx of etoh cirrhosis here noted to have lower extremity ischemia with worsening anemia. He is noted to have worsening anemia, severe, microcytic. No overt bleeding. He has had multiple egds in the past noting diffuse gastritis and a gastric ulcer. No overt bleeding. He has had a severe amount of weight loss.     The following portions of the patient's history were reviewed and updated as appropriate: allergies, current medications, past family history, past medical history, past social history, past surgical history and problem list.

## 2019-06-15 NOTE — PROGRESS NOTES
Progress Note            [facility]    Subjective: Patient stable with no changes overnight.         Review of patient's allergies indicates:  No Known Allergies       atorvastatin  20 mg Oral Daily    clopidogrel  75 mg Oral Daily    collagenase   Topical (Top) Daily    enoxaparin  40 mg Subcutaneous Daily    pantoprazole  40 mg Oral Daily    piperacillin-tazobactam (ZOSYN) IVPB  4.5 g Intravenous Q8H    [START ON 6/16/2019] polyethylene glycol  4,000 mL Oral Once    potassium chloride 10%  20 mEq Oral Daily    spironolactone  50 mg Oral Daily    vancomycin (VANCOCIN) IVPB  1,250 mg Intravenous Q24H           acetaminophen, aluminum-magnesium hydroxide-simethicone, ondansetron, ondansetron, oxyCODONE, polyethylene glycol, simethicone    Vitals:    06/15/19 0351 06/15/19 0515 06/15/19 0725 06/15/19 0735   BP:  128/86 136/80    BP Location:  Left arm     Patient Position:  Lying     Pulse: 81 100 95 93   Resp:  14 16    Temp:  98.1 °F (36.7 °C) 97.7 °F (36.5 °C)    TempSrc:  Oral     SpO2:       Weight:       Height:           Physical Examination:  General Appearance: No acute distress  Mental: Alert to person, time and place  HEENT: Perrl  Chest: No pain with palpitations, no chest deformities  Heart: RRR, no murmurs, no gallops or rubs  Lung: CTAB  ABDOMEN: Soft, nontender, nondistended with good bowel sounds heard. No mass or hepatosplenomegaly  EXTREMITIES: ischemic toes on left foot      Lab Results   Component Value Date     (L) 06/14/2019    K 4.1 06/14/2019     06/14/2019    CO2 24 06/14/2019    BUN 11 06/14/2019    CREATININE 0.7 06/14/2019    GLU 89 06/14/2019    MG 2.1 03/18/2015    AST 35 06/11/2019    ALT 33 06/11/2019    ALBUMIN 1.4 (L) 06/11/2019    PROT 5.8 (L) 06/11/2019    BILITOT 1.1 (H) 06/11/2019    WBC 18.79 (H) 06/15/2019    HGB 7.3 (L) 06/15/2019    HCT 23.7 (L) 06/15/2019    MCV 81 (L) 06/15/2019     06/15/2019    INR 1.3 (H)  05/02/2019    PSA 0.27 03/27/2015    TSH 2.560 06/06/2019    CHOL 68 (L) 06/12/2019    HDL 20 (L) 06/12/2019    LDLCALC 33.8 (L) 06/12/2019    TRIG 71 06/12/2019       No results for input(s): CPK, CPKMB, TROPONINI, MB in the last 24 hours.    No results for input(s): CPK, CPKMB, TROPONINI, MB in the last 168 hours.      Lab Results   Component Value Date    TSH 2.560 06/06/2019       No results found for: HGBA1C        Images and labs reviewed        Assessment/Plan    1. PAD with CLI requiring intervention and likely surgery. Continue medical management. No intervention planned until patient more stable. Continue antibiotics for gangrene and possible Osteo. MRi planned. ID and podiatry following.     2. Anemia. GI onboard and plan EGD/Colonscopy Monday. hgb 7.3 today. Transfuse if <7.     Lissett Crane MD  [facility]

## 2019-06-16 LAB
BASOPHILS # BLD AUTO: 0.03 K/UL (ref 0–0.2)
BASOPHILS # BLD AUTO: 0.03 K/UL (ref 0–0.2)
BASOPHILS NFR BLD: 0.2 % (ref 0–1.9)
BASOPHILS NFR BLD: 0.2 % (ref 0–1.9)
DIFFERENTIAL METHOD: ABNORMAL
DIFFERENTIAL METHOD: ABNORMAL
EOSINOPHIL # BLD AUTO: 0 K/UL (ref 0–0.5)
EOSINOPHIL # BLD AUTO: 0 K/UL (ref 0–0.5)
EOSINOPHIL NFR BLD: 0 % (ref 0–8)
EOSINOPHIL NFR BLD: 0 % (ref 0–8)
ERYTHROCYTE [DISTWIDTH] IN BLOOD BY AUTOMATED COUNT: 19.1 % (ref 11.5–14.5)
ERYTHROCYTE [DISTWIDTH] IN BLOOD BY AUTOMATED COUNT: 19.5 % (ref 11.5–14.5)
HCT VFR BLD AUTO: 22 % (ref 40–54)
HCT VFR BLD AUTO: 32 % (ref 40–54)
HGB BLD-MCNC: 10.4 G/DL (ref 14–18)
HGB BLD-MCNC: 6.9 G/DL (ref 14–18)
LYMPHOCYTES # BLD AUTO: 1.5 K/UL (ref 1–4.8)
LYMPHOCYTES # BLD AUTO: 1.7 K/UL (ref 1–4.8)
LYMPHOCYTES NFR BLD: 9.2 % (ref 18–48)
LYMPHOCYTES NFR BLD: 9.5 % (ref 18–48)
MCH RBC QN AUTO: 25.5 PG (ref 27–31)
MCH RBC QN AUTO: 26.6 PG (ref 27–31)
MCHC RBC AUTO-ENTMCNC: 31.4 G/DL (ref 32–36)
MCHC RBC AUTO-ENTMCNC: 32.5 G/DL (ref 32–36)
MCV RBC AUTO: 81 FL (ref 82–98)
MCV RBC AUTO: 82 FL (ref 82–98)
MONOCYTES # BLD AUTO: 1.4 K/UL (ref 0.3–1)
MONOCYTES # BLD AUTO: 1.6 K/UL (ref 0.3–1)
MONOCYTES NFR BLD: 8.7 % (ref 4–15)
MONOCYTES NFR BLD: 8.7 % (ref 4–15)
NEUTROPHILS # BLD AUTO: 13 K/UL (ref 1.8–7.7)
NEUTROPHILS # BLD AUTO: 14.7 K/UL (ref 1.8–7.7)
NEUTROPHILS NFR BLD: 81.1 % (ref 38–73)
NEUTROPHILS NFR BLD: 81.3 % (ref 38–73)
PLATELET # BLD AUTO: 315 K/UL (ref 150–350)
PLATELET # BLD AUTO: 324 K/UL (ref 150–350)
PMV BLD AUTO: 10.1 FL (ref 9.2–12.9)
PMV BLD AUTO: 9.7 FL (ref 9.2–12.9)
POCT GLUCOSE: 72 MG/DL (ref 70–110)
POCT GLUCOSE: 78 MG/DL (ref 70–110)
POCT GLUCOSE: 82 MG/DL (ref 70–110)
POCT GLUCOSE: 96 MG/DL (ref 70–110)
RBC # BLD AUTO: 2.71 M/UL (ref 4.6–6.2)
RBC # BLD AUTO: 3.91 M/UL (ref 4.6–6.2)
VANCOMYCIN TROUGH SERPL-MCNC: 20.4 UG/ML (ref 10–22)
WBC # BLD AUTO: 16.07 K/UL (ref 3.9–12.7)
WBC # BLD AUTO: 18.07 K/UL (ref 3.9–12.7)

## 2019-06-16 PROCEDURE — 99232 PR SUBSEQUENT HOSPITAL CARE,LEVL II: ICD-10-PCS | Mod: ,,, | Performed by: INTERNAL MEDICINE

## 2019-06-16 PROCEDURE — 25000003 PHARM REV CODE 250: Performed by: NURSE PRACTITIONER

## 2019-06-16 PROCEDURE — 99232 SBSQ HOSP IP/OBS MODERATE 35: CPT | Mod: ,,, | Performed by: INTERNAL MEDICINE

## 2019-06-16 PROCEDURE — 85025 COMPLETE CBC W/AUTO DIFF WBC: CPT | Mod: 91

## 2019-06-16 PROCEDURE — P9016 RBC LEUKOCYTES REDUCED: HCPCS

## 2019-06-16 PROCEDURE — 36415 COLL VENOUS BLD VENIPUNCTURE: CPT

## 2019-06-16 PROCEDURE — 11000001 HC ACUTE MED/SURG PRIVATE ROOM

## 2019-06-16 PROCEDURE — 25000003 PHARM REV CODE 250: Performed by: INTERNAL MEDICINE

## 2019-06-16 PROCEDURE — 80202 ASSAY OF VANCOMYCIN: CPT

## 2019-06-16 PROCEDURE — 63600175 PHARM REV CODE 636 W HCPCS: Performed by: NURSE PRACTITIONER

## 2019-06-16 RX ORDER — HYDROCODONE BITARTRATE AND ACETAMINOPHEN 500; 5 MG/1; MG/1
TABLET ORAL
Status: DISCONTINUED | OUTPATIENT
Start: 2019-06-16 | End: 2019-06-25 | Stop reason: HOSPADM

## 2019-06-16 RX ADMIN — OXYCODONE HYDROCHLORIDE 5 MG: 5 TABLET ORAL at 09:06

## 2019-06-16 RX ADMIN — SPIRONOLACTONE 50 MG: 25 TABLET, FILM COATED ORAL at 10:06

## 2019-06-16 RX ADMIN — CLOPIDOGREL BISULFATE 75 MG: 75 TABLET ORAL at 10:06

## 2019-06-16 RX ADMIN — PIPERACILLIN AND TAZOBACTAM 4.5 G: 4; .5 INJECTION, POWDER, LYOPHILIZED, FOR SOLUTION INTRAVENOUS; PARENTERAL at 01:06

## 2019-06-16 RX ADMIN — POLYETHYLENE GLYCOL 3350, SODIUM SULFATE ANHYDROUS, SODIUM BICARBONATE, SODIUM CHLORIDE, POTASSIUM CHLORIDE 4000 ML: 236; 22.74; 6.74; 5.86; 2.97 POWDER, FOR SOLUTION ORAL at 04:06

## 2019-06-16 RX ADMIN — COLLAGENASE SANTYL: 250 OINTMENT TOPICAL at 09:06

## 2019-06-16 RX ADMIN — ATORVASTATIN CALCIUM 20 MG: 20 TABLET, FILM COATED ORAL at 10:06

## 2019-06-16 RX ADMIN — POTASSIUM CHLORIDE 20 MEQ: 20 SOLUTION ORAL at 10:06

## 2019-06-16 RX ADMIN — PANTOPRAZOLE SODIUM 40 MG: 40 TABLET, DELAYED RELEASE ORAL at 10:06

## 2019-06-16 RX ADMIN — PIPERACILLIN AND TAZOBACTAM 4.5 G: 4; .5 INJECTION, POWDER, LYOPHILIZED, FOR SOLUTION INTRAVENOUS; PARENTERAL at 06:06

## 2019-06-16 RX ADMIN — ENOXAPARIN SODIUM 40 MG: 100 INJECTION SUBCUTANEOUS at 06:06

## 2019-06-16 NOTE — PT/OT/SLP PROGRESS
Physical Therapy      Patient Name:  Sachin Calloway   MRN:  [id number]    PT visit attempted; Patient not seen today secondary to patient declining; r/o he just got 2 units of blood and he is tired; when asked about tomorror; pt reports he will try and will probably not be able to participate in PT if he will have Surgery. Pt educated extensively on benefits on PT to improve weakness and functional mobility; pt encouraged to participate; and He gave his word to participate c/ PT tomorrow in the AM. Will follow-up as appropriate.    David Montelongo, PT

## 2019-06-16 NOTE — NURSING
Patient's H/H 10.4 and 32.0- CBC scheduled to be drawn 2 hours after one finished unit of PRBC. Dr. Crane paged and awaiting callback. Will hold second unit until further directive from MD. Will continue to assess patient for S/S of bleeding.

## 2019-06-16 NOTE — PROGRESS NOTES
[facility] Consult     Primary Team: Cardiology   Consultant Attending: Anum   Date of Admit: 6/10/2019    History      Sachin Calloway is a 58 y.o. male with a relevant history of alcoholic liver cirrhosis, HTN, former EtOH abuse, active tobacco use, PAD who presents for peripheral angiogram    The patient presented to [facility] on 6/10/2019 with a primary complaint of non healing right foot wound with doppler showing PAD. Had arthrectomy and PTA with good results on 6/11. Was assessed by podiatry who felt that the right foot appearance was concerning for gangrene with superimposed infection    Although he was afebrile and had no pain, his WBC was elevated and has remained elevated. Broad spec antibiotics were started and blood cultures drawn     Interval events/subjective      WBC still elevated with no fever    No new complaints and denies any abd pain, no cough, no diarrhoea, no dysuria    Still has some lower extrem pain rt>lt    Allergies:  Review of patient's allergies indicates:  No Known Allergies    Medications:   In-Hospital Scheduled Medications:   atorvastatin  20 mg Oral Daily    clopidogrel  75 mg Oral Daily    collagenase   Topical (Top) Daily    enoxaparin  40 mg Subcutaneous Daily    pantoprazole  40 mg Oral Daily    piperacillin-tazobactam (ZOSYN) IVPB  4.5 g Intravenous Q8H    polyethylene glycol  4,000 mL Oral Once    potassium chloride 10%  20 mEq Oral Daily    spironolactone  50 mg Oral Daily    vancomycin (VANCOCIN) IVPB  1,250 mg Intravenous Q24H      In-Hospital PRN Medications:  sodium chloride, acetaminophen, aluminum-magnesium hydroxide-simethicone, ondansetron, ondansetron, oxyCODONE, polyethylene glycol, simethicone   In-Hospital IV Infusion Medications:     Home Medications:  Prior to Admission medications    Medication Sig Start Date End Date Taking? Authorizing Provider   acetaminophen-codeine 300-30mg (TYLENOL #3) 300-30 mg Tab Take by mouth.   Yes Historical  MD Elver   furosemide (LASIX) 80 MG tablet Take 1 tablet (80 mg total) by mouth once daily. 4/25/19 7/18/20 Yes Juan Gatica MD   pantoprazole (PROTONIX) 40 MG tablet Take 1 tablet (40 mg total) by mouth once daily. 5/13/19 5/12/20 Yes Dillan Salinas MD   potassium chloride SA (K-DUR,KLOR-CON) 20 MEQ tablet Take 1 tablet (20 mEq total) by mouth once daily. 5/2/19  Yes Juan Gatica MD   spironolactone (ALDACTONE) 50 MG tablet Take 1 tablet (50 mg total) by mouth once daily. 5/15/19 8/7/20 Yes Juan Gatica MD     Antibiotics and Day Number of Therapy:  Piptazo and Vanc for 1 day     Past Medical History:  Past Medical History:   Diagnosis Date    Alcohol abuse     Cirrhosis     Edema     Hypertension     Seizures     Stricture esophagus      Past Surgical History/ObGyn Hx if gender appropriate:  Past Surgical History:   Procedure Laterality Date    Angiogram, Lower Arterial, Bilateral Bilateral 6/10/2019    Performed by [facility]ien [facility]Gnzales MD at [facility] CATH LAB/EP    Aortogram, Abdominal N/A 6/10/2019    Performed by Fabien Gonzales MD at [facility] CATH LAB/EP    Atherectomy, Peripheral Blood Vessel N/A 6/11/2019    Performed by Fabien Gonzales MD at [facility] CATH LAB/EP    COLONOSCOPY      COLONOSCOPY N/A 5/5/2015    Performed by Mario Martínez MD at [facility] ENDO    EGD (ESOPHAGOGASTRODUODENOSCOPY) N/A 5/13/2019    Performed by Dillan Salinas MD at [facility] ENDO    EGD with dilitation  03/04/2019    ESOPHAGOGASTRODUODENOSCOPY (EGD) N/A 3/29/2019    Performed by Dillan Salinas MD at [facility] ENDO    ESOPHAGOGASTRODUODENOSCOPY (EGD) N/A 3/18/2019    Performed by Omer Pritchard MD at [facility] ENDO    ESOPHAGOGASTRODUODENOSCOPY (EGD) N/A 3/4/2019    Performed by Madhu Schmidt MD at [facility] ENDO    ESOPHAGOGASTRODUODENOSCOPY (EGD) N/A 5/5/2015    Performed by Mario Martínez MD at [facility] ENDO    Filter Protection, Peripheral  6/11/2019    Performed by Fabien Gonzales MD at [facility] CATH LAB/EP    PTA,  PERIPHERAL VESSEL N/A 2019    Performed by [facility] Gonzales [facility] at [facility] CATH LAB/EP    PTA, Peroneal  2019    Performed by Fabien Gonzales MD at [facility] CATH LAB/EP    PTA, Superficial Femoral Artery  2019    Performed by Fabien Gonzales MD at [facility] CATH LAB/EP    ULTRASOUND, INTRAVASCULAR N/A 2019    Performed by Fabien Gonzales MD at [facility] CATH LAB/EP     Family History:  Family History   Problem Relation Age of Onset    Cancer Father      Social History:  Social History     Tobacco Use    Smoking status: Current Every Day Smoker     Packs/day: 0.50     Years: 43.00     Pack years: 21.50     Types: Cigarettes     Start date:     Smokeless tobacco: Never Used    Tobacco comment: Pt enrolled in [employer].  Ambulatory referral to Smoking Cessation program.   Substance Use Topics    Alcohol use: Yes     Comment: 6 pack every day (3-4)    Drug use: No     Objective   Last 24 Hour Vital Signs:  BP  Min: 122/73  Max: 136/82  Temp  Av.7 °F (36.5 °C)  Min: 96.3 °F (35.7 °C)  Max: 99.2 °F (37.3 °C)  Pulse  Av.6  Min: 89  Max: 103  Resp  Av.4  Min: 14  Max: 18  SpO2  Av %  Min: 100 %  Max: 100 %    Lines, Drains, Airway:  None     Physical Examination:  Examination  General: ill appearing, comfortable   HEENT: normal oral mucosa, normal dentition, conjunctiva normal, pupils normal, extraocular motion normal  Neck: no thyromegaly, no JVD   Cardiac: no murmurs, pulse regular    Pulmonary/Chest: chest clear, no respiratory distress   GI/Rectal: no organomegaly, no masses, NON tender, normal bowel sounds, rectal exam deferred   : deferred   Msk: normal motor screening exam, shallow clean ulcers on hip and sacrum   Vascular: normal peripheral perfusion   Lymph nodes: none palpated  Skin/ Extremities: right foot dry necrotic extremities with wet areas proximally and malodor, line of demarcation involves the entire dorsum of the right foot     Neurology/ Mental status:  alert oriented     Laboratory:  CBC:   Lab Results   Component Value Date    WBC 16.07 (H) 06/16/2019    HGB 6.9 (L) 06/16/2019     06/16/2019    MCV 81 (L) 06/16/2019    RDW 19.5 (H) 06/16/2019     Estimated Creatinine Clearance: 78.6 mL/min (based on SCr of 0.7 mg/dL).    Microbiology Data:  Negative blood cultures     Vanc trough 6/13/19  12    Radiology:  No gas noted in xray of right foot     MRI of right foot shows no osteo    Assessment     Sachin Calloway is a 58 y.o. male with alcoholic liver cirrhosis and PAD s/p right foot angioplasty who presented with gangrene of the right foot and leukocytosis. Vancomycin and piptazo have been started.     In a patient with cirrhosis who requires paracentesis, SBP would be another possible cause of infection leading to an elevated WBC but this seems less likely than the more obvious source of the right foot. Also he has no abdominal pain or tenderness.       Recommendations     Gangrene rt foot  - continue piptazo 4.5g q8  - continue 1.25g qday vanc   - needs daily BMP while on vanc and piptazo  - awaiting revascularization and subsequent surgical plan which will also factor into the decision about route and duration of antibiotic therapy    Thank you for this consult. We will follow.      Kimber Flood  Fellow, [facility]

## 2019-06-16 NOTE — NURSING
Received patient upon rounds last night, 1925H, Conscious , coherent to time, place date and situation. GCS 15. Wiith saline lock on left fore arm gauge 22 , flushed patent, with clean and dry dressing. Patient has  subjective complaint of  right foot pain 2/10. NO pain medication demand. No complaint of any chest pain, no dizziness, no cough, no headache. Telemetry Normal Sinus Rhythm (70-80's).Sacral sore stage 2, 2x2 cm, left hip stage 2, cleansed with soap and water, barrier cream applied, foam dressing applied.  With right foot unstageable wound, with clean and dry dressing. Oxygen saturation 97% on room air. Reposition every two hours.Advised to have clear  Liquid diet after midnight. Advised patient to call for any assistance. Safety fall precaution measures noted, Bed alarm ON. Call bell with in reach. No family around.Will continue to monitor patient.

## 2019-06-16 NOTE — NURSING
Okay to hold second unit of PRBC per Dr. Crane. H/H stable at this time. Will draw CBC with early morning labs.

## 2019-06-16 NOTE — PLAN OF CARE
VN note: VN cued into patient's room for introduction. Nurse Elke at bedside. VN informed patient that VN would be working closely along side bedside nurse, PCT, and the rest of care team and making rounds throughout the shift. He verbalized understanding. Blood currently infusing at this time. Education provided. Allowed time for questions. VN will continue to be available to patient and intervene prn.       06/16/19 1025   Type of Frequent Check   Type Patient Rounds;Other (see comments)  (VN Rounds)   Safety/Activity   Patient Rounds bed in low position;visualized patient   Safety Promotion/Fall Prevention side rails raised x 2   Activity Management activity adjusted per tolerance   Positioning   Body Position supine   Head of Bed (HOB) HOB at 60-90 degrees   Assessments (Pre/Post)   Level of Consciousness (AVPU) alert

## 2019-06-16 NOTE — PROGRESS NOTES
Progress Note            [facility]    Subjective: Patient doing the same. No complaints.         Review of patient's allergies indicates:  No Known Allergies       atorvastatin  20 mg Oral Daily    clopidogrel  75 mg Oral Daily    collagenase   Topical (Top) Daily    enoxaparin  40 mg Subcutaneous Daily    pantoprazole  40 mg Oral Daily    piperacillin-tazobactam (ZOSYN) IVPB  4.5 g Intravenous Q8H    polyethylene glycol  4,000 mL Oral Once    potassium chloride 10%  20 mEq Oral Daily    spironolactone  50 mg Oral Daily    vancomycin (VANCOCIN) IVPB  1,250 mg Intravenous Q24H           acetaminophen, aluminum-magnesium hydroxide-simethicone, ondansetron, ondansetron, oxyCODONE, polyethylene glycol, simethicone    Vitals:    06/15/19 2335 06/16/19 0000 06/16/19 0516 06/16/19 0521   BP: 126/76   130/72   BP Location: Left arm   Left arm   Patient Position: Lying   Lying   Pulse: 97 94 97 97   Resp: 16   16   Temp: 99.2 °F (37.3 °C)   98 °F (36.7 °C)   TempSrc: Oral   Oral   SpO2:    100%   Weight:       Height:           Physical Examination:  General Appearance: No acute distress  Mental: Alert to person, time and place  HEENT: Perrl  Chest: No pain with palpitations, no chest deformities  Heart: RRR, no murmurs, no gallops or rubs  Lung: CTAB  ABDOMEN: Soft, nontender, nondistended with good bowel sounds heard. No mass or hepatosplenomegaly  EXTREMITIES: Without cyanosis, clubbing or edema.       Lab Results   Component Value Date     (L) 06/14/2019    K 4.1 06/14/2019     06/14/2019    CO2 24 06/14/2019    BUN 11 06/14/2019    CREATININE 0.7 06/14/2019    GLU 89 06/14/2019    MG 2.1 03/18/2015    AST 35 06/11/2019    ALT 33 06/11/2019    ALBUMIN 1.4 (L) 06/11/2019    PROT 5.8 (L) 06/11/2019    BILITOT 1.1 (H) 06/11/2019    WBC 16.07 (H) 06/16/2019    HGB 6.9 (L) 06/16/2019    HCT 22.0 (L) 06/16/2019    MCV 81 (L) 06/16/2019     06/16/2019    INR 1.3 (H)  05/02/2019    PSA 0.27 03/27/2015    TSH 2.560 06/06/2019    CHOL 68 (L) 06/12/2019    HDL 20 (L) 06/12/2019    LDLCALC 33.8 (L) 06/12/2019    TRIG 71 06/12/2019       No results for input(s): CPK, CPKMB, TROPONINI, MB in the last 24 hours.    No results for input(s): CPK, CPKMB, TROPONINI, MB in the last 168 hours.      Lab Results   Component Value Date    TSH 2.560 06/06/2019       No results found for: HGBA1C        Images and labs reviewed        Assessment/Plan     1. PAD with CLI requiring intervention and likely surgery. Continue medical management. No intervention planned until patient more stable. Continue antibiotics for gangrene and possible Osteo. MRi planned. ID and podiatry following.      2. Anemia. GI onboard and plan EGD/Colonscopy Monday. hgb 6.9 today. Will transfuse 2 units PRBC    Lissett Crane MD  [facility]

## 2019-06-17 ENCOUNTER — ANESTHESIA EVENT (OUTPATIENT)
Dept: INTENSIVE CARE | Facility: HOSPITAL | Age: 59
DRG: 270 | End: 2019-06-17
Payer: MEDICARE

## 2019-06-17 ENCOUNTER — ANESTHESIA (OUTPATIENT)
Dept: GASTROENTEROLOGY | Facility: CLINIC | Age: 59
DRG: 270 | End: 2019-06-17
Payer: MEDICARE

## 2019-06-17 ENCOUNTER — ANESTHESIA (OUTPATIENT)
Dept: ENDOSCOPY | Facility: HOSPITAL | Age: 59
DRG: 270 | End: 2019-06-17
Payer: MEDICARE

## 2019-06-17 ENCOUNTER — ANESTHESIA EVENT (OUTPATIENT)
Dept: ENDOSCOPY | Facility: HOSPITAL | Age: 59
DRG: 270 | End: 2019-06-17
Payer: MEDICARE

## 2019-06-17 ENCOUNTER — ANESTHESIA (OUTPATIENT)
Dept: INTENSIVE CARE | Facility: HOSPITAL | Age: 59
DRG: 270 | End: 2019-06-17
Payer: MEDICARE

## 2019-06-17 ENCOUNTER — ANESTHESIA EVENT (OUTPATIENT)
Dept: GASTROENTEROLOGY | Facility: CLINIC | Age: 59
DRG: 270 | End: 2019-06-17
Payer: MEDICARE

## 2019-06-17 PROBLEM — I46.9 CARDIAC ARREST: Status: ACTIVE | Noted: 2019-06-17

## 2019-06-17 LAB
ABO + RH BLD: NORMAL
ALBUMIN SERPL BCP-MCNC: 1 G/DL (ref 3.5–5.2)
ALLENS TEST: ABNORMAL
ALLENS TEST: ABNORMAL
ALP SERPL-CCNC: 163 U/L (ref 55–135)
ALT SERPL W/O P-5'-P-CCNC: 33 U/L (ref 10–44)
ANION GAP SERPL CALC-SCNC: 6 MMOL/L (ref 8–16)
ANISOCYTOSIS BLD QL SMEAR: SLIGHT
AORTIC ROOT ANNULUS: 2.3 CM
AST SERPL-CCNC: 60 U/L (ref 10–40)
AV INDEX (PROSTH): 1.15
AV MEAN GRADIENT: 0.68 MMHG
AV PEAK GRADIENT: 1.04 MMHG
AV VALVE AREA: 2.54 CM2
AV VELOCITY RATIO: 0.92
BACTERIA BLD CULT: NORMAL
BACTERIA BLD CULT: NORMAL
BASOPHILS # BLD AUTO: 0.04 K/UL (ref 0–0.2)
BASOPHILS # BLD AUTO: ABNORMAL K/UL (ref 0–0.2)
BASOPHILS NFR BLD: 0 % (ref 0–1.9)
BASOPHILS NFR BLD: 0.2 % (ref 0–1.9)
BILIRUB SERPL-MCNC: 0.6 MG/DL (ref 0.1–1)
BLD GP AB SCN CELLS X3 SERPL QL: NORMAL
BLD PROD TYP BPU: NORMAL
BLOOD UNIT EXPIRATION DATE: NORMAL
BLOOD UNIT TYPE CODE: 5100
BLOOD UNIT TYPE: NORMAL
BNP SERPL-MCNC: 295 PG/ML (ref 0–99)
BSA FOR ECHO PROCEDURE: 1.58 M2
BUN SERPL-MCNC: 10 MG/DL (ref 6–20)
CALCIUM SERPL-MCNC: 6.6 MG/DL (ref 8.7–10.5)
CHLORIDE SERPL-SCNC: 106 MMOL/L (ref 95–110)
CO2 SERPL-SCNC: 19 MMOL/L (ref 23–29)
CODING SYSTEM: NORMAL
CREAT SERPL-MCNC: 0.7 MG/DL (ref 0.5–1.4)
CV ECHO LV RWT: 0.52 CM
D DIMER PPP IA.FEU-MCNC: 15.44 MG/L FEU
DELSYS: ABNORMAL
DELSYS: ABNORMAL
DIFFERENTIAL METHOD: ABNORMAL
DIFFERENTIAL METHOD: ABNORMAL
DISPENSE STATUS: NORMAL
DOP CALC AO PEAK VEL: 0.51 M/S
DOP CALC AO VTI: 6.73 CM
DOP CALC LVOT AREA: 2.22 CM2
DOP CALC LVOT DIAMETER: 1.68 CM
DOP CALC LVOT PEAK VEL: 0.47 M/S
DOP CALC LVOT STROKE VOLUME: 17.1 CM3
DOP CALCLVOT PEAK VEL VTI: 7.72 CM
ECHO LV POSTERIOR WALL: 0.77 CM (ref 0.6–1.1)
EOSINOPHIL # BLD AUTO: 0 K/UL (ref 0–0.5)
EOSINOPHIL # BLD AUTO: ABNORMAL K/UL (ref 0–0.5)
EOSINOPHIL NFR BLD: 0 % (ref 0–8)
EOSINOPHIL NFR BLD: 0 % (ref 0–8)
ERYTHROCYTE [DISTWIDTH] IN BLOOD BY AUTOMATED COUNT: 19.2 % (ref 11.5–14.5)
ERYTHROCYTE [DISTWIDTH] IN BLOOD BY AUTOMATED COUNT: 19.4 % (ref 11.5–14.5)
ERYTHROCYTE [SEDIMENTATION RATE] IN BLOOD BY WESTERGREN METHOD: 15 MM/H
EST. GFR  (AFRICAN AMERICAN): >60 ML/MIN/1.73 M^2
EST. GFR  (NON AFRICAN AMERICAN): >60 ML/MIN/1.73 M^2
FIO2: 60
FRACTIONAL SHORTENING: 13 % (ref 28–44)
GLUCOSE SERPL-MCNC: 143 MG/DL (ref 70–110)
HCO3 UR-SCNC: 16.7 MMOL/L (ref 24–28)
HCO3 UR-SCNC: 16.8 MMOL/L (ref 24–28)
HCO3 UR-SCNC: 18.2 MMOL/L (ref 24–28)
HCT VFR BLD AUTO: 23.4 % (ref 40–54)
HCT VFR BLD AUTO: 28.7 % (ref 40–54)
HCT VFR BLD AUTO: 36.1 % (ref 40–54)
HCT VFR BLD AUTO: 36.4 % (ref 40–54)
HCT VFR BLD AUTO: 46 % (ref 40–54)
HGB BLD-MCNC: 12.4 G/DL (ref 14–18)
HGB BLD-MCNC: 12.5 G/DL (ref 14–18)
HGB BLD-MCNC: 15.4 G/DL (ref 14–18)
HGB BLD-MCNC: 7.6 G/DL (ref 14–18)
HGB BLD-MCNC: 9.5 G/DL (ref 14–18)
HYPOCHROMIA BLD QL SMEAR: ABNORMAL
INTERVENTRICULAR SEPTUM: 0.7 CM (ref 0.6–1.1)
IVRT: 0.09 MSEC
LA MAJOR: 3.56 CM
LA MINOR: 3.79 CM
LA WIDTH: 3.47 CM
LACTATE SERPL-SCNC: 3 MMOL/L (ref 0.5–2.2)
LACTATE SERPL-SCNC: 4.5 MMOL/L (ref 0.5–2.2)
LEFT ATRIUM SIZE: 2.68 CM
LEFT ATRIUM VOLUME INDEX: 17.4 ML/M2
LEFT ATRIUM VOLUME: 29.02 CM3
LEFT INTERNAL DIMENSION IN SYSTOLE: 2.57 CM (ref 2.1–4)
LEFT VENTRICLE DIASTOLIC VOLUME INDEX: 20.2 ML/M2
LEFT VENTRICLE DIASTOLIC VOLUME: 33.61 ML
LEFT VENTRICLE MASS INDEX: 30.7 G/M2
LEFT VENTRICLE SYSTOLIC VOLUME INDEX: 14.3 ML/M2
LEFT VENTRICLE SYSTOLIC VOLUME: 23.79 ML
LEFT VENTRICULAR INTERNAL DIMENSION IN DIASTOLE: 2.95 CM (ref 3.5–6)
LEFT VENTRICULAR MASS: 51.08 G
LYMPHOCYTES # BLD AUTO: 1.8 K/UL (ref 1–4.8)
LYMPHOCYTES # BLD AUTO: ABNORMAL K/UL (ref 1–4.8)
LYMPHOCYTES NFR BLD: 13 % (ref 18–48)
LYMPHOCYTES NFR BLD: 9.5 % (ref 18–48)
MAGNESIUM SERPL-MCNC: 1.3 MG/DL (ref 1.6–2.6)
MCH RBC QN AUTO: 27.3 PG (ref 27–31)
MCH RBC QN AUTO: 27.3 PG (ref 27–31)
MCHC RBC AUTO-ENTMCNC: 32.5 G/DL (ref 32–36)
MCHC RBC AUTO-ENTMCNC: 33.1 G/DL (ref 32–36)
MCV RBC AUTO: 83 FL (ref 82–98)
MCV RBC AUTO: 84 FL (ref 82–98)
MODE: ABNORMAL
MONOCYTES # BLD AUTO: 1.4 K/UL (ref 0.3–1)
MONOCYTES # BLD AUTO: ABNORMAL K/UL (ref 0.3–1)
MONOCYTES NFR BLD: 3 % (ref 4–15)
MONOCYTES NFR BLD: 7.3 % (ref 4–15)
NEUTROPHILS # BLD AUTO: 15.4 K/UL (ref 1.8–7.7)
NEUTROPHILS NFR BLD: 82.3 % (ref 38–73)
NEUTROPHILS NFR BLD: 84 % (ref 38–73)
NRBC BLD-RTO: ABNORMAL /100 WBC
NUM UNITS TRANS PACKED RBC: NORMAL
PCO2 BLDA: 27.3 MMHG (ref 35–45)
PCO2 BLDA: 37.7 MMHG (ref 35–45)
PCO2 BLDA: 71.5 MMHG (ref 35–45)
PEEP: 5
PH SMN: 6.98 [PH] (ref 7.35–7.45)
PH SMN: 7.29 [PH] (ref 7.35–7.45)
PH SMN: 7.39 [PH] (ref 7.35–7.45)
PISA TR MAX VEL: 2.06 M/S
PLATELET # BLD AUTO: 295 K/UL (ref 150–350)
PLATELET # BLD AUTO: 352 K/UL (ref 150–350)
PLATELET BLD QL SMEAR: ABNORMAL
PMV BLD AUTO: 10.1 FL (ref 9.2–12.9)
PMV BLD AUTO: 9.6 FL (ref 9.2–12.9)
PO2 BLDA: 104 MMHG (ref 80–100)
PO2 BLDA: 249 MMHG (ref 80–100)
PO2 BLDA: 65 MMHG (ref 80–100)
POC BE: -15 MMOL/L
POC BE: -8 MMOL/L
POC BE: -8 MMOL/L
POC SATURATED O2: 90 % (ref 95–100)
POC SATURATED O2: 98 % (ref 95–100)
POC SATURATED O2: 99 % (ref 95–100)
POC TCO2: 18 MMOL/L (ref 23–27)
POC TCO2: 19 MMOL/L (ref 23–27)
POC TCO2: 19 MMOL/L (ref 23–27)
POCT GLUCOSE: 136 MG/DL (ref 70–110)
POCT GLUCOSE: 148 MG/DL (ref 70–110)
POCT GLUCOSE: 49 MG/DL (ref 70–110)
POCT GLUCOSE: 62 MG/DL (ref 70–110)
POLYCHROMASIA BLD QL SMEAR: ABNORMAL
POTASSIUM SERPL-SCNC: 3.7 MMOL/L (ref 3.5–5.1)
PROT SERPL-MCNC: 4.4 G/DL (ref 6–8.4)
RA MAJOR: 4.04 CM
RBC # BLD AUTO: 2.78 M/UL (ref 4.6–6.2)
RBC # BLD AUTO: 3.48 M/UL (ref 4.6–6.2)
SAMPLE: ABNORMAL
SITE: ABNORMAL
SODIUM SERPL-SCNC: 131 MMOL/L (ref 136–145)
TR MAX PG: 16.97 MMHG
TRANS ERYTHROCYTES VOL PATIENT: NORMAL ML
TRANS ERYTHROCYTES VOL PATIENT: NORMAL ML
TROPONIN I SERPL DL<=0.01 NG/ML-MCNC: 0.11 NG/ML (ref 0–0.03)
TROPONIN I SERPL DL<=0.01 NG/ML-MCNC: 0.12 NG/ML (ref 0–0.03)
VT: 450
WBC # BLD AUTO: 18.75 K/UL (ref 3.9–12.7)
WBC # BLD AUTO: 26.41 K/UL (ref 3.9–12.7)

## 2019-06-17 PROCEDURE — 97530 THERAPEUTIC ACTIVITIES: CPT

## 2019-06-17 PROCEDURE — 85027 COMPLETE CBC AUTOMATED: CPT

## 2019-06-17 PROCEDURE — 63600175 PHARM REV CODE 636 W HCPCS: Performed by: NURSE ANESTHETIST, CERTIFIED REGISTERED

## 2019-06-17 PROCEDURE — 85025 COMPLETE CBC W/AUTO DIFF WBC: CPT

## 2019-06-17 PROCEDURE — 83605 ASSAY OF LACTIC ACID: CPT | Mod: 91

## 2019-06-17 PROCEDURE — 84484 ASSAY OF TROPONIN QUANT: CPT

## 2019-06-17 PROCEDURE — 85347 COAGULATION TIME ACTIVATED: CPT

## 2019-06-17 PROCEDURE — 36430 TRANSFUSION BLD/BLD COMPNT: CPT

## 2019-06-17 PROCEDURE — 25000003 PHARM REV CODE 250: Performed by: ANESTHESIOLOGY

## 2019-06-17 PROCEDURE — 83735 ASSAY OF MAGNESIUM: CPT

## 2019-06-17 PROCEDURE — 82803 BLOOD GASES ANY COMBINATION: CPT

## 2019-06-17 PROCEDURE — 25000003 PHARM REV CODE 250: Performed by: NURSE PRACTITIONER

## 2019-06-17 PROCEDURE — 83605 ASSAY OF LACTIC ACID: CPT

## 2019-06-17 PROCEDURE — 25000003 PHARM REV CODE 250: Performed by: NURSE ANESTHETIST, CERTIFIED REGISTERED

## 2019-06-17 PROCEDURE — 37000008 HC ANESTHESIA 1ST 15 MINUTES: Performed by: INTERNAL MEDICINE

## 2019-06-17 PROCEDURE — 99233 PR SUBSEQUENT HOSPITAL CARE,LEVL III: ICD-10-PCS | Mod: 25,,, | Performed by: STUDENT IN AN ORGANIZED HEALTH CARE EDUCATION/TRAINING PROGRAM

## 2019-06-17 PROCEDURE — 93010 ELECTROCARDIOGRAM REPORT: CPT | Mod: ,,, | Performed by: INTERNAL MEDICINE

## 2019-06-17 PROCEDURE — 43235 EGD DIAGNOSTIC BRUSH WASH: CPT | Performed by: INTERNAL MEDICINE

## 2019-06-17 PROCEDURE — 99233 SBSQ HOSP IP/OBS HIGH 50: CPT | Mod: 25,,, | Performed by: STUDENT IN AN ORGANIZED HEALTH CARE EDUCATION/TRAINING PROGRAM

## 2019-06-17 PROCEDURE — 63600175 PHARM REV CODE 636 W HCPCS: Performed by: NURSE PRACTITIONER

## 2019-06-17 PROCEDURE — 20000000 HC ICU ROOM

## 2019-06-17 PROCEDURE — 93005 ELECTROCARDIOGRAM TRACING: CPT

## 2019-06-17 PROCEDURE — 36415 COLL VENOUS BLD VENIPUNCTURE: CPT

## 2019-06-17 PROCEDURE — 80053 COMPREHEN METABOLIC PANEL: CPT

## 2019-06-17 PROCEDURE — 25000003 PHARM REV CODE 250: Performed by: STUDENT IN AN ORGANIZED HEALTH CARE EDUCATION/TRAINING PROGRAM

## 2019-06-17 PROCEDURE — 86850 RBC ANTIBODY SCREEN: CPT

## 2019-06-17 PROCEDURE — 37000009 HC ANESTHESIA EA ADD 15 MINS: Performed by: INTERNAL MEDICINE

## 2019-06-17 PROCEDURE — 99900035 HC TECH TIME PER 15 MIN (STAT)

## 2019-06-17 PROCEDURE — 85018 HEMOGLOBIN: CPT | Mod: 91

## 2019-06-17 PROCEDURE — 36620 INSERTION CATHETER ARTERY: CPT

## 2019-06-17 PROCEDURE — 36600 WITHDRAWAL OF ARTERIAL BLOOD: CPT

## 2019-06-17 PROCEDURE — 85014 HEMATOCRIT: CPT | Mod: 91

## 2019-06-17 PROCEDURE — 37799 UNLISTED PX VASCULAR SURGERY: CPT

## 2019-06-17 PROCEDURE — P9016 RBC LEUKOCYTES REDUCED: HCPCS

## 2019-06-17 PROCEDURE — 83880 ASSAY OF NATRIURETIC PEPTIDE: CPT

## 2019-06-17 PROCEDURE — 51702 INSERT TEMP BLADDER CATH: CPT

## 2019-06-17 PROCEDURE — 63600175 PHARM REV CODE 636 W HCPCS: Performed by: ANESTHESIOLOGY

## 2019-06-17 PROCEDURE — 87040 BLOOD CULTURE FOR BACTERIA: CPT | Mod: 59

## 2019-06-17 PROCEDURE — 36556 INSERT NON-TUNNEL CV CATH: CPT

## 2019-06-17 PROCEDURE — 63600175 PHARM REV CODE 636 W HCPCS: Performed by: INTERNAL MEDICINE

## 2019-06-17 PROCEDURE — P9021 RED BLOOD CELLS UNIT: HCPCS

## 2019-06-17 PROCEDURE — C1751 CATH, INF, PER/CENT/MIDLINE: HCPCS | Performed by: STUDENT IN AN ORGANIZED HEALTH CARE EDUCATION/TRAINING PROGRAM

## 2019-06-17 PROCEDURE — S0028 INJECTION, FAMOTIDINE, 20 MG: HCPCS | Performed by: NURSE PRACTITIONER

## 2019-06-17 PROCEDURE — 93010 ELECTROCARDIOGRAM REPORT: CPT | Mod: 76,,, | Performed by: INTERNAL MEDICINE

## 2019-06-17 PROCEDURE — 25000003 PHARM REV CODE 250: Performed by: INTERNAL MEDICINE

## 2019-06-17 PROCEDURE — 85007 BL SMEAR W/DIFF WBC COUNT: CPT

## 2019-06-17 PROCEDURE — 86920 COMPATIBILITY TEST SPIN: CPT

## 2019-06-17 PROCEDURE — 93010 EKG 12-LEAD: ICD-10-PCS | Mod: 76,,, | Performed by: INTERNAL MEDICINE

## 2019-06-17 PROCEDURE — 94003 VENT MGMT INPAT SUBQ DAY: CPT

## 2019-06-17 PROCEDURE — 85379 FIBRIN DEGRADATION QUANT: CPT

## 2019-06-17 PROCEDURE — 94761 N-INVAS EAR/PLS OXIMETRY MLT: CPT

## 2019-06-17 PROCEDURE — 27000221 HC OXYGEN, UP TO 24 HOURS

## 2019-06-17 RX ORDER — PROPOFOL 10 MG/ML
VIAL (ML) INTRAVENOUS CONTINUOUS PRN
Status: DISCONTINUED | OUTPATIENT
Start: 2019-06-17 | End: 2019-06-17

## 2019-06-17 RX ORDER — ATROPINE SULFATE 0.1 MG/ML
INJECTION INTRAVENOUS
Status: COMPLETED | OUTPATIENT
Start: 2019-06-17 | End: 2019-06-17

## 2019-06-17 RX ORDER — SODIUM CHLORIDE 9 MG/ML
INJECTION, SOLUTION INTRAVENOUS CONTINUOUS PRN
Status: DISCONTINUED | OUTPATIENT
Start: 2019-06-17 | End: 2019-06-17

## 2019-06-17 RX ORDER — DEXTROSE 50 % IN WATER (D50W) INTRAVENOUS SYRINGE
25 ONCE
Status: COMPLETED | OUTPATIENT
Start: 2019-06-17 | End: 2019-06-17

## 2019-06-17 RX ORDER — ALBUMIN HUMAN 250 G/1000ML
12.5 SOLUTION INTRAVENOUS ONCE
Status: COMPLETED | OUTPATIENT
Start: 2019-06-18 | End: 2019-06-18

## 2019-06-17 RX ORDER — FAMOTIDINE 10 MG/ML
20 INJECTION INTRAVENOUS 2 TIMES DAILY
Status: DISCONTINUED | OUTPATIENT
Start: 2019-06-17 | End: 2019-06-18

## 2019-06-17 RX ORDER — PHENYLEPHRINE HYDROCHLORIDE 10 MG/ML
INJECTION INTRAVENOUS
Status: DISCONTINUED | OUTPATIENT
Start: 2019-06-17 | End: 2019-06-17

## 2019-06-17 RX ORDER — CHLORHEXIDINE GLUCONATE ORAL RINSE 1.2 MG/ML
15 SOLUTION DENTAL 2 TIMES DAILY
Status: DISCONTINUED | OUTPATIENT
Start: 2019-06-17 | End: 2019-06-20

## 2019-06-17 RX ORDER — DEXMEDETOMIDINE HYDROCHLORIDE 4 UG/ML
0.2 INJECTION INTRAVENOUS CONTINUOUS
Status: DISCONTINUED | OUTPATIENT
Start: 2019-06-17 | End: 2019-06-22

## 2019-06-17 RX ORDER — MAGNESIUM SULFATE 1 G/100ML
1 INJECTION INTRAVENOUS ONCE
Status: COMPLETED | OUTPATIENT
Start: 2019-06-17 | End: 2019-06-18

## 2019-06-17 RX ORDER — PROPOFOL 10 MG/ML
VIAL (ML) INTRAVENOUS
Status: DISCONTINUED | OUTPATIENT
Start: 2019-06-17 | End: 2019-06-17

## 2019-06-17 RX ORDER — LIDOCAINE HCL/EPINEPHRINE/PF 2%-1:200K
VIAL (ML) INJECTION
Status: COMPLETED | OUTPATIENT
Start: 2019-06-17 | End: 2019-06-17

## 2019-06-17 RX ORDER — SODIUM BICARBONATE 1 MEQ/ML
SYRINGE (ML) INTRAVENOUS
Status: COMPLETED | OUTPATIENT
Start: 2019-06-17 | End: 2019-06-17

## 2019-06-17 RX ORDER — LIDOCAINE HCL/PF 100 MG/5ML
SYRINGE (ML) INTRAVENOUS
Status: DISCONTINUED | OUTPATIENT
Start: 2019-06-17 | End: 2019-06-17

## 2019-06-17 RX ORDER — EPHEDRINE SULFATE 50 MG/ML
INJECTION, SOLUTION INTRAVENOUS
Status: DISCONTINUED | OUTPATIENT
Start: 2019-06-17 | End: 2019-06-17

## 2019-06-17 RX ORDER — EPINEPHRINE 0.1 MG/ML
INJECTION INTRAVENOUS
Status: COMPLETED | OUTPATIENT
Start: 2019-06-17 | End: 2019-06-17

## 2019-06-17 RX ADMIN — PIPERACILLIN AND TAZOBACTAM 4.5 G: 4; .5 INJECTION, POWDER, LYOPHILIZED, FOR SOLUTION INTRAVENOUS; PARENTERAL at 10:06

## 2019-06-17 RX ADMIN — PHENYLEPHRINE HYDROCHLORIDE 200 MCG: 10 INJECTION INTRAVENOUS at 01:06

## 2019-06-17 RX ADMIN — LIDOCAINE HYDROCHLORIDE 65 MG: 20 INJECTION, SOLUTION INTRAVENOUS at 12:06

## 2019-06-17 RX ADMIN — SODIUM CHLORIDE 500 ML: 0.9 INJECTION, SOLUTION INTRAVENOUS at 10:06

## 2019-06-17 RX ADMIN — SODIUM CHLORIDE: 0.9 INJECTION, SOLUTION INTRAVENOUS at 12:06

## 2019-06-17 RX ADMIN — PHENYLEPHRINE HYDROCHLORIDE 1 MCG/KG/MIN: 10 INJECTION INTRAVENOUS at 10:06

## 2019-06-17 RX ADMIN — EPINEPHRINE 1 MG: 0.1 INJECTION, SOLUTION ENDOTRACHEAL; INTRACARDIAC; INTRAVENOUS at 01:06

## 2019-06-17 RX ADMIN — PHENYLEPHRINE HYDROCHLORIDE 200 MCG: 10 INJECTION INTRAVENOUS at 12:06

## 2019-06-17 RX ADMIN — VANCOMYCIN HYDROCHLORIDE 750 MG: 750 INJECTION, POWDER, LYOPHILIZED, FOR SOLUTION INTRAVENOUS at 12:06

## 2019-06-17 RX ADMIN — PROPOFOL 50 MG: 10 INJECTION, EMULSION INTRAVENOUS at 12:06

## 2019-06-17 RX ADMIN — PIPERACILLIN AND TAZOBACTAM 4.5 G: 4; .5 INJECTION, POWDER, LYOPHILIZED, FOR SOLUTION INTRAVENOUS; PARENTERAL at 05:06

## 2019-06-17 RX ADMIN — ATORVASTATIN CALCIUM 20 MG: 20 TABLET, FILM COATED ORAL at 10:06

## 2019-06-17 RX ADMIN — SODIUM CHLORIDE 500 ML: 0.9 INJECTION, SOLUTION INTRAVENOUS at 03:06

## 2019-06-17 RX ADMIN — SODIUM BICARBONATE 50 MEQ: 84 INJECTION, SOLUTION INTRAVENOUS at 01:06

## 2019-06-17 RX ADMIN — PANTOPRAZOLE SODIUM 40 MG: 40 TABLET, DELAYED RELEASE ORAL at 10:06

## 2019-06-17 RX ADMIN — DEXMEDETOMIDINE HYDROCHLORIDE 0.02 MCG/KG/HR: 4 INJECTION INTRAVENOUS at 04:06

## 2019-06-17 RX ADMIN — ATROPINE SULFATE 1 MG: 0.1 INJECTION PARENTERAL at 02:06

## 2019-06-17 RX ADMIN — EPHEDRINE SULFATE 15 MG: 50 INJECTION, SOLUTION INTRAMUSCULAR; INTRAVENOUS; SUBCUTANEOUS at 01:06

## 2019-06-17 RX ADMIN — FAMOTIDINE 20 MG: 10 INJECTION, SOLUTION INTRAVENOUS at 08:06

## 2019-06-17 RX ADMIN — POTASSIUM CHLORIDE 20 MEQ: 20 SOLUTION ORAL at 10:06

## 2019-06-17 RX ADMIN — PROPOFOL 150 MCG/KG/MIN: 10 INJECTION, EMULSION INTRAVENOUS at 12:06

## 2019-06-17 RX ADMIN — DEXTROSE MONOHYDRATE 25 G: 25 INJECTION, SOLUTION INTRAVENOUS at 11:06

## 2019-06-17 RX ADMIN — LIDOCAINE HYDROCHLORIDE,EPINEPHRINE BITARTRATE 3 ML: 20; .005 INJECTION, SOLUTION EPIDURAL; INFILTRATION; INTRACAUDAL; PERINEURAL at 05:06

## 2019-06-17 RX ADMIN — PIPERACILLIN AND TAZOBACTAM 4.5 G: 4; .5 INJECTION, POWDER, LYOPHILIZED, FOR SOLUTION INTRAVENOUS; PARENTERAL at 12:06

## 2019-06-17 RX ADMIN — PHENYLEPHRINE HYDROCHLORIDE 0.05 MCG/KG/MIN: 10 INJECTION INTRAVENOUS at 03:06

## 2019-06-17 RX ADMIN — CHLORHEXIDINE GLUCONATE 0.12% ORAL RINSE 15 ML: 1.2 LIQUID ORAL at 08:06

## 2019-06-17 RX ADMIN — EPHEDRINE SULFATE 20 MG: 50 INJECTION, SOLUTION INTRAMUSCULAR; INTRAVENOUS; SUBCUTANEOUS at 01:06

## 2019-06-17 RX ADMIN — SPIRONOLACTONE 50 MG: 25 TABLET, FILM COATED ORAL at 10:06

## 2019-06-17 NOTE — PLAN OF CARE
Problem: Adult Inpatient Plan of Care  Goal: Plan of Care Review  Patient on small dose of Aaron. Following commands now. Precedex started per MD orders. Central line placed per anesthesia; awaiting A-line.

## 2019-06-17 NOTE — SUBJECTIVE & OBJECTIVE
Review of Systems   Constitution: Negative for chills, decreased appetite, diaphoresis and fever.   Cardiovascular: Negative for chest pain, claudication, cyanosis, dyspnea on exertion, irregular heartbeat, leg swelling, near-syncope, orthopnea, palpitations, paroxysmal nocturnal dyspnea and syncope.   Respiratory: Negative for cough, hemoptysis, shortness of breath and wheezing.    Gastrointestinal: Negative for bloating, abdominal pain, constipation, diarrhea, melena, nausea and vomiting.   Neurological: Negative for dizziness and weakness.     Objective:     Vital Signs (Most Recent):  Temp: 97.6 °F (36.4 °C) (06/17/19 0835)  Pulse: 99 (06/17/19 0835)  Resp: 18 (06/17/19 0835)  BP: 121/83 (06/17/19 0835)  SpO2: 100 % (06/16/19 0521) Vital Signs (24h Range):  Temp:  [96.3 °F (35.7 °C)-98.1 °F (36.7 °C)] 97.6 °F (36.4 °C)  Pulse:  [] 99  Resp:  [14-18] 18  BP: (121-142)/(66-92) 121/83     Weight: 65.4 kg (144 lb 2.9 oz)  Body mass index is 19.55 kg/m².     SpO2: 100 %  O2 Device (Oxygen Therapy): room air      Intake/Output Summary (Last 24 hours) at 6/17/2019 0935  Last data filed at 6/17/2019 0030  Gross per 24 hour   Intake 3606.67 ml   Output 1000 ml   Net 2606.67 ml       Lines/Drains/Airways     Peripheral Intravenous Line                 Peripheral IV - Single Lumen 06/17/19 0652 20 G Anterior;Distal;Left Forearm less than 1 day                Physical Exam   Constitutional: He is oriented to person, place, and time. He appears cachectic. He has a sickly appearance. He appears ill. No distress.   Cardiovascular: Normal rate and regular rhythm. Exam reveals no gallop.   No murmur heard.  Pulmonary/Chest: Effort normal and breath sounds normal. No respiratory distress. He has no wheezes.   Abdominal: Soft. Bowel sounds are normal. He exhibits no distension. There is no tenderness.   Neurological: He is alert and oriented to person, place, and time.   Skin: Skin is warm and dry.       Significant  Labs:        Recent Labs   Lab 06/16/19  1629   WBC 18.07*   RBC 3.91*   HGB 10.4*   HCT 32.0*      MCV 82   MCH 26.6*   MCHC 32.5

## 2019-06-17 NOTE — NURSING
Notified Rosi Mojica NP concerning pt BS of 49 pt is not symptomatic. Will order D50 to be administered. Will continue to monitor

## 2019-06-17 NOTE — ANESTHESIA PREPROCEDURE EVALUATION
06/17/2019  Sachin Calloway is a 58 y.o., male for EGD/colonoscopy under MAC.    Past Medical History:   Diagnosis Date    Alcohol abuse     Cirrhosis     Edema     Hypertension     Seizures     Stricture esophagus    cirrhosis  Ascites  anemia    There were no vitals filed for this visit.      Anesthesia Evaluation    I have reviewed the Patient Summary Reports.    I have reviewed the Nursing Notes.   I have reviewed the Medications.     Review of Systems  Anesthesia Hx:   Denies Personal Hx of Anesthesia complications.   Social:  Alcohol Use, No Alcohol Use    Cardiovascular:   Hypertension PVD    Hepatic/GI:   Bowel Prep. Liver Disease,    Musculoskeletal:   Gangrene vs osteo of foot       Physical Exam  General:  Well nourished    Airway/Jaw/Neck:  Airway Findings: Mallampati: II      Chest/Lungs:  Chest/Lungs Clear    Heart/Vascular:  Heart Findings: Normal          Lab Results   Component Value Date    WBC 18.07 (H) 06/16/2019    HGB 10.4 (L) 06/16/2019    HCT 32.0 (L) 06/16/2019     06/16/2019    CHOL 68 (L) 06/12/2019    TRIG 71 06/12/2019    HDL 20 (L) 06/12/2019    ALT 33 06/11/2019    AST 35 06/11/2019     (L) 06/14/2019    K 4.1 06/14/2019     06/14/2019    CREATININE 0.7 06/14/2019    BUN 11 06/14/2019    CO2 24 06/14/2019    TSH 2.560 06/06/2019    PSA 0.27 03/27/2015    INR 1.3 (H) 05/02/2019         Anesthesia Plan  Type of Anesthesia, risks & benefits discussed:  Anesthesia Type:  MAC  Patient's Preference:   Intra-op Monitoring Plan: standard ASA monitors  Intra-op Monitoring Plan Comments:   Post Op Pain Control Plan:   Post Op Pain Control Plan Comments:   Induction:   IV  Beta Blocker:  Patient is not currently on a Beta-Blocker (No further documentation required).       Informed Consent: Patient understands risks and agrees with Anesthesia plan.  Questions  answered. Anesthesia consent signed with patient.  ASA Score: 3     Day of Surgery Review of History & Physical:  There are no significant changes.          Ready For Surgery From Anesthesia Perspective.

## 2019-06-17 NOTE — PT/OT/SLP PROGRESS
Occupational Therapy   Treatment    Name: Sachin Calloway  MRN: [id number]  Admitting Diagnosis:  Critical lower limb ischemia  Day of Surgery    Recommendations:     Discharge Recommendations: home health OT  Discharge Equipment Recommendations:  none  Barriers to discharge:  None    Assessment:     Sachin Calloway is a 58 y.o. male with a medical diagnosis of Critical lower limb ischemia.  He presents with deconditioning, required assist to t/f beds fro endoscopy procedure. Performance deficits affecting function are impaired self care skills, weakness, impaired endurance, impaired functional mobilty, impaired sensation, gait instability, decreased lower extremity function, decreased upper extremity function, decreased coordination, impaired cardiopulmonary response to activity, impaired skin, orthopedic precautions.     Rehab Prognosis:  Fair; patient would benefit from acute skilled OT services to address these deficits and reach maximum level of function.       Plan:     Patient to be seen 5 x/week to address the above listed problems via self-care/home management, therapeutic groups, therapeutic exercises  · Plan of Care Expires: 07/12/19  · Plan of Care Reviewed with: patient, spouse    Subjective     Pain/Comfort:  · Pain Rating 1: 0/10    Objective:     Communicated with: nsg prior to session.  Patient found HOB elevated with oxygen, telemetry, peripheral IV upon OT entry to room.    General Precautions: Standard, fall, NPO   Orthopedic Precautions:(BLE WBAT)   Braces: N/A     Occupational Performance:     Bed Mobility:    · Patient completed Rolling/Turning to Left with  minimum assistance  · Patient completed Scooting/Bridging with contact guard assistance  · Patient completed Supine to Sit with minimum assistance with BLE management   · Patient completed Sit to Supine with minimum assistance for BLE lift into bed     Functional Mobility/Transfers:  · Patient completed Sit <> Stand Transfer with minimum  assistance  with  hand-held assist   · Patient completed Bed <> transport bed Transfer using Step Transfer technique with moderate assistance with hand-held assist   Functional Mobility: Pt with fair- to poor dynamic seated and standing balance.  ·      Activities of Daily Living:  · Lower Body Dressing: total assistance to don L sock      [facility] 6 Click ADL: 13    Treatment & Education:  Pt initially refusing OT treatment but agreeable for assistance to transport stretcher for scheduled procedure. Pt on 3L O2 and required B HHA to take 4-5 steps to stretcher. Pt left with transport and RN at end of session with all needs met    Patient left supine with all lines intact, nsg notified and nsg, transport, spouse presentEducation:      GOALS:   Multidisciplinary Problems     Occupational Therapy Goals        Problem: Occupational Therapy Goal    Goal Priority Disciplines Outcome Interventions   Occupational Therapy Goal     OT, PT/OT Ongoing (interventions implemented as appropriate)    Description:  Goals to be met by: 7/12/2019    Patient will increase functional independence with ADLs by performing:    Grooming while standing at sink with Stand-by Assistance.  Toileting from bedside commode with Stand-by Assistance for hygiene and clothing management.   Rolling to Bilateral with Modified [place].   Supine to sit with Modified [place].  Step transfer with Stand-by Assistance  Toilet transfer to bedside commode with Stand-by Assistance.  Increased functional strength to WFL for ADLS.  Upper extremity exercise program 10 reps per handout, with supervision.                      Time Tracking:     OT Date of Treatment: 06/17/19  OT Start Time: 1158  OT Stop Time: 1206  OT Total Time (min): 8 min    Billable Minutes:Therapeutic Activity 8    Alanna Fairchild OT  6/17/2019

## 2019-06-17 NOTE — PROGRESS NOTES
[facility]  Cardiology  Progress Note    Patient Name: Sachin Calloway  MRN: [id number]  Admission Date: 6/10/2019  Hospital Length of Stay: 5 days  Code Status: Prior   Attending Physician: Fabien Gonzales MD   Primary Care Physician: Juan Gatica MD  Expected Discharge Date:   Principal Problem:Critical lower limb ischemia    Subjective:     Hospital Course:   6/10/2019 Admitted for elective LE angiogram due to nonhealing wound to right foot. Taken to cath lab for the procedure via right radial access with following results:    · Severe bilateral PAD with CLI of right foot  · On the right there is an ostial SFA  which is calcified and reconstitutes via profunda collaterals distally. There is single vessel runoff via peroneal which has a severe proximal stenosis and distally supplies collaterals to a plantar arch. No identifiable DP, although there is a proximal portion of AT  · On the left there is a severe stenosis at proximal SFA with mid SFA  with reconstitution distally. There is two vessel runoff via AT into a DP and peroneal which supplies collaterals to a plantar arch  · Staged intervention of LSFA and peroneal first. Will then consider staged intervention of RATA and if unsuccessful will consider AV flow reversal     6/11/2019 NPO for RLE intervention today   6/12/2019 Taken to cath lab yesterday for intervention via LCFA access with following results:    · LCFA antegrade access with crossover sheath used  · Successful RSFA  intervention with atherectomy followed by IVUS guided PTA with 4.0 balloon in POP and mid to distal SFA and 5.0 balloon in ostial and proximal SFA. This was followed by PTA with DCB to POP/mid and distal SFA with 4.0 balloon and 5.0 balloon ostial/mid SFA all with excellent results  · Successful PTA to proximal peroneal with 3.0 x 60 balloon with excellent result  · Will likely need staged intervention of AT/DP and if unsuccessful, AV flow  reversal  · Plavix/ASA/statin  · Continue aggressive wound care and management per Podiatry    Tolerated procedure well and recovered on telemetry unit. LCFA access site with no active bleeding, hematoma or ecchymosis. Podiatry consulted with plans for surgery once demarcation occurs. ID consulted with recs for blood cultures along with empiric antibiotics with IV Vanc and Zosyn   6/13/2019 H&H 7.7 & 24.3 with WBC up to 16.11 from 15.17. BMP with unchanged creatinine. Blood cultures with prelim results NGTD. HR and BP stable. Dietian consulted with recs noted-will liberalize diet and order supplements. Remains on IV Vanc and Zosyn for now. PT/OT on board   6/14/2019 WBC down slightly to 15.4 this AM. H&H down slightly to 7.1&22.8. BP stable. Episode of ST with HR up to 140s this AM nonsustained and patient asymptomatic. ASA discontinued. Will consult GI due to downtrending H&H   6/15/2019 PAD with CLI requiring intervention and likely surgery. Continue medical management. No intervention planned until patient more stable. Continue antibiotics for gangrene and possible Osteo-blood cultures with NGTD. ID and podiatry following. Anemia. GI onboard and plan EGD/Colonscopy Monday. hgb 7.3 today. Transfuse if <7.   6/16/2019 Remains on IV abx currently. MRI with no evidence of osteomyelitis. ID and podiatry following. H&H down to 6.9 & 22.0 will transfuse with 2units PRBCs. Plan for EGD/Colonscopy Monday 6/17/2019 WBCs up to 18K this AM. H&H improved to 10.4&32.0 this AM. BMP stable. NPO for EGD/colonoscopy today         Review of Systems   Constitution: Negative for chills, decreased appetite, diaphoresis and fever.   Cardiovascular: Negative for chest pain, claudication, cyanosis, dyspnea on exertion, irregular heartbeat, leg swelling, near-syncope, orthopnea, palpitations, paroxysmal nocturnal dyspnea and syncope.   Respiratory: Negative for cough, hemoptysis, shortness of breath and wheezing.    Gastrointestinal:  Negative for bloating, abdominal pain, constipation, diarrhea, melena, nausea and vomiting.   Neurological: Negative for dizziness and weakness.     Objective:     Vital Signs (Most Recent):  Temp: 97.6 °F (36.4 °C) (06/17/19 0835)  Pulse: 99 (06/17/19 0835)  Resp: 18 (06/17/19 0835)  BP: 121/83 (06/17/19 0835)  SpO2: 100 % (06/16/19 0521) Vital Signs (24h Range):  Temp:  [96.3 °F (35.7 °C)-98.1 °F (36.7 °C)] 97.6 °F (36.4 °C)  Pulse:  [] 99  Resp:  [14-18] 18  BP: (121-142)/(66-92) 121/83     Weight: 65.4 kg (144 lb 2.9 oz)  Body mass index is 19.55 kg/m².     SpO2: 100 %  O2 Device (Oxygen Therapy): room air      Intake/Output Summary (Last 24 hours) at 6/17/2019 0935  Last data filed at 6/17/2019 0030  Gross per 24 hour   Intake 3606.67 ml   Output 1000 ml   Net 2606.67 ml       Lines/Drains/Airways     Peripheral Intravenous Line                 Peripheral IV - Single Lumen 06/17/19 0652 20 G Anterior;Distal;Left Forearm less than 1 day                Physical Exam   Constitutional: He is oriented to person, place, and time. He appears cachectic. He has a sickly appearance. He appears ill. No distress.   Cardiovascular: Normal rate and regular rhythm. Exam reveals no gallop.   No murmur heard.  Pulmonary/Chest: Effort normal and breath sounds normal. No respiratory distress. He has no wheezes.   Abdominal: Soft. Bowel sounds are normal. He exhibits no distension. There is no tenderness.   Neurological: He is alert and oriented to person, place, and time.   Skin: Skin is warm and dry.       Significant Labs:        Recent Labs   Lab 06/16/19  1629   WBC 18.07*   RBC 3.91*   HGB 10.4*   HCT 32.0*      MCV 82   MCH 26.6*   MCHC 32.5           Assessment and Plan:     Brief HPI: Seen on AM NP rounds with wife at the bedside. Denies any complaints currently. NPO for EGD/colonoscopy this AM. Discussed POC as detailed below-verbalized understanding and agrees with POC     * Critical lower limb  ischemia  -BLE angiogram with bilateral SFA CTOs with 2 vessel r/o on the left and single vessel via peroneal on right  -successful revascularization of RLE 6/11 with RSFA, right pop and right peroneal PTA with DCB; will likely need PTA of right AT however on hold for now given anemia and need for GI evaluation   -await GI results to determine if proceeding feasible    -continue statin and Plavix only; no ASA given GI issues     Hypoalbuminemia  -related to poor nutritional status  -last albumin 1.4  -dietian consulted and recs in place  -repeat CMP in AM     Severe protein-calorie malnutrition  -consult dietian  -diet changed to regular  -on Darrian BID and Benefiber TID     Pressure ulcer of left hip, stage 3  -wound care RN and General surgery consulted     Sacral decubitus ulcer  -POA  -related to debility and prolonged bedrest  -Wound care RN consulted as well as General surgery (recs noted)     Gangrene of right foot  -Podiatry and ID consulted  -case discussed with Podiatry with initial plans for TMA once demarcation occurs per review of note; ongoing discussions with Podiatry with high risk for limb loss; will continue to follow Podiatry recs  -blood cultures with prelim results with NGTD; continue  IV Vanc and Zosyn for now per ID recs; WBC up to 18K this AM   -anticipate need for placement upon discharge     Anemia  -H&H 7.1&22.8 6/14 &6/15 with trend down to 6.9&22.0 6/16  -transfused with 2U PRBCs with improvement in H&H to 10.4&32.0 this AM  -slow down trend since admission   -baseline 9-10/29-30 since early 218  -anemia multifactoral and related to slow GI blood loss vs infectious etiology vs malnutrition  -GI on board with plans for EGD/colonoscopy today   -continue Plavix only     Alcoholic cirrhosis of liver with ascites  -AST/ALT WNL with elevated alk phos  -continue Aldactone at home dose  -abdominal ultrasound from 5/2019 reviewed  -GI on board  -repeat CMP in AM         VTE Risk Mitigation (From  admission, onward)        Ordered     enoxaparin injection 40 mg  Daily      06/12/19 1324     IP VTE HIGH RISK PATIENT  Once      06/12/19 1324          MAURICE French, ANP  Cardiology  [facility]

## 2019-06-17 NOTE — PT/OT/SLP PROGRESS
Speech Language Pathology  Discharge    Sachin Calloway  MRN: [id number]      Pt with change in medical status, code blue called while in ENDO. Pt transferred to ICU and is orally intubated. SLP will DC acute orders at this time.           DAYLIN Drummond, CCC-SLP   6/17/2019

## 2019-06-17 NOTE — PROGRESS NOTES
[facility] Consult     Primary Team: Cardiology   Consultant Attending: Anum   Date of Admit: 6/10/2019    History      Sachin Calloway is a 58 y.o. male with a relevant history of alcoholic liver cirrhosis, HTN, former EtOH abuse, active tobacco use, PAD who presents for peripheral angiogram    The patient presented to [facility] on 6/10/2019 with a primary complaint of non healing right foot wound with doppler showing PAD. Had arthrectomy and PTA with good results on 6/11. Was assessed by podiatry who felt that the right foot appearance was concerning for gangrene with superimposed infection    Although he was afebrile and had no pain, his WBC was elevated and has remained elevated. Broad spec antibiotics were started and blood cultures drawn     Interval events/subjective      WBC still elevated with no fever    No new complaints     Allergies:  Review of patient's allergies indicates:  No Known Allergies    Medications:   In-Hospital Scheduled Medications:   atorvastatin  20 mg Oral Daily    chlorhexidine  15 mL Mouth/Throat BID    collagenase   Topical (Top) Daily    famotidine (PF)  20 mg Intravenous BID    piperacillin-tazobactam (ZOSYN) IVPB  4.5 g Intravenous Q8H    potassium chloride 10%  20 mEq Oral Daily    sodium chloride 0.9%  500 mL Intravenous Once    spironolactone  50 mg Oral Daily    vancomycin (VANCOCIN) IVPB  750 mg Intravenous Q24H      In-Hospital PRN Medications:  sodium chloride, acetaminophen, aluminum-magnesium hydroxide-simethicone, ondansetron, ondansetron, oxyCODONE, polyethylene glycol, simethicone   In-Hospital IV Infusion Medications:   phenylephrine        Home Medications:  Prior to Admission medications    Medication Sig Start Date End Date Taking? Authorizing Provider   acetaminophen-codeine 300-30mg (TYLENOL #3) 300-30 mg Tab Take by mouth.   Yes Historical Provider, MD   furosemide (LASIX) 80 MG tablet Take 1 tablet (80 mg total) by mouth once daily. 4/25/19  7/18/20 Yes Juan Gatica MD   pantoprazole (PROTONIX) 40 MG tablet Take 1 tablet (40 mg total) by mouth once daily. 5/13/19 5/12/20 Yes Dillan Salinas MD   potassium chloride SA (K-DUR,KLOR-CON) 20 MEQ tablet Take 1 tablet (20 mEq total) by mouth once daily. 5/2/19  Yes Juan Gatica MD   spironolactone (ALDACTONE) 50 MG tablet Take 1 tablet (50 mg total) by mouth once daily. 5/15/19 8/7/20 Yes Juan Gatica MD     Antibiotics and Day Number of Therapy:  Piptazo and Vanc for 1 day     Past Medical History:  Past Medical History:   Diagnosis Date    Alcohol abuse     Cirrhosis     Edema     Hypertension     Seizures     Stricture esophagus      Past Surgical History/ObGyn Hx if gender appropriate:  Past Surgical History:   Procedure Laterality Date    Angiogram, Lower Arterial, Bilateral Bilateral 6/10/2019    Performed [facility] at [facility] CATH LAB/EP    Aortogram, Abdominal N/A 6/10/2019    Performed by [facility] at [facility] CATH LAB/EP    Atherectomy, Peripheral Blood Vessel N/A 6/11/2019    Performed by [facility] at [facility] CATH LAB/EP    COLONOSCOPY      COLONOSCOPY N/A 5/5/2015    Performed by Mario Martínez MD at [facility] ENDO    EGD (ESOPHAGOGASTRODUODENOSCOPY) N/A 5/13/2019    Performed by Dillan Salinas MD at [facility] ENDO    EGD with dilitation  03/04/2019    ESOPHAGOGASTRODUODENOSCOPY (EGD) N/A 3/29/2019    Performed by [facility]an Salinas MD at [facility] ENDO    ESOPHAGOGASTRODUODENOSCOPY (EGD) N/A 3/18/2019    Performed by Omer Pritchard MD at [facility] ENDO    ESOPHAGOGASTRODUODENOSCOPY (EGD) N/A 3/4/2019    Performed by Madhu Schmidt MD at [facility] ENDO    ESOPHAGOGASTRODUODENOSCOPY (EGD) N/A 5/5/2015    Performed by Mario Martínez MD at [facility] ENDO    Filter Protection, Peripheral  6/11/2019    Performed by Fab[facility] Gonzales MD at [facility] CATH LAB/EP    PTA, PERIPHERAL VESSEL N/A 6/11/2019    Performed by [facility] Gonzales MD at [facility] CATH LAB/EP    PTA, Peroneal   2019    [facility] CATH [facility]EP    PTA, Superficial Femoral Artery  2019    Performed by [facility] at [facility] CATH LAB/EP    ULTRASOUND, INTRAVASCULAR N/A 2019    Performed by Fabien Gonzales MD at [facility] CATH LAB/EP     Family History:  Family History   Problem Relation Age of Onset    Cancer Father      Social History:  Social History     Tobacco Use    Smoking status: Current Every Day Smoker     Packs/day: 0.50     Years: 43.00     Pack years: 21.50     Types: Cigarettes     Start date:     Smokeless tobacco: Never Used    Tobacco comment: Pt enrolled in [employer].  Ambulatory referral to Smoking Cessation program.   Substance Use Topics    Alcohol use: Yes     Comment: 6 pack every day (3-4)    Drug use: No     Objective   Last 24 Hour Vital Signs:  BP  Min: 85/66  Max: 182/118  Temp  Av.9 °F (34.9 °C)  Min: 90.7 °F (32.6 °C)  Max: 97.7 °F (36.5 °C)  Pulse  Av.8  Min: 87  Max: 130  Resp  Av.6  Min: 14  Max: 33  SpO2  Av %  Min: 100 %  Max: 100 %  Weight  Av.4 kg (144 lb 2.9 oz)  Min: 65.4 kg (144 lb 2.9 oz)  Max: 65.4 kg (144 lb 2.9 oz)    Lines, Drains, Airway:  None     Physical Examination:  Examination  General: ill appearing, comfortable   HEENT: normal oral mucosa, normal dentition, conjunctiva normal, pupils normal, extraocular motion normal  Neck: no thyromegaly, no JVD   Cardiac: no murmurs, pulse regular    Pulmonary/Chest: chest clear, no respiratory distress   GI/Rectal: no organomegaly, no masses, NON tender, normal bowel sounds, rectal exam deferred   : deferred   Msk: normal motor screening exam, shallow clean ulcers on hip and sacrum   Vascular: normal peripheral perfusion   Lymph nodes: none palpated  Skin/ Extremities: right foot dry necrotic extremities with wet areas proximally and malodor, line of demarcation involves the entire dorsum of the right foot     Neurology/ Mental status: alert  oriented     Laboratory:  CBC:   Lab Results   Component Value Date    WBC 26.41 (H) 06/17/2019    HGB 7.6 (L) 06/17/2019     06/17/2019    MCV 84 06/17/2019    RDW 19.4 (H) 06/17/2019     Estimated Creatinine Clearance: 105.1 mL/min (based on SCr of 0.7 mg/dL).    Microbiology Data:  Negative blood cultures     Vanc trough 6/13/19  12, then 20.4 on 6/16    Radiology:  No gas noted in xray of right foot     MRI of right foot shows no osteo    Assessment     Sachin Calloway is a 58 y.o. male with alcoholic liver cirrhosis and PAD s/p right foot angioplasty who presented with gangrene of the right foot and leukocytosis. Vancomycin and piptazo have been started.     In a patient with cirrhosis who requires paracentesis, SBP would be another possible cause of infection leading to an elevated WBC but this seems less likely than the more obvious source of the right foot. Also he has no abdominal pain or tenderness.       Recommendations     Gangrene rt foot  - continue piptazo 4.5g q8  - continue 750mg qday vanc   - needs daily BMP while on vanc and piptazo  - awaiting endoscopy followed by revascularization and then subsequent surgical plan  - route and duration of antibiotic therapy to be determined based on above     Thank you for this consult. We will follow.      Kimber Flood  Fellow, [facility]

## 2019-06-17 NOTE — TRANSFER OF CARE
Anesthesia Transfer of Care Note    Patient: Sachin Calloway    Procedure(s) Performed: Procedure(s) (LRB):  EGD (ESOPHAGOGASTRODUODENOSCOPY) (N/A)  COLONOSCOPY (N/A)    Patient location: ICU    Anesthesia Type: MAC    Transport from OR: Continuous ECG monitoring in transport. Continuous SpO2 monitoring in transport. Continuos invasive BP monitoring in transport. Transported from OR intubated on 100% O2 by AMBU with adequate controlled ventilation. Upon arrival to PACU/ICU, patient attached to ventilator and auscultated to confirm bilateral breath sounds and adequate TV    Post pain: adequate analgesia    Post vital signs: stable    Level of consciousness: unresponsive    Nausea/Vomiting: no nausea/vomiting    Complications: cardiovascular complications    Transfer of care protocol was followedComments: Please see nurse notes and code record. Report given. Pt transported to ICU once VSS, 1Unit of PRBC already transfused and additional unit of PRBCs initiated. Monitors attached, 100% O2 via ambu.       Last vitals:   Visit Vitals  /83   Pulse 107   Temp 36.5 °C (97.7 °F)   Resp 16   Ht 6' (1.829 m)   Wt 65.4 kg (144 lb 2.9 oz)   SpO2 100%   BMI 19.55 kg/m²

## 2019-06-17 NOTE — ASSESSMENT & PLAN NOTE
-related to poor nutritional status  -last albumin 1.4  -dietian consulted and recs in place  -repeat CMP in AM

## 2019-06-17 NOTE — NURSING
Received patient upon rounds last night, 1930H, Conscious , coherent to time, place date and situation. GCS 15. Wiith saline lock on right hand  gauge 22 , flushed patent, with clean and dry dressing. Patient has  subjective complaint of  right foot pain 4/10. Percolone PO given one dose prior to wound dressing. Telemetry Normal Sinus Rhythm- Sinus tachycardic  ('s).Sacral sore stage 2, cleansed with saline, betadine, santyl and foam barier applied, left hip stage 2, cleansed with saline betadine santyl and foam  foam dressing applied.  With right foot unstageable wound,cleansed with saline, betadine, xeroform, gauze, rolled gauge and elastic bandage. Oxygen saturation 97% on  2 lpm via NC. Reposition every two hours.With ongoing Golytely.  Advised patient to call for any assistance. Safety fall precaution measures noted, Bed alarm ON. Call bell with in reach. .Will continue to monitor patient.  2300H- VAnco trough level 20.4, pharmacy informed Vanco dose decreased to 750 mg, time adjusted.   0000H-- patient unable to finish Golytely in time, reinforcement given. Patient noted understanding to complete the dose. Will follow up.

## 2019-06-17 NOTE — ASSESSMENT & PLAN NOTE
-BLE angiogram with bilateral SFA CTOs with 2 vessel r/o on the left and single vessel via peroneal on right  -successful revascularization of RLE 6/11 with RSFA, right pop and right peroneal PTA with DCB; will likely need PTA of right AT however on hold for now given anemia and need for GI evaluation   -await GI results to determine if proceeding feasible    -continue statin and Plavix only; no ASA given GI issues

## 2019-06-17 NOTE — PLAN OF CARE
Problem: Adult Inpatient Plan of Care  Goal: Plan of Care Review  Outcome: Ongoing (interventions implemented as appropriate)  Patient on vent with documented settings. Alarms are set and functioning with adequate volumes. Ambu bag and mask at bedside.   Will continue to monitor.

## 2019-06-17 NOTE — ASSESSMENT & PLAN NOTE
-H&H 7.1&22.8 6/14 &6/15 with trend down to 6.9&22.0 6/16  -transfused with 2U PRBCs with improvement in H&H to 10.4&32.0 this AM  -slow down trend since admission   -baseline 9-10/29-30 since early 218  -anemia multifactoral and related to slow GI blood loss vs infectious etiology vs malnutrition  -GI on board with plans for EGD/colonoscopy today   -continue Plavix only

## 2019-06-17 NOTE — ASSESSMENT & PLAN NOTE
-AST/ALT WNL with elevated alk phos  -continue Aldactone at home dose  -abdominal ultrasound from 5/2019 reviewed  -GI on board  -repeat CMP in AM

## 2019-06-17 NOTE — PROGRESS NOTES
Pharmacokinetic Assessment Follow Up: IV Vancomycin    Vancomycin serum concentration assessment(s):    The trough level was drawned correctly and can be used to guide therapy at this time. The measurement is above the desired definitive target range of 10 to 15 mcg/mL.    Vancomycin Regimen Plan:    Change regimen to Vancomycin 750 mg IV every 24hour with next serum trough concentration measured at 30 minutes prior to 3rd dose on 6/19/19.     Pharmacy will continue to follow and monitor vancomycin.    Please contact pharmacy at extension [phone] for questions regarding this assessment.    Thank you for the consult,   Milton Velasco     Patient brief summary:  Sachin Calloway is a 58 y.o. male initiated on antimicrobial therapy with IV Vancomycin for treatment of suspected skin & soft tissue    The patient received a loading dose, followed by the current treatment regimen: vancomycin 1250 mg IV every 24 hours    Drug Allergies:   Review of patient's allergies indicates:  No Known Allergies    Actual Body Weight:   48.9 kg    Renal Function:   Estimated Creatinine Clearance: 78.6 mL/min (based on SCr of 0.7 mg/dL).,     Dialysis Method (if applicable):  N/A     CBC (last 72 hours):  Recent Labs   Lab Result Units 06/14/19  0506 06/15/19  0758 06/16/19  0442 06/16/19  1629   WBC K/uL 15.48* 18.79* 16.07* 18.07*   Hemoglobin g/dL 7.1* 7.3* 6.9* 10.4*   Hematocrit % 22.8* 23.7* 22.0* 32.0*   Platelets K/uL 312 341 315 324   Gran% % 80.2* 82.2* 81.1* 81.3*   Lymph% % 10.4* 9.0* 9.5* 9.2*   Mono% % 8.2 8.0 8.7 8.7   Eosinophil% % 0.4 0.0 0.0 0.0   Basophil% % 0.1 0.3 0.2 0.2   Differential Method  Automated Automated Automated Automated       Metabolic Panel (last 72 hours):  Recent Labs   Lab Result Units 06/14/19  0506   Sodium mmol/L 135*   Potassium mmol/L 4.1   Chloride mmol/L 105   CO2 mmol/L 24   Glucose mg/dL 89   BUN, Bld mg/dL 11   Creatinine mg/dL 0.7       Vancomycin Administrations:  vancomycin given in the  last 96 hours                     vancomycin (VANCOCIN) 1,250 mg in dextrose 5 % 250 mL IVPB (mg) 1,250 mg New Bag 06/15/19 2245     1,250 mg New Bag 06/14/19 2247     1,250 mg New Bag 06/13/19 2317                      Drug levels (last 3 results):  Recent Labs   Lab Result Units 06/16/19  2218   Vancomycin-Trough ug/mL 20.4       Microbiologic Results:  Microbiology Results (last 7 days)       Procedure Component Value Units Date/Time    Blood culture [[id number]] Collected:  06/12/19 0814    Order Status:  Completed Specimen:  Blood Updated:  06/16/19 1212     Blood Culture, Routine No Growth to date     Blood Culture, Routine No Growth to date     Blood Culture, Routine No Growth to date     Blood Culture, Routine No Growth to date     Blood Culture, Routine No Growth to date    Blood culture [[id number]] Collected:  06/12/19 0815    Order Status:  Completed Specimen:  Blood Updated:  06/16/19 1212     Blood Culture, Routine No Growth to date     Blood Culture, Routine No Growth to date     Blood Culture, Routine No Growth to date     Blood Culture, Routine No Growth to date     Blood Culture, Routine No Growth to date

## 2019-06-17 NOTE — ASSESSMENT & PLAN NOTE
-Podiatry and ID consulted  -case discussed with Podiatry with initial plans for TMA once demarcation occurs per review of note; ongoing discussions with Podiatry with high risk for limb loss; will continue to follow Podiatry recs  -blood cultures with prelim results with NGTD; continue  IV Vanc and Zosyn for now per ID recs; WBC up to 18K this AM   -anticipate need for placement upon discharge

## 2019-06-17 NOTE — PLAN OF CARE
Problem: Adult Inpatient Plan of Care  Goal: Plan of Care Review  Outcome: Ongoing (interventions implemented as appropriate)  Golytely unable to finish, 2000 ml consumed over the night. No Bowel movement over the night. Stable VS. Afebrile. Dressing changed. No untoward signs and symptoms noted over the night. Comfortable. Telemetry no ectopy.

## 2019-06-17 NOTE — CONSULTS
Inpatient Radiology Pre-procedure Note    History of Present Illness:  Sahcin Calloway is a 58 y.o. male who presents for duodenal varix evaluation.  Admission H&P reviewed.  Past Medical History:   Diagnosis Date    Alcohol abuse     Cirrhosis     Edema     Hypertension     Seizures     Stricture esophagus      Past Surgical History:   Procedure Laterality Date    Angiogram, Lower Arterial, Bilateral Bilateral 6/10/2019    Performed by Fabien Gonzales MD at [facility] CATH LAB/EP    Aortogram, Abdominal N/A 6/10/2019    Performed by Fabien Gonzales MD at [facility] CATH LAB/EP    Atherectomy, Peripheral Blood Vessel N/A 6/11/2019    Performed by Fabien Gonzales MD at [facility] CATH LAB/EP    COLONOSCOPY      COLONOSCOPY N/A 5/5/2015    Performed by Mario Martínez MD at [facility] ENDO    EGD (ESOPHAGOGASTRODUODENOSCOPY) N/A 5/13/2019    Performed by Dillan Salinas MD at [facility] ENDO    EGD with dilitation  03/04/2019    ESOPHAGOGASTRODUODENOSCOPY (EGD) N/A 3/29/2019    Performed by Dillan Salinas MD at [facility] ENDO    ESOPHAGOGASTRODUODENOSCOPY (EGD) N/A 3/18/2019    Performed by Omer Pritchard MD at [facility] ENDO    ESOPHAGOGASTRODUODENOSCOPY (EGD) N/A 3/4/2019    Performed by Madhu Schmidt MD at [facility] ENDO    ESOPHAGOGASTRODUODENOSCOPY (EGD) N/A 5/5/2015    Performed by Mario Martínez MD at [facility] ENDO    Filter Protection, Peripheral  6/11/2019    Performed by Fabien Gonzales MD at [facility] CATH LAB/EP    PTA, PERIPHERAL VESSEL N/A 6/11/2019    Performed by Fabien Gonzales MD at [facility] CATH LAB/EP    PTA, Peroneal  6/11/2019    Performed by Fabien Gonzales MD at [facility] CATH LAB/EP    PTA, Superficial Femoral Artery  6/11/2019    Performed by Fabien Gonzales MD at [facility] CATH LAB/EP    ULTRASOUND, INTRAVASCULAR N/A 6/11/2019    Performed by Fabien Gonzales MD at [facility] CATH LAB/EP       Review of Systems:   As documented in primary team H&P    Home Meds:   Prior to Admission medications    Medication Sig Start  Date End Date Taking? Authorizing Provider   acetaminophen-codeine 300-30mg (TYLENOL #3) 300-30 mg Tab Take by mouth.   Yes Historical Provider, MD   furosemide (LASIX) 80 MG tablet Take 1 tablet (80 mg total) by mouth once daily. 4/25/19 7/18/20 Yes Juan Gatica MD   pantoprazole (PROTONIX) 40 MG tablet Take 1 tablet (40 mg total) by mouth once daily. 5/13/19 5/12/20 Yes Dillan Salinas MD   potassium chloride SA (K-DUR,KLOR-CON) 20 MEQ tablet Take 1 tablet (20 mEq total) by mouth once daily. 5/2/19  Yes Juan Gatica MD   spironolactone (ALDACTONE) 50 MG tablet Take 1 tablet (50 mg total) by mouth once daily. 5/15/19 8/7/20 Yes Juan Gatica MD     Scheduled Meds:    atorvastatin  20 mg Oral Daily    chlorhexidine  15 mL Mouth/Throat BID    collagenase   Topical (Top) Daily    famotidine (PF)  20 mg Intravenous BID    piperacillin-tazobactam (ZOSYN) IVPB  4.5 g Intravenous Q8H    potassium chloride 10%  20 mEq Oral Daily    sodium chloride 0.9%  500 mL Intravenous Once    spironolactone  50 mg Oral Daily    vancomycin (VANCOCIN) IVPB  750 mg Intravenous Q24H     Continuous Infusions:    dexmedetomidine (PRECEDEX) infusion      phenylephrine 0.5 mcg/kg/min (06/17/19 1545)     PRN Meds:sodium chloride, acetaminophen, aluminum-magnesium hydroxide-simethicone, ondansetron, ondansetron, oxyCODONE, polyethylene glycol, simethicone  Anticoagulants/Antiplatelets: no anticoagulation    Allergies: Review of patient's allergies indicates:  No Known Allergies  Sedation Hx: have not been any systemic reactions    Labs:  No results for input(s): INR in the last 168 hours.    Invalid input(s):  PT,  PTT    Recent Labs   Lab 06/17/19  1345 06/17/19  1358   WBC 26.41*  --    HGB 7.6* 15.4   HCT 23.4* 46.0   MCV 84  --      --       Recent Labs   Lab 06/11/19  1719  06/14/19  0506   GLU 82   < > 89      < > 135*   K 4.4   < > 4.1      < > 105   CO2 22*   < > 24   BUN 13   < > 11   CREATININE 0.6    < > 0.7   CALCIUM 7.6*   < > 7.4*   ALT 33  --   --    AST 35  --   --    ALBUMIN 1.4*  --   --    BILITOT 1.1*  --   --     < > = values in this interval not displayed.         Vitals:  Temp: (!) 92.1 °F (33.4 °C) (06/17/19 1615)  Pulse: (!) 112 (06/17/19 1615)  Resp: (!) 37 (06/17/19 1615)  BP: (!) 88/62 (06/17/19 1615)  SpO2: 100 % (06/17/19 1221)     Physical Exam:  ASA: 3  Mallampati: 2       Plan: Patient is a critically ill patient with concern for duodenal varix which bleeding. Would recommend medical optimization and triple phase CT for treatment evaluation. Have discussed case with attending cardiologist, and will discuss with GI prior to proceeding with any intervention.     .IToi M.D. personally reviewed and agree with the above dictated note.

## 2019-06-17 NOTE — [employer] PATIENT INSTRUCTIONS
Discharge Summary/Instructions after an Endoscopic Procedure  Patient Name: Sachin Calloway  Patient MRN: [id number]  Patient YOB: 1960 Monday, June 17, 2019  Juan Sutton MD  RESTRICTIONS:  During your procedure today, you received medications for sedation.  These   medications may affect your judgment, balance and coordination.  Therefore,   for 24 hours, you have the following restrictions:   - DO NOT drive a car, operate machinery, make legal/financial decisions,   sign important papers or drink alcohol.    ACTIVITY:  Today: no heavy lifting, straining or running due to procedural   sedation/anesthesia.  The following day: return to full activity including work.  DIET:  Eat and drink normally unless instructed otherwise.     TREATMENT FOR COMMON SIDE EFFECTS:  - Mild abdominal pain, nausea, belching, bloating or excessive gas:  rest,   eat lightly and use a heating pad.  - Sore Throat: treat with throat lozenges and/or gargle with warm salt   water.  - Because air was used during the procedure, expelling large amounts of air   from your rectum or belching is normal.  - If a bowel prep was taken, you may not have a bowel movement for 1-3 days.    This is normal.  SYMPTOMS TO WATCH FOR AND REPORT TO YOUR PHYSICIAN:  1. Abdominal pain or bloating, other than gas cramps.  2. Chest pain.  3. Back pain.  4. Signs of infection such as: chills or fever occurring within 24 hours   after the procedure.  5. Rectal bleeding, which would show as bright red, maroon, or black stools.   (A tablespoon of blood from the rectum is not serious, especially if   hemorrhoids are present.)  6. Vomiting.  7. Weakness or dizziness.  GO DIRECTLY TO THE NEAREST EMERGENCY ROOM IF YOU HAVE ANY OF THE FOLLOWING:      Difficulty breathing              Chills and/or fever over 101 F   Persistent vomiting and/or vomiting blood   Severe abdominal pain   Severe chest pain   Black, tarry stools   Bleeding- more than one tablespoon   Any  other symptom or condition that you feel may need urgent attention  Your doctor recommends these additional instructions:  If any biopsies were taken, your doctors clinic will contact you in 1 to 2   weeks with any results.  - IR consultation for angiography and embolization of suspected duodenal   varix when stable  - Transfer patient to icu.  For questions, problems or results please call your physician - Juan Sutton MD at Work:  [phone].  EMERGENCY PHONE NUMBER: [phone],  LAB RESULTS: [phone]  IF A COMPLICATION OR EMERGENCY SITUATION ARISES AND YOU ARE UNABLE TO REACH   YOUR PHYSICIAN - GO DIRECTLY TO THE EMERGENCY ROOM.  MD Juan Dubose MD  6/17/2019 2:17:40 PM  This report has been verified and signed electronically.  [employer]

## 2019-06-17 NOTE — PLAN OF CARE
Report received from jessica on fourth floor.   Patient still had over half of gallon of golytely to drink at 815 am   Patient states that last bm was brown liquid   vss  Patient alert and on 3l o2 nc

## 2019-06-18 ENCOUNTER — ANESTHESIA EVENT (OUTPATIENT)
Dept: SURGERY | Facility: HOSPITAL | Age: 59
DRG: 270 | End: 2019-06-18
Payer: MEDICARE

## 2019-06-18 ENCOUNTER — ANESTHESIA (OUTPATIENT)
Dept: SURGERY | Facility: HOSPITAL | Age: 59
DRG: 270 | End: 2019-06-18
Payer: MEDICARE

## 2019-06-18 PROBLEM — E83.51 HYPOCALCEMIA: Status: ACTIVE | Noted: 2019-06-18

## 2019-06-18 PROBLEM — E87.20 LACTIC ACIDOSIS: Status: ACTIVE | Noted: 2019-06-18

## 2019-06-18 PROBLEM — D72.829 LEUKOCYTOSIS: Status: ACTIVE | Noted: 2019-06-18

## 2019-06-18 LAB
ALBUMIN SERPL BCP-MCNC: 1.6 G/DL (ref 3.5–5.2)
ALP SERPL-CCNC: 145 U/L (ref 55–135)
ALT SERPL W/O P-5'-P-CCNC: 28 U/L (ref 10–44)
ANION GAP SERPL CALC-SCNC: 11 MMOL/L (ref 8–16)
AST SERPL-CCNC: 44 U/L (ref 10–40)
BASOPHILS # BLD AUTO: 0.04 K/UL (ref 0–0.2)
BASOPHILS NFR BLD: 0.1 % (ref 0–1.9)
BILIRUB SERPL-MCNC: 0.7 MG/DL (ref 0.1–1)
BLD PROD TYP BPU: NORMAL
BLD PROD TYP BPU: NORMAL
BLOOD UNIT EXPIRATION DATE: NORMAL
BLOOD UNIT EXPIRATION DATE: NORMAL
BLOOD UNIT TYPE CODE: 5100
BLOOD UNIT TYPE CODE: 5100
BLOOD UNIT TYPE: NORMAL
BLOOD UNIT TYPE: NORMAL
BUN SERPL-MCNC: 10 MG/DL (ref 6–20)
CALCIUM SERPL-MCNC: 7.1 MG/DL (ref 8.7–10.5)
CHLORIDE SERPL-SCNC: 104 MMOL/L (ref 95–110)
CO2 SERPL-SCNC: 16 MMOL/L (ref 23–29)
CODING SYSTEM: NORMAL
CODING SYSTEM: NORMAL
CREAT SERPL-MCNC: 0.7 MG/DL (ref 0.5–1.4)
DELSYS: ABNORMAL
DIFFERENTIAL METHOD: ABNORMAL
DISPENSE STATUS: NORMAL
DISPENSE STATUS: NORMAL
EOSINOPHIL # BLD AUTO: 0 K/UL (ref 0–0.5)
EOSINOPHIL NFR BLD: 0 % (ref 0–8)
ERYTHROCYTE [DISTWIDTH] IN BLOOD BY AUTOMATED COUNT: 16.5 % (ref 11.5–14.5)
ERYTHROCYTE [SEDIMENTATION RATE] IN BLOOD BY WESTERGREN METHOD: 440 MM/H
EST. GFR  (AFRICAN AMERICAN): >60 ML/MIN/1.73 M^2
EST. GFR  (NON AFRICAN AMERICAN): >60 ML/MIN/1.73 M^2
FIO2: 40
GLUCOSE SERPL-MCNC: 117 MG/DL (ref 70–110)
GLUCOSE SERPL-MCNC: 162 MG/DL (ref 70–110)
HCO3 UR-SCNC: 16.1 MMOL/L (ref 24–28)
HCO3 UR-SCNC: 19.9 MMOL/L (ref 24–28)
HCT VFR BLD AUTO: 16.6 % (ref 40–54)
HCT VFR BLD AUTO: 18 % (ref 40–54)
HCT VFR BLD AUTO: 27.8 % (ref 40–54)
HCT VFR BLD AUTO: 28.5 % (ref 40–54)
HCT VFR BLD AUTO: 30.3 % (ref 40–54)
HCT VFR BLD AUTO: 30.3 % (ref 40–54)
HCT VFR BLD CALC: 38 %PCV (ref 36–54)
HGB BLD-MCNC: 10.2 G/DL (ref 14–18)
HGB BLD-MCNC: 10.2 G/DL (ref 14–18)
HGB BLD-MCNC: 13 G/DL
HGB BLD-MCNC: 5.4 G/DL (ref 14–18)
HGB BLD-MCNC: 5.5 G/DL (ref 14–18)
HGB BLD-MCNC: 9.4 G/DL (ref 14–18)
HGB BLD-MCNC: 9.5 G/DL (ref 14–18)
INR PPP: 1.2 (ref 0.8–1.2)
LACTATE SERPL-SCNC: 4.7 MMOL/L (ref 0.5–2.2)
LYMPHOCYTES # BLD AUTO: 2 K/UL (ref 1–4.8)
LYMPHOCYTES NFR BLD: 5.4 % (ref 18–48)
MAGNESIUM SERPL-MCNC: 1.6 MG/DL (ref 1.6–2.6)
MCH RBC QN AUTO: 27.6 PG (ref 27–31)
MCHC RBC AUTO-ENTMCNC: 33.7 G/DL (ref 32–36)
MCV RBC AUTO: 82 FL (ref 82–98)
MODE: ABNORMAL
MONOCYTES # BLD AUTO: 1.4 K/UL (ref 0.3–1)
MONOCYTES NFR BLD: 3.6 % (ref 4–15)
NEUTROPHILS # BLD AUTO: 34.2 K/UL (ref 1.8–7.7)
NEUTROPHILS NFR BLD: 90.9 % (ref 38–73)
NUM UNITS TRANS PACKED RBC: NORMAL
NUM UNITS TRANS PACKED RBC: NORMAL
PCO2 BLDA: 27 MMHG (ref 35–45)
PCO2 BLDA: 44.7 MMHG (ref 35–45)
PEEP: 5
PH SMN: 7.26 [PH] (ref 7.35–7.45)
PH SMN: 7.38 [PH] (ref 7.35–7.45)
PLATELET # BLD AUTO: 300 K/UL (ref 150–350)
PMV BLD AUTO: 10.4 FL (ref 9.2–12.9)
PO2 BLDA: 107 MMHG (ref 80–100)
PO2 BLDA: 269 MMHG (ref 80–100)
POC BE: -7 MMOL/L
POC BE: -9 MMOL/L
POC IONIZED CALCIUM: 1.02 MMOL/L (ref 1.06–1.42)
POC SATURATED O2: 100 % (ref 95–100)
POC SATURATED O2: 98 % (ref 95–100)
POC TCO2: 17 MMOL/L (ref 23–27)
POC TCO2: 21 MMOL/L (ref 23–27)
POTASSIUM BLD-SCNC: 4.2 MMOL/L (ref 3.5–5.1)
POTASSIUM SERPL-SCNC: 3.8 MMOL/L (ref 3.5–5.1)
PROT SERPL-MCNC: 4.7 G/DL (ref 6–8.4)
PROTHROMBIN TIME: 12.9 SEC (ref 9–12.5)
RBC # BLD AUTO: 3.69 M/UL (ref 4.6–6.2)
SAMPLE: ABNORMAL
SAMPLE: ABNORMAL
SITE: ABNORMAL
SODIUM BLD-SCNC: 135 MMOL/L (ref 136–145)
SODIUM SERPL-SCNC: 131 MMOL/L (ref 136–145)
VANCOMYCIN TROUGH SERPL-MCNC: 18.3 UG/ML (ref 10–22)
VT: 18
WBC # BLD AUTO: 37.96 K/UL (ref 3.9–12.7)

## 2019-06-18 PROCEDURE — S0028 INJECTION, FAMOTIDINE, 20 MG: HCPCS | Performed by: NURSE PRACTITIONER

## 2019-06-18 PROCEDURE — 63600175 PHARM REV CODE 636 W HCPCS: Performed by: NURSE PRACTITIONER

## 2019-06-18 PROCEDURE — 80053 COMPREHEN METABOLIC PANEL: CPT

## 2019-06-18 PROCEDURE — 25000003 PHARM REV CODE 250: Performed by: NURSE PRACTITIONER

## 2019-06-18 PROCEDURE — 99223 PR INITIAL HOSPITAL CARE,LEVL III: ICD-10-PCS | Mod: 25,,, | Performed by: STUDENT IN AN ORGANIZED HEALTH CARE EDUCATION/TRAINING PROGRAM

## 2019-06-18 PROCEDURE — 85025 COMPLETE CBC W/AUTO DIFF WBC: CPT

## 2019-06-18 PROCEDURE — 51700 IRRIGATION OF BLADDER: CPT | Mod: ,,, | Performed by: STUDENT IN AN ORGANIZED HEALTH CARE EDUCATION/TRAINING PROGRAM

## 2019-06-18 PROCEDURE — 63600175 PHARM REV CODE 636 W HCPCS: Mod: JG | Performed by: NURSE ANESTHETIST, CERTIFIED REGISTERED

## 2019-06-18 PROCEDURE — 25000003 PHARM REV CODE 250: Performed by: STUDENT IN AN ORGANIZED HEALTH CARE EDUCATION/TRAINING PROGRAM

## 2019-06-18 PROCEDURE — P9016 RBC LEUKOCYTES REDUCED: HCPCS

## 2019-06-18 PROCEDURE — 83605 ASSAY OF LACTIC ACID: CPT

## 2019-06-18 PROCEDURE — 85610 PROTHROMBIN TIME: CPT

## 2019-06-18 PROCEDURE — 63600175 PHARM REV CODE 636 W HCPCS: Performed by: STUDENT IN AN ORGANIZED HEALTH CARE EDUCATION/TRAINING PROGRAM

## 2019-06-18 PROCEDURE — 25500020 PHARM REV CODE 255: Performed by: STUDENT IN AN ORGANIZED HEALTH CARE EDUCATION/TRAINING PROGRAM

## 2019-06-18 PROCEDURE — P9045 ALBUMIN (HUMAN), 5%, 250 ML: HCPCS | Mod: JG | Performed by: NURSE ANESTHETIST, CERTIFIED REGISTERED

## 2019-06-18 PROCEDURE — 99232 PR SUBSEQUENT HOSPITAL CARE,LEVL II: ICD-10-PCS | Mod: GC,,, | Performed by: INTERNAL MEDICINE

## 2019-06-18 PROCEDURE — 51700 PR IRRIGATION, BLADDER: ICD-10-PCS | Mod: ,,, | Performed by: STUDENT IN AN ORGANIZED HEALTH CARE EDUCATION/TRAINING PROGRAM

## 2019-06-18 PROCEDURE — 99900035 HC TECH TIME PER 15 MIN (STAT)

## 2019-06-18 PROCEDURE — 25000003 PHARM REV CODE 250

## 2019-06-18 PROCEDURE — 99232 SBSQ HOSP IP/OBS MODERATE 35: CPT | Mod: GC,,, | Performed by: INTERNAL MEDICINE

## 2019-06-18 PROCEDURE — 20000000 HC ICU ROOM

## 2019-06-18 PROCEDURE — 63600175 PHARM REV CODE 636 W HCPCS: Performed by: INTERNAL MEDICINE

## 2019-06-18 PROCEDURE — 99223 1ST HOSP IP/OBS HIGH 75: CPT | Mod: 25,,, | Performed by: STUDENT IN AN ORGANIZED HEALTH CARE EDUCATION/TRAINING PROGRAM

## 2019-06-18 PROCEDURE — P9047 ALBUMIN (HUMAN), 25%, 50ML: HCPCS | Mod: JG | Performed by: STUDENT IN AN ORGANIZED HEALTH CARE EDUCATION/TRAINING PROGRAM

## 2019-06-18 PROCEDURE — 94761 N-INVAS EAR/PLS OXIMETRY MLT: CPT

## 2019-06-18 PROCEDURE — 37000008 HC ANESTHESIA 1ST 15 MINUTES

## 2019-06-18 PROCEDURE — 25000003 PHARM REV CODE 250: Performed by: INTERNAL MEDICINE

## 2019-06-18 PROCEDURE — 36430 TRANSFUSION BLD/BLD COMPNT: CPT

## 2019-06-18 PROCEDURE — 82803 BLOOD GASES ANY COMBINATION: CPT

## 2019-06-18 PROCEDURE — 83735 ASSAY OF MAGNESIUM: CPT

## 2019-06-18 PROCEDURE — 25000003 PHARM REV CODE 250: Performed by: NURSE ANESTHETIST, CERTIFIED REGISTERED

## 2019-06-18 PROCEDURE — 27000221 HC OXYGEN, UP TO 24 HOURS

## 2019-06-18 PROCEDURE — 85014 HEMATOCRIT: CPT | Mod: 91

## 2019-06-18 PROCEDURE — C9113 INJ PANTOPRAZOLE SODIUM, VIA: HCPCS | Performed by: INTERNAL MEDICINE

## 2019-06-18 PROCEDURE — 80202 ASSAY OF VANCOMYCIN: CPT

## 2019-06-18 PROCEDURE — 85018 HEMOGLOBIN: CPT | Mod: 91

## 2019-06-18 PROCEDURE — 94003 VENT MGMT INPAT SUBQ DAY: CPT

## 2019-06-18 PROCEDURE — 37000009 HC ANESTHESIA EA ADD 15 MINS

## 2019-06-18 RX ORDER — PANTOPRAZOLE SODIUM 40 MG/10ML
40 INJECTION, POWDER, LYOPHILIZED, FOR SOLUTION INTRAVENOUS 2 TIMES DAILY
Status: DISCONTINUED | OUTPATIENT
Start: 2019-06-18 | End: 2019-06-24

## 2019-06-18 RX ORDER — NOREPINEPHRINE BITARTRATE 1 MG/ML
INJECTION, SOLUTION INTRAVENOUS
Status: DISPENSED
Start: 2019-06-18 | End: 2019-06-19

## 2019-06-18 RX ORDER — VASOPRESSIN 20 [USP'U]/ML
INJECTION, SOLUTION INTRAMUSCULAR; SUBCUTANEOUS
Status: COMPLETED
Start: 2019-06-18 | End: 2019-06-18

## 2019-06-18 RX ORDER — NOREPINEPHRINE BITARTRATE/D5W 16MG/250ML
0.02 PLASTIC BAG, INJECTION (ML) INTRAVENOUS CONTINUOUS
Status: DISCONTINUED | OUTPATIENT
Start: 2019-06-18 | End: 2019-06-22

## 2019-06-18 RX ORDER — SODIUM CHLORIDE 9 MG/ML
INJECTION, SOLUTION INTRAVENOUS CONTINUOUS PRN
Status: DISCONTINUED | OUTPATIENT
Start: 2019-06-18 | End: 2019-06-18

## 2019-06-18 RX ORDER — SODIUM BICARBONATE 1 MEQ/ML
SYRINGE (ML) INTRAVENOUS
Status: DISCONTINUED | OUTPATIENT
Start: 2019-06-18 | End: 2019-06-18

## 2019-06-18 RX ORDER — MIDAZOLAM HYDROCHLORIDE 1 MG/ML
INJECTION, SOLUTION INTRAMUSCULAR; INTRAVENOUS
Status: DISCONTINUED | OUTPATIENT
Start: 2019-06-18 | End: 2019-06-18

## 2019-06-18 RX ORDER — HYDROCODONE BITARTRATE AND ACETAMINOPHEN 500; 5 MG/1; MG/1
TABLET ORAL
Status: DISCONTINUED | OUTPATIENT
Start: 2019-06-18 | End: 2019-06-25 | Stop reason: HOSPADM

## 2019-06-18 RX ORDER — ROCURONIUM BROMIDE 10 MG/ML
INJECTION, SOLUTION INTRAVENOUS
Status: DISCONTINUED | OUTPATIENT
Start: 2019-06-18 | End: 2019-06-18

## 2019-06-18 RX ORDER — ALBUMIN HUMAN 50 G/1000ML
SOLUTION INTRAVENOUS CONTINUOUS PRN
Status: DISCONTINUED | OUTPATIENT
Start: 2019-06-18 | End: 2019-06-18

## 2019-06-18 RX ADMIN — ALBUMIN HUMAN 12.5 G: 0.25 SOLUTION INTRAVENOUS at 12:06

## 2019-06-18 RX ADMIN — ALBUMIN (HUMAN): 2.5 SOLUTION INTRAVENOUS at 01:06

## 2019-06-18 RX ADMIN — MIDAZOLAM 2 MG: 1 INJECTION INTRAMUSCULAR; INTRAVENOUS at 11:06

## 2019-06-18 RX ADMIN — PANTOPRAZOLE SODIUM 40 MG: 40 INJECTION, POWDER, FOR SOLUTION INTRAVENOUS at 08:06

## 2019-06-18 RX ADMIN — PIPERACILLIN AND TAZOBACTAM 4.5 G: 4; .5 INJECTION, POWDER, LYOPHILIZED, FOR SOLUTION INTRAVENOUS; PARENTERAL at 12:06

## 2019-06-18 RX ADMIN — CHLORHEXIDINE GLUCONATE 0.12% ORAL RINSE 15 ML: 1.2 LIQUID ORAL at 08:06

## 2019-06-18 RX ADMIN — PHENYLEPHRINE HYDROCHLORIDE 3 MCG/KG/MIN: 10 INJECTION INTRAVENOUS at 06:06

## 2019-06-18 RX ADMIN — DEXMEDETOMIDINE HYDROCHLORIDE 0.5 MCG/KG/HR: 4 INJECTION INTRAVENOUS at 04:06

## 2019-06-18 RX ADMIN — PHENYLEPHRINE HYDROCHLORIDE 3 MCG/KG/MIN: 10 INJECTION INTRAVENOUS at 09:06

## 2019-06-18 RX ADMIN — SODIUM CHLORIDE 500 ML: 0.9 INJECTION, SOLUTION INTRAVENOUS at 05:06

## 2019-06-18 RX ADMIN — SODIUM CHLORIDE: 0.9 INJECTION, SOLUTION INTRAVENOUS at 11:06

## 2019-06-18 RX ADMIN — ROCURONIUM BROMIDE 50 MG: 10 INJECTION, SOLUTION INTRAVENOUS at 12:06

## 2019-06-18 RX ADMIN — PIPERACILLIN AND TAZOBACTAM 4.5 G: 4; .5 INJECTION, POWDER, LYOPHILIZED, FOR SOLUTION INTRAVENOUS; PARENTERAL at 04:06

## 2019-06-18 RX ADMIN — IOHEXOL 125 ML: 350 INJECTION, SOLUTION INTRAVENOUS at 04:06

## 2019-06-18 RX ADMIN — FAMOTIDINE 20 MG: 10 INJECTION, SOLUTION INTRAVENOUS at 08:06

## 2019-06-18 RX ADMIN — IOHEXOL 100 ML: 350 INJECTION, SOLUTION INTRAVENOUS at 11:06

## 2019-06-18 RX ADMIN — MAGNESIUM SULFATE 1 G: 1 INJECTION INTRAVENOUS at 12:06

## 2019-06-18 RX ADMIN — VANCOMYCIN HYDROCHLORIDE 750 MG: 750 INJECTION, POWDER, LYOPHILIZED, FOR SOLUTION INTRAVENOUS at 12:06

## 2019-06-18 RX ADMIN — VASOPRESSIN 0.04 UNITS/MIN: 20 INJECTION INTRAVENOUS at 05:06

## 2019-06-18 RX ADMIN — SODIUM BICARBONATE 50 MEQ: 84 INJECTION, SOLUTION INTRAVENOUS at 01:06

## 2019-06-18 RX ADMIN — PIPERACILLIN AND TAZOBACTAM 4.5 G: 4; .5 INJECTION, POWDER, LYOPHILIZED, FOR SOLUTION INTRAVENOUS; PARENTERAL at 08:06

## 2019-06-18 RX ADMIN — PHENYLEPHRINE HYDROCHLORIDE 2 MCG/KG/MIN: 10 INJECTION INTRAVENOUS at 10:06

## 2019-06-18 RX ADMIN — VASOPRESSIN 20 UNITS: 20 INJECTION INTRAVENOUS at 05:06

## 2019-06-18 NOTE — ASSESSMENT & PLAN NOTE
-multifactoral-cardiac arrest, hypotension, infectious etiology and active bleeding   -initial lactic acid 4.5 with trend down to 3.0 and up to 4.7  -continue supportive care with pressors and vent; will follow lactic acid; last ABG reviewed

## 2019-06-18 NOTE — PROGRESS NOTES
Dr. Villavicencio was notified of patient having no UO since all the intervention were taken. Requested for cont albumin, or bolus.  Per MD to continue to monitor.

## 2019-06-18 NOTE — ANESTHESIA POSTPROCEDURE EVALUATION
Anesthesia Post Evaluation    Patient: Sachin Calloway    Procedure(s) Performed: Procedure(s) (LRB):  EGD (ESOPHAGOGASTRODUODENOSCOPY) (N/A)  COLONOSCOPY (N/A)    Final Anesthesia Type: general  Patient location during evaluation: PACU  Patient participation: Yes- Able to Participate  Level of consciousness: awake and alert, oriented and awake  Post-procedure vital signs: reviewed and stable  Pain management: adequate  Airway patency: patent  PONV status at discharge: No PONV  Anesthetic complications: no      Cardiovascular status: blood pressure returned to baseline  Respiratory status: unassisted and room air  Hydration status: euvolemic  Follow-up not needed.          Vitals Value Taken Time   /75 6/18/2019 11:02 AM   Temp 36.5 °C (97.7 °F) 6/18/2019 11:04 AM   Pulse 101 6/18/2019 11:04 AM   Resp 31 6/18/2019 11:04 AM   SpO2 99 % 6/18/2019  7:34 AM   Vitals shown include unvalidated device data.      No case tracking events are documented in the log.      Pain/Aretha Score: Pain Rating Prior to Med Admin: 0 (6/18/2019  7:37 AM)  Pain Rating Post Med Admin: 0 (6/18/2019  7:37 AM)  Aretha Score: 4 (6/17/2019  2:00 PM)

## 2019-06-18 NOTE — PROGRESS NOTES
[facility]  Cardiology  Progress Note    Patient Name: Sachin Calloway  MRN: [id number]  Admission Date: 6/10/2019  Hospital Length of Stay: 6 days  Code Status: Prior   Attending Physician: Toi Arevalo MD   Primary Care Physician: Juan Gatica MD  Expected Discharge Date:   Principal Problem:Critical lower limb ischemia    Subjective:     Hospital Course:   6/10/2019 Admitted for elective LE angiogram due to nonhealing wound to right foot. Taken to cath lab for the procedure via right radial access with following results:    · Severe bilateral PAD with CLI of right foot  · On the right there is an ostial SFA  which is calcified and reconstitutes via profunda collaterals distally. There is single vessel runoff via peroneal which has a severe proximal stenosis and distally supplies collaterals to a plantar arch. No identifiable DP, although there is a proximal portion of AT  · On the left there is a severe stenosis at proximal SFA with mid SFA  with reconstitution distally. There is two vessel runoff via AT into a DP and peroneal which supplies collaterals to a plantar arch  · Staged intervention of LSFA and peroneal first. Will then consider staged intervention of RATA and if unsuccessful will consider AV flow reversal     6/11/2019 NPO for RLE intervention today   6/12/2019 Taken to cath lab yesterday for intervention via LCFA access with following results:    · LCFA antegrade access with crossover sheath used  · Successful RSFA  intervention with atherectomy followed by IVUS guided PTA with 4.0 balloon in POP and mid to distal SFA and 5.0 balloon in ostial and proximal SFA. This was followed by PTA with DCB to POP/mid and distal SFA with 4.0 balloon and 5.0 balloon ostial/mid SFA all with excellent results  · Successful PTA to proximal peroneal with 3.0 x 60 balloon with excellent result  · Will likely need staged intervention of AT/DP and if unsuccessful, AV flow  reversal  · Plavix/ASA/statin  · Continue aggressive wound care and management per Podiatry    Tolerated procedure well and recovered on telemetry unit. LCFA access site with no active bleeding, hematoma or ecchymosis. Podiatry consulted with plans for surgery once demarcation occurs. ID consulted with recs for blood cultures along with empiric antibiotics with IV Vanc and Zosyn   6/13/2019 H&H 7.7 & 24.3 with WBC up to 16.11 from 15.17. BMP with unchanged creatinine. Blood cultures with prelim results NGTD. HR and BP stable. Dietian consulted with recs noted-will liberalize diet and order supplements. Remains on IV Vanc and Zosyn for now. PT/OT on board   6/14/2019 WBC down slightly to 15.4 this AM. H&H down slightly to 7.1&22.8. BP stable. Episode of ST with HR up to 140s this AM nonsustained and patient asymptomatic. ASA discontinued. Will consult GI due to downtrending H&H   6/15/2019 PAD with CLI requiring intervention and likely surgery. Continue medical management. No intervention planned until patient more stable. Continue antibiotics for gangrene and possible Osteo-blood cultures with NGTD. ID and podiatry following. Anemia. GI onboard and plan EGD/Colonscopy Monday. hgb 7.3 today. Transfuse if <7.   6/16/2019 Remains on IV abx currently. MRI with no evidence of osteomyelitis. ID and podiatry following. H&H down to 6.9 & 22.0 will transfuse with 2units PRBCs. Plan for EGD/Colonscopy Monday 6/17/2019 WBCs up to 18K this AM. H&H improved to 10.4&32.0 this AM. BMP stable. NPO for EGD/colonoscopy today   6/18/2019 PEA arrest yesterday post EGD with ROSC after CPR and a few rounds of CPR. Given additional 2 units of PRBCs in Endoscopy given findings on EGD and based on discussion with GI. Intubated per anesthesia and admitted to ICU with SBP 120s-130s upon arrival. Post admission more alert and pulling at ETT therefore placed on Precedex drip. BP trended down to 90s with initiation of Neosynephrine drip. Lactic  acid 4.5 and repeat H&H 15.4&46.0 likely erroneous immediately after PRBC. Serial H&H overnight with H&H 12.5&36.1 and down to 10.2&30.3 this AM. Remains intubated-Pulmonary consulted. GI on board with plans for CT of abdomen with IR/TIPS procedure today by IR. onversation with wife per IR in regards to procedure with high risk for hemodynamic instability during the procedure although no other alternative currently and family with desire for aggressive treatment.         Review of Systems   Unable to perform ROS: intubated     Objective:     Vital Signs (Most Recent):  Temp: 97 °F (36.1 °C) (06/18/19 0945)  Pulse: 97 (06/18/19 0945)  Resp: (!) 25 (06/18/19 0945)  BP: (!) 82/55 (06/18/19 0945)  SpO2: 100 % (06/18/19 0521) Vital Signs (24h Range):  Temp:  [90.7 °F (32.6 °C)-97.7 °F (36.5 °C)] 97 °F (36.1 °C)  Pulse:  [] 97  Resp:  [16-37] 25  SpO2:  [72 %-100 %] 100 %  BP: ()/() 82/55  Arterial Line BP: ()/(56-87) 87/61     Weight: 50.7 kg (111 lb 12.4 oz)  Body mass index is 15.16 kg/m².     SpO2: 100 %  O2 Device (Oxygen Therapy): ventilator      Intake/Output Summary (Last 24 hours) at 6/18/2019 1035  Last data filed at 6/18/2019 0947  Gross per 24 hour   Intake 3040.66 ml   Output 626 ml   Net 2414.66 ml       Lines/Drains/Airways     Central Venous Catheter Line                 Percutaneous Central Line Insertion/Assessment - triple lumen  06/17/19 1700 right internal jugular less than 1 day          Drain                 Urethral Catheter 06/17/19 1414 Straight-tip 16 Fr. less than 1 day          Airway                 Airway - Non-Surgical 06/17/19 1048 Nasal Cannula less than 1 day         Airway - Non-Surgical 06/17/19 1324 Endotracheal Tube less than 1 day          Arterial Line                 Arterial Line 06/17/19 1821 Right Radial less than 1 day          Peripheral Intravenous Line                 Peripheral IV - Single Lumen 06/17/19 0652 20 G Anterior;Distal;Left Forearm 1 day          Peripheral IV - Single Lumen 06/17/19 1300 22 G Right Forearm less than 1 day                Physical Exam   Constitutional: He appears cachectic. He appears toxic. He has a sickly appearance. No distress.   Cardiovascular: Normal rate and regular rhythm. Exam reveals no gallop.   No murmur heard.  Pulmonary/Chest: Effort normal and breath sounds normal. No respiratory distress. He has no wheezes.   Abdominal: Soft. Bowel sounds are normal. He exhibits no distension. There is no tenderness.   Neurological:   Intubated and sedated    Skin: Skin is warm and dry.       Significant Labs:     Recent Labs   Lab 06/18/19  0346   *   K 3.8      CO2 16*   BUN 10   CREATININE 0.7   MG 1.6     Recent Labs   Lab 06/18/19  0346   CALCIUM 7.1*   PROT 4.7*   *   K 3.8   CO2 16*      BUN 10   CREATININE 0.7   ALKPHOS 145*   ALT 28   AST 44*   BILITOT 0.7     Recent Labs   Lab 06/18/19  0346 06/18/19  0738   WBC 37.96*  --    RBC 3.69*  --    HGB 10.2*  10.2* 9.5*   HCT 30.3*  30.3* 28.5*     --    MCV 82  --    MCH 27.6  --    MCHC 33.7  --      Recent Labs     06/17/19  2246   PH 7.395   PCO2 27.3*   PO2 104*   HCO3 16.7*   POCSATURATED 98   BE -8       Significant Imaging:     TTE 6/17/2019    · Normal left ventricular systolic function. The estimated ejection fraction is 55%  · Normal right ventricular systolic function.  · Indeterminate left ventricular diastolic function.  · Limited echo windows    Assessment and Plan:         * Critical lower limb ischemia  -BLE angiogram with bilateral SFA CTOs with 2 vessel r/o on the left and single vessel via peroneal on right  -successful revascularization of RLE 6/11 with RSFA, right pop and right peroneal PTA with DCB; anticipated need for PTA of right AT however deferred given acute decompensation, cardiac arrest and anemia yesterday   -on statin but currently intubated; will hold ASA and Plavix for now     Hypocalcemia  -Ca 6.1 this  AM  -corrected to 8.2 due to hypoalbuminemia      Leukocytosis  -WBCs up to 26K post arrest and up to 37K this AM  -hypothermic overnight  -initial blood cultures from 6/12 with NGTD; repeat blood cultures with NGTD  -remains on IV Zosyn and Vanc and will continue for now  -ID on board and case discussed with ID fellow this AM; source of leukocytosis and multifactoral with combination of stress response and infectious etiology; ID questions if abdomen potential source of infection but unable to evaluate given critical status currently     Lactic acidosis  -multifactoral-cardiac arrest, hypotension, infectious etiology and active bleeding   -initial lactic acid 4.5 with trend down to 3.0 and up to 4.7  -continue supportive care with pressors and vent; will follow lactic acid; last ABG reviewed     Cardiac arrest  -PEA arrest preceded by SB at completion of EGD yesterday  -responded to few rounds of Epi and ROSC after ACLS/CPR  -intubated and admitted to ICU  -initial pH 6.9 with improvement 7.2-7.3 with vent and bicarb  -etiology likely hypoxia related  -on Aaron for BP support and will continue for now; Pulmonary on board for vent management with no plans for extubation as of now given plan for IR/TIPS procedure     Hypoalbuminemia  -related to poor nutritional status  -last albumin 1.4  -dietian consulted and recs in place  -repeat CMP in AM     Severe protein-calorie malnutrition  -consult dietian  -diet changed to regular  -on Darrian BID and Benefiber TID     Pressure ulcer of left hip, stage 3  -wound care RN and General surgery consulted     Sacral decubitus ulcer  -POA  -related to debility and prolonged bedrest  -Wound care RN consulted as well as General surgery (recs noted)     Gangrene of right foot  -Podiatry and ID on board  -case discussed with Podiatry with initial plans for TMA once demarcation occurs per review of note; ongoing discussions with Podiatry with high risk for limb loss and deferment for now  given acute decompensation   -initial blood cultures 6/12 with NGTD; repeat blood cultures yesterday; continue  IV Vanc and Zosyn; WBC up to 37K this AM       Anemia  -H&H 7.1&22.8 6/14 &6/15 with trend down to 6.9&22.0 6/16  -transfused with 2U PRBCs with improvement in H&H to 10.4&32.0 yesterday with slight drop to 9.5 prior to EGD yesterday  -EGD yesterday with following results:   LA Grade D (one or more mucosal breaks involving at least 75% of esophageal circumference) esophagitis with no bleeding was found in the lower third of the esophagus.   A deformity was found in the prepyloric region of the stomach.   Red blood was found in the gastric body and duodenum.   Active bleeding with adherent clot was found in the third portion of the duodenum associated with a duodenal varix.  -additional 2units PRBCs given 6/17 post cardiac arrest  -serial H&H overnight with H&H 12.5&36.1 with trend down to 10.2&30.3 this AM    -baseline 9-10/29-30 since early 2018  -GI on board; IR consulted with plan for IR/TIPS procedure today   -will hold ASA/Plavix for now     Alcoholic cirrhosis of liver with ascites  -AST/ALT WNL with elevated alk phos at 145 this AM   -on Aldactone at home-discontinued due to pressor use  -abdominal ultrasound from 5/2019 reviewed; EGD yesterday with concern for advanced cirrhosis per GI interpretation   -daily CMP in AM         VTE Risk Mitigation (From admission, onward)        Ordered     Reason for No Pharmacological VTE Prophylaxis  Once      06/17/19 1353     IP VTE HIGH RISK PATIENT  Once      06/17/19 1353     Place SERGO hose  Until discontinued      06/17/19 1346     Place sequential compression device  Until discontinued      06/17/19 1346          MAURICE French, ANP  Cardiology  [facility]

## 2019-06-18 NOTE — PLAN OF CARE
Problem: Adult Inpatient Plan of Care  Goal: Plan of Care Review  Outcome: Ongoing (interventions implemented as appropriate)     06/18/19 0440   Plan of Care Review   Plan of Care Reviewed With Patient; Vitals: Hypotensive, on esdras map of >65 maintained. Hypothermic, improving slowly by applying the baire hugger.  Total care for all needs due to patient's status. Unable to determine orientation level, but follows commands appropriately, alert, unable to voice needs due to intubation; O2 sat remained wnl; PRN abg obtained. No facial grimacing or signs of pain noted.  Hearing and vision adequate, Assist x 2 for ADLs. Repositioned q2hrs. F/C patent and draining to gravity c orin, MD aware. Incont of bowels, but no bm overnight. Stool occult culture to be done.  Wounds noted, wound care is following the patient. All concerns were addressed overnight with MD. Plan of care reviewed with the patient. Labs and vitals are being monitored.

## 2019-06-18 NOTE — CONSULTS
[facility]  Urology  Consult Note    Patient Name: Sachin Calloway  MRN: [id number]  Admission Date: 6/10/2019  Hospital Length of Stay: 6 days  Code Status: Prior   Attending Provider: Toi Arevalo MD   Consulting Provider: Bety Severino MD  Primary Care Physician: Juan Gatica MD  Principal Problem:Critical lower limb ischemia    Consults    Subjective:     HPI: 58 y.o. male with history of alcoholic liver cirrhosis, HTN, current smoker, who is a cardiology patient who was admitted for a peripheral angiogram and nonhealing right foot wound. While in the hospital he also coded yesterday and was transferred to the ICU. The urologic consult is for a nondraining angelo.     Past Medical History:   Diagnosis Date    Alcohol abuse     Cirrhosis     Edema     Hypertension     Seizures     Stricture esophagus        Past Surgical History:   Procedure Laterality Date    Angiogram, Lower Arterial, Bilateral Bilateral 6/10/2019    Performed by Fabien Gonzales MD at [facility] CATH LAB/EP    Aortogram, Abdominal N/A 6/10/2019    Performed by Fabien Gonzales MD at [facility] CATH LAB/EP    Atherectomy, Peripheral Blood Vessel N/A 6/11/2019    Performed by Fabien Gonzales MD at [facility] CATH LAB/EP    COLONOSCOPY      COLONOSCOPY N/A 5/5/2015    Performed by Mario Martínez MD at [facility] ENDO    EGD (ESOPHAGOGASTRODUODENOSCOPY) N/A 5/13/2019    Performed by Dillan Salinas MD at [facility] ENDO    EGD with dilitation  03/04/2019    ESOPHAGOGASTRODUODENOSCOPY (EGD) N/A 3/29/2019    Performed by Dillan Salinas MD at [facility] ENDO    ESOPHAGOGASTRODUODENOSCOPY (EGD) N/A 3/18/2019    Performed by Omer Pritchard MD at [facility] ENDO    ESOPHAGOGASTRODUODENOSCOPY (EGD) N/A 3/4/2019    Performed by Madhu Schmidt MD at [facility] ENDO    ESOPHAGOGASTRODUODENOSCOPY (EGD) N/A 5/5/2015    Performed by Mario Martínez MD at [facility] ENDO    Filter Protection, Peripheral  6/11/2019    Performed by Fabien Gonzales MD at [facility] CATH  LAB/EP    PTA, PERIPHERAL VESSEL N/A 6/11/2019    Performed by [facility] Gonzales MD at [facility] CATH LAB/EP    PTA, Peroneal  6/11/2019    Performed by Fabien Gonzales MD at [facility] CATH LAB/EP    PTA, Superficial Femoral Artery  6/11/2019    Performed by Fabien Gonzales MD at [facility] CATH LAB/EP    ULTRASOUND, INTRAVASCULAR N/A 6/11/2019    Performed by Fabien Gonzales MD at [facility] CATH LAB/EP       Review of patient's allergies indicates:  No Known Allergies    Family History     Problem Relation (Age of Onset)    Cancer Father          Tobacco Use    Smoking status: Current Every Day Smoker     Packs/day: 0.50     Years: 43.00     Pack years: 21.50     Types: Cigarettes     Start date: 1976    Smokeless tobacco: Never Used    Tobacco comment: Pt enrolled in [employer].  Ambulatory referral to Smoking Cessation program.   Substance and Sexual Activity    Alcohol use: Yes     Comment: 6 pack every day (3-4)    Drug use: No    Sexual activity: Not on file       Review of Systems   Unable to perform ROS: Intubated       Objective:     Temp:  [90.7 °F (32.6 °C)-97.7 °F (36.5 °C)] 95.2 °F (35.1 °C)  Pulse:  [] 92  Resp:  [16-37] 25  SpO2:  [72 %-100 %] 100 %  BP: ()/() 90/68  Arterial Line BP: ()/(56-87) 86/62     Body mass index is 15.16 kg/m².    Date 06/18/19 0700 - 06/19/19 0659   Shift 3924-8639 7963-8226 7891-9482 24 Hour Total   INTAKE   Shift Total(mL/kg)       OUTPUT   Urine(mL/kg/hr) 0   0   Shift Total(mL/kg) 0(0)   0(0)   Weight (kg) 50.7 50.7 50.7 50.7     Bladder Scan Volume (mL): 705 mL(unable to determine whether it is accurate due to ascities) (06/18/19 0500)    Lines/Drains/Airways     Central Venous Catheter Line                 Percutaneous Central Line Insertion/Assessment - triple lumen  06/17/19 1700 right internal jugular less than 1 day          Drain                 Urethral Catheter 06/17/19 1414 Straight-tip 16 Fr. less than 1 day          Airway                  Airway - Non-Surgical 06/17/19 1048 Nasal Cannula less than 1 day         Airway - Non-Surgical 06/17/19 1324 Endotracheal Tube less than 1 day          Arterial Line                 Arterial Line 06/17/19 1821 Right Radial less than 1 day          Peripheral Intravenous Line                 Peripheral IV - Single Lumen 06/17/19 0652 20 G Anterior;Distal;Left Forearm 1 day         Peripheral IV - Single Lumen 06/17/19 1300 22 G Right Forearm less than 1 day                Physical Exam   Vitals reviewed.  Constitutional: He is oriented to person, place, and time. He appears well-developed and well-nourished.   HENT:   Head: Normocephalic and atraumatic.   Eyes: Conjunctivae are normal. Pupils are equal, round, and reactive to light.   Neck: Normal range of motion. Neck supple.   Cardiovascular: Intact distal pulses.    Pulmonary/Chest: Effort normal. No respiratory distress.   Abdominal: Soft. He exhibits no distension. There is tenderness.   Genitourinary:   Genitourinary Comments: 16 [place] temperature probe catheter in place, I instilled 60cc of irrigation and was able to withdraw back yellow fluid consistent with urine. After I discarded the 60cc of irrigation, the angelo was flowing well. I secured the angelo back to the thigh.    Musculoskeletal: Normal range of motion. He exhibits no edema.   Neurological: He is alert and oriented to person, place, and time.   Skin: Skin is warm and dry.     Psychiatric: He has a normal mood and affect. His behavior is normal.       Significant Labs:  BMP:  Recent Labs   Lab 06/14/19  0506 06/17/19  2222 06/18/19  0346   * 131* 131*   K 4.1 3.7 3.8    106 104   CO2 24 19* 16*   BUN 11 10 10   CREATININE 0.7 0.7 0.7   CALCIUM 7.4* 6.6* 7.1*       CBC:  Recent Labs   Lab 06/17/19  0905 06/17/19  1345  06/17/19  2129 06/18/19  0346 06/18/19  0738   WBC 18.75* 26.41*  --   --  37.96*  --    HGB 9.5* 7.6*   < > 12.4* 10.2*  10.2* 9.5*   HCT 28.7* 23.4*   < > 36.4*  30.3*  30.3* 28.5*   * 295  --   --  300  --     < > = values in this interval not displayed.       Recent Lab Results       06/18/19  0738   06/18/19  0346   06/17/19  2246   06/17/19  2222   06/17/19  2136        Albumin   1.6   1.0       Alkaline Phosphatase   145   163       Allens Test               ALT   28   33       Anion Gap   11   6       Aniso               Ao root annulus               Ao peak eddi               Ao VTI               AST   44   60       AV valve area               AV mean gradient               AV peak gradient               AV Velocity Ratio               Baso #   0.04           Basophil%   0.1           BILIRUBIN TOTAL   0.7  Comment:  For infants and newborns, interpretation of results should be based  on gestational age, weight and in agreement with clinical  observations.  Premature Infant recommended reference ranges:  Up to 24 hours.............<8.0 mg/dL  Up to 48 hours............<12.0 mg/dL  3-5 days..................<15.0 mg/dL  6-29 days.................<15.0 mg/dL     0.6  Comment:  For infants and newborns, interpretation of results should be based  on gestational age, weight and in agreement with clinical  observations.  Premature Infant recommended reference ranges:  Up to 24 hours.............<8.0 mg/dL  Up to 48 hours............<12.0 mg/dL  3-5 days..................<15.0 mg/dL  6-29 days.................<15.0 mg/dL         BNP               Site     Leobardo/UAC         BSA               BUN, Bld   10   10       Calcium   7.1   6.6  Comment:  CA critical result(s) called and verbal readback obtained from Rebekah Mendez RN, 06/17/2019 23:03         Chloride   104   106       CO2   16   19       Creatinine   0.7   0.7       Left Ventricle Relative Wall Thickness               D-Dimer               DelSys     Adult Vent         Differential Method   Automated           AV index (prosthetic)               eGFR if    >60   >60       eGFR if non     >60  Comment:  Calculation used to obtain the estimated glomerular filtration  rate (eGFR) is the CKD-EPI equation.      >60  Comment:  Calculation used to obtain the estimated glomerular filtration  rate (eGFR) is the CKD-EPI equation.          Eos #   0.0           Eosinophil%   0.0           FiO2     60         FS               Glucose   162   143       Gran # (ANC)   34.2           Gran%   90.9           Group & Rh               Hematocrit 28.5 30.3              30.3           Hemoglobin 9.5 10.2              10.2           Hypo               INDIRECT DOLLY               Coumadin Monitoring INR   1.2  Comment:  Coumadin Therapy:  2.0 - 3.0 for INR for all indicators except mechanical heart valves  and antiphospholipid syndromes which should use 2.5 - 3.5.             IVRT               IVS               LA WIDTH               Lactate, Rodney 4.7  Comment:  Falsely low lactic acid results can be found in samples   containing >=13.0 mg/dL total bilirubin and/or >=3.5 mg/dL   direct bilirubin.  LACT critical result(s) called and verbal readback obtained from   SHAINA Moreno, 06/18/2019 08:33       3.0  Comment:  Falsely low lactic acid results can be found in samples   containing >=13.0 mg/dL total bilirubin and/or >=3.5 mg/dL   direct bilirubin.         Left Atrium Major Axis               Left Atrium Minor Axis               LA size               LA volume               LA Volume Index               LVOT area               LV Diastolic Volume               LV Diastolic Volume Index               LVIDD               LVIDS               LV mass               LV Mass Index               LVOT diameter               LVOT peak eddi               LVOT stroke volume               LVOT peak VTI               LV Systolic Volume               LV Systolic Volume Index               Lymph #   2.0           Lymph%   5.4           Magnesium   1.6   1.3       MCH   27.6           MCHC   33.7           MCV   82            Mode     AC/PRVC         Mono #   1.4           Mono%   3.6           MPV   10.4           nRBC               PEEP     5         Platelet Estimate               Platelets   300           POC BE     -8         POC HCO3     16.7         POC PCO2     27.3         POC PH     7.395         POC PO2     104         POC SATURATED O2     98         POC TCO2     18         POCT Glucose         148     Poly               Potassium   3.8   3.7       PROTEIN TOTAL   4.7   4.4       Protime   12.9           PW               RA Major Axis               Rate     15         RBC   3.69           RDW   16.5           Sample     ARTERIAL         Sodium   131   131       Triscuspid Valve Regurgitation Peak Gradient               TR Max Nathan               Troponin I               Vt     450         WBC   37.96                            06/17/19  2129   06/17/19  1730   06/17/19  1608   06/17/19  1435   06/17/19  1407        Albumin               Alkaline Phosphatase               Allens Test       Pass       ALT               Anion Gap               Aniso               Ao root annulus     2.30         Ao peak nathan     0.51         Ao VTI     6.73         AST               AV valve area     2.54         AV mean gradient     0.68         AV peak gradient     1.04         AV Velocity Ratio     0.92         Baso #               Basophil%               BILIRUBIN TOTAL               BNP               Site       RR       BSA     1.58         BUN, Bld               Calcium               Chloride               CO2               Creatinine               Left Ventricle Relative Wall Thickness     0.52         D-Dimer         15.44  Comment:  The quantitative D-dimer assay should be used as an aid in   the diagnosis of deep vein thrombosis and pulmonary embolism  in patients with the appropriate presentation and clinical  history. The upper limit of the reference interval and the clinical   cut off   point are identical. Causes of a positive  (>0.50 mg/L FEU) D-Dimer   test  include, but are not limited to: DVT, PE, DIC, thrombolytic   therapy, anticoagulant therapy, recent surgery, trauma, or   pregnancy, disseminated malignancy, aortic aneurysm, cirrhosis,  and severe infection. False negative results may occur in   patients with distal DVT.       DelSys       Adult Vent       Differential Method               AV index (prosthetic)     1.15         eGFR if                eGFR if non                Eos #               Eosinophil%               FiO2               FS     13         Glucose               Gran # (ANC)               Gran%               Group & Rh   O POS           Hematocrit 36.4 36.1           Hemoglobin 12.4 12.5           Hypo               INDIRECT DOLLY   NEG           Coumadin Monitoring INR               IVRT     0.09         IVS     0.70         LA WIDTH     3.47         Lactate, Rodney               Left Atrium Major Axis     3.56         Left Atrium Minor Axis     3.79         LA size     2.68         LA volume     29.02         LA Volume Index     17.4         LVOT area     2.22         LV Diastolic Volume     33.61         LV Diastolic Volume Index     20.20         LVIDD     2.95         LVIDS     2.57         LV mass     51.08         LV Mass Index     30.7         LVOT diameter     1.68         LVOT peak eddi     [id number]         LVOT stroke volume     17.10         LVOT peak VTI     7.72         LV Systolic Volume     23.79         LV Systolic Volume Index     14.3         Lymph #               Lymph%               Magnesium               MCH               MCHC               MCV               Mode               Mono #               Mono%               MPV               nRBC               PEEP               Platelet Estimate               Platelets               POC BE       -8       POC HCO3       18.2       POC PCO2       37.7       POC PH       7.293       POC PO2       65       POC  SATURATED O2       90       POC TCO2       19       POCT Glucose               Poly               Potassium               PROTEIN TOTAL               Protime               PW     0.77         RA Major Axis     4.04         Rate               RBC               RDW               Sample       ARTERIAL       Sodium               Triscuspid Valve Regurgitation Peak Gradient     16.97         TR Max Nathan     2.06         Troponin I         0.112  Comment:  The reference interval for Troponin I represents the 99th percentile   cutoff   for our facility and is consistent with 3rd generation assay   performance.       Vt               WBC                                06/17/19  1405   06/17/19  1358   06/17/19  1345   06/17/19  1335   06/17/19  1214        Albumin               Alkaline Phosphatase               Allens Test       Pass       ALT               Anion Gap               Aniso     Slight         Ao root annulus               Ao peak nathan               Ao VTI               AST               AV valve area               AV mean gradient               AV peak gradient               AV Velocity Ratio               Baso #     CANCELED  Comment:  Result canceled by the ancillary.         Basophil%     0.0         BILIRUBIN TOTAL                 Comment:  Values of less than 100 pg/ml are consistent with non-CHF populations.             Site       RR       BSA               BUN, Bld               Calcium               Chloride               CO2               Creatinine               Left Ventricle Relative Wall Thickness               D-Dimer               DelSys               Differential Method     Manual  Comment:  Corrected result; previously reported as Automated on 06/17/2019 at   14:36.  [C]         AV index (prosthetic)               eGFR if                eGFR if non                Eos #     CANCELED  Comment:  Result canceled by the ancillary.         Eosinophil%     0.0          FiO2               FS               Glucose               Gran # (ANC)               Gran%     84.0         Group & Rh               Hematocrit   46.0 23.4         Hemoglobin   15.4 7.6         Hypo     Occasional         INDIRECT DOLLY               Coumadin Monitoring INR               IVRT               IVS               LA WIDTH               Lactate, Rodney 4.5  Comment:  Falsely low lactic acid results can be found in samples   containing >=13.0 mg/dL total bilirubin and/or >=3.5 mg/dL   direct bilirubin.               Left Atrium Major Axis               Left Atrium Minor Axis               LA size               LA volume               LA Volume Index               LVOT area               LV Diastolic Volume               LV Diastolic Volume Index               LVIDD               LVIDS               LV mass               LV Mass Index               LVOT diameter               LVOT peak eddi               LVOT stroke volume               LVOT peak VTI               LV Systolic Volume               LV Systolic Volume Index               Lymph #     CANCELED  Comment:  Result canceled by the ancillary.         Lymph%     13.0         Magnesium               MCH     27.3         MCHC     32.5         MCV     84         Mode               Mono #     CANCELED  Comment:  Result canceled by the ancillary.         Mono%     3.0         MPV     10.1         nRBC     2@L=100         PEEP               Platelet Estimate     Appears normal         Platelets     295         POC BE       -15       POC HCO3       16.8       POC PCO2       71.5       POC PH       6.978       POC PO2       249       POC SATURATED O2       99       POC TCO2       19       POCT Glucose         136     Poly     Occasional         Potassium               PROTEIN TOTAL               Protime               PW               RA Major Axis               Rate               RBC     2.78         RDW     19.4         Sample       ARTERIAL       Sodium                Triscuspid Valve Regurgitation Peak Gradient               TR Max Nathan               Troponin I     0.125  Comment:  The reference interval for Troponin I represents the 99th percentile   cutoff   for our facility and is consistent with 3rd generation assay   performance.           Vt               WBC     26.41                          06/17/19  1115   06/17/19  0905        Albumin         Alkaline Phosphatase         Allens Test         ALT         Anion Gap         Aniso         Ao root annulus         Ao peak nathan         Ao VTI         AST         AV valve area         AV mean gradient         AV peak gradient         AV Velocity Ratio         Baso #   0.04     Basophil%   0.2     BILIRUBIN TOTAL         BNP         Site         BSA         BUN, Bld         Calcium         Chloride         CO2         Creatinine         Left Ventricle Relative Wall Thickness         D-Dimer         DelSys         Differential Method   Automated     AV index (prosthetic)         eGFR if          eGFR if non          Eos #   0.0     Eosinophil%   0.0     FiO2         FS         Glucose         Gran # (ANC)   15.4     Gran%   82.3     Group & Rh         Hematocrit   28.7     Hemoglobin   9.5     Hypo         INDIRECT DOLLY         Coumadin Monitoring INR         IVRT         IVS         LA WIDTH         Lactate, Rodney         Left Atrium Major Axis         Left Atrium Minor Axis         LA size         LA volume         LA Volume Index         LVOT area         LV Diastolic Volume         LV Diastolic Volume Index         LVIDD         LVIDS         LV mass         LV Mass Index         LVOT diameter         LVOT peak nathan         LVOT stroke volume         LVOT peak VTI         LV Systolic Volume         LV Systolic Volume Index         Lymph #   1.8     Lymph%   9.5     Magnesium         MCH   27.3     MCHC   33.1     MCV   83     Mode         Mono #   1.4     Mono%   7.3     MPV   9.6      nRBC         PEEP         Platelet Estimate         Platelets   352     POC BE         POC HCO3         POC PCO2         POC PH         POC PO2         POC SATURATED O2         POC TCO2         POCT Glucose 49       Poly         Potassium         PROTEIN TOTAL         Protime         PW         RA Major Axis         Rate         RBC   3.48     RDW   19.2     Sample         Sodium         Triscuspid Valve Regurgitation Peak Gradient         TR Max Nathan         Troponin I         Vt         WBC   18.75           Significant Imaging:       Assessment and Plan:     58 y.o. male who is a patient of the cardiology service that I was consulted on while he was in the ICU intubated, and sedated after coding yesterday for a nondraining angelo catheter    Plan:  1. The patient has a temperature probe 16 Kinyarwanda angelo catheter in place, it is possible that this catheter has a smaller lumen for the urine to drain and is positional.  2. I irrigated the angelo with 60cc of irrigation and this irrigated and withdrew well, confirming that the angelo catheter is likely in the correct position.   3. I resecured the angelo back to the patient's thigh and both the patient's nurse and myself noted clear yellow urine flowing in the tubing.     Active Diagnoses:    Diagnosis Date Noted POA    PRINCIPAL PROBLEM:  Critical lower limb ischemia [I99.8] 06/10/2019 Yes    Cardiac arrest [I46.9] 06/17/2019 Yes    Severe protein-calorie malnutrition [E43] 06/12/2019 Yes    Hypoalbuminemia [E88.09] 06/12/2019 Yes    Gangrene of right foot [I96] 06/11/2019 Yes    Sacral decubitus ulcer [L89.159] 06/11/2019 Yes    Pressure ulcer of left hip, stage 3 [L89.223] 06/11/2019 Yes    Alcoholic cirrhosis of liver with ascites [K70.31] 05/02/2019 Yes    Anemia [D64.9] 05/02/2019 Yes      Problems Resolved During this Admission:       VTE Risk Mitigation (From admission, onward)        Ordered     Reason for No Pharmacological VTE Prophylaxis  Once       06/17/19 1353     IP VTE HIGH RISK PATIENT  Once      06/17/19 1353     Place SERGO hose  Until discontinued      06/17/19 1346     Place sequential compression device  Until discontinued      06/17/19 1346          Thank you for your consult.     Bety Severino MD  Urology  [facility]

## 2019-06-18 NOTE — SUBJECTIVE & OBJECTIVE
Subjective:     Interval History: intubated and sedated this morning. Blood transfusions overnight and reported hematemesis and bloody stools continued. Requiring pressor support for labile pressures.    Review of Systems   Reason unable to perform ROS: limited by current functional status.     Objective:     Vital Signs (Most Recent):  Temp: 97 °F (36.1 °C) (06/18/19 0945)  Pulse: 97 (06/18/19 0945)  Resp: (!) 25 (06/18/19 0945)  BP: (!) 82/55 (06/18/19 0945)  SpO2: 100 % (06/18/19 0521) Vital Signs (24h Range):  Temp:  [90.7 °F (32.6 °C)-97.7 °F (36.5 °C)] 97 °F (36.1 °C)  Pulse:  [] 97  Resp:  [16-37] 25  SpO2:  [72 %-100 %] 100 %  BP: ()/() 82/55  Arterial Line BP: ()/(56-87) 87/61     Weight: 50.7 kg (111 lb 12.4 oz) (06/17/19 1345)  Body mass index is 15.16 kg/m².      Intake/Output Summary (Last 24 hours) at 6/18/2019 1002  Last data filed at 6/18/2019 0947  Gross per 24 hour   Intake 3040.66 ml   Output 626 ml   Net 2414.66 ml       Lines/Drains/Airways     Central Venous Catheter Line                 Percutaneous Central Line Insertion/Assessment - triple lumen  06/17/19 1700 right internal jugular less than 1 day          Drain                 Urethral Catheter 06/17/19 1414 Straight-tip 16 Fr. less than 1 day          Airway                 Airway - Non-Surgical 06/17/19 1048 Nasal Cannula less than 1 day         Airway - Non-Surgical 06/17/19 1324 Endotracheal Tube less than 1 day          Arterial Line                 Arterial Line 06/17/19 1821 Right Radial less than 1 day          Peripheral Intravenous Line                 Peripheral IV - Single Lumen 06/17/19 0652 20 G Anterior;Distal;Left Forearm 1 day         Peripheral IV - Single Lumen 06/17/19 1300 22 G Right Forearm less than 1 day                Physical Exam   Constitutional:   Sedated   HENT:   Head: Normocephalic.   ETT in place   Eyes: Pupils are equal, round, and reactive to light. No scleral icterus.   Neck:  Normal range of motion.   Cardiovascular: Regular rhythm. Exam reveals no friction rub.   Tachycardic   Pulmonary/Chest:   Transmitted ventilatory breath sounds. Symmetric chest expansion.   Abdominal: Soft. Bowel sounds are normal. He exhibits no distension.   Musculoskeletal: He exhibits edema.   Neurological:   Sedated   Skin: Skin is warm and dry.       Significant Labs:  CBC:   Recent Labs   Lab 06/17/19  0905 06/17/19  1345  06/17/19  2129 06/18/19  0346 06/18/19  0738   WBC 18.75* 26.41*  --   --  37.96*  --    HGB 9.5* 7.6*   < > 12.4* 10.2*  10.2* 9.5*   HCT 28.7* 23.4*   < > 36.4* 30.3*  30.3* 28.5*   * 295  --   --  300  --     < > = values in this interval not displayed.     CMP:   Recent Labs   Lab 06/18/19  0346   *   CALCIUM 7.1*   ALBUMIN 1.6*   PROT 4.7*   *   K 3.8   CO2 16*      BUN 10   CREATININE 0.7   ALKPHOS 145*   ALT 28   AST 44*   BILITOT 0.7         Significant Imaging:  Abdominal Film: I have reviewed all results within the past 24 hours and my personal findings are:  without evidence of pneumoperitoneum

## 2019-06-18 NOTE — PROGRESS NOTES
Dr. Arevalo was notified of projectile vomiting of bright red blood with big clotts over his gown and the bed. Also, when oral suctioned 300cc of blood noted. Aaron was maxed to 5mcg as he bp start to drop to 60's and 70s. His heart rate was in >120s. Pr MD to take telephone order for 500cc NS bolus. Per MD will call GI. Dr. Gordon pager and cell number was provided.

## 2019-06-18 NOTE — PLAN OF CARE
Problem: Adult Inpatient Plan of Care  Goal: Plan of Care Review  Pt received as charted on vent flowsheet. Ambu bag and mask at bedside. All alarms on and functional with adequate volume. Cuff pressure monitored via MLT. ETT size #7.5 .Pt with no apprent respiratory distress noted. Will continue to  Monitor.

## 2019-06-18 NOTE — PROGRESS NOTES
Dr. Villavicencio was notified of prolonged QTc of 508. Per MD to check mag level.  Mag level was added to the cmp order.

## 2019-06-18 NOTE — ASSESSMENT & PLAN NOTE
-AST/ALT WNL with elevated alk phos at 145 this AM   -on Aldactone at home-discontinued due to pressor use  -abdominal ultrasound from 5/2019 reviewed; EGD yesterday with concern for advanced cirrhosis per GI interpretation   -daily CMP in AM

## 2019-06-18 NOTE — PROGRESS NOTES
Results for WAYNE PARKS (MRN [id number]) as of 6/17/2019 22:51   Ref. Range 6/17/2019 22:46   POC PH Latest Ref Range: 7.35 - 7.45  7.395   POC PCO2 Latest Ref Range: 35 - 45 mmHg 27.3 (LL)   POC PO2 Latest Ref Range: 80 - 100 mmHg 104 (H)   POC BE Latest Ref Range: -2 to 2 mmol/L -8   POC HCO3 Latest Ref Range: 24 - 28 mmol/L 16.7 (L)   POC SATURATED O2 Latest Ref Range: 95 - 100 % 98   POC TCO2 Latest Ref Range: 23 - 27 mmol/L 18 (L)   FiO2 Unknown 60   Vt Unknown 450   PEEP Unknown 5   Sample Unknown ARTERIAL   DelSys Unknown Adult Vent   Site Unknown Leobardo/UAC   Mode Unknown AC/PRVC   Rate Unknown 15     *Vent rate 18bpm (not 15bpm)

## 2019-06-18 NOTE — NURSING
At the bedside the patient's BP and MAP began to consistently drop. Aaron was titrated up to the maximum dose, although the patient's BP and MAP continued to decline. The charge nurse, Ashely, was called to the bedside. Dr. Arevalo was notified. Vasopressin and Levophed were ordered as well as 2 units of PRBC for a sudden drop in his H/H. Will continue to monitor.

## 2019-06-18 NOTE — NURSING
Dr. Arevalo contacted on behalf of Ruperto RN. Patient's MAP falling below 60, despite increase in neosynephrine infusion to a max dose. H/H of 5/18 reported to him at this time. 2 units of PRBCs ordered, as well as adding vasopressin and levophed to maintain a MAP of 60.

## 2019-06-18 NOTE — PLAN OF CARE
Problem: Adult Inpatient Plan of Care  Goal: Plan of Care Review  Outcome: Ongoing (interventions implemented as appropriate)     06/18/19 1705   Plan of Care Review   Plan of Care Reviewed With patient;spouse;sibling;family     Mr. Calloway had poor UOP this morning. No urine output was recorded overnight. Urology was consulted. Mr. Calloway's cathether was flushed per Dr. Severino. As of this evening he has put out a total of 610 cc of dark yellow urine.Today Mr. Calloway went for a TIPS procedure in IR. The patient ultimately had a DIPS procedure instead, along with a paracentesis. He has a dressing to his R lateral chest that is C/D/I and his R femoral. R femoral site has scant drainage. The pt is currently receiving 1 unit of PRBC due to a critical drop in his H/H after his DIPS procedure. The pt was also started on vasopressin this evening.

## 2019-06-18 NOTE — NURSING
Dr. Overton closed right femoral artery with mynxgrip vascular closure device, LOT [id number], manual pressure x 15 minutes, bleeding controlled, no hematoma noted .  Right hepatic, gelfoam inserted and manual pressure x 15 minutes, bleeding controlled, no hematoma . Right internal jugular manual pressure x 15 minutes, bleeding controlled, no hematoma

## 2019-06-18 NOTE — ASSESSMENT & PLAN NOTE
-BLE angiogram with bilateral SFA CTOs with 2 vessel r/o on the left and single vessel via peroneal on right  -successful revascularization of RLE 6/11 with RSFA, right pop and right peroneal PTA with DCB; anticipated need for PTA of right AT however deferred given acute decompensation, cardiac arrest and anemia yesterday   -on statin but currently intubated; will hold ASA and Plavix for now

## 2019-06-18 NOTE — PLAN OF CARE
Problem: Adult Inpatient Plan of Care  Goal: Plan of Care Review  Outcome: Ongoing (interventions implemented as appropriate)  Patient on vent with documented settings. Alarms are set and functioning with adequate volumes. Ambu bag and mask at bedside.

## 2019-06-18 NOTE — PROGRESS NOTES
Dr. Arevalo, was called to address anuria, no UO for the past 4 hrs and was olguric during the day. No bmp or cmp was done for the past 3 days to evaluate kidney function. Lactic acid for today was 4.5, received only 500cc of fluids during the day with EF of 55%. No bld culture was obtained during the day, last one was done on the 6/12. Temp of 91.6, hypotensive, on esdras, wbc of 26. Concerned for septic. Requested if it is okay to speak with eicu to get appropriate interventions done. Per Dr. Arevalo he is okay with speaking to eICU doctor to address the issues.

## 2019-06-18 NOTE — PROGRESS NOTES
[facility] Consult     Primary Team: Cardiology   Consultant Attending: Anum   Date of Admit: 6/10/2019    History      Sachin Calloway is a 58 y.o. male with a relevant history of alcoholic liver cirrhosis, HTN, former EtOH abuse, active tobacco use, PAD who presents for peripheral angiogram    The patient presented to [facility] on 6/10/2019 with a primary complaint of non healing right foot wound with doppler showing PAD. Had arthrectomy and PTA on 6/11.     Subsequently has had GI bleeding, persistent leukocytosis and coded during an EGD (bradycardia followed by PEA)    Interval events/subjective      WBC still elevated with no fever    Intubated, phenylephrine 2.5    Allergies:  Review of patient's allergies indicates:  No Known Allergies    Medications:   In-Hospital Scheduled Medications:   atorvastatin  20 mg Oral Daily    chlorhexidine  15 mL Mouth/Throat BID    collagenase   Topical (Top) Daily    famotidine (PF)  20 mg Intravenous BID    piperacillin-tazobactam (ZOSYN) IVPB  4.5 g Intravenous Q8H    potassium chloride 10%  20 mEq Oral Daily    vancomycin (VANCOCIN) IVPB  750 mg Intravenous Q24H      In-Hospital PRN Medications:  sodium chloride, acetaminophen, aluminum-magnesium hydroxide-simethicone, ondansetron, ondansetron, oxyCODONE, polyethylene glycol, simethicone   In-Hospital IV Infusion Medications:   dexmedetomidine (PRECEDEX) infusion 0.5 mcg/kg/hr (06/18/19 0412)    phenylephrine 2 mcg/kg/min (06/18/19 5843)      Home Medications:  Prior to Admission medications    Medication Sig Start Date End Date Taking? Authorizing Provider   acetaminophen-codeine 300-30mg (TYLENOL #3) 300-30 mg Tab Take by mouth.   Yes Historical Provider, MD   furosemide (LASIX) 80 MG tablet Take 1 tablet (80 mg total) by mouth once daily. 4/25/19 7/18/20 Yes Juan Gatica MD   pantoprazole (PROTONIX) 40 MG tablet Take 1 tablet (40 mg total) by mouth once daily. 5/13/19 5/12/20 Yes Dillan Salinas  MD   potassium chloride SA (K-DUR,KLOR-CON) 20 MEQ tablet Take 1 tablet (20 mEq total) by mouth once daily. 19  Yes Juan Gatica MD   spironolactone (ALDACTONE) 50 MG tablet Take 1 tablet (50 mg total) by mouth once daily. 5/15/19 8/7/20 Yes Juan Gatica MD     Antibiotics and Day Number of Therapy:  Piptazo and Vanc for 1 day     Objective   Last 24 Hour Vital Signs:  BP  Min: 76/52  Max: 182/118  Temp  Av.1 °F (33.9 °C)  Min: 90.7 °F (32.6 °C)  Max: 97.7 °F (36.5 °C)  Pulse  Av.4  Min: 74  Max: 130  Resp  Av.6  Min: 16  Max: 37  SpO2  Av.4 %  Min: 72 %  Max: 100 %  Weight  Av.7 kg (111 lb 12.4 oz)  Min: 50.7 kg (111 lb 12.4 oz)  Max: 50.7 kg (111 lb 12.4 oz)    Lines, Drains, Airway:  None     Physical Examination:  Examination  Abbreviated exam as he was on the way to a procedure   General: ill appearing, alert, tracking and responding appropriately     Laboratory:  CBC:   Lab Results   Component Value Date    WBC 37.96 (H) 2019    HGB 9.4 (L) 2019     2019    MCV 82 2019    RDW 16.5 (H) 2019     Estimated Creatinine Clearance: 81.5 mL/min (based on SCr of 0.7 mg/dL).    Microbiology Data:  Negative blood cultures     Vanc trough 19  12, then 20.4 on     Radiology:  No gas noted in xray of right foot     MRI of right foot shows no osteo    CT abd   No evidence for active contrast extravasation within the GI tract.  High-density material within the stomach and duodenum may reflect blood products.  Mural thickening of the distal duodenum may reflect focal inflammation.  Given evidence for chronic pancreatitis, possibly secondary.  Diffuse mucosal thickening of the small bowel with wall thickening of the sigmoid colon and rectum may reflect portal enteropathy/colopathy.  Small wedge-shaped hypoattenuating areas in the bilateral renal cortices may reflect sequela of infarcts.  Small bilateral pleural effusions with right lower lobe  consolidation.  Small hiatal hernia.  Fluid within the lower esophagus in keeping with gastroesophageal reflux.  Large volume of ascites.  Extensive calcified plaque throughout the abdomen and pelvis.    Assessment     Sachin Calloway is a 58 y.o. male with alcoholic liver cirrhosis and PAD s/p right foot angioplasty who presented with gangrene of the right foot and leukocytosis. Vancomycin and piptazo have been started.     Now post code during EGD for GI bleed. Planned for TIPS procedure.     The causes of his WBC spike include a stress response post code, gangrene of the right foot, and Spontaneous Bacterial Peritonitis in a patient with cirrhosis/large volume paracentesis.    Recommendations     Gangrene rt foot  - continue piptazo 4.5g q8  - continue 750mg qday vanc   - awaiting revascularization and then subsequent surgical plan, if any   - route and duration of antibiotic therapy to be determined based on above     Ascites, cirrhosis  - once stable, would do paracentesis to assess for SBP, but can defer since it would not change his immediate antibiotic regimen      Thank you for this consult. We will follow.      Kimber Flood  Fellow, [facility]

## 2019-06-18 NOTE — PROGRESS NOTES
[facility]  Gastroenterology  Progress Note    Patient Name: Sachin Calloway  MRN: [id number]  Admission Date: 6/10/2019  Hospital Length of Stay: 6 days  Code Status: Prior   Attending Provider: Toi Arevalo MD  Consulting Provider: Leonardo Wagner MD  Primary Care Physician: Juan Gatica MD  Principal Problem: Critical lower limb ischemia      Subjective:     Interval History: intubated and sedated this morning. Blood transfusions overnight and reported hematemesis and bloody stools continued. Requiring pressor support for labile pressures.    Review of Systems   Reason unable to perform ROS: limited by current functional status.     Objective:     Vital Signs (Most Recent):  Temp: 97 °F (36.1 °C) (06/18/19 0945)  Pulse: 97 (06/18/19 0945)  Resp: (!) 25 (06/18/19 0945)  BP: (!) 82/55 (06/18/19 0945)  SpO2: 100 % (06/18/19 0521) Vital Signs (24h Range):  Temp:  [90.7 °F (32.6 °C)-97.7 °F (36.5 °C)] 97 °F (36.1 °C)  Pulse:  [] 97  Resp:  [16-37] 25  SpO2:  [72 %-100 %] 100 %  BP: ()/() 82/55  Arterial Line BP: ()/(56-87) 87/61     Weight: 50.7 kg (111 lb 12.4 oz) (06/17/19 1345)  Body mass index is 15.16 kg/m².      Intake/Output Summary (Last 24 hours) at 6/18/2019 1002  Last data filed at 6/18/2019 0947  Gross per 24 hour   Intake 3040.66 ml   Output 626 ml   Net 2414.66 ml       Lines/Drains/Airways     Central Venous Catheter Line                 Percutaneous Central Line Insertion/Assessment - triple lumen  06/17/19 1700 right internal jugular less than 1 day          Drain                 Urethral Catheter 06/17/19 1414 Straight-tip 16 Fr. less than 1 day          Airway                 Airway - Non-Surgical 06/17/19 1048 Nasal Cannula less than 1 day         Airway - Non-Surgical 06/17/19 1324 Endotracheal Tube less than 1 day          Arterial Line                 Arterial Line 06/17/19 1821 Right Radial less than 1 day          Peripheral Intravenous Line                  Peripheral IV - Single Lumen 06/17/19 0652 20 G Anterior;Distal;Left Forearm 1 day         Peripheral IV - Single Lumen 06/17/19 1300 22 G Right Forearm less than 1 day                Physical Exam   Constitutional:   Sedated   HENT:   Head: Normocephalic.   ETT in place   Eyes: Pupils are equal, round, and reactive to light. No scleral icterus.   Neck: Normal range of motion.   Cardiovascular: Regular rhythm. Exam reveals no friction rub.   Tachycardic   Pulmonary/Chest:   Transmitted ventilatory breath sounds. Symmetric chest expansion.   Abdominal: Soft. Bowel sounds are normal. He exhibits no distension.   Musculoskeletal: He exhibits edema.   Neurological:   Sedated   Skin: Skin is warm and dry.       Significant Labs:  CBC:   Recent Labs   Lab 06/17/19  0905 06/17/19  1345  06/17/19  2129 06/18/19  0346 06/18/19  0738   WBC 18.75* 26.41*  --   --  37.96*  --    HGB 9.5* 7.6*   < > 12.4* 10.2*  10.2* 9.5*   HCT 28.7* 23.4*   < > 36.4* 30.3*  30.3* 28.5*   * 295  --   --  300  --     < > = values in this interval not displayed.     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 S/p EGD 6/17 with severe erosive esophagitis and actively bleeding duodenal varix that is not amenable to endoscopic therapy. The patient experienced cardiopulmonary instability following procedure resulting in emergent intubation and ICU transfer.  - IR consulted for angiography and embolization of suspected duodenal varix when stable. Note reviewed, noted plan for multiphase CT when amenable.  - resuscitation  as needed in interim and as underway by ICU service  - recommend also surgical consultation for review for any options they may have to offer patient.  - PPI BID (for indications of severe erosive esophagitis and ICU patient; will not treat etiology above)  - following      Leonardo Wagner MD  Gastroenterology Fellow  [facility]

## 2019-06-18 NOTE — ANESTHESIA PROCEDURE NOTES
Arterial    Diagnosis: hypotension     Patient location during procedure: ICU  Procedure start time: 6/17/2019 6:21 PM  Timeout: 6/17/2019 6:20 PM  Procedure end time: 6/17/2019 6:24 PM  Staffing  Anesthesiologist: Chris Shore MD  Resident/CRNA: Sung Dimas MD  Performed: resident/CRNA   Anesthesiologist was present at the time of the procedure.  Preanesthetic Checklist  Completed: patient identified, site marked, surgical consent, pre-op evaluation, timeout performed, IV checked, risks and benefits discussed, monitors and equipment checked and anesthesia consent givenArterial  Skin Prep: chlorhexidine gluconate  Local Infiltration: lidocaine  Orientation: left  Location: radial  Catheter Size: 20 G  Catheter placement by Ultrasound guidance. Heme positive aspiration all ports.  Vessel Caliber: patent  Needle advanced into vessel with real time Ultrasound guidance.Insertion Attempts: 1  Assessment  Dressing: secured with tape and tegaderm

## 2019-06-18 NOTE — SUBJECTIVE & OBJECTIVE
Review of Systems   Unable to perform ROS: intubated     Objective:     Vital Signs (Most Recent):  Temp: 97 °F (36.1 °C) (06/18/19 0945)  Pulse: 97 (06/18/19 0945)  Resp: (!) 25 (06/18/19 0945)  BP: (!) 82/55 (06/18/19 0945)  SpO2: 100 % (06/18/19 0521) Vital Signs (24h Range):  Temp:  [90.7 °F (32.6 °C)-97.7 °F (36.5 °C)] 97 °F (36.1 °C)  Pulse:  [] 97  Resp:  [16-37] 25  SpO2:  [72 %-100 %] 100 %  BP: ()/() 82/55  Arterial Line BP: ()/(56-87) 87/61     Weight: 50.7 kg (111 lb 12.4 oz)  Body mass index is 15.16 kg/m².     SpO2: 100 %  O2 Device (Oxygen Therapy): ventilator      Intake/Output Summary (Last 24 hours) at 6/18/2019 1035  Last data filed at 6/18/2019 0947  Gross per 24 hour   Intake 3040.66 ml   Output 626 ml   Net 2414.66 ml       Lines/Drains/Airways     Central Venous Catheter Line                 Percutaneous Central Line Insertion/Assessment - triple lumen  06/17/19 1700 right internal jugular less than 1 day          Drain                 Urethral Catheter 06/17/19 1414 Straight-tip 16 Fr. less than 1 day          Airway                 Airway - Non-Surgical 06/17/19 1048 Nasal Cannula less than 1 day         Airway - Non-Surgical 06/17/19 1324 Endotracheal Tube less than 1 day          Arterial Line                 Arterial Line 06/17/19 1821 Right Radial less than 1 day          Peripheral Intravenous Line                 Peripheral IV - Single Lumen 06/17/19 0652 20 G Anterior;Distal;Left Forearm 1 day         Peripheral IV - Single Lumen 06/17/19 1300 22 G Right Forearm less than 1 day                Physical Exam   Constitutional: He appears cachectic. He appears toxic. He has a sickly appearance. No distress.   Cardiovascular: Normal rate and regular rhythm. Exam reveals no gallop.   No murmur heard.  Pulmonary/Chest: Effort normal and breath sounds normal. No respiratory distress. He has no wheezes.   Abdominal: Soft. Bowel sounds are normal. He exhibits no  distension. There is no tenderness.   Neurological:   Intubated and sedated    Skin: Skin is warm and dry.       Significant Labs:     Recent Labs   Lab 06/18/19  0346   *   K 3.8      CO2 16*   BUN 10   CREATININE 0.7   MG 1.6     Recent Labs   Lab 06/18/19  0346   CALCIUM 7.1*   PROT 4.7*   *   K 3.8   CO2 16*      BUN 10   CREATININE 0.7   ALKPHOS 145*   ALT 28   AST 44*   BILITOT 0.7     Recent Labs   Lab 06/18/19  0346 06/18/19  0738   WBC 37.96*  --    RBC 3.69*  --    HGB 10.2*  10.2* 9.5*   HCT 30.3*  30.3* 28.5*     --    MCV 82  --    MCH 27.6  --    MCHC 33.7  --      Recent Labs     06/17/19  2246   PH 7.395   PCO2 27.3*   PO2 104*   HCO3 16.7*   POCSATURATED 98   BE -8       Significant Imaging:     TTE 6/17/2019    · Normal left ventricular systolic function. The estimated ejection fraction is 55%  · Normal right ventricular systolic function.  · Indeterminate left ventricular diastolic function.  · Limited echo windows

## 2019-06-18 NOTE — EICU
Notified of code that occurred earlier today    57 y/o M ETOH cirrhosis presented on 6/10 due to non-healing wound right foot s/p arthrectomy and PTA.  He is on piperacillin-tazobactam and vancomyin for gangrene and superimposed infection and elevated WBC, IDS following.  He had a drop in his H/H and underwent EGD but suffered a PEA arrest and is now intubated.  Patient is now requiring pressors and his hypothermic.  CXR shows right sided infiltrate.    Camera assessment:  Intubated , rate 18, FiO2 50%, PEEP 5, peak pressure 20  Sedated on Precedex    Telemetry:  /81  HR 79 O2 100% on phenylephrine    Data:  Results for WAYNE PARKS (MRN [id number]) as of 6/18/2019 02:41   6/17/2019 13:45 6/17/2019 13:58 6/17/2019 17:30 6/17/2019 21:29   WBC 26.41 (H)      RBC 2.78 (L)      Hemoglobin 7.6 (L) 15.4 12.5 (L) 12.4 (L)   Hematocrit 23.4 (L) 46.0 36.1 (L) 36.4 (L)   Platelets 295  Na 131, K 3.7, creatinine 0.7, magnesium 1.3, calcium 6.6 (corrected to 9)  Lactate 3 from 4.5  EKG QTc 508  AST 60 ALT 33,     · Chemistries ordered tonight, results above.  Will correct magnesium as with prolonged QT  · Anuric with no response to fluid bolus ordered, will give albumin as patient with evidence of third spacing  · Right sided infiltrate on CXR, on vancomycin and piperacillin-tazobactam, consider escalating if without clinical improvement.  Should also cover for possible SBP

## 2019-06-18 NOTE — PROGRESS NOTES
Dr. Arevalo was notified of patient's anuric, but his bun and cr are normal. He was updated on patient receiving 500cc bolus of NS and albumin 25% x1 due to albumin level of 1. Also, patient is drooling bright red blood. Unsure if it was caused by oral suctioning him or due to his esophogeal varices. Moderate amount of bright red blood noted Patient is not on PPI or Vivien. Per MD to continue to monitor for now and will consult palliative care in morning.

## 2019-06-18 NOTE — ASSESSMENT & PLAN NOTE
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 S/p EGD 6/17 with severe erosive esophagitis and actively bleeding duodenal varix that is not amenable to endoscopic therapy. The patient experienced cardiopulmonary instability following procedure resulting in emergent intubation and ICU transfer.  - IR consulted for angiography and embolization of suspected duodenal varix when stable. Note reviewed, noted plan for multiphase CT when amenable.  - resuscitation as needed in interim and as underway by ICU service  - recommend also surgical consultation for review for any options they may have to offer patient.  - PPI BID (for indications of severe erosive esophagitis and ICU patient; will not treat etiology above)  - following

## 2019-06-18 NOTE — CONSULTS
"U Pulmonology Consult - Resident Note    Primary Attending Physician: Irina  Primary Team: [facility]  Consultant Attending: Rush  Consultant Fellow: Adrian  Consultant Resident: Salima     Reason for Consult:     Vent management     Subjective:      History of Present Illness:  Sachin Calloway is a 58 y.o. male who  has a past medical history of Alcohol abuse, Cirrhosis, Edema, Hypertension, severe PA, Seizures, and Stricture esophagus.. The patient presented to the [facility] on 6/10/2019 with a primary complaint of No chief complaint on file.    Patient admitted on 6/10 for elective LE angio due to nonhealing right foot wound. Cath lab intervention performed 6/11. ID has been on board for antibiotic coverage of foot wound, has been on vanc and zosyn. H/H 9.7/29.7 on admission with progressive decline to 6.9/22.0 on 6/16 requiring transfusion. Anemia thought to be 2/2 multiple etiologies (gangrenous wound, poor nutritional status, GI losses, liver disease). Patient evaluated for GI bleeding with EGD/colonoscopy 6/17. Patient coded during endoscopy, reported to be asystole, received epi x2, atropine x1, and sodium bicarb x2 with ROSC, sinus tach. Possibly 2/2 sedation during procedure. Was noted to have "dark blood" per rectum during code, and was transfused 2u RBC. Transferred to ICU yesterday. Has been oliguric since transfer. This morning was noted to have "projectile vomiting of bright red blood with big clots" and bright red blood with oral suctioning. Patient is currently on phenylephrine for hypotension and precedex for sedation. Pulmonology is consulted for vent management post arrest.      Past Medical History:  Past Medical History:   Diagnosis Date    Alcohol abuse     Cirrhosis     Edema     Hypertension     Seizures     Stricture esophagus        Past Surgical History:  Past Surgical History:   Procedure Laterality Date    Angiogram, Lower " Arterial, Bilateral Bilateral 6/10/2019    Performed by Fabien Gonzales MD at [facility] CATH LAB/EP    Aortogram, Abdominal N/A 6/10/2019    Performed by [facility]ien Gonzales MD at [facility] CATH LAB/EP    Atherectomy, Peripheral Blood Vessel N/A 6/11/2019    Performed by Fabien Gonzales MD at [facility] CATH LAB/EP    COLONOSCOPY      COLONOSCOPY N/A 5/5/2015    Performed by Mario Martínez MD at [facility] ENDO    EGD (ESOPHAGOGASTRODUODENOSCOPY) N/A 5/13/2019    Performed by Dillan Salinas MD at [facility] ENDO    EGD with dilitation  03/04/2019    ESOPHAGOGASTRODUODENOSCOPY (EGD) N/A 3/29/2019    Performed by Dillan Salinas MD at [facility] ENDO    ESOPHAGOGASTRODUODENOSCOPY (EGD) N/A 3/18/2019    Performed by Omer Pritchard MD at [facility] ENDO    ESOPHAGOGASTRODUODENOSCOPY (EGD) N/A 3/4/2019    Performed by Madhu Schmidt MD at [facility] ENDO    ESOPHAGOGASTRODUODENOSCOPY (EGD) N/A 5/5/2015    Performed by Mario Martínez MD at [facility] ENDO    Filter Protection, Peripheral  6/11/2019    Performed by Fabien Gonzales MD at [facility] CATH LAB/EP    PTA, PERIPHERAL VESSEL N/A 6/11/2019    Performed by Fabien Gonzales MD at [facility] CATH LAB/EP    PTA, Peroneal  6/11/2019    Performed by Fabien Gonzales MD at [facility] CATH LAB/EP    PTA, Superficial Femoral Artery  6/11/2019    Performed by Fabien Gonzales MD at [facility] CATH LAB/EP    ULTRASOUND, INTRAVASCULAR N/A 6/11/2019    Performed by Fabien Gonzales MD at [facility] CATH LAB/EP       Allergies:  Review of patient's allergies indicates:  No Known Allergies    Medications:   In-Hospital Scheduled Medications:   atorvastatin  20 mg Oral Daily    chlorhexidine  15 mL Mouth/Throat BID    collagenase   Topical (Top) Daily    famotidine (PF)  20 mg Intravenous BID    piperacillin-tazobactam (ZOSYN) IVPB  4.5 g Intravenous Q8H    potassium chloride 10%  20 mEq Oral Daily    vancomycin (VANCOCIN) IVPB  750 mg Intravenous Q24H      In-Hospital PRN Medications:  sodium chloride, acetaminophen,  aluminum-magnesium hydroxide-simethicone, ondansetron, ondansetron, oxyCODONE, polyethylene glycol, simethicone   In-Hospital IV Infusion Medications:   dexmedetomidine (PRECEDEX) infusion 0.5 mcg/kg/hr (19 041)    phenylephrine 2.5 mcg/kg/min (19 19)      Home Medications:  Prior to Admission medications    Medication Sig Start Date End Date Taking? Authorizing Provider   acetaminophen-codeine 300-30mg (TYLENOL #3) 300-30 mg Tab Take by mouth.   Yes Historical Provider, MD   furosemide (LASIX) 80 MG tablet Take 1 tablet (80 mg total) by mouth once daily. 19 Yes Juan Gatica MD   pantoprazole (PROTONIX) 40 MG tablet Take 1 tablet (40 mg total) by mouth once daily. 19 Yes Dillan Salinas MD   potassium chloride SA (K-DUR,KLOR-CON) 20 MEQ tablet Take 1 tablet (20 mEq total) by mouth once daily. 19  Yes Juan Gatica MD   spironolactone (ALDACTONE) 50 MG tablet Take 1 tablet (50 mg total) by mouth once daily. 5/15/19 8/7/20 Yes Juan Gatica MD       Family History:  Family History   Problem Relation Age of Onset    Cancer Father        Social History:  Social History     Tobacco Use    Smoking status: Current Every Day Smoker     Packs/day: 0.50     Years: 43.00     Pack years: 21.50     Types: Cigarettes     Start date:     Smokeless tobacco: Never Used    Tobacco comment: Pt enrolled in Tobacco [facility].  Ambulatory referral to Smoking Cessation program.   Substance Use Topics    Alcohol use: Yes     Comment: 6 pack every day (3-4)    Drug use: No       Review of Systems:  Pertinent items are noted in HPI. All other systems are reviewed and are negative.     Objective:   Last 24 Hour Vital Signs:  BP  Min: 76/59  Max: 182/118  Temp  Av.6 °F (33.7 °C)  Min: 90.7 °F (32.6 °C)  Max: 97.7 °F (36.5 °C)  Pulse  Av.8  Min: 74  Max: 130  Resp  Av  Min: 16  Max: 37  SpO2  Av.4 %  Min: 72 %  Max: 100 %  Weight  Av.7 kg (111 lb 12.4 oz)   Min: 50.7 kg (111 lb 12.4 oz)  Max: 50.7 kg (111 lb 12.4 oz)  I/O last 3 completed shifts:  In: 5096.8 [P.O.:2030; I.V.:1166.8; IV Piggyback:1900]  Out: 1426 [Urine:1100; Emesis/NG output:1; Other:325]    Physical Examination:  BP 90/68   Pulse 92   Temp (!) 95.2 °F (35.1 °C)   Resp (!) 25   Ht 6' (1.829 m)   Wt 50.7 kg (111 lb 12.4 oz)   SpO2 100%   BMI 15.16 kg/m²     General Appearance:    Cachectic, intubated, appears comfortable    Head:    Temporal wasting bilaterally, atraumatic   Eyes:    PERRL, conjunctiva/corneas clear, arcus cornealis bilaterally       Throat:   ETT in place, dried blood present on lips   Neck:   Supple, symmetrical, trachea midline   Lungs:     Clear to auscultation bilaterally, sounds of vent, no wheeze or crackles noted    Heart:    Regular rate and rhythm, S1 and S2 appreciated, no murmur appreciated   Abdomen:     tense, non-tender, tympanic, bowel sounds not appreciated    Extremities:   Extremities thin, atraumatic, edema present   Skin:   Skin color, texture, turgor normal, no rashes or lesions   Neurologic:   Awakens to voice, follows commands, moves all extremities         Laboratory Results:  Trended Lab Data:  Recent Labs   Lab 06/11/19  1719  06/14/19  0506  06/17/19  0905 06/17/19  1345  06/17/19  1730 06/17/19  2129 06/17/19  2222 06/18/19  0346   WBC  --    < > 15.48*   < > 18.75* 26.41*  --   --   --   --  37.96*   HGB  --    < > 7.1*   < > 9.5* 7.6*   < > 12.5* 12.4*  --  10.2*  10.2*   HCT  --    < > 22.8*   < > 28.7* 23.4*   < > 36.1* 36.4*  --  30.3*  30.3*   PLT  --    < > 312   < > 352* 295  --   --   --   --  300   MCV  --    < > 79*   < > 83 84  --   --   --   --  82   RDW  --    < > 17.9*   < > 19.2* 19.4*  --   --   --   --  16.5*      < > 135*  --   --   --   --   --   --  131* 131*   K 4.4   < > 4.1  --   --   --   --   --   --  3.7 3.8      < > 105  --   --   --   --   --   --  106 104   CO2 22*   < > 24  --   --   --   --   --   --  19*  16*   BUN 13   < > 11  --   --   --   --   --   --  10 10   GLU 82   < > 89  --   --   --   --   --   --  143* 162*   PROT 5.8*  --   --   --   --   --   --   --   --  4.4* 4.7*   ALBUMIN 1.4*  --   --   --   --   --   --   --   --  1.0* 1.6*   BILITOT 1.1*  --   --   --   --   --   --   --   --  0.6 0.7   AST 35  --   --   --   --   --   --   --   --  60* 44*   ALKPHOS 280*  --   --   --   --   --   --   --   --  163* 145*   ALT 33  --   --   --   --   --   --   --   --  33 28    < > = values in this interval not displayed.       Trended Cardiac Data:  Recent Labs   Lab 06/17/19  1345 06/17/19  1405 06/17/19  1407   TROPONINI 0.125*  --  0.112*   BNP  --  295*  --        Microbiology Data:  Microbiology Results (last 7 days)     Procedure Component Value Units Date/Time    Blood culture [[id number]] Collected:  06/17/19 2247    Order Status:  Sent Specimen:  Blood from Line, Arterial, Right Updated:  06/18/19 0412    Blood culture [[id number]] Collected:  06/17/19 2247    Order Status:  Sent Specimen:  Blood from Line, Jugular, Internal Right Updated:  06/18/19 0412    Blood culture [[id number]] Collected:  06/12/19 0814    Order Status:  Completed Specimen:  Blood Updated:  06/17/19 1212     Blood Culture, Routine No growth after 5 days.    Blood culture [[id number]] Collected:  06/12/19 0815    Order Status:  Completed Specimen:  Blood Updated:  06/17/19 1212     Blood Culture, Routine No growth after 5 days.            Radiology:  X-ray Chest Ap Portable    Result Date: 6/17/2019  EXAMINATION: XR CHEST AP PORTABLE CLINICAL HISTORY: central line; TECHNIQUE: Single frontal view of the chest was performed. COMPARISON: 06/17/2019 FINDINGS: Endotracheal tube tip lies approximately 4.1 cm above the arianne.  Right central venous catheter tip projects over the distal SVC.  Defibrillator pads noted.  There is no pneumothorax or significant interval detrimental change in the cardiopulmonary status since the previous exam.      As above Electronically signed by: Leonardo Kirkland MD Date:    06/17/2019 Time:    17:42    X-ray Chest Ap Portable    Result Date: 6/17/2019  EXAMINATION: XR CHEST AP PORTABLE CLINICAL HISTORY: Evaluate for pneumoperitoneum/ ET Tube placement; Alcoholic cirrhosis of liver with ascites TECHNIQUE: Single frontal view of the chest was performed. COMPARISON: March 2015. FINDINGS: ET tube above the arianne.  Monitoring leads and defibrillator pads noted.  Heart size is normal.  Mild accentuation of the pulmonary vascular and interstitial markings.  Some patchy increased opacity right lower lung field.  No pneumothorax. Images of the upper abdomen demonstrate multiple air distended loops of bowel.     Patchy parenchymal opacity right lower lung field.  Developing infiltrate not excluded.  There is diffuse accentuation of the interstitial markings. Multiple air distended loops of bowel in the abdomen.  If further evaluation for pneumoperitoneum desired and erect radiograph, cross-table lateral or lateral decubitus film could be obtained for more thorough evaluation. Electronically signed by: Juan Mcdowell MD Date:    06/17/2019 Time:    14:20     Assessment:     Sachin Calloway is a 58 y.o. male with:  Patient Active Problem List    Diagnosis Date Noted    Cardiac arrest 06/17/2019    Severe protein-calorie malnutrition 06/12/2019    Hypoalbuminemia 06/12/2019    Gangrene of right foot 06/11/2019    Sacral decubitus ulcer 06/11/2019    Pressure ulcer of left hip, stage 3 06/11/2019    Critical lower limb ischemia 06/10/2019    Panlobular emphysema 05/14/2019    Anasarca 05/14/2019    Esophageal stricture 05/13/2019    Alcoholic cirrhosis of liver with ascites 05/02/2019    Anemia 05/02/2019    Esophageal stenosis 03/29/2019    Dysphagia 05/05/2015    Seizure disorder 03/25/2015    Clavicle fracture 03/23/2015        Patient admitted on 6/10 for elective LE angio due to nonhealing right foot wound. Cath  "lab intervention performed 6/11. ID has been on board for antibiotic coverage of foot wound, has been on vanc and zosyn. H/H 9.7/29.7 on admission with progressive decline to 6.9/22.0 on 6/16 requiring transfusion. Anemia thought to be 2/2 multiple etiologies (gangrenous wound, poor nutritional status, GI losses, cirrhosis). Patient evaluated for GI bleeding with EGD/colonoscopy 6/17. Patient coded during endoscopy, reported to be asystole, received epi x2, atropine x1, and sodium bicarb x2 with ROSC, sinus tach. Possibly 2/2 sedation during procedure. Was noted to have "dark blood" per rectum during code, and was transfused 2u RBC. Noted to have severe erosive esophagitis, actively bleeding duodenal varix in EGD (not amenable to endoscopic therapy). Transferred to ICU 6/17. Has been oliguric since code. Hematemesis this morning. Pulmonology consulted for vent management.     Plan:     Neuro:  - patient currently sedated with precedex for comfort  - following commands and moves extremities   - goal RASS -1     CV:  - s/p cardiac arrest 6/17 during EGD  asystole   - Echo 6/17 with good EF and no WMAs noted   - shock requiring vasopressor, presumably was hypovolemic etiology given GI bleeding  currently on 2.5 phenylephrine, wean as tolerated   - lactic acidosis multifactorial in this patient with liver disease     Resp:   - intubated and ventilated post-code   - doing well on pressure support this morning  - will keep ventilated as there are plans for patient to go for further procedures to address his GI bleeding     FEN/GI:  - appears to be volume resuscitated this morning   - actively bleeding duodenal varix with adherent clot noted on EGD   - IR consulted for angiography and embolization  TIPS procedure being discussed   - severe malnutrition with sacral and hip decubitus ulcer  plan to start tube feeds when possible     ID  - ID has been following for right foot wound/gangrene  - has been on vanc/zosyn " since admission    Heme  - anemia multifactorial  baseline ~9-10  - acute drop 2/2 GI bleeding as noted above  - s/p 2u RBC post code   - INR 1.2 today     Renal  - Cr stable 0.7  - oliguric post code  - continue to monitor    Thank you for allowing us to participate in the care of this patient.     Aguilar LINDSEY Dearing  [facility] Internal Medicine \[facility] [place] [facility]\""III  [facility] Pulmonology Service    Pt seen and examined with Pulmonary/Critical Care team. Critical Care time was spent validating the history and physical exam, reviewing the lab and imaging results, and discussing the care of the patient with the bedside nurse. The following additional comments are made:    Active GI bleeding, ongoing vasopressor dependent shock, volume overload, acute respiratory failure. My bedside echo shows good LV function and adequate filling. Furthermore, pt is making urine and we do have a location for his bleeding and a plan to manage it.    Critical Care time 45 minutes    Mikey Godfrey MD  Phone [phone]

## 2019-06-18 NOTE — ASSESSMENT & PLAN NOTE
-H&H 7.1&22.8 6/14 &6/15 with trend down to 6.9&22.0 6/16  -transfused with 2U PRBCs with improvement in H&H to 10.4&32.0 yesterday with slight drop to 9.5 prior to EGD yesterday  -EGD yesterday with following results:   LA Grade D (one or more mucosal breaks involving at least 75% of esophageal circumference) esophagitis with no bleeding was found in the lower third of the esophagus.   A deformity was found in the prepyloric region of the stomach.   Red blood was found in the gastric body and duodenum.   Active bleeding with adherent clot was found in the third portion of the duodenum associated with a duodenal varix.  -additional 2units PRBCs given 6/17 post cardiac arrest  -serial H&H overnight with H&H 12.5&36.1 with trend down to 10.2&30.3 this AM    -baseline 9-10/29-30 since early 2018  -GI on board; IR consulted with plan for IR/TIPS procedure today   -will hold ASA/Plavix for now

## 2019-06-18 NOTE — PROGRESS NOTES
Spoke with Dr. Villavicencio regarding the issues that was stated to Dr. Arevalo. No bmp or cmp was done for the past 3 days to evaluate kidney function. Lactic acid for today was 4.5, received only 500cc of fluids during the day with EF of 55%. No bld culture was obtained during the day, last one was done on the 6/12. Temp of 91.6, hypotensive, on esdras, cvp between 3-5, wbc of 26. Concerned for septic. Requested for bolus and interventions to be done. Also, if it is okay to do the prn abg as the wave form for Spo2 is not accurate since the patient is so hypothermic.

## 2019-06-18 NOTE — ANESTHESIA PREPROCEDURE EVALUATION
06/18/2019  Sachin Calloway is a 58 y.o., male.    Anesthesia Evaluation    I have reviewed the Patient Summary Reports.    I have reviewed the Nursing Notes.   I have reviewed the Medications.     Review of Systems  Anesthesia Hx:  No problems with previous Anesthesia   Denies Personal Hx of Anesthesia complications.   Social:  Alcohol Use, No Alcohol Use    Cardiovascular:   Hypertension PVD    Pulmonary:   COPD    Hepatic/GI:   Bowel Prep. Liver Disease,    Musculoskeletal:   Gangrene vs osteo of foot   Neurological:   Seizures    Psych:   Psychiatric History          Physical Exam  General:  Cachexia    Airway/Jaw/Neck:  Airway Findings: Pre-Existing Airway Tube(s): Oral Endotracheal tube      Chest/Lungs:  Chest/Lungs Findings: Clear to auscultation, Normal Respiratory Rate         Mental Status:  Mental Status Findings:         Anesthesia Plan  Type of Anesthesia, risks & benefits discussed:  Anesthesia Type:  general  Patient's Preference:   Intra-op Monitoring Plan:   Intra-op Monitoring Plan Comments:   Post Op Pain Control Plan: multimodal analgesia  Post Op Pain Control Plan Comments:   Induction:   IV  Beta Blocker:         Informed Consent: Patient representative understands risks and agrees with Anesthesia plan.  Questions answered. Anesthesia consent signed with patient representative.  ASA Score: 4     Day of Surgery Review of History & Physical:  There are no significant changes.          Ready For Surgery From Anesthesia Perspective.

## 2019-06-18 NOTE — PROGRESS NOTES
ETT tube initially assessed 23 cm at the lip 1913. ETT tube reassessed 25 cm at the lip 0557. Pt has been vomiting bright red blood with clots. Nurse at bedside with continuous oral suction via yankauer. Unable to reposition ETT tube due to pts current status. Nurse notified. Will to continue to monitor.

## 2019-06-18 NOTE — PROCEDURES
Radiology Post Procedure Note:     Procedure: DIPS (Direct Intrahepatic PortoCaval Shunt Placement), SMA and Celiac Arteriogram, Large volume paracentesis    (s): Estephania    Blood Loss: Minimal    Specimen: Large volume bloody ascites    Findings:   Patient came to IR and under imaging guidance had the right common femoral artery accessed and celiac and SMA arteriograms performed with no evidence of contrast extravasation or pseudoaneurysm. Right groin closed with Mynx Device.     Next, right IJ was accessed and a 10 F sheath placed. Right hepatic vein accessed and became apparent the angle was poorly suited for conventional TIPS placement and  DIPS was placed. Portal vein target was placed via transhepatic approach.  DSA imaging demonstrated brisk flow through the shunt at the conclusion of the procedure. Likely culprit varix was identified however appeared to begin to decompress after stent placement    Right transhepatic access closed with gelfoam slurry.     Patient needs to be monitored closely. If continues to have evidence of bleeding may need additional variceal embolization.       Toi JETER M.D. personally reviewed and agree with the above dictated note.

## 2019-06-18 NOTE — PROGRESS NOTES
Bladder scanned the patient and it showed 705cc of urine. Dr. Arevalo was made aware that can be a false reading since patient has abdominal fluids. Reed was irrigated and no UO noted.

## 2019-06-18 NOTE — ASSESSMENT & PLAN NOTE
-WBCs up to 26K post arrest and up to 37K this AM  -hypothermic overnight  -initial blood cultures from 6/12 with NGTD; repeat blood cultures with NGTD  -remains on IV Zosyn and Vanc and will continue for now  -ID on board and case discussed with ID fellow this AM; source of leukocytosis and multifactoral with combination of stress response and infectious etiology; ID questions if abdomen potential source of infection but unable to evaluate given critical status currently

## 2019-06-18 NOTE — ASSESSMENT & PLAN NOTE
-PEA arrest preceded by SB at completion of EGD yesterday  -responded to few rounds of Epi and ROSC after ACLS/CPR  -intubated and admitted to ICU  -initial pH 6.9 with improvement 7.2-7.3 with vent and bicarb  -etiology likely hypoxia related  -on Aaron for BP support and will continue for now; Pulmonary on board for vent management with no plans for extubation as of now given plan for IR/TIPS procedure

## 2019-06-18 NOTE — PROGRESS NOTES
Spoke with Dr. Arevalo regarding H&H due every 2 hrs to be changed to q4hrs as it did not changed much.  Per MD to take verbal order. Order repeat and was verified.

## 2019-06-18 NOTE — ANESTHESIA PROCEDURE NOTES
Central Line    Diagnosis: Hypotension   Patient location during procedure: ICU  Procedure start time: 6/17/2019 5:01 PM  Timeout: 6/17/2019 5:00 PM  Procedure end time: 6/17/2019 5:11 PM  Staffing  Anesthesiologist: Chris Shore MD  Resident/CRNA: Sung Dimas MD  Performed: resident/CRNA   Anesthesiologist was present at the time of the procedure.  Preanesthetic Checklist  Completed: patient identified, site marked, surgical consent, pre-op evaluation, timeout performed, IV checked, risks and benefits discussed, monitors and equipment checked and anesthesia consent given  Indication   Indication: hemodynamic monitoring, vascular access, med administration     Anesthesia   local infiltration    Central Line   Skin Prep: skin prepped with ChloraPrep, skin prep agent completely dried prior to procedure  maximum sterile barriers used during central venous catheter insertion  hand hygiene performed prior to central venous catheter insertion  Location: right, internal jugular.   Catheter type: triple lumen  Inserted Catheter Length: 16 cm  Ultrasound: vascular probe with ultrasound  Vessel Caliber: patent  Manometry: none  Insertion Attempts: 1   Securement:line sutured and sterile dressing applied    Post-Procedure   X-Ray: no pneumothorax on x-ray, placement verified by x-ray and successful placement  Adverse Events:none    Guidewire Guidewire removed intact. Guidewire removed intact, verified with nurse.

## 2019-06-18 NOTE — PT/OT/SLP PROGRESS
Occupational Therapy  Missed Visit    Patient Name:  Sachin Calloway   MRN:  [id number]    Patient not seen today secondary to  LINDA for EGD. Will follow-up .    Michael Pryor OT  6/18/2019

## 2019-06-19 ENCOUNTER — TELEPHONE (OUTPATIENT)
Dept: FAMILY MEDICINE | Facility: CLINIC | Age: 59
End: 2019-06-19

## 2019-06-19 LAB
ALBUMIN SERPL BCP-MCNC: 1.7 G/DL (ref 3.5–5.2)
ALP SERPL-CCNC: 216 U/L (ref 55–135)
ALT SERPL W/O P-5'-P-CCNC: 167 U/L (ref 10–44)
ANION GAP SERPL CALC-SCNC: 8 MMOL/L (ref 8–16)
ANISOCYTOSIS BLD QL SMEAR: SLIGHT
AST SERPL-CCNC: 383 U/L (ref 10–40)
BASOPHILS # BLD AUTO: 0.05 K/UL (ref 0–0.2)
BASOPHILS NFR BLD: 0.3 % (ref 0–1.9)
BILIRUB SERPL-MCNC: 1 MG/DL (ref 0.1–1)
BUN SERPL-MCNC: 9 MG/DL (ref 6–20)
CALCIUM SERPL-MCNC: 7 MG/DL (ref 8.7–10.5)
CHLORIDE SERPL-SCNC: 108 MMOL/L (ref 95–110)
CO2 SERPL-SCNC: 19 MMOL/L (ref 23–29)
CREAT SERPL-MCNC: 0.8 MG/DL (ref 0.5–1.4)
DIFFERENTIAL METHOD: ABNORMAL
EOSINOPHIL # BLD AUTO: 0 K/UL (ref 0–0.5)
EOSINOPHIL NFR BLD: 0 % (ref 0–8)
ERYTHROCYTE [DISTWIDTH] IN BLOOD BY AUTOMATED COUNT: 15.9 % (ref 11.5–14.5)
EST. GFR  (AFRICAN AMERICAN): >60 ML/MIN/1.73 M^2
EST. GFR  (NON AFRICAN AMERICAN): >60 ML/MIN/1.73 M^2
GLUCOSE SERPL-MCNC: 102 MG/DL (ref 70–110)
HCT VFR BLD AUTO: 29.3 % (ref 40–54)
HCT VFR BLD AUTO: 29.7 % (ref 40–54)
HCT VFR BLD AUTO: 31.4 % (ref 40–54)
HCT VFR BLD AUTO: 33.1 % (ref 40–54)
HCT VFR BLD AUTO: 33.1 % (ref 40–54)
HCT VFR BLD AUTO: 33.2 % (ref 40–54)
HCT VFR BLD AUTO: 34.2 % (ref 40–54)
HGB BLD-MCNC: 10 G/DL (ref 14–18)
HGB BLD-MCNC: 10 G/DL (ref 14–18)
HGB BLD-MCNC: 10.7 G/DL (ref 14–18)
HGB BLD-MCNC: 11.2 G/DL (ref 14–18)
HGB BLD-MCNC: 11.7 G/DL (ref 14–18)
HYPOCHROMIA BLD QL SMEAR: ABNORMAL
LACTATE SERPL-SCNC: 2.4 MMOL/L (ref 0.5–2.2)
LYMPHOCYTES # BLD AUTO: 1.5 K/UL (ref 1–4.8)
LYMPHOCYTES NFR BLD: 7.7 % (ref 18–48)
MAGNESIUM SERPL-MCNC: 1.7 MG/DL (ref 1.6–2.6)
MCH RBC QN AUTO: 27.7 PG (ref 27–31)
MCHC RBC AUTO-ENTMCNC: 33.8 G/DL (ref 32–36)
MCV RBC AUTO: 82 FL (ref 82–98)
MONOCYTES # BLD AUTO: 1.5 K/UL (ref 0.3–1)
MONOCYTES NFR BLD: 8 % (ref 4–15)
NEUTROPHILS # BLD AUTO: 16 K/UL (ref 1.8–7.7)
NEUTROPHILS NFR BLD: 84 % (ref 38–73)
PLATELET # BLD AUTO: 190 K/UL (ref 150–350)
PLATELET BLD QL SMEAR: ABNORMAL
PMV BLD AUTO: 10.1 FL (ref 9.2–12.9)
POCT GLUCOSE: 148 MG/DL (ref 70–110)
POCT GLUCOSE: 42 MG/DL (ref 70–110)
POCT GLUCOSE: 66 MG/DL (ref 70–110)
POCT GLUCOSE: 69 MG/DL (ref 70–110)
POCT GLUCOSE: 79 MG/DL (ref 70–110)
POTASSIUM SERPL-SCNC: 3.6 MMOL/L (ref 3.5–5.1)
PROT SERPL-MCNC: 4.2 G/DL (ref 6–8.4)
RBC # BLD AUTO: 4.04 M/UL (ref 4.6–6.2)
SODIUM SERPL-SCNC: 135 MMOL/L (ref 136–145)
WBC # BLD AUTO: 19.19 K/UL (ref 3.9–12.7)

## 2019-06-19 PROCEDURE — 99233 PR SUBSEQUENT HOSPITAL CARE,LEVL III: ICD-10-PCS | Mod: GC,,, | Performed by: STUDENT IN AN ORGANIZED HEALTH CARE EDUCATION/TRAINING PROGRAM

## 2019-06-19 PROCEDURE — 27200966 HC CLOSED SUCTION SYSTEM

## 2019-06-19 PROCEDURE — 97803 MED NUTRITION INDIV SUBSEQ: CPT

## 2019-06-19 PROCEDURE — 85025 COMPLETE CBC W/AUTO DIFF WBC: CPT

## 2019-06-19 PROCEDURE — 80053 COMPREHEN METABOLIC PANEL: CPT

## 2019-06-19 PROCEDURE — 20000000 HC ICU ROOM

## 2019-06-19 PROCEDURE — C9113 INJ PANTOPRAZOLE SODIUM, VIA: HCPCS | Performed by: INTERNAL MEDICINE

## 2019-06-19 PROCEDURE — 25000003 PHARM REV CODE 250: Performed by: NURSE PRACTITIONER

## 2019-06-19 PROCEDURE — 27000221 HC OXYGEN, UP TO 24 HOURS

## 2019-06-19 PROCEDURE — 99233 SBSQ HOSP IP/OBS HIGH 50: CPT | Mod: GC,,, | Performed by: STUDENT IN AN ORGANIZED HEALTH CARE EDUCATION/TRAINING PROGRAM

## 2019-06-19 PROCEDURE — 99233 SBSQ HOSP IP/OBS HIGH 50: CPT | Mod: ,,, | Performed by: STUDENT IN AN ORGANIZED HEALTH CARE EDUCATION/TRAINING PROGRAM

## 2019-06-19 PROCEDURE — 63600175 PHARM REV CODE 636 W HCPCS: Performed by: INTERNAL MEDICINE

## 2019-06-19 PROCEDURE — 25000003 PHARM REV CODE 250: Performed by: INTERNAL MEDICINE

## 2019-06-19 PROCEDURE — 25000003 PHARM REV CODE 250: Performed by: STUDENT IN AN ORGANIZED HEALTH CARE EDUCATION/TRAINING PROGRAM

## 2019-06-19 PROCEDURE — 99233 PR SUBSEQUENT HOSPITAL CARE,LEVL III: ICD-10-PCS | Mod: ,,, | Performed by: STUDENT IN AN ORGANIZED HEALTH CARE EDUCATION/TRAINING PROGRAM

## 2019-06-19 PROCEDURE — 83735 ASSAY OF MAGNESIUM: CPT

## 2019-06-19 PROCEDURE — 85018 HEMOGLOBIN: CPT | Mod: 91

## 2019-06-19 PROCEDURE — 99900035 HC TECH TIME PER 15 MIN (STAT)

## 2019-06-19 PROCEDURE — 25000003 PHARM REV CODE 250

## 2019-06-19 PROCEDURE — 94761 N-INVAS EAR/PLS OXIMETRY MLT: CPT

## 2019-06-19 PROCEDURE — 83605 ASSAY OF LACTIC ACID: CPT

## 2019-06-19 PROCEDURE — 63600175 PHARM REV CODE 636 W HCPCS: Performed by: STUDENT IN AN ORGANIZED HEALTH CARE EDUCATION/TRAINING PROGRAM

## 2019-06-19 PROCEDURE — 94003 VENT MGMT INPAT SUBQ DAY: CPT

## 2019-06-19 PROCEDURE — 63600175 PHARM REV CODE 636 W HCPCS: Performed by: NURSE PRACTITIONER

## 2019-06-19 PROCEDURE — 85014 HEMATOCRIT: CPT

## 2019-06-19 PROCEDURE — 36415 COLL VENOUS BLD VENIPUNCTURE: CPT

## 2019-06-19 RX ORDER — POTASSIUM CHLORIDE 29.8 MG/ML
40 INJECTION INTRAVENOUS ONCE
Status: COMPLETED | OUTPATIENT
Start: 2019-06-19 | End: 2019-06-19

## 2019-06-19 RX ORDER — GLUCAGON 1 MG
1 KIT INJECTION
Status: DISCONTINUED | OUTPATIENT
Start: 2019-06-19 | End: 2019-06-25 | Stop reason: HOSPADM

## 2019-06-19 RX ORDER — MAGNESIUM SULFATE HEPTAHYDRATE 40 MG/ML
2 INJECTION, SOLUTION INTRAVENOUS ONCE
Status: COMPLETED | OUTPATIENT
Start: 2019-06-19 | End: 2019-06-19

## 2019-06-19 RX ORDER — DEXTROSE 50 % IN WATER (D50W) INTRAVENOUS SYRINGE
Status: COMPLETED
Start: 2019-06-19 | End: 2019-06-19

## 2019-06-19 RX ADMIN — PIPERACILLIN AND TAZOBACTAM 4.5 G: 4; .5 INJECTION, POWDER, LYOPHILIZED, FOR SOLUTION INTRAVENOUS; PARENTERAL at 12:06

## 2019-06-19 RX ADMIN — CHLORHEXIDINE GLUCONATE 0.12% ORAL RINSE 15 ML: 1.2 LIQUID ORAL at 09:06

## 2019-06-19 RX ADMIN — MAGNESIUM SULFATE IN WATER 2 G: 40 INJECTION, SOLUTION INTRAVENOUS at 03:06

## 2019-06-19 RX ADMIN — CHLORHEXIDINE GLUCONATE 0.12% ORAL RINSE 15 ML: 1.2 LIQUID ORAL at 08:06

## 2019-06-19 RX ADMIN — COLLAGENASE SANTYL: 250 OINTMENT TOPICAL at 09:06

## 2019-06-19 RX ADMIN — VANCOMYCIN HYDROCHLORIDE 750 MG: 750 INJECTION, POWDER, LYOPHILIZED, FOR SOLUTION INTRAVENOUS at 12:06

## 2019-06-19 RX ADMIN — PIPERACILLIN AND TAZOBACTAM 4.5 G: 4; .5 INJECTION, POWDER, LYOPHILIZED, FOR SOLUTION INTRAVENOUS; PARENTERAL at 08:06

## 2019-06-19 RX ADMIN — DEXTROSE MONOHYDRATE 12.5 G: 25 INJECTION, SOLUTION INTRAVENOUS at 06:06

## 2019-06-19 RX ADMIN — PHENYLEPHRINE HYDROCHLORIDE 2.5 MCG/KG/MIN: 10 INJECTION INTRAVENOUS at 06:06

## 2019-06-19 RX ADMIN — PANTOPRAZOLE SODIUM 40 MG: 40 INJECTION, POWDER, FOR SOLUTION INTRAVENOUS at 08:06

## 2019-06-19 RX ADMIN — PANTOPRAZOLE SODIUM 40 MG: 40 INJECTION, POWDER, FOR SOLUTION INTRAVENOUS at 09:06

## 2019-06-19 RX ADMIN — POTASSIUM CHLORIDE 40 MEQ: 400 INJECTION, SOLUTION INTRAVENOUS at 12:06

## 2019-06-19 RX ADMIN — PIPERACILLIN AND TAZOBACTAM 4.5 G: 4; .5 INJECTION, POWDER, LYOPHILIZED, FOR SOLUTION INTRAVENOUS; PARENTERAL at 03:06

## 2019-06-19 NOTE — PROGRESS NOTES
[facility]  Cardiology  Progress Note    Patient Name: Sachin Calloway  MRN: [id number]  Admission Date: 6/10/2019  Hospital Length of Stay: 7 days  Code Status: Prior   Attending Physician: Toi Arevalo MD   Primary Care Physician: Juan Gatica MD  Expected Discharge Date:   Principal Problem:Critical lower limb ischemia    Subjective:     Hospital Course:   6/10/2019 Admitted for elective LE angiogram due to nonhealing wound to right foot. Taken to cath lab for the procedure via right radial access with following results:    · Severe bilateral PAD with CLI of right foot  · On the right there is an ostial SFA  which is calcified and reconstitutes via profunda collaterals distally. There is single vessel runoff via peroneal which has a severe proximal stenosis and distally supplies collaterals to a plantar arch. No identifiable DP, although there is a proximal portion of AT  · On the left there is a severe stenosis at proximal SFA with mid SFA  with reconstitution distally. There is two vessel runoff via AT into a DP and peroneal which supplies collaterals to a plantar arch  · Staged intervention of LSFA and peroneal first. Will then consider staged intervention of RATA and if unsuccessful will consider AV flow reversal     6/11/2019 NPO for RLE intervention today   6/12/2019 Taken to cath lab yesterday for intervention via LCFA access with following results:    · LCFA antegrade access with crossover sheath used  · Successful RSFA  intervention with atherectomy followed by IVUS guided PTA with 4.0 balloon in POP and mid to distal SFA and 5.0 balloon in ostial and proximal SFA. This was followed by PTA with DCB to POP/mid and distal SFA with 4.0 balloon and 5.0 balloon ostial/mid SFA all with excellent results  · Successful PTA to proximal peroneal with 3.0 x 60 balloon with excellent result  · Will likely need staged intervention of AT/DP and if unsuccessful, AV flow  reversal  · Plavix/ASA/statin  · Continue aggressive wound care and management per Podiatry    Tolerated procedure well and recovered on telemetry unit. LCFA access site with no active bleeding, hematoma or ecchymosis. Podiatry consulted with plans for surgery once demarcation occurs. ID consulted with recs for blood cultures along with empiric antibiotics with IV Vanc and Zosyn   6/13/2019 H&H 7.7 & 24.3 with WBC up to 16.11 from 15.17. BMP with unchanged creatinine. Blood cultures with prelim results NGTD. HR and BP stable. Dietian consulted with recs noted-will liberalize diet and order supplements. Remains on IV Vanc and Zosyn for now. PT/OT on board   6/14/2019 WBC down slightly to 15.4 this AM. H&H down slightly to 7.1&22.8. BP stable. Episode of ST with HR up to 140s this AM nonsustained and patient asymptomatic. ASA discontinued. Will consult GI due to downtrending H&H   6/15/2019 PAD with CLI requiring intervention and likely surgery. Continue medical management. No intervention planned until patient more stable. Continue antibiotics for gangrene and possible Osteo-blood cultures with NGTD. ID and podiatry following. Anemia. GI onboard and plan EGD/Colonscopy Monday. hgb 7.3 today. Transfuse if <7.   6/16/2019 Remains on IV abx currently. MRI with no evidence of osteomyelitis. ID and podiatry following. H&H down to 6.9 & 22.0 will transfuse with 2units PRBCs. Plan for EGD/Colonscopy Monday 6/17/2019 WBCs up to 18K this AM. H&H improved to 10.4&32.0 this AM. BMP stable. NPO for EGD/colonoscopy today   6/18/2019 PEA arrest yesterday post EGD with ROSC after CPR and a few rounds of CPR. Given additional 2 units of PRBCs in Endoscopy given findings on EGD and based on discussion with GI. Intubated per anesthesia and admitted to ICU with SBP 120s-130s upon arrival. Post admission more alert and pulling at ETT therefore placed on Precedex drip. BP trended down to 90s with initiation of Neosynephrine drip. Lactic  acid 4.5 and repeat H&H 15.4&46.0 likely erroneous immediately after PRBC. Serial H&H overnight with H&H 12.5&36.1 and down to 10.2&30.3 this AM. Remains intubated-Pulmonary consulted. GI on board with plans for CT of abdomen with IR/TIPS procedure today by IR. onversation with wife per IR in regards to procedure with high risk for hemodynamic instability during the procedure although no other alternative currently and family with desire for aggressive treatment.   6/19/2019 Remains intubated on Precedex and Aaron. IR procedure yesterday with DIPS. GI note recommend surgical evaluation. Additional PRBCs yesterday. H&H this MA 11.2&33.1 with slight trend down to 10.7&31.4 later in morning. HR and BP stable on pressors. K+ 3.6 BUN 9 creatinine .8 Mg 1.7        Review of Systems   Unable to perform ROS: intubated     Objective:     Vital Signs (Most Recent):  Temp: 96.4 °F (35.8 °C) (06/19/19 1303)  Pulse: 65 (06/19/19 1303)  Resp: 18 (06/19/19 1303)  BP: 131/67 (06/19/19 1303)  SpO2: 100 % (06/19/19 1303) Vital Signs (24h Range):  Temp:  [91.6 °F (33.1 °C)-99.7 °F (37.6 °C)] 96.4 °F (35.8 °C)  Pulse:  [58-96] 65  Resp:  [9-45] 18  SpO2:  [70 %-100 %] 100 %  BP: ()/(57-87) 131/67  Arterial Line BP: ()/(31-87) 134/62     Weight: 50.7 kg (111 lb 12.4 oz)  Body mass index is 15.16 kg/m².     SpO2: 100 %  O2 Device (Oxygen Therapy): ventilator      Intake/Output Summary (Last 24 hours) at 6/19/2019 1403  Last data filed at 6/19/2019 1300  Gross per 24 hour   Intake 2125.96 ml   Output 822 ml   Net 1303.96 ml       Lines/Drains/Airways     Central Venous Catheter Line                 Percutaneous Central Line Insertion/Assessment - triple lumen  06/17/19 1700 right internal jugular 1 day          Drain                 Urethral Catheter 06/17/19 1414 Straight-tip 16 Fr. 1 day         Rectal Tube 06/19/19 0930 fecal management system less than 1 day          Airway                 Airway - Non-Surgical 06/17/19 1324  Endotracheal Tube 2 days          Arterial Line                 Arterial Line 06/17/19 1821 Right Radial 1 day          Peripheral Intravenous Line                 Peripheral IV - Single Lumen 06/17/19 0652 20 G Anterior;Distal;Left Forearm 2 days         Peripheral IV - Single Lumen 06/17/19 1300 22 G Right Forearm 2 days                Physical Exam   Constitutional: He appears cachectic. He has a sickly appearance. He appears ill. He is intubated.   Cardiovascular: Normal rate and regular rhythm.   Pulmonary/Chest: He is intubated. He has decreased breath sounds.   Neurological:   Eyes open spontaneously; responds appropriately non verbally        Significant Labs:     Recent Labs   Lab 06/19/19  0743   CALCIUM 7.0*   PROT 4.2*   *   K 3.6   CO2 19*      BUN 9   CREATININE 0.8   ALKPHOS 216*   *   *   BILITOT 1.0     Recent Labs   Lab 06/19/19  0415  06/19/19  1214   WBC 19.19*  --   --    RBC 4.04*  --   --    HGB 11.2*  11.2*   < > 10.7*   HCT 33.1*  33.1*   < > 31.4*     --   --    MCV 82  --   --    MCH 27.7  --   --    MCHC 33.8  --   --     < > = values in this interval not displayed.           Assessment and Plan:         * Critical lower limb ischemia  -BLE angiogram with bilateral SFA CTOs with 2 vessel r/o on the left and single vessel via peroneal on right  -successful revascularization of RLE 6/11 with RSFA, right pop and right peroneal PTA with DCB; anticipated need for PTA of right AT however deferred given acute decompensation, cardiac arrest and ongoing anemia   -will continue to hold DAPT and statin therapy     Hypocalcemia  -Ca 6.1 this AM  -corrected to 8.2 due to hypoalbuminemia      Leukocytosis  -WBCs up to 26K post arrest and up to 37K yesterday; down to 19K  this AM  -initial blood cultures from 6/12 with NGTD; repeat blood cultures 6/17 with GPC on prelim report   -remains on IV Zosyn and Vanc and will continue for now  -ID on board and continue to  follow recs     Lactic acidosis  -multifactoral-cardiac arrest, hypotension, infectious etiology and active bleeding   -initial lactic acid 4.5 with trend down to 3.0 and up to 4.7; down to 2.4 this AM   -continue supportive care with pressors and vent    Cardiac arrest  -PEA arrest preceded by SB at completion of EGD yesterday  -responded to few rounds of Epi and ROSC after ACLS/CPR  -intubated and admitted to ICU  -initial pH 6.9 with improvement 7.2-7.3 with vent and bicarb  -etiology likely hypoxia related  -remains intubated and sedated on pressor support; Pulm CC on board for vent management with no plans for extubation today     Hypoalbuminemia  -related to poor nutritional status  -last albumin 1.4  -dietian consulted and recs in place  -repeat CMP in AM     Severe protein-calorie malnutrition  -consult dietian  -diet changed to regular  -on Darrian BID and Benefiber TID     Pressure ulcer of left hip, stage 3  -wound care RN and General surgery consulted     Sacral decubitus ulcer  -POA  -related to debility and prolonged bedrest  -Wound care RN consulted as well as General surgery (recs noted)     Gangrene of right foot  -Podiatry and ID on board  -case discussed with Podiatry with initial plans for TMA once demarcation occurs per review of note; ongoing discussions with Podiatry with high risk for limb loss and no plans for surgical intervention given acute decompensation and recent cardiac arrest     Anemia  -H&H 7.1&22.8 6/14 &6/15 with trend down to 6.9&22.0 6/16  -transfused with 2U PRBCs with improvement in H&H to 10.4&32.0 yesterday with slight drop to 9.5 prior to EGD yesterday  -EGD 6/17 with following results:   LA Grade D (one or more mucosal breaks involving at least 75% of esophageal circumference) esophagitis with no bleeding was found in the lower third of the esophagus.   A deformity was found in the prepyloric region of the stomach.   Red blood was found in the gastric body and  duodenum.   Active bleeding with adherent clot was found in the third portion of the duodenum associated with a duodenal varix.  -additional 2units PRBCs given 6/17 post cardiac arrest and repeat transfusion 6/18 post DIPS yesterday   -serial H&Hs in process; H&H 11.2&33.1 down slightly to 10.7&31.4    -baseline 9-10/29-30 since early 2018  -large bloody BM this AM-Flexi Seal placed   -IR/DIPS procedure yesterday   -will continue to hold ASA/Plavix for now   -General Surgery consult for surgical intervention for duodenal varix     Alcoholic cirrhosis of liver with ascites  -AST/ALT WNL with elevated alk phos at 145 this AM   -on Aldactone at home-discontinued due to pressor use and will continue to hold   -abdominal ultrasound from 5/2019 reviewed; EGD yesterday with concern for advanced cirrhosis per GI interpretation   -daily CMP in AM         VTE Risk Mitigation (From admission, onward)        Ordered     Reason for No Pharmacological VTE Prophylaxis  Once      06/17/19 1353     IP VTE HIGH RISK PATIENT  Once      06/17/19 1353     Place SERGO hose  Until discontinued      06/17/19 1346     Place sequential compression device  Until discontinued      06/17/19 1346          MAURICE French, ANP  Cardiology  [facility]

## 2019-06-19 NOTE — PROGRESS NOTES
[facility]  Adult Nutrition  Progress Note    SUMMARY       Recommendations    Recommendation:   1. If pt to remain intubated, consider initiation of Impact Peptide 1.5 at 10ml/hr and advance as toelrated to goal rate of 50ml/hr to provide 1800 kcal, 113g protein, & 924ml free water.     Goals:   TF or diet will be started within 24-48 hours  Nutrition Goal Status: new  Communication of RD Recs: reviewed with RN(Reji)    Reason for Assessment  Reason For Assessment: RD follow-up  Diagnosis: (critical lower limb ischemia)  Relevant Medical History: seizures, HTN, alcohol abuse, esophagus stricture, cirrhosis, edema  General Information Comments: Pt currently intubated. Pt family states pt asking for water. Informed them he can't have water intubated. Central line. NFPE completed -severe temple, clavicle, & upper & lower extremity wasting with moderatre orbital wasting,    Nutrition Discharge Planning: d/c needs to be determined    Nutrition Risk Screen  Nutrition Risk Screen: large or nonhealing wound, burn or pressure injury    Nutrition/Diet History  Food Preferences: no Baptist or cultural food prefs identified  Spiritual, Cultural Beliefs, Restorationism Practices, Values that Affect Care: no  Factors Affecting Nutritional Intake: NPO, on mechanical ventilation    Anthropometrics  Temp: (!) 91.6 °F (33.1 °C)  Height Method: Stated  Height: 6' (182.9 cm)  Height (inches): 72 in  Weight Method: Bed Scale  Weight: 50.7 kg (111 lb 12.4 oz)  Weight (lb): 111.77 lb  Ideal Body Weight (IBW), Male: 178 lb  % Ideal Body Weight, Male (lb): 60.57 lb  BMI (Calculated): 14.7  BMI Grade: less than 16 protein-energy malnutrition grade III  Usual Body Weight (UBW), k kg  % Usual Body Weight: 47.12  % Weight Change From Usual Weight: -52.98 %     Lab/Procedures/Meds  Pertinent Labs Reviewed: reviewed  Pertinent Labs Comments: Na 131L, Glu 162H, Ca 7.1L, Alb 1.6L  Pertinent Medications Reviewed:  reviewed  Pertinent Medications Comments: precedex, albumin    Estimated/Assessed Needs  Weight Used For Calorie Calculations: 50.7 kg (111 lb 12.4 oz)  Energy Calorie Requirements (kcal): 1858  Energy Need Method: [facility]  Protein Requirements: 75g (1.5g/kg)  Weight Used For Protein Calculations: 50.7 kg (111 lb 12.4 oz)  Estimated Fluid Requirement Method: RDA Method  RDA Method (mL): 1858     Nutrition Prescription Ordered  Current Diet Order: NPO  Oral Nutrition Supplement: Boost Plus, beneprotein, Darrian    Evaluation of Received Nutrient/Fluid Intake  I/O: 2500/925  Energy Calories Required: not meeting needs  Protein Required: not meeting needs  Fluid Required: not meeting needs  Comments: LBM 6/17  % Intake of Estimated Energy Needs: Other: NPO  % Meal Intake: NPO    Nutrition Risk  Level of Risk/Frequency of Follow-up: (2xweekly)     Assessment and Plan    Severe protein-calorie malnutrition  Malnutrition in the context of Chronic Illness/Injury    Related to (etiology):  dysphagia    Signs and Symptoms (as evidenced by):  Energy Intake: <50% of estimated energy requirement for several months  Body Fat Depletion: severe depletion of orbitals, triceps and thoracic and lumbar region   Muscle Mass Depletion: severe depletion of temples, clavicle region, scapular region, interosseous muscle and lower extremities   Weight Loss: greater than 20% in 1 year     Interventions:  Commercial beverage    Nutrition Diagnosis Status:  Continues       Monitor and Evaluation  Food and Nutrient Intake: energy intake  Food and Nutrient Adminstration: diet order, enteral and parenteral nutrition administration  Physical Activity and Function: nutrition-related ADLs and IADLs  Anthropometric Measurements: weight  Biochemical Data, Medical Tests and Procedures: electrolyte and renal panel  Nutrition-Focused Physical Findings: overall appearance     Malnutrition Assessment  Malnutrition Type: chronic illness  Weight Loss  (Malnutrition): greater than 20% in 1 year  Energy Intake (Malnutrition): less than or equal to 50% for greater than or equal to 5 days   Orbital Region (Subcutaneous Fat Loss): moderate depletion  Upper Arm Region (Subcutaneous Fat Loss): severe depletion  Thoracic and Lumbar Region: severe depletion   [place] Region (Muscle Loss): severe depletion  Clavicle Bone Region (Muscle Loss): severe depletion  Clavicle and Acromion Bone Region (Muscle Loss): severe depletion  Scapular Bone Region (Muscle Loss): severe depletion  Patellar Region (Muscle Loss): severe depletion  Anterior Thigh Region (Muscle Loss): severe depletion  Posterior Calf Region (Muscle Loss): severe depletion   Subcutaneous Fat Loss (Final Summary): severe protein-calorie malnutrition  Muscle Loss Evaluation (Final Summary): severe protein-calorie malnutrition    Severe Weight Loss (Malnutrition): greater than 20% in 1 year    Nutrition Follow-Up  RD Follow-up?: Yes

## 2019-06-19 NOTE — ASSESSMENT & PLAN NOTE
-multifactoral-cardiac arrest, hypotension, infectious etiology and active bleeding   -initial lactic acid 4.5 with trend down to 3.0 and up to 4.7; down to 2.4 this AM   -continue supportive care with pressors and vent

## 2019-06-19 NOTE — PHYSICIAN QUERY
"PT Name: Sachin Calloway  MR #: [id number]    Physician Query Form - Hematology Clarification      CDS/: Yuly Benavidez RN, CCDS             Contact information: [email or web address]    This form is a permanent document in the medical record.      Query Date: June 19, 2019    By submitting this query, we are merely seeking further clarification of documentation. Please utilize your independent clinical judgment when addressing the question(s) below.    The Medical record contains the following:   Indicators  Supporting Clinical Findings Location in Medical Record   X "Anemia" documented anemia multifactorial  baseline ~9-10  - acute drop 2/2 GI bleeding  6/18 consult note- Dr. Cm   X H & H = 10.4/32.0-->7.6/23.4-->5.5/18.0 6/16, 6/17, 6/18 lab    BP =                     HR=     X "GI bleeding" documented S/p EGD 6/17 with severe erosive esophagitis and actively bleeding duodenal varix      This morning was noted to have "projectile vomiting of bright red blood with big clots" and bright red blood with oral suctioning.  6/18 GI note- Dr. Wagner, Dr. Arias      6/18 consult note- Dr. Cm    Acute bleeding (Non GI site)     X Transfusion(s) Blood transfusions overnight and reported hematemesis and bloody stools continued    2 units of PRBC for a sudden drop in his H/H. 6/18 GI note- Dr. Wagner, Dr. Arias    6/18 nurse note    Treatment:      Other:        Provider, please specify diagnosis or diagnoses associated with above clinical findings.    [x  ] Acute blood loss anemia         [  ] Other Hematological Diagnosis (please specify):     [  ] Clinically Undetermined       Please document in your progress notes daily for the duration of treatment, until resolved, and include in your discharge summary.                                                                                                      "

## 2019-06-19 NOTE — PLAN OF CARE
Problem: Adult Inpatient Plan of Care  Goal: Plan of Care Review  Outcome: Ongoing (interventions implemented as appropriate)       06/18/19 0440   Plan of Care Review   Plan of Care Reviewed With Patient; Vitals: Hypotensive, on esdras map of >65 maintained. Normathermic, baire hugger turned off.  Total care for all needs due to patient's status. Unable to determine orientation level, but follows commands appropriately, alert, unable to voice needs due to intubation; O2 sat remained wnl;  No facial grimacing or signs of pain noted.  Hearing and vision adequate, Assist x 2 for ADLs. Repositioned q2hrs. F/C patent, irrigated x1 with sterile water and draining to gravity; olguric, UO improving,  Incont of bowels, x1 bloody stool overnight.  Wounds noted, wound care provided and dressings were changed.  All concerns were addressed overnight with MD. Plan of care reviewed with the patient. Labs and vitals are being monitored.

## 2019-06-19 NOTE — SUBJECTIVE & OBJECTIVE
Review of Systems   Unable to perform ROS: intubated     Objective:     Vital Signs (Most Recent):  Temp: 96.4 °F (35.8 °C) (06/19/19 1303)  Pulse: 65 (06/19/19 1303)  Resp: 18 (06/19/19 1303)  BP: 131/67 (06/19/19 1303)  SpO2: 100 % (06/19/19 1303) Vital Signs (24h Range):  Temp:  [91.6 °F (33.1 °C)-99.7 °F (37.6 °C)] 96.4 °F (35.8 °C)  Pulse:  [58-96] 65  Resp:  [9-45] 18  SpO2:  [70 %-100 %] 100 %  BP: ()/(57-87) 131/67  Arterial Line BP: ()/(31-87) 134/62     Weight: 50.7 kg (111 lb 12.4 oz)  Body mass index is 15.16 kg/m².     SpO2: 100 %  O2 Device (Oxygen Therapy): ventilator      Intake/Output Summary (Last 24 hours) at 6/19/2019 1403  Last data filed at 6/19/2019 1300  Gross per 24 hour   Intake 2125.96 ml   Output 822 ml   Net 1303.96 ml       Lines/Drains/Airways     Central Venous Catheter Line                 Percutaneous Central Line Insertion/Assessment - triple lumen  06/17/19 1700 right internal jugular 1 day          Drain                 Urethral Catheter 06/17/19 1414 Straight-tip 16 Fr. 1 day         Rectal Tube 06/19/19 0930 fecal management system less than 1 day          Airway                 Airway - Non-Surgical 06/17/19 1324 Endotracheal Tube 2 days          Arterial Line                 Arterial Line 06/17/19 1821 Right Radial 1 day          Peripheral Intravenous Line                 Peripheral IV - Single Lumen 06/17/19 0652 20 G Anterior;Distal;Left Forearm 2 days         Peripheral IV - Single Lumen 06/17/19 1300 22 G Right Forearm 2 days                Physical Exam   Constitutional: He appears cachectic. He has a sickly appearance. He appears ill. He is intubated.   Cardiovascular: Normal rate and regular rhythm.   Pulmonary/Chest: He is intubated. He has decreased breath sounds.   Neurological:   Eyes open spontaneously; responds appropriately non verbally        Significant Labs:     Recent Labs   Lab 06/19/19  0743   CALCIUM 7.0*   PROT 4.2*   *   K 3.6    CO2 19*      BUN 9   CREATININE 0.8   ALKPHOS 216*   *   *   BILITOT 1.0     Recent Labs   Lab 06/19/19  0415  06/19/19  1214   WBC 19.19*  --   --    RBC 4.04*  --   --    HGB 11.2*  11.2*   < > 10.7*   HCT 33.1*  33.1*   < > 31.4*     --   --    MCV 82  --   --    MCH 27.7  --   --    MCHC 33.8  --   --     < > = values in this interval not displayed.

## 2019-06-19 NOTE — PROGRESS NOTES
[facility] Infectious Disease Consult     Primary Team: Cardiology   Consultant Attending: Anum   Date of Admit: 6/10/2019    History      Sachin Calloway is a 58 y.o. male with a relevant history of alcoholic liver cirrhosis, HTN, former EtOH abuse, active tobacco use, PAD who presents for peripheral angiogram    The patient presented to [facility] on 6/10/2019 with a primary complaint of non healing right foot wound with doppler showing PAD. Had arthrectomy and PTA on 6/11.     Subsequently has had GI bleeding, persistent leukocytosis and coded during an EGD (bradycardia followed by PEA)    Interval events/subjective      WBC improved, afebrile but hypothermic    Intubated, still on phenylephrine    Allergies:  Review of patient's allergies indicates:  No Known Allergies    Medications:   In-Hospital Scheduled Medications:   chlorhexidine  15 mL Mouth/Throat BID    collagenase   Topical (Top) Daily    magnesium sulfate IVPB  2 g Intravenous Once    pantoprazole  40 mg Intravenous BID    piperacillin-tazobactam (ZOSYN) IVPB  4.5 g Intravenous Q8H    potassium chloride in water  40 mEq Intravenous Once    [START ON 6/20/2019] vancomycin (VANCOCIN) IVPB  500 mg Intravenous Q24H      In-Hospital PRN Medications:  sodium chloride, sodium chloride, acetaminophen, aluminum-magnesium hydroxide-simethicone, ondansetron, ondansetron, oxyCODONE, polyethylene glycol, simethicone   In-Hospital IV Infusion Medications:   dexmedetomidine (PRECEDEX) infusion Stopped (06/19/19 0921)    norepinephrine bitartrate-D5W Stopped (06/18/19 1800)    phenylephrine 2 mcg/kg/min (06/19/19 1219)    vasopressin (PITRESSIN) infusion Stopped (06/18/19 1819)      Home Medications:  Prior to Admission medications    Medication Sig Start Date End Date Taking? Authorizing Provider   acetaminophen-codeine 300-30mg (TYLENOL #3) 300-30 mg Tab Take by mouth.   Yes Historical Provider, MD   furosemide (LASIX) 80 MG tablet Take 1 tablet (80 mg total) by  mouth once daily. 19 Yes Juan Gatica MD   pantoprazole (PROTONIX) 40 MG tablet Take 1 tablet (40 mg total) by mouth once daily. 19 Yes Dillan Salinas MD   potassium chloride SA (K-DUR,KLOR-CON) 20 MEQ tablet Take 1 tablet (20 mEq total) by mouth once daily. 19  Yes Juan Gatica MD   spironolactone (ALDACTONE) 50 MG tablet Take 1 tablet (50 mg total) by mouth once daily. 5/15/19 8/7/20 Yes Juan Gatica MD     Antibiotics and Day Number of Therapy:  Piptazo and Vanc for 1 day     Objective   Last 24 Hour Vital Signs:  BP  Min: 82/58  Max: 149/87  Temp  Av.7 °F (35.9 °C)  Min: 91.6 °F (33.1 °C)  Max: 99.7 °F (37.6 °C)  Pulse  Av.6  Min: 58  Max: 96  Resp  Av.9  Min: 9  Max: 45  SpO2  Av.2 %  Min: 70 %  Max: 100 %    Lines, Drains, Airway:  Central line     Physical Examination:  Examination  General: well appearing, comfortable   HEENT: intubated 28% FiO2 and PEEP5   Neck: no thyromegaly, no JVD   Cardiac: no murmurs, pulse regular    Pulmonary/Chest: chest clear, no respiratory distress   GI/Rectal: no organomegaly, no masses, non tender, normal bowel sounds, rectal exam deferred   : deferred   Msk: normal motor screening exam  Vascular: normal peripheral perfusion   Lymph nodes: none palpated  Skin/ Extremities: no rash, no pedal edema, no ulceration on left  Right foot dry gangrene involving the entire dorsum of the right foot     Neurology/ Mental status: alert oriented     Laboratory:  CBC:   Lab Results   Component Value Date    WBC 19.19 (H) 2019    HGB 10.7 (L) 2019     2019    MCV 82 2019    RDW 15.9 (H) 2019     Estimated Creatinine Clearance: 71.3 mL/min (based on SCr of 0.8 mg/dL).    Microbiology Data:  Negative blood cultures     Vanc trough 19  12, then 20.4 on     Radiology:  No gas noted in xray of right foot     MRI of right foot shows no osteo    CT abd   No evidence for active contrast  extravasation within the GI tract.  High-density material within the stomach and duodenum may reflect blood products.  Mural thickening of the distal duodenum may reflect focal inflammation.  Given evidence for chronic pancreatitis, possibly secondary.  Diffuse mucosal thickening of the small bowel with wall thickening of the sigmoid colon and rectum may reflect portal enteropathy/colopathy.  Small wedge-shaped hypoattenuating areas in the bilateral renal cortices may reflect sequela of infarcts.  Small bilateral pleural effusions with right lower lobe consolidation.  Small hiatal hernia.  Fluid within the lower esophagus in keeping with gastroesophageal reflux.  Large volume of ascites.  Extensive calcified plaque throughout the abdomen and pelvis.    Assessment     Sachin Calloway is a 58 y.o. male with alcoholic liver cirrhosis and PAD s/p right foot angioplasty who presented with gangrene of the right foot and leukocytosis. Vancomycin and piptazo have been ongoing.     Now post code during EGD for GI bleed. Had DIPS procedure and paracentesis.     The causes of his WBC spike included a stress response post code, gangrene of the right foot, and spontaneous Bacterial Peritonitis in a patient with cirrhosis/large volume ascites. The resolution of his leukocytosis is reassuring as is the appearance of his foot (entirely dry, not wet grangrene). No studies are available on the paracentesis fluid.     Recommendations     Gangrene rt foot  - continue piptazo 4.5g q8  - continue 750mg qday vanc   - since revascularization and amputation do not seem imminent, will do a 10 day course of antibiotics (end date 6/22/19)   - after course of IV antibiotic completed, will consider suppressive therapy with oral antibiotic if any clinical evidence of persistent infection in the right lower extremity     Ascites, cirrhosis  - if any specimen still available from paracentesis done during DIPS on 6/18/19, would send for cell count  and culture      Thank you for this consult. We will follow.      Kimber Flood  Fellow, [facility] Infectious Disease

## 2019-06-19 NOTE — PROGRESS NOTES
Pt noted to be hypothermic with core temp 94.8. Bare hugger applied. Temp coming up. Will continue to monitor.

## 2019-06-19 NOTE — PLAN OF CARE
Problem: Adult Inpatient Plan of Care  Goal: Plan of Care Review  Outcome: Ongoing (interventions implemented as appropriate)  Pt resting in bed with no complaints on precedex at 0.3 and esdras at 2.5. Surgery consult put in today. See previous note. Seems varices will need to be treated by surgery at this point so we're awaiting their consult to move forward. Pt will remain intubated for the time being waiting for surgery until notified otherwise. Hypoglycemia meds/protocol initiated for pt bg of 69 today. Will continue to monitor. Family at bedside. Pt remains in bilateral wrist restraints. Right foot dressing changed by me today after ID took it down.

## 2019-06-19 NOTE — TELEPHONE ENCOUNTER
SHILA      ----- Message from Kim Macias sent at 6/19/2019 10:33 AM CDT -----  Contact: Wife/ Radhika Calloway / [phone]  The pt is now in ICU at the [facility].  His wife Radhika needs FMLA form filled out for her job so that she can be there with him.  She is asking Dr Jones to fill out her papers.  Dropped off today.  Any further questions, call her at [phone] or [phone]

## 2019-06-19 NOTE — ADDENDUM NOTE
Addendum  created 06/19/19 1559 by Nisa Castanon, CRNA    Intraprocedure Event deleted, Intraprocedure Event edited

## 2019-06-19 NOTE — PROGRESS NOTES
Called dr conway office and cell phone [phone] and never received a call back. Spoke to his nurse one time who told me he was scrubbed in and would call back shortly and she took down the unit number, but still never received a call back. Will continue trying. Began calling at 1500.

## 2019-06-19 NOTE — ASSESSMENT & PLAN NOTE
-PEA arrest preceded by SB at completion of EGD yesterday  -responded to few rounds of Epi and ROSC after ACLS/CPR  -intubated and admitted to ICU  -initial pH 6.9 with improvement 7.2-7.3 with vent and bicarb  -etiology likely hypoxia related  -remains intubated and sedated on pressor support; Pulm CC on board for vent management with no plans for extubation today

## 2019-06-19 NOTE — ASSESSMENT & PLAN NOTE
-AST/ALT WNL with elevated alk phos at 145 this AM   -on Aldactone at home-discontinued due to pressor use and will continue to hold   -abdominal ultrasound from 5/2019 reviewed; EGD yesterday with concern for advanced cirrhosis per GI interpretation   -daily CMP in AM

## 2019-06-19 NOTE — ASSESSMENT & PLAN NOTE
-Podiatry and ID on board  -case discussed with Podiatry with initial plans for TMA once demarcation occurs per review of note; ongoing discussions with Podiatry with high risk for limb loss and no plans for surgical intervention given acute decompensation and recent cardiac arrest

## 2019-06-19 NOTE — ASSESSMENT & PLAN NOTE
-BLE angiogram with bilateral SFA CTOs with 2 vessel r/o on the left and single vessel via peroneal on right  -successful revascularization of RLE 6/11 with RSFA, right pop and right peroneal PTA with DCB; anticipated need for PTA of right AT however deferred given acute decompensation, cardiac arrest and ongoing anemia   -will continue to hold DAPT and statin therapy

## 2019-06-19 NOTE — PROGRESS NOTES
[facility]  Urology Progress Note    Problems:   Patient Active Problem List   Diagnosis    Clavicle fracture    Seizure disorder    Dysphagia    Esophageal stenosis    Alcoholic cirrhosis of liver with ascites    Anemia    Esophageal stricture    Panlobular emphysema    Anasarca    Critical lower limb ischemia    Gangrene of right foot    Sacral decubitus ulcer    Pressure ulcer of left hip, stage 3    Severe protein-calorie malnutrition    Hypoalbuminemia    Cardiac arrest    Lactic acidosis    Leukocytosis    Hypocalcemia       Subjective   Intubated, s/p IR Procedure yesterday, angeol draining well since I irrigated yesterday    Meds:   atorvastatin  20 mg Oral Daily    chlorhexidine  15 mL Mouth/Throat BID    collagenase   Topical (Top) Daily    pantoprazole  40 mg Intravenous BID    piperacillin-tazobactam (ZOSYN) IVPB  4.5 g Intravenous Q8H    potassium chloride 10%  20 mEq Oral Daily    [START ON 6/20/2019] vancomycin (VANCOCIN) IVPB  500 mg Intravenous Q24H      dexmedetomidine (PRECEDEX) infusion Stopped (06/19/19 0921)    norepinephrine bitartrate-D5W Stopped (06/18/19 1800)    phenylephrine 2 mcg/kg/min (06/19/19 0937)    vasopressin (PITRESSIN) infusion Stopped (06/18/19 1819)     sodium chloride, sodium chloride, acetaminophen, aluminum-magnesium hydroxide-simethicone, ondansetron, ondansetron, oxyCODONE, polyethylene glycol, simethicone    Temp:  [94.3 °F (34.6 °C)-99.7 °F (37.6 °C)] 97 °F (36.1 °C)  Pulse:  [59-96] 66  Resp:  [9-45] 21  SpO2:  [70 %-100 %] 100 %  BP: ()/(57-87) 132/68  Arterial Line BP: ()/(31-87) 108/69    I/O last 3 completed shifts:  In: 4512.4 [I.V.:1912.4; Blood:700; IV Piggyback:1900]  Out: 1251 [Urine:925; Emesis/NG output:1; Other:325]    General:  Alert, cooperative, no distress, appears stated age   Head:  Normocephalic, without obvious abnormality, atraumatic   Eyes:  PERRL, conjunctiva/corneas clear   Lungs:    Respirations unlabored    Heart:  Warm and well perfused   Abdomen:   abdomen is soft without significant tenderness, masses, organomegaly or guarding   : Urethral angelo draining light yellow urine   Drains: Urinary Catheter (Angelo)   Extremities: Extremities normal, atraumatic, no cyanosis or edema   Skin: Skin color, texture, turgor normal, no rashes or lesions   Psych: Appropriate   Neurologic: Non-focal     Recent Results (from the past 24 hour(s))   ISTAT PROCEDURE    Collection Time: 06/18/19 12:32 PM   Result Value Ref Range    POC PH 7.257 (LL) 7.35 - 7.45    POC PCO2 44.7 35 - 45 mmHg    POC PO2 269 (H) 80 - 100 mmHg    POC HCO3 19.9 (L) 24 - 28 mmol/L    POC BE -7 -2 to 2 mmol/L    POC SATURATED O2 100 95 - 100 %    POC Glucose 117 (H) 70 - 110 mg/dL    POC Sodium 135 (L) 136 - 145 mmol/L    POC Potassium 4.2 3.5 - 5.1 mmol/L    POC TCO2 21 (L) 23 - 27 mmol/L    POC Ionized Calcium 1.02 (L) 1.06 - 1.42 mmol/L    POC Hematocrit 38 36 - 54 %PCV    POC HEMOGLOBIN 13 g/dL    Sample ART    ISTAT PROCEDURE    Collection Time: 06/18/19  4:06 PM   Result Value Ref Range    POC PH 7.383 7.35 - 7.45    POC PCO2 27.0 (LL) 35 - 45 mmHg    POC PO2 107 (H) 80 - 100 mmHg    POC HCO3 16.1 (L) 24 - 28 mmol/L    POC BE -9 -2 to 2 mmol/L    POC SATURATED O2 98 95 - 100 %    POC TCO2 17 (L) 23 - 27 mmol/L    Rate 440     Sample ARTERIAL     Site Leobardo/UAC     DelSys Adult Vent     Mode AC/PRVC     Vt 18     PEEP 5     FiO2 40    Hematocrit    Collection Time: 06/18/19  4:22 PM   Result Value Ref Range    Hematocrit 18.0 (LL) 40.0 - 54.0 %   Hemoglobin    Collection Time: 06/18/19  4:22 PM   Result Value Ref Range    Hemoglobin 5.5 (LL) 14.0 - 18.0 g/dL   Hematocrit    Collection Time: 06/18/19  5:31 PM   Result Value Ref Range    Hematocrit 16.6 (LL) 40.0 - 54.0 %   Hemoglobin    Collection Time: 06/18/19  5:31 PM   Result Value Ref Range    Hemoglobin 5.4 (LL) 14.0 - 18.0 g/dL   Hematocrit    Collection Time: 06/18/19  10:51 PM   Result Value Ref Range    Hematocrit 34.2 (L) 40.0 - 54.0 %   Hemoglobin    Collection Time: 06/18/19 10:51 PM   Result Value Ref Range    Hemoglobin 11.7 (L) 14.0 - 18.0 g/dL   VANCOMYCIN, TROUGH before 3rd dose    Collection Time: 06/18/19 10:53 PM   Result Value Ref Range    Vancomycin-Trough 18.3 10.0 - 22.0 ug/mL   CBC auto differential    Collection Time: 06/19/19  4:15 AM   Result Value Ref Range    WBC 19.19 (H) 3.90 - 12.70 K/uL    RBC 4.04 (L) 4.60 - 6.20 M/uL    Hemoglobin 11.2 (L) 14.0 - 18.0 g/dL    Hematocrit 33.1 (L) 40.0 - 54.0 %    Mean Corpuscular Volume 82 82 - 98 fL    Mean Corpuscular Hemoglobin 27.7 27.0 - 31.0 pg    Mean Corpuscular Hemoglobin Conc 33.8 32.0 - 36.0 g/dL    RDW 15.9 (H) 11.5 - 14.5 %    Platelets 190 150 - 350 K/uL    MPV 10.1 9.2 - 12.9 fL    Gran # (ANC) 16.0 (H) 1.8 - 7.7 K/uL    Lymph # 1.5 1.0 - 4.8 K/uL    Mono # 1.5 (H) 0.3 - 1.0 K/uL    Eos # 0.0 0.0 - 0.5 K/uL    Baso # 0.05 0.00 - 0.20 K/uL    Gran% 84.0 (H) 38.0 - 73.0 %    Lymph% 7.7 (L) 18.0 - 48.0 %    Mono% 8.0 4.0 - 15.0 %    Eosinophil% 0.0 0.0 - 8.0 %    Basophil% 0.3 0.0 - 1.9 %    Platelet Estimate Appears normal     Aniso Slight     Hypo Occasional     Differential Method Automated    Hematocrit    Collection Time: 06/19/19  4:15 AM   Result Value Ref Range    Hematocrit 33.1 (L) 40.0 - 54.0 %   Hemoglobin    Collection Time: 06/19/19  4:15 AM   Result Value Ref Range    Hemoglobin 11.2 (L) 14.0 - 18.0 g/dL   POCT glucose    Collection Time: 06/19/19  4:29 AM   Result Value Ref Range    POCT Glucose 66 (L) 70 - 110 mg/dL   POCT glucose    Collection Time: 06/19/19  4:35 AM   Result Value Ref Range    POCT Glucose 148 (H) 70 - 110 mg/dL   Hematocrit    Collection Time: 06/19/19  7:43 AM   Result Value Ref Range    Hematocrit 33.2 (L) 40.0 - 54.0 %   Hemoglobin    Collection Time: 06/19/19  7:43 AM   Result Value Ref Range    Hemoglobin 11.2 (L) 14.0 - 18.0 g/dL   Comprehensive metabolic panel     Collection Time: 06/19/19  7:43 AM   Result Value Ref Range    Sodium 135 (L) 136 - 145 mmol/L    Potassium 3.6 3.5 - 5.1 mmol/L    Chloride 108 95 - 110 mmol/L    CO2 19 (L) 23 - 29 mmol/L    Glucose 102 70 - 110 mg/dL    BUN, Bld 9 6 - 20 mg/dL    Creatinine 0.8 0.5 - 1.4 mg/dL    Calcium 7.0 (L) 8.7 - 10.5 mg/dL    Total Protein 4.2 (L) 6.0 - 8.4 g/dL    Albumin 1.7 (L) 3.5 - 5.2 g/dL    Total Bilirubin 1.0 0.1 - 1.0 mg/dL    Alkaline Phosphatase 216 (H) 55 - 135 U/L     (H) 10 - 40 U/L     (H) 10 - 44 U/L    Anion Gap 8 8 - 16 mmol/L    eGFR if African American >60 >60 mL/min/1.73 m^2    eGFR if non African American >60 >60 mL/min/1.73 m^2   Magnesium    Collection Time: 06/19/19  7:43 AM   Result Value Ref Range    Magnesium 1.7 1.6 - 2.6 mg/dL   Lactic acid, plasma    Collection Time: 06/19/19  8:11 AM   Result Value Ref Range    Lactate (Lactic Acid) 2.4 (H) 0.5 - 2.2 mmol/L       58 y.o. male who is a patient of the cardiology service that I was consulted on while he was in the ICU intubated, and sedated after coding for a nondraining angelo catheter, he is now s/p an IR Procedure 6/18/19 for bleeding varices     Plan:  1. The patient has a temperature probe 16 Yoruba angelo catheter in place, it is possible that this catheter has a smaller lumen for the urine to drain and is positional.  2. I irrigated the angelo with 60cc of irrigation and this irrigated and withdrew well, confirming that the angelo catheter is likely in the correct position - 6/18/19.  3. Urine is draining well, nursing staff do not report any issues.  4. Reviewed CT scan yesterday 6/18/19 - angelo in good position, bladder is not distended. +ascites so bladder scans will yield inaccurate results.

## 2019-06-19 NOTE — PROGRESS NOTES
[facility] Pulmonology Consult - Resident Note    Primary Attending Physician: Irina  Primary Team: [facility]  Consultant Attending: Rush  Consultant Fellow: Adrian Monae Resident: Salima     Reason for Consult:     Vent management     Subjective:      DIPS procedure yesterday. Further GI bleeding post procedure with hypotension requiring transfusion of 2u RBC. Now stable. Doing well on vent.     Objective:   Last 24 Hour Vital Signs:  BP  Min: 82/58  Max: 149/87  Temp  Av.7 °F (35.9 °C)  Min: 91.6 °F (33.1 °C)  Max: 99.7 °F (37.6 °C)  Pulse  Av.1  Min: 58  Max: 96  Resp  Av.3  Min: 9  Max: 45  SpO2  Av.1 %  Min: 70 %  Max: 100 %  I/O last 3 completed shifts:  In: 4512.4 [I.V.:1912.4; Blood:700; IV Piggyback:1900]  Out: 1251 [Urine:925; Emesis/NG output:1; Other:325]    Physical Examination:  /63   Pulse (!) 58   Temp (!) 91.6 °F (33.1 °C)   Resp 11   Ht 6' (1.829 m)   Wt 50.7 kg (111 lb 12.4 oz)   SpO2 100%   BMI 15.16 kg/m²     General Appearance:    Awake, cachectic, intubated, appears comfortable    Head:    Temporal wasting bilaterally, atraumatic   Eyes:    PERRL, conjunctiva/corneas clear, arcus cornealis bilaterally       Throat:   ETT in place, dried blood present on lips   Neck:   Supple, symmetrical, trachea midline   Lungs:     Clear to auscultation bilaterally, sounds of vent, no wheeze or crackles noted    Heart:    Regular rate and rhythm, S1 and S2 appreciated, no murmur appreciated   Abdomen:     soft, tympanic, bowel sounds not appreciated    Extremities:   Extremities thin, atraumatic, edema present   Skin:   Skin color, texture, turgor normal, no rashes or lesions   Neurologic:   Awakens to voice, follows commands, moves all extremities         Laboratory Results:  Trended Lab Data:  Recent Labs   Lab 19  1345  19  2222 19  0346  19  2251 19  0415 19  0743   WBC 26.41*  --   --  37.96*  --   --   19.19*  --    HGB 7.6*   < >  --  10.2*  10.2*   < > 11.7* 11.2*  11.2* 11.2*   HCT 23.4*   < >  --  30.3*  30.3*   < > 34.2* 33.1*  33.1* 33.2*     --   --  300  --   --  190  --    MCV 84  --   --  82  --   --  82  --    RDW 19.4*  --   --  16.5*  --   --  15.9*  --    NA  --   --  131* 131*  --   --   --  135*   K  --   --  3.7 3.8  --   --   --  3.6   CL  --   --  106 104  --   --   --  108   CO2  --   --  19* 16*  --   --   --  19*   BUN  --   --  10 10  --   --   --  9   GLU  --   --  143* 162*  --   --   --  102   PROT  --   --  4.4* 4.7*  --   --   --  4.2*   ALBUMIN  --   --  1.0* 1.6*  --   --   --  1.7*   BILITOT  --   --  0.6 0.7  --   --   --  1.0   AST  --   --  60* 44*  --   --   --  383*   ALKPHOS  --   --  163* 145*  --   --   --  216*   ALT  --   --  33 28  --   --   --  167*    < > = values in this interval not displayed.       Trended Cardiac Data:  Recent Labs   Lab 06/17/19  1345 06/17/19  1405 06/17/19  1407   TROPONINI 0.125*  --  0.112*   BNP  --  295*  --        Microbiology Data:  Microbiology Results (last 7 days)     Procedure Component Value Units Date/Time    Blood culture [[id number]] Collected:  06/17/19 2247    Order Status:  Completed Specimen:  Blood from Line, Jugular, Internal Right Updated:  06/19/19 0605     Blood Culture, Routine Gram stain aer bottle: Gram positive cocci      Blood Culture, Routine Results called to and read back by:Rebekah Mendez RN 06/19/2019 06:05    Narrative:       Central line    Blood culture [[id number]] Collected:  06/17/19 2247    Order Status:  Sent Specimen:  Blood from Line, Arterial, Right Updated:  06/18/19 0412    Blood culture [[id number]] Collected:  06/12/19 0814    Order Status:  Completed Specimen:  Blood Updated:  06/17/19 1212     Blood Culture, Routine No growth after 5 days.    Blood culture [[id number]] Collected:  06/12/19 0815    Order Status:  Completed Specimen:  Blood Updated:  06/17/19 1212     Blood Culture, Routine  No growth after 5 days.            Radiology:  X-ray Chest Ap Portable    Result Date: 6/17/2019  EXAMINATION: XR CHEST AP PORTABLE CLINICAL HISTORY: central line; TECHNIQUE: Single frontal view of the chest was performed. COMPARISON: 06/17/2019 FINDINGS: Endotracheal tube tip lies approximately 4.1 cm above the arianne.  Right central venous catheter tip projects over the distal SVC.  Defibrillator pads noted.  There is no pneumothorax or significant interval detrimental change in the cardiopulmonary status since the previous exam.     As above Electronically signed by: Leonardo Kirkland MD Date:    06/17/2019 Time:    17:42    X-ray Chest Ap Portable    Result Date: 6/17/2019  EXAMINATION: XR CHEST AP PORTABLE CLINICAL HISTORY: Evaluate for pneumoperitoneum/ ET Tube placement; Alcoholic cirrhosis of liver with ascites TECHNIQUE: Single frontal view of the chest was performed. COMPARISON: March 2015. FINDINGS: ET tube above the arianne.  Monitoring leads and defibrillator pads noted.  Heart size is normal.  Mild accentuation of the pulmonary vascular and interstitial markings.  Some patchy increased opacity right lower lung field.  No pneumothorax. Images of the upper abdomen demonstrate multiple air distended loops of bowel.     Patchy parenchymal opacity right lower lung field.  Developing infiltrate not excluded.  There is diffuse accentuation of the interstitial markings. Multiple air distended loops of bowel in the abdomen.  If further evaluation for pneumoperitoneum desired and erect radiograph, cross-table lateral or lateral decubitus film could be obtained for more thorough evaluation. Electronically signed by: Juan Mcdowell MD Date:    06/17/2019 Time:    14:20     Assessment:     Sachin Calloway is a 58 y.o. male with:  Patient Active Problem List    Diagnosis Date Noted    Lactic acidosis 06/18/2019    Leukocytosis 06/18/2019    Hypocalcemia 06/18/2019    Cardiac arrest 06/17/2019    Severe  "protein-calorie malnutrition 06/12/2019    Hypoalbuminemia 06/12/2019    Gangrene of right foot 06/11/2019    Sacral decubitus ulcer 06/11/2019    Pressure ulcer of left hip, stage 3 06/11/2019    Critical lower limb ischemia 06/10/2019    Panlobular emphysema 05/14/2019    Anasarca 05/14/2019    Esophageal stricture 05/13/2019    Alcoholic cirrhosis of liver with ascites 05/02/2019    Anemia 05/02/2019    Esophageal stenosis 03/29/2019    Dysphagia 05/05/2015    Seizure disorder 03/25/2015    Clavicle fracture 03/23/2015        Patient admitted on 6/10 for elective LE angio due to nonhealing right foot wound. Cath lab intervention performed 6/11. ID has been on board for antibiotic coverage of foot wound, has been on vanc and zosyn. H/H 9.7/29.7 on admission with progressive decline to 6.9/22.0 on 6/16 requiring transfusion. Anemia thought to be 2/2 multiple etiologies (gangrenous wound, poor nutritional status, GI losses, cirrhosis). Patient evaluated for GI bleeding with EGD/colonoscopy 6/17. Patient coded during endoscopy, reported to be asystole, received epi x2, atropine x1, and sodium bicarb x2 with ROSC, sinus tach. Possibly 2/2 sedation during procedure. Was noted to have "dark blood" per rectum during code, and was transfused 2u RBC. Noted to have severe erosive esophagitis, actively bleeding duodenal varix in EGD (not amenable to endoscopic therapy). Transferred to ICU 6/17. Has been oliguric since code. Hematemesis this morning. Pulmonology consulted for vent management.     Plan:     Neuro:  - patient currently sedated with precedex for comfort  - following commands and moves extremities   - RASS 0    CV:  - s/p cardiac arrest 6/17 during EGD  asystole   - Echo 6/17 with good EF and no WMAs noted   - shock requiring vasopressor, presumably was hypovolemic etiology given GI bleeding  currently on 2.5 phenylephrine, wean as tolerated   - lactic acidosis multifactorial in this patient " with liver disease     Resp:   - intubated and ventilated post-code   - doing well on pressure support this morning  able to be extubated   - will keep ventilated as there are plans for patient to go for further procedures to address his GI bleeding     FEN/GI:  - appears to be volume resuscitated   - actively bleeding duodenal varix with adherent clot noted on EGD   - DIPS procedure performed 6/18 with more GI bleeding after return from procedure  - ?plans for further procedures   - severe malnutrition with sacral and hip decubitus ulcer  plan to start tube feeds when possible     ID  - ID has been following for right foot wound/gangrene  - has been on vanc/zosyn since admission    Heme  - anemia multifactorial  baseline ~9-10  - acute drop 2/2 GI bleeding as noted above  - s/p 2u RBC post code   - drop in H/H post DIPS requiring 2u RBC   - INR 1.2 yesterday platelets 190 today    Renal  - Cr stable 0.8  - oliguric post code  improving   - continue to monitor    Thank you for allowing us to participate in the care of this patient.     Aguilar PORTILLO Clifton  [facility]  [facility] Pul[facility]    Pt seen and examined with Pulmonary/Critical Care team. Critical Care time was spent validating the history and physical exam, reviewing the lab and imaging results, and discussing the care of the patient with the bedside nurse. The following additional comments are made:    Appears to be neurologically intact and following commands.  Mechanics of breathing OK. Diuresing. Some ongoing GIB but hemodynamics stable (on low dose pressors). Once we sort out his bleeding we may be able to extubate.    Critical Care time 35 minutes    Mikey Godfrey MD  Phone [phone]

## 2019-06-19 NOTE — ASSESSMENT & PLAN NOTE
-WBCs up to 26K post arrest and up to 37K yesterday; down to 19K  this AM  -initial blood cultures from 6/12 with [facility]; repeat blood cultures 6/17 with [facility] on prelim report   -remains on IV Zosyn and Vanc and will continue for now  -ID on board and continue to follow recs

## 2019-06-19 NOTE — PLAN OF CARE
Problem: Adult Inpatient Plan of Care  Goal: Plan of Care Review  Outcome: Ongoing (interventions implemented as appropriate)  Recommendation:   1. If pt to remain intubated, consider initiation of Impact Peptide 1.5 at 10ml/hr and advance as toelrated to goal rate of 50ml/hr to provide 1800 kcal, 113g protein, & 924ml free water.     Goals:   TF or diet will be started within 24-48 hours  Nutrition Goal Status: new  Communication of RD Recs: reviewed with RN(Reji)

## 2019-06-19 NOTE — ASSESSMENT & PLAN NOTE
-H&H 7.1&22.8 6/14 &6/15 with trend down to 6.9&22.0 6/16  -transfused with 2U PRBCs with improvement in H&H to 10.4&32.0 yesterday with slight drop to 9.5 prior to EGD yesterday  -EGD 6/17 with following results:   LA Grade D (one or more mucosal breaks involving at least 75% of esophageal circumference) esophagitis with no bleeding was found in the lower third of the esophagus.   A deformity was found in the prepyloric region of the stomach.   Red blood was found in the gastric body and duodenum.   Active bleeding with adherent clot was found in the third portion of the duodenum associated with a duodenal varix.  -additional 2units PRBCs given 6/17 post cardiac arrest and repeat transfusion 6/18 post DIPS yesterday   -serial H&Hs in process; H&H 11.2&33.1 down slightly to 10.7&31.4    -baseline 9-10/29-30 since early 2018  -large bloody BM this AM-Flexi Seal placed   -IR/DIPS procedure yesterday   -will continue to hold ASA/Plavix for now   -General Surgery consult for surgical intervention for duodenal varix

## 2019-06-19 NOTE — ANESTHESIA POSTPROCEDURE EVALUATION
Anesthesia Post Evaluation    Patient: Sachin Calloway    Procedure(s) Performed: Procedure(s) (LRB):  EMBOLIZATION, BLOOD VESSEL (N/A)    Final Anesthesia Type: general  Patient location during evaluation: ICU  Patient participation: Yes- Able to Participate  Level of consciousness: awake and alert, oriented and awake  Post-procedure vital signs: reviewed and stable  Pain management: adequate  Airway patency: patent  PONV status at discharge: No PONV  Anesthetic complications: no      Cardiovascular status: blood pressure returned to baseline  Respiratory status: unassisted and room air  Hydration status: euvolemic  Follow-up not needed.          Vitals Value Taken Time   /72 6/19/2019  7:17 AM   Temp 36.7 °C (98.06 °F) 6/19/2019  7:18 AM   Pulse 67 6/19/2019  7:18 AM   Resp 18 6/19/2019  7:18 AM   SpO2 100 % 6/19/2019  7:18 AM   Vitals shown include unvalidated device data.      No case tracking events are documented in the log.      Pain/Aretha Score: Pain Rating Prior to Med Admin: 0 (6/19/2019  3:05 AM)  Pain Rating Post Med Admin: 0 (6/19/2019  3:05 AM)

## 2019-06-19 NOTE — PROGRESS NOTES
Pharmacokinetic Assessment Follow Up: IV Vancomycin    Vancomycin serum concentration assessment(s):    The trough level was drawned correctly and can be used to guide therapy at this time. The measurement is above the desired definitive target range of 10 to 15 mcg/mL.    Vancomycin Regimen Plan:    Starting at 1200 on 6/20/19, Change regimen to Vancomycin 500 mg IV every 24hour with next serum trough concentration measured at 30 minutes prior to 3rd dose on 6/22/19.     Pharmacy will continue to follow and monitor vancomycin.    Please contact pharmacy at extension [phone] for questions regarding this assessment.    Thank you for the consult,   Milton Velasco     Patient brief summary:  Sachin Calloway is a 58 y.o. male initiated on antimicrobial therapy with IV Vancomycin for treatment of suspected skin & soft tissue    The patient received a loading dose, followed by the current treatment regimen: vancomycin 750 mg IV every 24 hours    Drug Allergies:   Review of patient's allergies indicates:  No Known Allergies    Actual Body Weight:   50.7 kg    Renal Function:   Estimated Creatinine Clearance: 81.5 mL/min (based on SCr of 0.7 mg/dL).,     Dialysis Method (if applicable):  N/A     CBC (last 72 hours):  Recent Labs   Lab Result Units 06/16/19  0442 06/16/19  1629 06/17/19  0905 06/17/19  1345 06/17/19  1358 06/17/19  1730 06/17/19  2129 06/18/19  0346 06/18/19  0738 06/18/19  1013 06/18/19  1622 06/18/19  1731   WBC K/uL 16.07* 18.07* 18.75* 26.41*  --   --   --  37.96*  --   --   --   --    Hemoglobin g/dL 6.9* 10.4* 9.5* 7.6* 15.4 12.5* 12.4* 10.2*  10.2* 9.5* 9.4* 5.5* 5.4*   Hematocrit % 22.0* 32.0* 28.7* 23.4* 46.0 36.1* 36.4* 30.3*  30.3* 28.5* 27.8* 18.0* 16.6*   Platelets K/uL 315 324 352* 295  --   --   --  300  --   --   --   --    Gran% % 81.1* 81.3* 82.3* 84.0*  --   --   --  90.9*  --   --   --   --    Lymph% % 9.5* 9.2* 9.5* 13.0*  --   --   --  5.4*  --   --   --   --    Mono% % 8.7 8.7 7.3 3.0*   --   --   --  3.6*  --   --   --   --    Eosinophil% % 0.0 0.0 0.0 0.0  --   --   --  0.0  --   --   --   --    Basophil% % 0.2 0.2 0.2 0.0  --   --   --  0.1  --   --   --   --    Differential Method  Automated Automated Automated Manual  --   --   --  Automated  --   --   --   --        Metabolic Panel (last 72 hours):  Recent Labs   Lab Result Units 06/17/19  2222 06/18/19  0346   Sodium mmol/L 131* 131*   Potassium mmol/L 3.7 3.8   Chloride mmol/L 106 104   CO2 mmol/L 19* 16*   Glucose mg/dL 143* 162*   BUN, Bld mg/dL 10 10   Creatinine mg/dL 0.7 0.7   Albumin g/dL 1.0* 1.6*   Total Bilirubin mg/dL 0.6 0.7   Alkaline Phosphatase U/L 163* 145*   AST U/L 60* 44*   ALT U/L 33 28   Magnesium mg/dL 1.3* 1.6       Vancomycin Administrations:  vancomycin given in the last 96 hours                     vancomycin 750 mg in dextrose 5 % 250 mL IVPB (ready to mix system) (mg) 750 mg New Bag 06/19/19 0003     750 mg New Bag 06/18/19 0009     750 mg New Bag 06/17/19 0006                      Drug levels (last 3 results):  Recent Labs   Lab Result Units 06/16/19  2218 06/18/19  2253   Vancomycin-Trough ug/mL 20.4 18.3       Microbiologic Results:  Microbiology Results (last 7 days)       Procedure Component Value Units Date/Time    Blood culture [[id number]] Collected:  06/17/19 2247    Order Status:  Sent Specimen:  Blood from Line, Arterial, Right Updated:  06/18/19 0412    Blood culture [[id number]] Collected:  06/17/19 2247    Order Status:  Sent Specimen:  Blood from Line, Jugular, Internal Right Updated:  06/18/19 0412    Blood culture [[id number]] Collected:  06/12/19 0814    Order Status:  Completed Specimen:  Blood Updated:  06/17/19 1212     Blood Culture, Routine No growth after 5 days.    Blood culture [[id number]] Collected:  06/12/19 0815    Order Status:  Completed Specimen:  Blood Updated:  06/17/19 1212     Blood Culture, Routine No growth after 5 days.

## 2019-06-19 NOTE — PHYSICIAN QUERY
PT Name: Sachin Calloway  MR #: [id number]    Physician Query Form - Respiratory Condition Clarification      CDS/: Yuly Benavidez RN, CCDS             Contact information: [email or web address]    This form is a permanent document in the medical record.    Query Date: June 19, 2019    By submitting this query, we are merely seeking further clarification of documentation. Please utilize your independent clinical judgment when addressing the question(s) below.    The Medical record contains the following   Indicators   Supporting Clinical Findings Location in Medical Record      SOB, ASCENCIO, Wheezing, Productive Cough, Use of Accessory Muscles, etc.     X   Acute/Chronic Illness PEA arrest yesterday post EGD with ROSC after CPR    S/p EGD 6/17 with severe erosive esophagitis and actively bleeding duodenal varix  6/18 cards note- Mitchel Arevalo    6/18 gastro note Dr. Wagner and Dr. Arias      Radiology Findings        Respiratory Distress or Failure     X   Hypoxia or Hypercapnia PEA arrest preceded by SB at completion of EGD yesterday, etiology likely hypoxia related 6/18 cards note, Dr. Irina Bryant   X   RR         ABGs         O2 sat Ph 6.978, PCO2-71.5, PCO2-249    Ph-7.293, PCO2-37.7, PCO2-65   6/17 abg's   X   BiPAP/Intubation Called stat to endoscopy code blue, Patient was being ventilated by ambu bag / mask 100% oxygen..  Intubated with EBBS  6/17 anesthesia note- Dr. Mcmillan   X   Supplemental O2 still intubated and mild pressor support 6/18 cards note, Dr. Irina Bryant      Home O2, Oxygen Dependence     X   Treatment initial pH 6.9 with improvement 7.2-7.3 with vent and bicarb 6/18 cards note, Dr. Irina Bryant      Other       Respiratory failure can be acute, chronic or both. It is generally further specified as hypoxic, hypercapnic or both. Lastly, it is important to identify an etiology, if known or suspected.   References::   https://www.acphospitalist.org/archives/2013/10/coding.htm http://[employer]/acute-respiratory-failure-know     The clinical guidelines noted below are only system guidelines, and do not replace the providers clinical judgment.    Provider, please specify diagnosis or diagnoses associated with above clinical findings.   [   ] Acute Respiratory Failure with Hypoxia - ABG pO2 < 60 mmHg or O2 sat of 88% on RA and respiratory symptoms documented   [   ] Acute Respiratory Failure with Hypercapnia - pCO2 > 50 mmHg with pH < 7.35 and respiratory symptoms documented   [ x  ] Acute Respiratory Failure with Hypoxia and Hypercapnia - Hypoxia: ABG pO2 < 60 mmHg or O2 sat of 88% on RA and Hypercapnia: pCO2 > 50 mmHg with pH < 7.35 and respiratory symptoms documented   [   ] Other Acute Respiratory Failure     [   ] Other Respiratory Diagnosis (please specify): _________________________________   [   ]  Clinically Undetermined       Please document in your progress notes daily for the duration of treatment until resolved and include in your discharge summary.

## 2019-06-20 LAB
ALBUMIN SERPL BCP-MCNC: 1.4 G/DL (ref 3.5–5.2)
ALP SERPL-CCNC: 287 U/L (ref 55–135)
ALT SERPL W/O P-5'-P-CCNC: 206 U/L (ref 10–44)
ANION GAP SERPL CALC-SCNC: 8 MMOL/L (ref 8–16)
AST SERPL-CCNC: 291 U/L (ref 10–40)
BASOPHILS # BLD AUTO: 0.04 K/UL (ref 0–0.2)
BASOPHILS NFR BLD: 0.3 % (ref 0–1.9)
BILIRUB SERPL-MCNC: 0.9 MG/DL (ref 0.1–1)
BUN SERPL-MCNC: 10 MG/DL (ref 6–20)
CALCIUM SERPL-MCNC: 7.3 MG/DL (ref 8.7–10.5)
CHLORIDE SERPL-SCNC: 108 MMOL/L (ref 95–110)
CO2 SERPL-SCNC: 21 MMOL/L (ref 23–29)
CREAT SERPL-MCNC: 0.7 MG/DL (ref 0.5–1.4)
DIFFERENTIAL METHOD: ABNORMAL
EOSINOPHIL # BLD AUTO: 0 K/UL (ref 0–0.5)
EOSINOPHIL NFR BLD: 0 % (ref 0–8)
ERYTHROCYTE [DISTWIDTH] IN BLOOD BY AUTOMATED COUNT: 16.5 % (ref 11.5–14.5)
EST. GFR  (AFRICAN AMERICAN): >60 ML/MIN/1.73 M^2
EST. GFR  (NON AFRICAN AMERICAN): >60 ML/MIN/1.73 M^2
GLUCOSE SERPL-MCNC: 85 MG/DL (ref 70–110)
HCT VFR BLD AUTO: 27.7 % (ref 40–54)
HCT VFR BLD AUTO: 30.1 % (ref 40–54)
HCT VFR BLD AUTO: 32.8 % (ref 40–54)
HCT VFR BLD AUTO: 32.8 % (ref 40–54)
HGB BLD-MCNC: 10.1 G/DL (ref 14–18)
HGB BLD-MCNC: 10.9 G/DL (ref 14–18)
HGB BLD-MCNC: 10.9 G/DL (ref 14–18)
HGB BLD-MCNC: 9.1 G/DL (ref 14–18)
LYMPHOCYTES # BLD AUTO: 1 K/UL (ref 1–4.8)
LYMPHOCYTES NFR BLD: 6.9 % (ref 18–48)
MCH RBC QN AUTO: 27.5 PG (ref 27–31)
MCHC RBC AUTO-ENTMCNC: 33.2 G/DL (ref 32–36)
MCV RBC AUTO: 83 FL (ref 82–98)
MONOCYTES # BLD AUTO: 0.9 K/UL (ref 0.3–1)
MONOCYTES NFR BLD: 6.3 % (ref 4–15)
NEUTROPHILS # BLD AUTO: 12.6 K/UL (ref 1.8–7.7)
NEUTROPHILS NFR BLD: 86.2 % (ref 38–73)
PLATELET # BLD AUTO: 150 K/UL (ref 150–350)
PMV BLD AUTO: 9.6 FL (ref 9.2–12.9)
POCT GLUCOSE: 111 MG/DL (ref 70–110)
POCT GLUCOSE: 138 MG/DL (ref 70–110)
POCT GLUCOSE: 47 MG/DL (ref 70–110)
POCT GLUCOSE: 78 MG/DL (ref 70–110)
POTASSIUM SERPL-SCNC: 3.5 MMOL/L (ref 3.5–5.1)
PROT SERPL-MCNC: 4 G/DL (ref 6–8.4)
RBC # BLD AUTO: 3.97 M/UL (ref 4.6–6.2)
SODIUM SERPL-SCNC: 137 MMOL/L (ref 136–145)
WBC # BLD AUTO: 14.57 K/UL (ref 3.9–12.7)

## 2019-06-20 PROCEDURE — 94761 N-INVAS EAR/PLS OXIMETRY MLT: CPT

## 2019-06-20 PROCEDURE — 85025 COMPLETE CBC W/AUTO DIFF WBC: CPT

## 2019-06-20 PROCEDURE — 99232 SBSQ HOSP IP/OBS MODERATE 35: CPT | Mod: ,,, | Performed by: INTERNAL MEDICINE

## 2019-06-20 PROCEDURE — 92610 EVALUATE SWALLOWING FUNCTION: CPT

## 2019-06-20 PROCEDURE — 27000221 HC OXYGEN, UP TO 24 HOURS

## 2019-06-20 PROCEDURE — 20000000 HC ICU ROOM

## 2019-06-20 PROCEDURE — 80053 COMPREHEN METABOLIC PANEL: CPT

## 2019-06-20 PROCEDURE — 25000003 PHARM REV CODE 250: Performed by: STUDENT IN AN ORGANIZED HEALTH CARE EDUCATION/TRAINING PROGRAM

## 2019-06-20 PROCEDURE — 99232 PR SUBSEQUENT HOSPITAL CARE,LEVL II: ICD-10-PCS | Mod: ,,, | Performed by: INTERNAL MEDICINE

## 2019-06-20 PROCEDURE — 97168 OT RE-EVAL EST PLAN CARE: CPT

## 2019-06-20 PROCEDURE — 63600175 PHARM REV CODE 636 W HCPCS: Performed by: INTERNAL MEDICINE

## 2019-06-20 PROCEDURE — 94010 BREATHING CAPACITY TEST: CPT

## 2019-06-20 PROCEDURE — 63600175 PHARM REV CODE 636 W HCPCS: Performed by: STUDENT IN AN ORGANIZED HEALTH CARE EDUCATION/TRAINING PROGRAM

## 2019-06-20 PROCEDURE — 99291 CRITICAL CARE FIRST HOUR: CPT | Mod: ,,, | Performed by: INTERNAL MEDICINE

## 2019-06-20 PROCEDURE — C9113 INJ PANTOPRAZOLE SODIUM, VIA: HCPCS | Performed by: INTERNAL MEDICINE

## 2019-06-20 PROCEDURE — 85018 HEMOGLOBIN: CPT

## 2019-06-20 PROCEDURE — 63600175 PHARM REV CODE 636 W HCPCS: Performed by: NURSE PRACTITIONER

## 2019-06-20 PROCEDURE — 25000003 PHARM REV CODE 250: Performed by: NURSE PRACTITIONER

## 2019-06-20 PROCEDURE — 97803 MED NUTRITION INDIV SUBSEQ: CPT | Performed by: DIETITIAN, REGISTERED

## 2019-06-20 PROCEDURE — 85014 HEMATOCRIT: CPT | Mod: 91

## 2019-06-20 PROCEDURE — 27200966 HC CLOSED SUCTION SYSTEM

## 2019-06-20 PROCEDURE — 97164 PT RE-EVAL EST PLAN CARE: CPT

## 2019-06-20 PROCEDURE — 94003 VENT MGMT INPAT SUBQ DAY: CPT

## 2019-06-20 PROCEDURE — 99900017 HC EXTUBATION W/PARAMETERS (STAT)

## 2019-06-20 PROCEDURE — 85018 HEMOGLOBIN: CPT | Mod: 91

## 2019-06-20 PROCEDURE — 85014 HEMATOCRIT: CPT

## 2019-06-20 PROCEDURE — 99291 PR CRITICAL CARE, E/M 30-74 MINUTES: ICD-10-PCS | Mod: ,,, | Performed by: INTERNAL MEDICINE

## 2019-06-20 PROCEDURE — 94150 VITAL CAPACITY TEST: CPT

## 2019-06-20 PROCEDURE — 99900035 HC TECH TIME PER 15 MIN (STAT)

## 2019-06-20 RX ADMIN — CHLORHEXIDINE GLUCONATE 0.12% ORAL RINSE 15 ML: 1.2 LIQUID ORAL at 09:06

## 2019-06-20 RX ADMIN — OXYCODONE HYDROCHLORIDE 5 MG: 5 TABLET ORAL at 06:06

## 2019-06-20 RX ADMIN — DEXMEDETOMIDINE HYDROCHLORIDE 0.3 MCG/KG/HR: 4 INJECTION INTRAVENOUS at 12:06

## 2019-06-20 RX ADMIN — PHENYLEPHRINE HYDROCHLORIDE 1.5 MCG/KG/MIN: 10 INJECTION INTRAVENOUS at 09:06

## 2019-06-20 RX ADMIN — VANCOMYCIN HYDROCHLORIDE 750 MG: 750 INJECTION, POWDER, LYOPHILIZED, FOR SOLUTION INTRAVENOUS at 12:06

## 2019-06-20 RX ADMIN — PIPERACILLIN AND TAZOBACTAM 4.5 G: 4; .5 INJECTION, POWDER, LYOPHILIZED, FOR SOLUTION INTRAVENOUS; PARENTERAL at 04:06

## 2019-06-20 RX ADMIN — PANTOPRAZOLE SODIUM 40 MG: 40 INJECTION, POWDER, FOR SOLUTION INTRAVENOUS at 09:06

## 2019-06-20 RX ADMIN — PIPERACILLIN AND TAZOBACTAM 4.5 G: 4; .5 INJECTION, POWDER, LYOPHILIZED, FOR SOLUTION INTRAVENOUS; PARENTERAL at 09:06

## 2019-06-20 RX ADMIN — PIPERACILLIN AND TAZOBACTAM 4.5 G: 4; .5 INJECTION, POWDER, LYOPHILIZED, FOR SOLUTION INTRAVENOUS; PARENTERAL at 12:06

## 2019-06-20 RX ADMIN — COLLAGENASE SANTYL: 250 OINTMENT TOPICAL at 09:06

## 2019-06-20 RX ADMIN — PANTOPRAZOLE SODIUM 40 MG: 40 INJECTION, POWDER, FOR SOLUTION INTRAVENOUS at 08:06

## 2019-06-20 NOTE — ASSESSMENT & PLAN NOTE
-PEA arrest preceded by SB at completion of EGD 6/10  -responded to few rounds of Epi and ROSC after ACLS/CPR  -intubated and admitted to ICU  -initial pH 6.9 with improvement 7.2-7.3 with vent and bicarb  -etiology likely hypoxia related  -remains intubated and sedated on pressor support; Pulm CC on board for vent management with evaluation for extubation today

## 2019-06-20 NOTE — CONSULTS
Portocaval shunt placed successfully.  No evidence of bleeding currently, so would defer any further intervention at this time.  H/H stable.    Please contact IR if there is evidence of active bleeding.

## 2019-06-20 NOTE — PLAN OF CARE
Problem: Adult Inpatient Plan of Care  Goal: Plan of Care Review  Outcome: Ongoing (interventions implemented as appropriate)       06/18/19 0440   Plan of Care Review   Plan of Care Reviewed With Patient; Vitals: Hypotensive, on esdras map of >65 maintained. hypothermic, baire hugger applied.  Total care for all needs due to patient's status. Unable to determine orientation level, but follows commands appropriately, alert, unable to voice needs due to intubation; O2 sat remained wnl;  No facial grimacing or signs of pain noted.  Hearing and vision adequate, Assist x 2 for ADLs. Repositioned q2hrs. F/C patent, irrigated x1 with sterile water and draining to gravity; olguric. Stool tube in place.  Wounds noted, wound care provided to all and dressings were changed.  All concerns were addressed overnight. Plan of care reviewed with the patient and the family. Labs and vitals are being monitored.

## 2019-06-20 NOTE — PROGRESS NOTES
Pharmacokinetic Assessment Follow Up: IV Vancomycin    Vancomycin serum concentration assessment(s):    The trough level was drawned correctly and can be used to guide therapy at this time.    Vancomycin Regimen Plan:    Change regimen to Vancomycin 750 mg IV every 12hour with next serum trough concentration measured at 1130 prior to 3rd dose on 6/22     Pharmacy will continue to follow and monitor vancomycin.    Please contact pharmacy at extension [phone] for questions regarding this assessment.    Thank you for the consult,   Merlene Patel     Patient brief summary:  Sachin Calloway is a 58 y.o. male initiated on antimicrobial therapy with IV Vancomycin for treatment of suspected skin & soft tissue    The patient received a loading dose, followed by the current treatment regimen: vancomycin 750 mg IV every 12 hours    Drug Allergies:   Review of patient's allergies indicates:  No Known Allergies    Actual Body Weight:   50.7kg    Renal Function:   Estimated Creatinine Clearance: 81.5 mL/min (based on SCr of 0.7 mg/dL).,     Dialysis Method (if applicable):  none     CBC (last 72 hours):  Recent Labs   Lab Result Units 06/17/19  1345 06/17/19  1358 06/17/19  1730 06/17/19  2129 06/18/19  0346 06/18/19  0738 06/18/19  1013 06/18/19  1622 06/18/19  1731 06/18/19  2251 06/19/19  0415 06/19/19  0743 06/19/19  1214 06/19/19  1714 06/19/19  2110 06/20/19  0020 06/20/19  0439   WBC K/uL 26.41*  --   --   --  37.96*  --   --   --   --   --  19.19*  --   --   --   --   --  14.57*   Hemoglobin g/dL 7.6* 15.4 12.5* 12.4* 10.2*  10.2* 9.5* 9.4* 5.5* 5.4* 11.7* 11.2*  11.2* 11.2* 10.7* 10.0* 10.0* 10.1* 10.9*  10.9*   Hematocrit % 23.4* 46.0 36.1* 36.4* 30.3*  30.3* 28.5* 27.8* 18.0* 16.6* 34.2* 33.1*  33.1* 33.2* 31.4* 29.3* 29.7* 30.1* 32.8*  32.8*   Platelets K/uL 295  --   --   --  300  --   --   --   --   --  190  --   --   --   --   --  150   Gran% % 84.0*  --   --   --  90.9*  --   --   --   --   --  84.0*  --   --    --   --   --  86.2*   Lymph% % 13.0*  --   --   --  5.4*  --   --   --   --   --  7.7*  --   --   --   --   --  6.9*   Mono% % 3.0*  --   --   --  3.6*  --   --   --   --   --  8.0  --   --   --   --   --  6.3   Eosinophil% % 0.0  --   --   --  0.0  --   --   --   --   --  0.0  --   --   --   --   --  0.0   Basophil% % 0.0  --   --   --  0.1  --   --   --   --   --  0.3  --   --   --   --   --  0.3   Differential Method  Manual  --   --   --  Automated  --   --   --   --   --  Automated  --   --   --   --   --  Automated       Metabolic Panel (last 72 hours):  Recent Labs   Lab Result Units 06/17/19  2222 06/18/19  0346 06/19/19  0743 06/20/19  0439   Sodium mmol/L 131* 131* 135* 137   Potassium mmol/L 3.7 3.8 3.6 3.5   Chloride mmol/L 106 104 108 108   CO2 mmol/L 19* 16* 19* 21*   Glucose mg/dL 143* 162* 102 85   BUN, Bld mg/dL 10 10 9 10   Creatinine mg/dL 0.7 0.7 0.8 0.7   Albumin g/dL 1.0* 1.6* 1.7* 1.4*   Total Bilirubin mg/dL 0.6 0.7 1.0 0.9   Alkaline Phosphatase U/L 163* 145* 216* 287*   AST U/L 60* 44* 383* 291*   ALT U/L 33 28 167* 206*   Magnesium mg/dL 1.3* 1.6 1.7  --        Vancomycin Administrations:  vancomycin given in the last 96 hours                     vancomycin 750 mg in dextrose 5 % 250 mL IVPB (ready to mix system) (mg) 750 mg New Bag 06/19/19 0003     750 mg New Bag 06/18/19 0009     750 mg New Bag 06/17/19 0006                      Drug levels (last 3 results):  Recent Labs   Lab Result Units 06/18/19  2253   Vancomycin-Trough ug/mL 18.3       Microbiologic Results:  Microbiology Results (last 7 days)       Procedure Component Value Units Date/Time    Blood culture [[id number]] Collected:  06/17/19 2247    Order Status:  Completed Specimen:  Blood from Line, Jugular, Internal Right Updated:  06/20/19 0936     Blood Culture, Routine Gram stain aer bottle: Gram positive cocci      Blood Culture, Routine Results called to and read back by:Rebekah Mendez RN 06/19/2019 06:05    Narrative:        Central line    Blood culture [[id number]] Collected:  06/17/19 2247    Order Status:  Completed Specimen:  Blood from Line, Arterial, Right Updated:  06/20/19 0612     Blood Culture, Routine No growth to date     Blood Culture, Routine No Growth to date    Narrative:       Arterial line    Blood culture [[id number]] Collected:  06/12/19 0814    Order Status:  Completed Specimen:  Blood Updated:  06/17/19 1212     Blood Culture, Routine No growth after 5 days.    Blood culture [[id number]] Collected:  06/12/19 0815    Order Status:  Completed Specimen:  Blood Updated:  06/17/19 1212     Blood Culture, Routine No growth after 5 days.

## 2019-06-20 NOTE — ASSESSMENT & PLAN NOTE
-AST/ down from 383 yesterday;  down from 206  -on Aldactone at home- continue to hold   -abdominal ultrasound from 5/2019 reviewed; EGD with concern for advanced cirrhosis per GI interpretation; IR/DIPS procedure for duodenal varix; General surgery consulted for surgical evaluation   -daily CMP in AM

## 2019-06-20 NOTE — PT/OT/SLP RE-EVAL
Occupational Therapy   Re-evaluation    Name: Sachin Calloway  MRN: [id number]  Admitting Diagnosis:  Critical lower limb ischemia 2 Days Post-Op    Recommendations:     Discharge Recommendations: nursing facility, skilled  Discharge Equipment Recommendations:  (TBD pending progress)  Barriers to discharge:  None    Assessment:     Sachin Calloway is a 58 y.o. male with a medical diagnosis of Critical lower limb ischemia.  He presents with deconditioning, limited tolerance EOB ax 2/2 low BP  Performance deficits affecting function are weakness, impaired functional mobilty, impaired balance, decreased upper extremity function, impaired coordination, decreased lower extremity function, decreased coordination, gait instability, impaired self care skills, impaired endurance, impaired cardiopulmonary response to activity, edema, impaired fine motor, decreased ROM.      Rehab Prognosis:  Fair to fair-; patient would benefit from acute skilled OT services to address these deficits and reach maximum level of function.       Plan:     Patient to be seen 5 x/week to address the above listed problems via self-care/home management, therapeutic activities, therapeutic exercises  · Plan of Care Expires: 07/20/19  · Plan of Care Reviewed with: patient    Subjective     Chief Complaint: Pt with no complaints this date, happy to work with therapies  Patient/Family stated goals: To return to PLOF  Communicated with: radhika prior to session.  Pain/Comfort:  · Pain Rating 1: 0/10    Objective:     Communicated with: radhika prior to session.  Patient found HOB elevated with: angelo catheter, bowel management system(ICU monitoring, bear hugger blanket) upon OT entry to room.    General Precautions: Standard, fall   Orthopedic Precautions:(BLE WBAT)   Braces: N/A     Occupational Performance:    Bed Mobility:    · Patient completed Rolling/Turning to Right with max/total assistance  · Patient completed Scooting/Bridging with total assistance and 2  persons  · Patient completed Supine to Sit with total assistance and 2 persons  · Patient completed Sit to Supine with total assistance and 2 persons    Functional Mobility/Transfers:  · NT 2/2 low BP seated EOB  · Functional Mobility: Pt with fair ro fair- static seated balance, able to use BUE to maintain grasp on EOB to assist in seated balance    Activities of Daily Living:  · Feeding:  stand by assistance to eat pudding from cup (in L hand) to self feed from spoon R hand    Cognitive/Visual Perceptual:  Cognitive/Psychosocial Skills:     -       Oriented to: Person, Place, Time and Situation   -       Follows Commands/attention:Follows multistep  commands  -       Communication: clear/fluent  -       Memory: No Deficits noted  -       Safety awareness/insight to disability: intact   -       Mood/Affect/Coping skills/emotional control: Appropriate to situation    Physical Exam:  Postural examination/scapula alignment:    -       Rounded shoulders  -       Forward head  -       generalized muscle wasting noted throughout  Skin integrity: Wound RLE and Thin  Edema:  Pitting B hands L>R  Motor Planning:    -       WFL  Upper Extremity Range of Motion:   BUE distally WFL; limited use of shoulder flexion this date likely 2/2 weakness and multiple lines  Upper Extremity Strength: grossly 3 to 3+/5   Strength:  Grossly 3+/5  Fine Motor Coordination:    -       Intact but limited 2/2 edema  Gross motor coordination:   WFL    AMPAC 6 Click:  AMPAC Total Score: 11    Treatment & Education:  Education:  Pt educated on role of OT and POC.   Pt performing skills as listed above. BP decreased from 105/65 HR 81 to 69/47 HR 98 while sitting; returned pt to supine and BP increased to 113/70 HR 81; O2 sats 100% throughout      Patient left HOB elevated with all lines intact, call button in reach and nsg notified    GOALS:   Multidisciplinary Problems     Occupational Therapy Goals        Problem: Occupational Therapy Goal     Goal Priority Disciplines Outcome Interventions   Occupational Therapy Goal     OT, PT/OT Ongoing (interventions implemented as appropriate)    Description:  Previous goals discontinued 2/2 change in status  New Goals to be met by: 07/20/2019     Patient will increase functional independence with ADLs by performing:    Feeding with Modified [place].  UE Dressing with Minimal Assistance.  Grooming while seated with Set-up Assistance.  Sitting at edge of bed x15 minutes with Stand-by Assistance.  Increased functional strength to WFL for self care.                         History:     Past Medical History:   Diagnosis Date    Alcohol abuse     Cirrhosis     Edema     Hypertension     Seizures     Stricture esophagus        Past Surgical History:   Procedure Laterality Date    Angiogram, Lower Arterial, Bilateral Bilateral 6/10/2019    Performed by [facility] at [facility] CATH LAB/EP    Aortogram, Abdominal N/A 6/10/2019    Performed by [facility] at [facility] CATH LAB/EP    Atherectomy, Peripheral Blood Vessel N/A 6/11/2019    Performed by [facility] at [facility] CATH LAB/EP    COLONOSCOPY      COLONOSCOPY N/A 6/17/2019    Performed by Juan Sutton MD at [facility] ENDO    COLONOSCOPY N/A 5/5/2015    Performed by Mario Mratínez MD at [facility] ENDO    EGD (ESOPHAGOGASTRODUODENOSCOPY) N/A 6/17/2019    Performed by Juan Sutton [facility] at [facility] ENDO    EGD (ESOPHAGOGASTRODUODENOSCOPY) N/A 5/13/2019    Performed by Dillan Salinas MD at [facility] ENDO    EGD with dilitation  03/04/2019    EMBOLIZATION, BLOOD VESSEL N/A 6/18/2019    Performed by [facility] at [facility] OR    ESOPHAGOGASTRODUODENOSCOPY (EGD) N/A 3/29/2019    Performed by Dillan Salinas MD at [facility] ENDO    ESOPHAGOGASTRODUODENOSCOPY (EGD) N/A 3/18/2019    Performed by Omer Pritchard MD at [facility] ENDO    ESOPHAGOGASTRODUODENOSCOPY (EGD) N/A 3/4/2019    Performed by Madhu Schmidt MD at [facility] ENDO     ESOPHAGOGASTRODUODENOSCOPY (EGD) N/A 5/5/2015    Performed by [facility] at [facility] ENDO    Filter Protection, Peripheral  6/11/2019    Performed by [facility] at [facility] CATH LAB/EP    PTA, PERIPHERAL VESSEL N/A 6/11/2019    Performed by [facility] at [facility] CATH LAB/EP    PTA, Peroneal  6/11/2019    Performed by [facility] at [facility] CATH LAB/EP    PTA, Superficial Femoral Artery  6/11/2019    Performed by Fab[facility] at [facility] CATH LAB/EP    ULTRASOUND, INTRAVASCULAR N/A 6/11/2019    Performed by Fab[facility] Gonzales MD at [facility] CATH LAB/EP       Time Tracking:     OT Date of Treatment: 06/20/19  OT Start Time: 1459  OT Stop Time: 1520  OT Total Time (min): 21 min    Billable Minutes:Re-eval 10 Co Tx PT    Alanna Fairchild, OT  6/20/2019

## 2019-06-20 NOTE — ASSESSMENT & PLAN NOTE
-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 BRENNON Kimball

## 2019-06-20 NOTE — PLAN OF CARE
Pt extubated without issue.    Luiz Campbell MD  Fellow, PGY6, [facility]  Cell [phone]

## 2019-06-20 NOTE — PLAN OF CARE
Problem: Physical Therapy Goal  Goal: Physical Therapy Goal  Goals to be met by: 2019     Patient will increase functional independence with mobility by performin. Supine <> sit with Minimal Assistance  2. Sit to stand transfer with Minimal Assistance  3. Bed to chair transfer with Contact Guard Assistance using Rolling Walker  4. Gait  x 20 feet with Contact Guard Assistance using Rolling Walker.   5. Tolerate 10 reps x 2 BLE AROM ex  Outcome: Ongoing (interventions implemented as appropriate)  Patient seen for re-eval; pt with limited activity tolerance for sitting EOB 2/2 low BP; full report to follow; recommend SNF to maximize functional status.

## 2019-06-20 NOTE — PLAN OF CARE
Problem: SLP Goal  Goal: SLP Goal  Updated Short Term Goals:  1. Pt will complete a clinical swallow evaluation to determine safest diet. MET 6/20  2. Pt will tolerate full liquid diet with no audible s/s of dysphagia.   3. Pt will tolerate advanced trials of soft solids with no audible s/s of dysphagia.   4. SLP will recommend calorie count via dietician consult to ensure Pt is meeting adequate nutrition/hydration needs orally.   Outcome: Ongoing (interventions implemented as appropriate)  ST swallow eval orders received this date s/p extubation at noon. Pt familiar to this service. REC: full liquid diet for now. Notified Primary Cards team and ICU RNReji.

## 2019-06-20 NOTE — ASSESSMENT & PLAN NOTE
-H&H 7.1&22.8 6/14 &6/15 with trend down to 6.9&22.0 6/16  -transfused with 2U PRBCs with improvement in H&H to 10.4&32.0 6/17  with slight drop to 9.5   -EGD 6/17 with following results:   LA Grade D (one or more mucosal breaks involving at least 75% of esophageal circumference) esophagitis with no bleeding was found in the lower third of the esophagus.   A deformity was found in the prepyloric region of the stomach.   Red blood was found in the gastric body and duodenum.   Active bleeding with adherent clot was found in the third portion of the duodenum associated with a duodenal varix.  -additional 2units PRBCs given 6/17 post cardiac arrest and repeat transfusion 6/18 post DIPS yesterday   -serial H&Hs in process; H&H 10.9&32.8 this AM and stable over past 24 hours; will decrease serial H&Hs to [id number]   -baseline 9-10/29-30 since early 2018  -large bloody BM yesterday with Flexi Seal placed   -will continue to hold ASA/Plavix for now   -General Surgery (Dr. Kirk) consult for surgical intervention for duodenal varix; awaiting second opinion by Dr. Kimball

## 2019-06-20 NOTE — CONSULTS
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  Patient with no complaints at present.  No abdominal pain.  No nausea or vomiting.  No fevers or chills.  Last transfusion was 2 nights ago, H/H has been stable since 10.9/32.8.  Denies any prior abdominal surgeries.      Allergies:  Review of patient's allergies indicates:  No Known Allergies    Home Medications:  No current facility-administered medications on file prior to encounter.      Current Outpatient Medications on File Prior to Encounter   Medication Sig    acetaminophen-codeine 300-30mg (TYLENOL #3) 300-30 mg Tab Take by mouth.    furosemide (LASIX) 80 MG tablet Take 1 tablet (80 mg total) by mouth once daily.    pantoprazole (PROTONIX) 40 MG tablet Take 1 tablet (40 mg total) by mouth once daily.    potassium chloride SA (K-DUR,KLOR-CON) 20 MEQ tablet Take 1 tablet (20 mEq total) by mouth once daily.    spironolactone (ALDACTONE) 50 MG tablet Take 1 tablet (50 mg total) by mouth once daily.       Past Medical History:   Diagnosis Date    Alcohol abuse     Cirrhosis     Edema     Hypertension     Seizures     Stricture esophagus      Past Surgical History:   Procedure Laterality Date    Angiogram, Lower Arterial, Bilateral Bilateral 6/10/2019    [facility] CATH LAB/EP    Aortogram, Abdominal N/A  6/10/2019    Performed by [facility] at [facility] CATH LAB/EP    Atherectomy, Peripheral Blood Vessel N/A 6/11/2019    Performed by Fabien Gonzales MD at [facility] CATH LAB/EP    COLONOSCOPY      COLONOSCOPY N/A 6/17/2019    Performed by Juan Sutton MD at [facility] ENDO    COLONOSCOPY N/A 5/5/2015    Performed by Mario Martínez MD at [facility] ENDO    EGD (ESOPHAGOGASTRODUODENOSCOPY) N/A 6/17/2019    Performed by Juan Sutton MD at [facility] ENDO    EGD (ESOPHAGOGASTRODUODENOSCOPY) N/A 5/13/2019    Performed by Dillan Salinas MD at [facility] ENDO    EGD with dilitation  03/04/2019    EMBOLIZATION, BLOOD VESSEL N/A 6/18/2019    Performed by [facility] at [facility] OR    ESOPHAGOGASTRODUODENOSCOPY (EGD) N/A 3/29/2019    Performed by Dillan Salinas MD at [facility] ENDO    ESOPHAGOGASTRODUODENOSCOPY (EGD) N/A 3/18/2019    Performed by Omer Pritchard MD at [facility] ENDO    ESOPHAGOGASTRODUODENOSCOPY (EGD) N/A 3/4/2019    Performed by Madhu Schmidt MD at [facility] ENDO    ESOPHAGOGASTRODUODENOSCOPY (EGD) N/A 5/5/2015    Performed by Mario Martínez MD at [facility] ENDO    Filter Protection, Peripheral  6/11/2019    Performed by Fabien Gonzales MD at [facility] CATH LAB/EP    PTA, PERIPHERAL VESSEL N/A 6/11/2019    Performed by Fabien Gonzales MD at [facility] CATH LAB/EP    PTA, Peroneal  6/11/2019    Performed by Fabien Gonzales MD at [facility] CATH LAB/EP    PTA, Superficial Femoral Artery  6/11/2019    Performed by Fabien Gonzales MD at [facility] CATH LAB/EP    ULTRASOUND, INTRAVASCULAR N/A 6/11/2019    Performed by Fabien Gonzales MD at [facility] CATH LAB/EP     Family History   Problem Relation Age of Onset    Cancer Father      Social History     Tobacco Use    Smoking status: Current Every Day Smoker     Packs/day: 0.50     Years: 43.00     Pack years: 21.50     Types: Cigarettes     Start date: 1976    Smokeless tobacco: Never Used    Tobacco comment: Pt enrolled in [employer].  Ambulatory referral to Smoking  Cessation program.   Substance Use Topics    Alcohol use: Yes     Comment: 6 pack every day (3-4)    Drug use: No      Review of Systems:  Constitutional: no fever or chills  Eyes: no visual changes  ENT: no nasal congestion or sore throat  Respiratory: no cough or shortness of breath  Cardiovascular: no chest pain or palpitations  Gastrointestinal: no nausea or vomiting, no abdominal pain or change in bowel habits  Genitourinary: no hematuria or dysuria  Integument/Breast: no rash or pruritis  Hematologic/Lymphatic: no easy bruising or lymphadenopathy  Musculoskeletal: no arthralgias or myalgias  Neurological: no seizures or tremors  Behavioral/Psych: no auditory or visual hallucinations  Endocrine: no heat or cold intolerance    OBJECTIVE:     Vital Signs:  Temp: 96.1 °F (35.6 °C) (06/20/19 1133)  Pulse: 80 (06/20/19 1133)  Resp: (!) 29 (06/20/19 1133)  BP: 127/70 (06/20/19 1133)  SpO2: 100 % (06/20/19 1133)    Physical Exam:  NAD, AAOx3, nontoxic appearing  Normal respiratory effort on room air  RRR  Abdomen soft, nontender, nondistended, no obvious scars  Moving all extremities    Laboratory:  Labs Reviewed   BASIC METABOLIC PANEL - Abnormal; Notable for the following components:       Result Value    Potassium 3.2 (*)     Calcium 7.9 (*)     All other components within normal limits   CBC W/ AUTO DIFFERENTIAL - Abnormal; Notable for the following components:    WBC 14.58 (*)     RBC 3.87 (*)     Hemoglobin 9.7 (*)     Hematocrit 29.7 (*)     Mean Corpuscular Volume 77 (*)     Mean Corpuscular Hemoglobin 25.1 (*)     RDW 16.3 (*)     Platelets 405 (*)     Gran # (ANC) 11.8 (*)     Gran% 80.9 (*)     Lymph% 11.6 (*)     All other components within normal limits   COMPREHENSIVE METABOLIC PANEL - Abnormal; Notable for the following components:    Calcium 7.9 (*)     Total Protein 5.8 (*)     Albumin 1.4 (*)     Alkaline Phosphatase 284 (*)     All other components within normal limits   COMPREHENSIVE METABOLIC  PANEL - Abnormal; Notable for the following components:    CO2 22 (*)     Calcium 7.6 (*)     Total Protein 5.8 (*)     Albumin 1.4 (*)     Total Bilirubin 1.1 (*)     Alkaline Phosphatase 280 (*)     All other components within normal limits   LIPID PANEL - Abnormal; Notable for the following components:    Cholesterol 68 (*)     HDL 20 (*)     LDL Cholesterol 33.8 (*)     All other components within normal limits   CBC W/ AUTO DIFFERENTIAL - Abnormal; Notable for the following components:    WBC 15.17 (*)     RBC 3.32 (*)     Hemoglobin 8.3 (*)     Hematocrit 25.9 (*)     Mean Corpuscular Volume 78 (*)     Mean Corpuscular Hemoglobin 25.0 (*)     RDW 16.5 (*)     Platelets 354 (*)     Gran # (ANC) 12.1 (*)     Gran% 79.7 (*)     Lymph% 13.6 (*)     All other components within normal limits   BASIC METABOLIC PANEL - Abnormal; Notable for the following components:    Calcium 7.6 (*)     All other components within normal limits   CBC W/ AUTO DIFFERENTIAL - Abnormal; Notable for the following components:    WBC 16.11 (*)     RBC 3.04 (*)     Hemoglobin 7.7 (*)     Hematocrit 24.3 (*)     Mean Corpuscular Volume 80 (*)     Mean Corpuscular Hemoglobin 25.3 (*)     Mean Corpuscular Hemoglobin Conc 31.7 (*)     RDW 17.8 (*)     Gran # (ANC) 12.4 (*)     Mono # 1.3 (*)     Gran% 77.2 (*)     Lymph% 14.0 (*)     All other components within normal limits   BASIC METABOLIC PANEL - Abnormal; Notable for the following components:    Sodium 135 (*)     Calcium 7.4 (*)     Anion Gap 6 (*)     All other components within normal limits   CBC W/ AUTO DIFFERENTIAL - Abnormal; Notable for the following components:    WBC 15.48 (*)     RBC 2.87 (*)     Hemoglobin 7.1 (*)     Hematocrit 22.8 (*)     Mean Corpuscular Volume 79 (*)     Mean Corpuscular Hemoglobin 24.7 (*)     Mean Corpuscular Hemoglobin Conc 31.1 (*)     RDW 17.9 (*)     Gran # (ANC) 12.4 (*)     Mono # 1.3 (*)     Gran% 80.2 (*)     Lymph% 10.4 (*)     All other  components within normal limits   CBC W/ AUTO DIFFERENTIAL - Abnormal; Notable for the following components:    WBC 18.79 (*)     RBC 2.93 (*)     Hemoglobin 7.3 (*)     Hematocrit 23.7 (*)     Mean Corpuscular Volume 81 (*)     Mean Corpuscular Hemoglobin 24.9 (*)     Mean Corpuscular Hemoglobin Conc 30.8 (*)     RDW 18.9 (*)     Gran # (ANC) 15.4 (*)     Mono # 1.5 (*)     Gran% 82.2 (*)     Lymph% 9.0 (*)     All other components within normal limits   CBC W/ AUTO DIFFERENTIAL - Abnormal; Notable for the following components:    WBC 16.07 (*)     RBC 2.71 (*)     Hemoglobin 6.9 (*)     Hematocrit 22.0 (*)     Mean Corpuscular Volume 81 (*)     Mean Corpuscular Hemoglobin 25.5 (*)     Mean Corpuscular Hemoglobin Conc 31.4 (*)     RDW 19.5 (*)     Gran # (ANC) 13.0 (*)     Mono # 1.4 (*)     Gran% 81.1 (*)     Lymph% 9.5 (*)     All other components within normal limits   CBC W/ AUTO DIFFERENTIAL - Abnormal; Notable for the following components:    WBC 18.07 (*)     RBC 3.91 (*)     Hemoglobin 10.4 (*)     Hematocrit 32.0 (*)     Mean Corpuscular Hemoglobin 26.6 (*)     RDW 19.1 (*)     Gran # (ANC) 14.7 (*)     Mono # 1.6 (*)     Gran% 81.3 (*)     Lymph% 9.2 (*)     All other components within normal limits   CBC W/ AUTO DIFFERENTIAL - Abnormal; Notable for the following components:    WBC 18.75 (*)     RBC 3.48 (*)     Hemoglobin 9.5 (*)     Hematocrit 28.7 (*)     RDW 19.2 (*)     Platelets 352 (*)     Gran # (ANC) 15.4 (*)     Mono # 1.4 (*)     Gran% 82.3 (*)     Lymph% 9.5 (*)     All other components within normal limits   B-TYPE NATRIURETIC PEPTIDE - Abnormal; Notable for the following components:     (*)     All other components within normal limits   LACTIC ACID, PLASMA - Abnormal; Notable for the following components:    Lactate (Lactic Acid) 4.5 (*)     All other components within normal limits   CBC W/ AUTO DIFFERENTIAL - Abnormal; Notable for the following components:    WBC 26.41 (*)     RBC  2.78 (*)     Hemoglobin 7.6 (*)     Hematocrit 23.4 (*)     RDW 19.4 (*)     nRBC 2@L=100 (*)     Gran% 84.0 (*)     Lymph% 13.0 (*)     Mono% 3.0 (*)     All other components within normal limits   TROPONIN I - Abnormal; Notable for the following components:    Troponin I 0.125 (*)     All other components within normal limits   TROPONIN I - Abnormal; Notable for the following components:    Troponin I 0.112 (*)     All other components within normal limits   D DIMER, QUANTITATIVE - Abnormal; Notable for the following components:    D-Dimer 15.44 (*)     All other components within normal limits   HEMOGLOBIN - Abnormal; Notable for the following components:    Hemoglobin 12.5 (*)     All other components within normal limits   HEMOGLOBIN - Abnormal; Notable for the following components:    Hemoglobin 12.4 (*)     All other components within normal limits   HEMATOCRIT - Abnormal; Notable for the following components:    Hematocrit 36.1 (*)     All other components within normal limits   HEMATOCRIT - Abnormal; Notable for the following components:    Hematocrit 36.4 (*)     All other components within normal limits   COMPREHENSIVE METABOLIC PANEL - Abnormal; Notable for the following components:    Sodium 131 (*)     CO2 19 (*)     Glucose 143 (*)     Calcium 6.6 (*)     Total Protein 4.4 (*)     Albumin 1.0 (*)     Alkaline Phosphatase 163 (*)     AST 60 (*)     Anion Gap 6 (*)     All other components within normal limits    Narrative:     CA critical result(s) called and verbal readback obtained from Rebekah Mendez RN, 06/17/2019 23:03   LACTIC ACID, PLASMA - Abnormal; Notable for the following components:    Lactate (Lactic Acid) 3.0 (*)     All other components within normal limits   MAGNESIUM - Abnormal; Notable for the following components:    Magnesium 1.3 (*)     All other components within normal limits   CBC W/ AUTO DIFFERENTIAL - Abnormal; Notable for the following components:    WBC 37.96 (*)     RBC  3.69 (*)     Hemoglobin 10.2 (*)     Hematocrit 30.3 (*)     RDW 16.5 (*)     Gran # (ANC) 34.2 (*)     Mono # 1.4 (*)     Gran% 90.9 (*)     Lymph% 5.4 (*)     Mono% 3.6 (*)     All other components within normal limits   COMPREHENSIVE METABOLIC PANEL - Abnormal; Notable for the following components:    Sodium 131 (*)     CO2 16 (*)     Glucose 162 (*)     Calcium 7.1 (*)     Total Protein 4.7 (*)     Albumin 1.6 (*)     Alkaline Phosphatase 145 (*)     AST 44 (*)     All other components within normal limits   HEMATOCRIT - Abnormal; Notable for the following components:    Hematocrit 30.3 (*)     All other components within normal limits   HEMOGLOBIN - Abnormal; Notable for the following components:    Hemoglobin 10.2 (*)     All other components within normal limits   PROTIME-INR - Abnormal; Notable for the following components:    Prothrombin Time 12.9 (*)     All other components within normal limits   HEMATOCRIT - Abnormal; Notable for the following components:    Hematocrit 28.5 (*)     All other components within normal limits   HEMOGLOBIN - Abnormal; Notable for the following components:    Hemoglobin 9.5 (*)     All other components within normal limits   LACTIC ACID, PLASMA - Abnormal; Notable for the following components:    Lactate (Lactic Acid) 4.7 (*)     All other components within normal limits    Narrative:       LACT critical result(s) called and verbal readback obtained from   SHAINA Moreno, 06/18/2019 08:33   HEMATOCRIT - Abnormal; Notable for the following components:    Hematocrit 27.8 (*)     All other components within normal limits   HEMATOCRIT - Abnormal; Notable for the following components:    Hematocrit 18.0 (*)     All other components within normal limits    Narrative:      H&H  critical result(s) called and verbal readback obtained from ICU   Lang Carrasquillo RN, 06/18/2019 17:29   HEMOGLOBIN - Abnormal; Notable for the following components:    Hemoglobin 9.4 (*)     All other components  within normal limits   HEMOGLOBIN - Abnormal; Notable for the following components:    Hemoglobin 5.5 (*)     All other components within normal limits    Narrative:      H&H  critical result(s) called and verbal readback obtained from ICU   Lang Carrasquillo RN, 06/18/2019 17:29   HEMATOCRIT - Abnormal; Notable for the following components:    Hematocrit 16.6 (*)     All other components within normal limits    Narrative:      H&H  critical result(s) called and verbal readback obtained from ICU   Pamela Langford RN, 06/18/2019 18:28   HEMOGLOBIN - Abnormal; Notable for the following components:    Hemoglobin 5.4 (*)     All other components within normal limits    Narrative:      H&H  critical result(s) called and verbal readback obtained from ICU   Pamela Langford RN, 06/18/2019 18:28   HEMATOCRIT - Abnormal; Notable for the following components:    Hematocrit 34.2 (*)     All other components within normal limits   HEMOGLOBIN - Abnormal; Notable for the following components:    Hemoglobin 11.7 (*)     All other components within normal limits   CBC W/ AUTO DIFFERENTIAL - Abnormal; Notable for the following components:    WBC 19.19 (*)     RBC 4.04 (*)     Hemoglobin 11.2 (*)     Hematocrit 33.1 (*)     RDW 15.9 (*)     Gran # (ANC) 16.0 (*)     Mono # 1.5 (*)     Gran% 84.0 (*)     Lymph% 7.7 (*)     All other components within normal limits   HEMATOCRIT - Abnormal; Notable for the following components:    Hematocrit 33.1 (*)     All other components within normal limits   HEMOGLOBIN - Abnormal; Notable for the following components:    Hemoglobin 11.2 (*)     All other components within normal limits   HEMATOCRIT - Abnormal; Notable for the following components:    Hematocrit 33.2 (*)     All other components within normal limits   HEMOGLOBIN - Abnormal; Notable for the following components:    Hemoglobin 11.2 (*)     All other components within normal limits   COMPREHENSIVE METABOLIC PANEL - Abnormal; Notable for the  following components:    Sodium 135 (*)     CO2 19 (*)     Calcium 7.0 (*)     Total Protein 4.2 (*)     Albumin 1.7 (*)     Alkaline Phosphatase 216 (*)      (*)      (*)     All other components within normal limits   LACTIC ACID, PLASMA - Abnormal; Notable for the following components:    Lactate (Lactic Acid) 2.4 (*)     All other components within normal limits   HEMATOCRIT - Abnormal; Notable for the following components:    Hematocrit 31.4 (*)     All other components within normal limits   HEMATOCRIT - Abnormal; Notable for the following components:    Hematocrit 29.3 (*)     All other components within normal limits   HEMOGLOBIN - Abnormal; Notable for the following components:    Hemoglobin 10.7 (*)     All other components within normal limits   HEMOGLOBIN - Abnormal; Notable for the following components:    Hemoglobin 10.0 (*)     All other components within normal limits   HEMATOCRIT - Abnormal; Notable for the following components:    Hematocrit 29.7 (*)     All other components within normal limits   HEMOGLOBIN - Abnormal; Notable for the following components:    Hemoglobin 10.0 (*)     All other components within normal limits   HEMATOCRIT - Abnormal; Notable for the following components:    Hematocrit 30.1 (*)     All other components within normal limits   HEMOGLOBIN - Abnormal; Notable for the following components:    Hemoglobin 10.1 (*)     All other components within normal limits   CBC W/ AUTO DIFFERENTIAL - Abnormal; Notable for the following components:    WBC 14.57 (*)     RBC 3.97 (*)     Hemoglobin 10.9 (*)     Hematocrit 32.8 (*)     RDW 16.5 (*)     Gran # (ANC) 12.6 (*)     Gran% 86.2 (*)     Lymph% 6.9 (*)     All other components within normal limits   HEMATOCRIT - Abnormal; Notable for the following components:    Hematocrit 32.8 (*)     All other components within normal limits   HEMOGLOBIN - Abnormal; Notable for the following components:    Hemoglobin 10.9 (*)      All other components within normal limits   COMPREHENSIVE METABOLIC PANEL - Abnormal; Notable for the following components:    CO2 21 (*)     Calcium 7.3 (*)     Total Protein 4.0 (*)     Albumin 1.4 (*)     Alkaline Phosphatase 287 (*)      (*)      (*)     All other components within normal limits   ISTAT ACT-K - Abnormal; Notable for the following components:    POC ACTIVATED CLOTTING TIME K 219 (*)     All other components within normal limits   ISTAT ACT-K - Abnormal; Notable for the following components:    POC ACTIVATED CLOTTING TIME K 208 (*)     All other components within normal limits   ISTAT ACT-K - Abnormal; Notable for the following components:    POC ACTIVATED CLOTTING TIME K 202 (*)     All other components within normal limits   ISTAT ACT-K - Abnormal; Notable for the following components:    POC ACTIVATED CLOTTING TIME K 224 (*)     All other components within normal limits   ISTAT ACT-K - Abnormal; Notable for the following components:    POC ACTIVATED CLOTTING TIME K 180 (*)     All other components within normal limits   POCT GLUCOSE - Abnormal; Notable for the following components:    POCT Glucose 117 (*)     All other components within normal limits   POCT GLUCOSE - Abnormal; Notable for the following components:    POCT Glucose 62 (*)     All other components within normal limits   POCT GLUCOSE - Abnormal; Notable for the following components:    POCT Glucose 49 (*)     All other components within normal limits   POCT GLUCOSE - Abnormal; Notable for the following components:    POCT Glucose 136 (*)     All other components within normal limits   ISTAT PROCEDURE - Abnormal; Notable for the following components:    POC PH 6.978 (*)     POC PCO2 71.5 (*)     POC PO2 249 (*)     POC HCO3 16.8 (*)     POC TCO2 19 (*)     All other components within normal limits   ISTAT PROCEDURE - Abnormal; Notable for the following components:    POC PH 7.293 (*)     POC PO2 65 (*)     POC HCO3  18.2 (*)     POC SATURATED O2 90 (*)     POC TCO2 19 (*)     All other components within normal limits   POCT GLUCOSE - Abnormal; Notable for the following components:    POCT Glucose 148 (*)     All other components within normal limits   ISTAT PROCEDURE - Abnormal; Notable for the following components:    POC PCO2 27.3 (*)     POC PO2 104 (*)     POC HCO3 16.7 (*)     POC TCO2 18 (*)     All other components within normal limits   ISTAT PROCEDURE - Abnormal; Notable for the following components:    POC PH 7.257 (*)     POC PO2 269 (*)     POC HCO3 19.9 (*)     POC Glucose 117 (*)     POC Sodium 135 (*)     POC TCO2 21 (*)     POC Ionized Calcium 1.02 (*)     All other components within normal limits   ISTAT PROCEDURE - Abnormal; Notable for the following components:    POC PCO2 27.0 (*)     POC PO2 107 (*)     POC HCO3 16.1 (*)     POC TCO2 17 (*)     All other components within normal limits   POCT GLUCOSE - Abnormal; Notable for the following components:    POCT Glucose 66 (*)     All other components within normal limits   POCT GLUCOSE - Abnormal; Notable for the following components:    POCT Glucose 148 (*)     All other components within normal limits   POCT GLUCOSE - Abnormal; Notable for the following components:    POCT Glucose 42 (*)     All other components within normal limits   POCT GLUCOSE - Abnormal; Notable for the following components:    POCT Glucose 69 (*)     All other components within normal limits   POCT GLUCOSE - Abnormal; Notable for the following components:    POCT Glucose 138 (*)     All other components within normal limits   POCT GLUCOSE - Abnormal; Notable for the following components:    POCT Glucose 111 (*)     All other components within normal limits   POCT GLUCOSE - Abnormal; Notable for the following components:    POCT Glucose 47 (*)     All other components within normal limits   CULTURE, BLOOD   CULTURE, BLOOD   CULTURE, BLOOD    Narrative:     Arterial line   CULTURE, BLOOD     Narrative:     Central line   LIPID PANEL   HEPATIC FUNCTION PANEL   VANCOMYCIN, TROUGH    Narrative:     Collection Instructions:->before 4th dose   VANCOMYCIN, TROUGH    Narrative:     Collection Instructions:->before next dose   HEMOGLOBIN   HEMATOCRIT   MAGNESIUM   MAGNESIUM   MAGNESIUM   VANCOMYCIN, TROUGH    Narrative:     Collection Instructions:->before 3rd dose   MAGNESIUM   HEMATOCRIT   HEMOGLOBIN   TYPE & SCREEN   TYPE & SCREEN   TYPE & SCREEN   POCT GLUCOSE   POCT GLUCOSE   POCT GLUCOSE   POCT GLUCOSE   POCT GLUCOSE   POCT GLUCOSE   POCT GLUCOSE   POCT GLUCOSE   POCT GLUCOSE   POCT GLUCOSE   POCT GLUCOSE MONITORING CONTINUOUS   PREPARE RBC SOFT   PREPARE RBC SOFT   PREPARE RBC SOFT     Diagnostic Results:      ASSESSMENT:     This is a 57yo male with PMH significant for HTN, EtOH abuse, and cirrhosis, who was admitted 6/10/19 to cardiology service after peripheral angiogram for critical limb ischemia, evaluated by GI for anemia and weakness, EGD done 6/17/19 which showed 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      PLAN:   - last transfusion was 2 nights ago, H/H has been stable since 10.9/32.8, no acute surgical intervention indicated at this time  - continue medical management and supportive care  - if continues to bleed, may benefit from IR eval for embolization  - case discussed with Dr. Jigar Rojas MD  General Surgery PGY-3  6/20/2019  11:44 AM

## 2019-06-20 NOTE — PLAN OF CARE
Problem: Occupational Therapy Goal  Goal: Occupational Therapy Goal  Previous goals discontinued 2/2 change in status  New Goals to be met by: 07/20/2019     Patient will increase functional independence with ADLs by performing:    Feeding with Modified [place].  UE Dressing with Minimal Assistance.  Grooming while seated with Set-up Assistance.  Sitting at edge of bed x15 minutes with Stand-by Assistance.  Increased functional strength to WFL for self care.       Outcome: Ongoing (interventions implemented as appropriate)  Pt would benefit from continued OT to address deficits in self care and functional mobility. Recommending SNF; DME needs TBD pending progress

## 2019-06-20 NOTE — HPI
"Sachin Calloway is a 58 y.o. male who  has a past medical history of Alcohol abuse, Cirrhosis, Edema, Hypertension, severe PA, Seizures, and Stricture esophagus.. The patient presented to the [facility] on 6/10/2019 with a primary complaint of No chief complaint on file.     Patient admitted on 6/10 for elective LE angio due to nonhealing right foot wound. Cath lab intervention performed 6/11. ID has been on board for antibiotic coverage of foot wound, has been on vanc and zosyn. H/H 9.7/29.7 on admission with progressive decline to 6.9/22.0 on 6/16 requiring transfusion. Anemia thought to be 2/2 multiple etiologies (gangrenous wound, poor nutritional status, GI losses, liver disease). Patient evaluated for GI bleeding with EGD/colonoscopy 6/17. Patient coded during endoscopy, reported to be asystole, received epi x2, atropine x1, and sodium bicarb x2 with ROSC, sinus tach. Possibly 2/2 sedation during procedure. Was noted to have "dark blood" per rectum during code, and was transfused 2u RBC. Transferred to ICU yesterday. Has been oliguric since transfer. This morning was noted to have "projectile vomiting of bright red blood with big clots" and bright red blood with oral suctioning. Patient is currently on phenylephrine for hypotension and precedex for sedation. Pulmonology consulted for vent.  "

## 2019-06-20 NOTE — PT/OT/SLP RE-EVAL
Physical Therapy Re-evaluation    Patient Name:  Sachin Calloway   MRN:  [id number]    Recommendations:     Discharge Recommendations:  nursing facility, skilled   Discharge Equipment Recommendations: (TBD pending progress)   Barriers to discharge: None    Assessment:     Sachin Calloway is a 58 y.o. male admitted with a medical diagnosis of Critical lower limb ischemia.  He presents with the following impairments/functional limitations:  weakness, impaired endurance, gait instability, impaired functional mobilty, impaired self care skills, impaired balance, orthopedic precautions, decreased ROM, impaired skin, impaired coordination, decreased upper extremity function, decreased lower extremity function, decreased coordination Pt with limited activity tolerance for sitting EOB 2/2 low BP.    Rehab Prognosis:  Fair- to Fair; patient would benefit from acute skilled PT services to address these deficits and reach maximum level of function.      Recent Surgery: Procedure(s) (LRB):  EMBOLIZATION, BLOOD VESSEL (N/A) 2 Days Post-Op    Plan:     During this hospitalization, patient to be seen 6 x/week to address the above listed problems via gait training, therapeutic activities, therapeutic exercises  · Plan of Care Expires:  07/20/19   Plan of Care Reviewed with: patient    Subjective     Communicated with nurse prior to session.  Patient found with bowel management system, angelo catheter, central line, SCD, pressure relief boots(ICU monitoring) upon PT entry to room, agreeable to evaluation.      Chief Complaint: none stated  Patient comments/goals: return to PLOF  Pain/Comfort:  · Pain Rating 1: 0/10  · Pain Rating Post-Intervention 1: 0/10    Patients cultural, spiritual, Worship conflicts given the current situation: no      Objective:     Patient found with: bowel management system, angelo catheter, central line, SCD, pressure relief boots(ICU monitoring)     General Precautions: Standard, fall   Orthopedic  Precautions:(BLE WBAT)   Braces: N/A     Exams:  · Cognitive Exam:  Patient is oriented to Person, Place, Time, Situation and follows multistep commands  · Gross Motor Coordination:  slow movement 2/2 weakness, decreased ROM  · Postural Exam:  Patient presented with the following abnormalities:    · -       Rounded shoulders  · -       Forward head  · Skin Integrity/Edema:      · -       Skin integrity: Wound R foot, scabs ant knees  · -       Edema: Pitting R mild, L mod  · RLE ROM: WFL except lacking knee ext, ankle df  · RLE Strength: at least 3 to 3+ grossly  · LLE ROM: WFL except lacking knee ext, ankle df   · LLE Strength: at least 3 to 3+    Functional Mobility:  · Bed Mobility:     · Rolling Right: maximal assistance and total assistance  · Scooting: total assistance and of 2 persons  · Supine to Sit: total assistance and of 2 persons  · Sit to Supine: total assistance and of 2 persons    AM-PAC 6 CLICK MOBILITY  Total Score:8       Therapeutic Activities and Exercises:   Patient performed skills as described above; moved to EOB; sat x 6 minutes while performing limited AROM df and FAQ x 10 reps; BP decreased from 105/65 HR 81 to 69/47 HR 98 while sitting; returned pt to supine and BP increased to 113/70 HR 81; O2 sats 100% throughout; will cont with POC    Patient left HOB elevated with all lines intact, call button in reach and nurse notified.    GOALS:   Multidisciplinary Problems     Physical Therapy Goals        Problem: Physical Therapy Goal    Goal Priority Disciplines Outcome Goal Variances Interventions   Physical Therapy Goal     PT, PT/OT Ongoing (interventions implemented as appropriate)     Description:  Goals to be met by: 2019     Patient will increase functional independence with mobility by performin. Supine to sit with Modified [place]  2. Sit to stand transfer with Minimal Assistance  3. Bed to chair transfer with Contact Guard Assistance using Rolling Walker  4. Gait  x  20 feet with Contact Guard Assistance using Rolling Walker.              Problem: Physical Therapy Goal    Goal Priority Disciplines Outcome Goal Variances Interventions   Physical Therapy Goal     PT, PT/OT Ongoing (interventions implemented as appropriate)     Description:  Goals to be met by: 2019     Patient will increase functional independence with mobility by performin. Supine <> sit with Minimal Assistance  2. Sit to stand transfer with Minimal Assistance  3. Bed to chair transfer with Contact Guard Assistance using Rolling Walker  4. Gait  x 20 feet with Contact Guard Assistance using Rolling Walker.   5. Tolerate 10 reps x 2 BLE AROM ex                    History:     Past Medical History:   Diagnosis Date    Alcohol abuse     Cirrhosis     Edema     Hypertension     Seizures     Stricture esophagus        Past Surgical History:   Procedure Laterality Date    Angiogram, Lower Arterial, Bilateral Bilateral 6/10/2019    Performed by [facility] at [facility] CATH LAB/EP    Aortogram, Abdominal N/A 6/10/2019    Performed by [facility] at [facility] CATH LAB/EP    Atherectomy, Peripheral Blood Vessel N/A 2019    Performed by [facility] at [facility] CATH LAB/EP    COLONOSCOPY      COLONOSCOPY N/A 2019    Performed by Juan Sutton MD at [facility] ENDO    COLONOSCOPY N/A 2015    Performed by Mario Martínez MD at [facility] ENDO    EGD (ESOPHAGOGASTRODUODENOSCOPY) N/A 2019    Performed by [facility] at [facility] ENDO    EGD (ESOPHAGOGASTRODUODENOSCOPY) N/A 2019    Performed by Dillan Salinas MD at [facility] ENDO    EGD with dilitation  2019    EMBOLIZATION, BLOOD VESSEL N/A 2019    Performed by [facility] OR    ESOPHAGOGASTRODUODENOSCOPY (EGD) N/A 3/29/2019    Performed by Dillan [facility]inas MD at [facility] ENDO    ESOPHAGOGASTRODUODENOSCOPY (EGD) N/A 3/18/2019    Performed by Omer Pritchard MD at [facility] ENDO     ESOPHAGOGASTRODUODENOSCOPY (EGD) N/A 3/4/2019    Performed by [facility] at [facility] ENDO    ESOPHAGOGASTRODUODENOSCOPY (EGD) N/A 5/5/2015    Performed by Mario [facility]ínez MD at [facility] ENDO    Filter Protection, Peripheral  6/11/2019    Performed by [facility] at [facility] CATH LAB/EP    PTA, PERIPHERAL VESSEL N/A 6/11/2019    Performed by [facility] Gonzales [facility] at [facility] CATH LAB/EP    PTA, Peroneal  6/11/2019    Performed by [facility] at [facility] CATH LAB/EP    PTA, Superficial Femoral Artery  6/11/2019    Performed by [facility] at [facility] CATH LAB/EP    ULTRASOUND, INTRAVASCULAR N/A 6/11/2019    Performed by Fabien Gonzales MD at [facility] CATH LAB/EP       Time Tracking:     PT Received On: 06/20/19  PT Start Time: 1459     PT Stop Time: 1521  PT Total Time (min): 22 min     Billable Minutes: Re-eval 22 minutes      Janet Vergara, PT  06/20/2019

## 2019-06-20 NOTE — PLAN OF CARE
Problem: Adult Inpatient Plan of Care  Goal: Plan of Care Review  Outcome: Ongoing (interventions implemented as appropriate)  Pt resting in bed with family at bedside. No complaints currently. H/h stable. Pt extubated today. Surgery thinks pt is not a candidate for surgical fix of duodenal varices. Only on esdras gtts at 0.5 now and bp is currently maintaining map >65. Will continue to monitor.

## 2019-06-20 NOTE — CONSULTS
[facility]  Adult Nutrition  Consult Note    SUMMARY     Recommendations    Recommendation:   1.  If pt extubated but remains NPO, rec NGT placement for TF. Consider Nutren 2.0 @ 10 mL/hr to INC as BRIJESH to goal rate of  35 mL/hr to provide 1680 calories, 76 gm Pro, 602 mL water     2. Consult SLP rec for texture and consistency of diet. Goal diet of 2 gm Na.    3. Administer 150 mL water every 4 hrs to provide 900 mL to meet fluid needs if NGT placed and TF started.   4. Obtain Prealbumin and CRP     Goals: TF or diet will be started within 24-48 hours  Nutrition Goal Status: goal not met  Communication of RD Recs: reviewed with physician    Nutrition Discharge Planning: d/c needs to be determined      Reason for Assessment    Reason For Assessment: consult(TF recs)  Diagnosis: (critical lower limb ischemia)      Relevant Medical History:   Past Medical History:   Diagnosis Date    Alcohol abuse     Cirrhosis     Edema     Hypertension     Seizures     Stricture esophagus      Past Surgical History:   Procedure Laterality Date    Angiogram, Lower Arterial, Bilateral Bilateral 6/10/2019    Performed [facility] at [facility] CATH LAB/EP    Aortogram, Abdominal N/A 6/10/2019    Performed by [facility] at [facility] CATH LAB/EP    Atherectomy, Peripheral Blood Vessel N/A 6/11/2019    Performed by Fab[facility] at [facility] CATH LAB/EP    COLONOSCOPY      COLONOSCOPY N/A 6/17/2019    Performed by Juan Sutton MD at [facility] ENDO    COLONOSCOPY N/A 5/5/2015    Performed by Mario Martínez MD at [facility] ENDO    EGD (ESOPHAGOGASTRODUODENOSCOPY) N/A 6/17/2019    Performed by Juan Sutton MD at [facility] ENDO    EGD (ESOPHAGOGASTRODUODENOSCOPY) N/A 5/13/2019    Performed by Dillan Salinas MD at [facility] ENDO    EGD with dilitation  03/04/2019    EMBOLIZATION, BLOOD VESSEL N/A 6/18/2019    Performed by [facility] OR    ESOPHAGOGASTRODUODENOSCOPY (EGD) N/A  3/29/2019    Performed by [facility] at [facility] ENDO    ESOPHAGOGASTRODUODENOSCOPY (EGD) N/A 3/18/2019    Performed by Omer Pritchard MD at [facility] ENDO    ESOPHAGOGASTRODUODENOSCOPY (EGD) N/A 3/4/2019    Performed by Madhu Schmidt MD at [facility] ENDO    ESOPHAGOGASTRODUODENOSCOPY (EGD) N/A 5/5/2015    Performed by Mario Martínez MD at [facility] ENDO    Filter Protection, Peripheral  6/11/2019    Performed by Fabien Gonzales MD at [facility] CATH LAB/EP    PTA, PERIPHERAL VESSEL N/A 6/11/2019    Performed by Fabien Gonzales MD at [facility] CATH LAB/EP    PTA, Peroneal  6/11/2019    Performed by Fabien Gonzales MD at [facility] CATH LAB/EP    PTA, Superficial Femoral Artery  6/11/2019    Performed by Fabien Gonzales MD at [facility] CATH LAB/EP    ULTRASOUND, INTRAVASCULAR N/A 6/11/2019    Performed by Fabien Gonzales MD at [facility] CATH LAB/EP       General Information Comments: NFPE completed on 6/12/19. See malnutrition section for details. Fluid status updated which reflects mild edema (+2) to lower extrementies.  Pt to be extubated today per RT.  Consult request for TF recieved today. Patient is alert off of sedation.       Nutrition Risk Screen    Nutrition Risk Screen: large or nonhealing wound, burn or pressure injury    Nutrition/Diet History    Patient Reported Diet/Restrictions/Preferences: general  Food Preferences: no Buddhism or cultural food prefs identified  Spiritual, Cultural Beliefs, Christianity Practices, Values that Affect Care: no  Factors Affecting Nutritional Intake: NPO    Anthropometrics    Temp: 96.1 °F (35.6 °C)  Height Method: Stated  Height: 6' (182.9 cm)  Height (inches): 72 in  Weight Method: Bed Scale  Weight: 50.7 kg (111 lb 12.4 oz)  Weight (lb): 111.77 lb  Ideal Body Weight (IBW), Male: 178 lb  % Ideal Body Weight, Male (lb): 62.79 lb  % Ideal Body Weight Malnutrition: less than 70% -severe deficit  BMI (Calculated): 15.2  BMI Grade: less than 16 protein-energy malnutrition grade III  Usual  Body Weight (UBW), k kg  % Usual Body Weight: 47.12  % Weight Change From Usual Weight: -52.98 %       Lab/Procedures/Meds    Pertinent Labs Reviewed: reviewed  Recent Labs   Lab 19      K 3.5      CO2 21*   BUN 10   CREATININE 0.7     Recent Labs   Lab 19   WBC 14.57*   RBC 3.97*   HGB 10.9*  10.9*   HCT 32.8*  32.8*      MCV 83   MCH 27.5   MCHC 33.2     Recent Labs   Lab 19   *   *   ALKPHOS 287*   BILITOT 0.9   PROT 4.0*   ALBUMIN 1.4*         Pertinent Medications Reviewed: reviewed  Scheduled Meds:   chlorhexidine  15 mL Mouth/Throat BID    collagenase   Topical (Top) Daily    pantoprazole  40 mg Intravenous BID    piperacillin-tazobactam (ZOSYN) IVPB  4.5 g Intravenous Q8H    vancomycin (VANCOCIN) IVPB  750 mg Intravenous Q24H     Continuous Infusions:   dexmedetomidine (PRECEDEX) infusion Stopped (19 0900)    norepinephrine bitartrate-D5W Stopped (19 1800)    phenylephrine 1.5 mcg/kg/min (19 0901)    vasopressin (PITRESSIN) infusion Stopped (19 181)     PRN Meds:.sodium chloride, sodium chloride, aluminum-magnesium hydroxide-simethicone, Dextrose 10% Bolus, Dextrose 10% Bolus, glucagon (human recombinant), ondansetron, ondansetron, oxyCODONE, polyethylene glycol, simethicone      Estimated/Assessed Needs    Weight Used For Calorie Calculations: 50.7 kg (111 lb 12.4 oz)  Energy Calorie Requirements (kcal): 1539  Energy Need Method: [facility]     Protein Requirements: 75(1.5 gm Pro/kg)  Weight Used For Protein Calculations: 50.7 kg (111 lb 12.4 oz)     Estimated Fluid Requirement Method: RDA Method  RDA Method (mL): 1539  CHO Requirement: 175 gm      Nutrition Prescription Ordered    Current Diet Order: NPO  Oral Nutrition Supplement: Boost Plus, beneprotein, Darrian    Evaluation of Received Nutrient/Fluid Intake    I/O: reviewed  Energy Calories Required: not meeting needs  Protein Required: not  meeting needs  Fluid Required: not meeting needs  Comments: LBM 6/18/19     % Intake of Estimated Energy Needs: 0 - 25 %  % Meal Intake: NPO    Nutrition Risk    Level of Risk/Frequency of Follow-up: (2xweekly)     Assessment and Plan    Severe protein-calorie malnutrition  Malnutrition in the context of Chronic Illness/Injury    Related to (etiology):  dysphagia    Signs and Symptoms (as evidenced by):  Energy Intake: <50% of estimated energy requirement for several months  Body Fat Depletion: severe depletion of orbitals, triceps and thoracic and lumbar region   Muscle Mass Depletion: severe depletion of temples, clavicle region, scapular region, interosseous muscle and lower extremities   Weight Loss: greater than 20% in 1 year     Interventions:  Commercial beverage    Nutrition Diagnosis Status:  Continues         Monitor and Evaluation    Food and Nutrient Intake: energy intake  Food and Nutrient Adminstration: diet order, enteral and parenteral nutrition administration  Physical Activity and Function: nutrition-related ADLs and IADLs  Anthropometric Measurements: weight  Biochemical Data, Medical Tests and Procedures: electrolyte and renal panel  Nutrition-Focused Physical Findings: overall appearance     Malnutrition Assessment  Malnutrition Type: chronic illness          Weight Loss (Malnutrition): greater than 20% in 1 year  Energy Intake (Malnutrition): less than or equal to 50% for greater than or equal to 5 days  Fluid Accumulation (Malnutrition): mild   Orbital Region (Subcutaneous Fat Loss): moderate depletion  Upper Arm Region (Subcutaneous Fat Loss): severe depletion  Thoracic and Lumbar Region: severe depletion   Amish Region (Muscle Loss): severe depletion  Clavicle Bone Region (Muscle Loss): severe depletion  Clavicle and Acromion Bone Region (Muscle Loss): severe depletion  Scapular Bone Region (Muscle Loss): severe depletion  Patellar Region (Muscle Loss): severe depletion  Anterior Thigh  Region (Muscle Loss): severe depletion  Posterior Calf Region (Muscle Loss): severe depletion   Edema (Fluid Accumulation): 2-->mild   Subcutaneous Fat Loss (Final Summary): severe protein-calorie malnutrition  Muscle Loss Evaluation (Final Summary): severe protein-calorie malnutrition  Fluid Accumulation Evaluation: mild    Severe Weight Loss (Malnutrition): greater than 20% in 1 year    Nutrition Follow-Up    RD Follow-up?: Yes

## 2019-06-20 NOTE — ASSESSMENT & PLAN NOTE
-Podiatry and ID on board  -initial plans by  Podiatry for TMA once demarcation; ongoing discussions with Podiatry per IC staff (Dr. Gonzales) with high risk for limb loss and no plans for surgical intervention given acute decompensation and recent cardiac arrest

## 2019-06-20 NOTE — PLAN OF CARE
Problem: Oral Intake Inadequate  Goal: Improved Oral Intake    Intervention: Promote and Optimize Oral Intake  Recommendation:   1.  If pt extubated but remains NPO, rec NGT placement for TF. Previous recs placed on 6/19/19.  Consider Nutren 2.0 @ 10 mL/hr to INC as BRIJESH to goal rate of  35 mL/hr to provide 1680 calories, 76 gm Pro, 602 mL water     2. Consult SLP rec for texture and consistency of diet. Goal diet of 2 gm Na.    3. Administer 150 mL water every 4 hrs to provide 900 mL to meet fluid needs if NGT placed and TF started.   4. Obtain Prealbumin and CRP      Goals: TF or diet will be started within 24-48 hours  Nutrition Goal Status: goal not met  Communication of RD Recs: reviewed with physician     Nutrition Discharge Planning: d/c needs to be determined

## 2019-06-20 NOTE — CONSULTS
Today`s Date: 6/20/2019     Admit Date: 6/10/2019    Admitting Physician: Toi Arevalo MD    Patient`s Name: Sachin Calloway , 58 y.o. male    Reason for consultation  awakw responsive to verbal stimuli   Patient Active Problem List:     Clavicle fracture     Seizure disorder     Dysphagia     Esophageal stenosis     Alcoholic cirrhosis of liver with ascites     Anemia     Esophageal stricture     Panlobular emphysema     Anasarca     Critical lower limb ischemia     Gangrene of right foot     Sacral decubitus ulcer     Pressure ulcer of left hip, stage 3     Severe protein-calorie malnutrition     Hypoalbuminemia     Cardiac arrest     Lactic acidosis     Leukocytosis     Hypocalcemia     Gastrointestinal hemorrhage      Past Medical History:  No date: Alcohol abuse  No date: Cirrhosis  No date: Edema  No date: Hypertension  No date: Seizures  No date: Stricture esophagus    Past Surgical History:  6/10/2019: Angiogram, Lower Arterial, Bilateral; Bilateral      Comment:  Performed by Fabien Gonzales MD at [facility] CATH LAB/EP  6/10/2019: Aortogram, Abdominal; N/A      Comment:  Performed by Fabien Gonzales MD at [facility] CATH LAB/EP  6/11/2019: Atherectomy, Peripheral Blood Vessel; N/A      Comment:  Performed by Fabien Gonzales MD at [facility] CATH LAB/EP  No date: COLONOSCOPY  6/17/2019: COLONOSCOPY; N/A      Comment:  Performed by Juan Sutton MD at [facility] ENDO  5/5/2015: COLONOSCOPY; N/A      Comment:  Performed by Mario Martínez MD at [facility] ENDO  6/17/2019: EGD (ESOPHAGOGASTRODUODENOSCOPY); N/A      Comment:  Performed by Juan Sutton MD at [facility] ENDO  5/13/2019: EGD (ESOPHAGOGASTRODUODENOSCOPY); N/A      Comment:  Performed by Dillan Salinas MD at [facility] ENDO  03/04/2019: EGD with dilitation  6/18/2019: EMBOLIZATION, BLOOD VESSEL; N/A      Comment:  Performed by [facility] Diagnostic Provider at [facility] OR  3/29/2019: ESOPHAGOGASTRODUODENOSCOPY (EGD); N/A      Comment:  Performed by Dillan Salinas MD at [facility]  ENDO  3/18/2019: ESOPHAGOGASTRODUODENOSCOPY (EGD); N/A      Comment:  Performed by Omer Pritchard MD at [facility] ENDO  3/4/2019: ESOPHAGOGASTRODUODENOSCOPY (EGD); N/A      Comment:  Performed by Madhu Schmidt MD at [facility] ENDO  5/5/2015: ESOPHAGOGASTRODUODENOSCOPY (EGD); N/A      Comment:  Performed by Mario Martínez MD at [facility] ENDO  6/11/2019: Filter Protection, Peripheral      Comment:  Performed by Fabien Gonzales MD at [facility] CATH LAB/EP  6/11/2019: PTA, PERIPHERAL VESSEL; N/A      Comment:  Performed by Fabien Gonzales MD at [facility] CATH LAB/EP  6/11/2019: PTA, Peroneal      Comment:  Performed by Fabien Gonzales MD at [facility] CATH LAB/EP  6/11/2019: PTA, Superficial Femoral Artery      Comment:  Performed by Fabien Gonzales MD at [facility] CATH LAB/EP  6/11/2019: ULTRASOUND, INTRAVASCULAR; N/A      Comment:  Performed by Fabien Gonzales MD at [facility] CATH LAB/EP    Prior to Admission medications :  Medication acetaminophen-codeine 300-30mg (TYLENOL #3) 300-30 mg Tab, Sig Take by mouth., Start Date , End Date , Taking? Yes, Authorizing Provider Historical Provider, MD    Medication furosemide (LASIX) 80 MG tablet, Sig Take 1 tablet (80 mg total) by mouth once daily., Start Date 4/25/19, End Date 7/18/20, Taking? Yes, Authorizing Provider Juan Gatica MD    Medication pantoprazole (PROTONIX) 40 MG tablet, Sig Take 1 tablet (40 mg total) by mouth once daily., Start Date 5/13/19, End Date 5/12/20, Taking? Yes, Authorizing Provider Dillan Salinas MD    Medication potassium chloride SA (K-DUR,KLOR-CON) 20 MEQ tablet, Sig Take 1 tablet (20 mEq total) by mouth once daily., Start Date 5/2/19, End Date , Taking? Yes, Authorizing Provider Juan Gatica MD    Medication spironolactone (ALDACTONE) 50 MG tablet, Sig Take 1 tablet (50 mg total) by mouth once daily., Start Date 5/15/19, End Date 8/7/20, Taking? Yes, Authorizing Provider Juan Gatica MD      No current facility-administered medications on file prior to  encounter.   Current Outpatient Medications on File Prior to Encounter:  acetaminophen-codeine 300-30mg (TYLENOL #3) 300-30 mg Tab, Take by mouth., Disp: , Rfl:   furosemide (LASIX) 80 MG tablet, Take 1 tablet (80 mg total) by mouth once daily., Disp: 90 tablet, Rfl: 4  pantoprazole (PROTONIX) 40 MG tablet, Take 1 tablet (40 mg total) by mouth once daily., Disp: 30 tablet, Rfl: 11  potassium chloride SA (K-DUR,KLOR-CON) 20 MEQ tablet, Take 1 tablet (20 mEq total) by mouth once daily., Disp: 90 tablet, Rfl: 3  spironolactone (ALDACTONE) 50 MG tablet, Take 1 tablet (50 mg total) by mouth once daily., Disp: 90 tablet, Rfl: 4         Review of patient's allergies indicates:  No Known Allergies    Social History:   reports that he has been smoking cigarettes.  He started smoking about 43 years ago. He has a 21.50 pack-year smoking history. He has never used smokeless tobacco. He reports that he drinks alcohol. He reports that he does not use drugs.     Review of patient's family history indicates:  Problem: Cancer      Relation: Father          Age of Onset: (Not Specified)      PHYSICAL EXAMINATION  Temp:  [91.6 °F (33.1 °C)-98.8 °F (37.1 °C)] 96.8 °F (36 °C)  Pulse:  [] 96  Resp:  [11-38] 38  SpO2:  [89 %-100 %] 98 %  BP: ()/(50-84) 133/84    General Condition:  In tubated on vent , s/p endoscopy findings of gastric and duodenal varcies    Head & Neck  Anemia: None  Jaundice: None  Neck vein: Not distended  Carotid Bruits: none  Lymph nodes: none palpable  Thyroid: normal    Chest: normal    Heart: normal    Rt. Breast: not examined  Lt. Breast: not examined  Axillary lymph nodes: none    Abdomen: Soft,  None tender with no palpable mass or organ  Hernia: none    Rectal: Defered    Extremities: gangrenous right foot    Vascular: normal    Specific focus Examination    Sacral decubitus wound , better    Imp: s/p endoscope , gi bleeding source variceal bleeding, gangrene foot.    Plan: not a surgical  candidate for surgical correction of duodenal varices , will consult Dr Kimball for second opinion.

## 2019-06-20 NOTE — PROGRESS NOTES
[facility] Infectious Disease Consult     Primary Team: Cardiology   Consultant Attending: Anum   Date of Admit: 6/10/2019    History      Sachin Calloway is a 58 y.o. male with a relevant history of alcoholic liver cirrhosis, HTN, former EtOH abuse, active tobacco use, PAD who presents for peripheral angiogram    The patient presented to [facility] on 6/10/2019 with a primary complaint of non healing right foot wound with doppler showing PAD. Had arthrectomy and PTA on 6/11.     Subsequently has had GI bleeding, persistent leukocytosis and coded during an EGD (bradycardia followed by PEA)    Interval events/subjective      WBC improved, afebrile but hypothermic    Intubated, still on phenylephrine    Allergies:  Review of patient's allergies indicates:  No Known Allergies    Medications:   In-Hospital Scheduled Medications:   collagenase   Topical (Top) Daily    pantoprazole  40 mg Intravenous BID    piperacillin-tazobactam (ZOSYN) IVPB  4.5 g Intravenous Q8H    vancomycin (VANCOCIN) IVPB  750 mg Intravenous Q24H      In-Hospital PRN Medications:  sodium chloride, sodium chloride, aluminum-magnesium hydroxide-simethicone, Dextrose 10% Bolus, Dextrose 10% Bolus, glucagon (human recombinant), ondansetron, ondansetron, oxyCODONE, polyethylene glycol, simethicone   In-Hospital IV Infusion Medications:   dexmedetomidine (PRECEDEX) infusion Stopped (06/20/19 0900)    norepinephrine bitartrate-D5W Stopped (06/18/19 1800)    phenylephrine 1 mcg/kg/min (06/20/19 1415)    vasopressin (PITRESSIN) infusion Stopped (06/18/19 1819)      Home Medications:  Prior to Admission medications    Medication Sig Start Date End Date Taking? Authorizing Provider   acetaminophen-codeine 300-30mg (TYLENOL #3) 300-30 mg Tab Take by mouth.   Yes Historical Provider, MD   furosemide (LASIX) 80 MG tablet Take 1 tablet (80 mg total) by mouth once daily. 4/25/19 7/18/20 Yes Juan Gatica MD   pantoprazole (PROTONIX) 40 MG tablet Take 1 tablet (40  mg total) by mouth once daily. 19 Yes Dillan Salinas MD   potassium chloride SA (K-DUR,KLOR-CON) 20 MEQ tablet Take 1 tablet (20 mEq total) by mouth once daily. 19  Yes Juan Gatica MD   spironolactone (ALDACTONE) 50 MG tablet Take 1 tablet (50 mg total) by mouth once daily. 5/15/19 8/7/20 Yes Juan Gatica MD     Antibiotics and Day Number of Therapy:  Piptazo and Vanc for 1 day     Objective   Last 24 Hour Vital Signs:  BP  Min: 84/53  Max: 140/73  Temp  Av.8 °F (36 °C)  Min: 94.1 °F (34.5 °C)  Max: 98.8 °F (37.1 °C)  Pulse  Av  Min: 61  Max: 109  Resp  Av.4  Min: 17  Max: 53  SpO2  Av.7 %  Min: 94 %  Max: 100 %  Height  Av' (182.9 cm)  Min: 6' (182.9 cm)  Max: 6' (182.9 cm)  Weight  Av.7 kg (111 lb 12.4 oz)  Min: 50.7 kg (111 lb 12.4 oz)  Max: 50.7 kg (111 lb 12.4 oz)    Lines, Drains, Airway:  Central line     Physical Examination:  Examination  General: well appearing, comfortable   HEENT: extubated   Neck: no thyromegaly, no JVD   Cardiac: no murmurs, pulse regular    Pulmonary/Chest: chest clear, no respiratory distress   GI/Rectal: no organomegaly, no masses, non tender, normal bowel sounds, rectal exam deferred   : deferred   Msk: normal motor screening exam  Vascular: normal peripheral perfusion   Lymph nodes: none palpated  Skin/ Extremities: no rash, no pedal edema, no ulceration on left  Right foot dressed, not unwrapped      Neurology/ Mental status: alert oriented     Laboratory:  CBC:   Lab Results   Component Value Date    WBC 14.57 (H) 2019    HGB 10.9 (L) 2019    HGB 10.9 (L) 2019     2019    MCV 83 2019    RDW 16.5 (H) 2019     Estimated Creatinine Clearance: 81.5 mL/min (based on SCr of 0.7 mg/dL).    Microbiology Data:  Negative blood cultures     Vanc trough 19  12, then 20.4 on     Radiology:  No gas noted in xray of right foot     MRI of right foot shows no osteo    CT abd   No evidence  for active contrast extravasation within the GI tract.  High-density material within the stomach and duodenum may reflect blood products.  Mural thickening of the distal duodenum may reflect focal inflammation.  Given evidence for chronic pancreatitis, possibly secondary.  Diffuse mucosal thickening of the small bowel with wall thickening of the sigmoid colon and rectum may reflect portal enteropathy/colopathy.  Small wedge-shaped hypoattenuating areas in the bilateral renal cortices may reflect sequela of infarcts.  Small bilateral pleural effusions with right lower lobe consolidation.  Small hiatal hernia.  Fluid within the lower esophagus in keeping with gastroesophageal reflux.  Large volume of ascites.  Extensive calcified plaque throughout the abdomen and pelvis.    Assessment     Sachin Calloway is a 58 y.o. male with alcoholic liver cirrhosis and PAD s/p right foot angioplasty who presented with gangrene of the right foot and leukocytosis. Vancomycin and piptazo have been ongoing.     Now post code during EGD for GI bleed. Had DIPS procedure and paracentesis (no cell count or culture sent). His WBC spike post code is downtrending. Pressor requirements decreasing.     His blood cultures are growing GPCs from one bottle, which may turn out to be a contaminant.      Recommendations     Gangrene rt foot  - continue piptazo 4.5g q8  - continue 750mg qday vanc   - since revascularization and amputation do not seem imminent, will do a 10 day course of antibiotics (end date 6/22/19)   - after course of IV antibiotic completed, will consider suppressive therapy with oral antibiotic if any clinical evidence of persistent infection in the right lower extremity     Thank you for this consult. We will follow.      Kimber Flood  Fellow, [facility]

## 2019-06-20 NOTE — ASSESSMENT & PLAN NOTE
-WBCs up to 37K post arrest with trend won to 26K-19K and now 14K this  AM  -initial blood cultures from 6/12 with NGTD; repeat blood cultures 6/17 with 2 out of 4 GPC on prelim report   -remains on IV Zosyn and Vanc and will continue for now  -ID on board and continue to follow recs

## 2019-06-20 NOTE — PT/OT/SLP EVAL
Speech Language Pathology Evaluation  Bedside Swallow Re-Evauation    Patient Name:  Sachin Calloway   MRN:  [id number]  Admitting Diagnosis: Critical lower limb ischemia    Recommendations:                 General Recommendations:  ongoing swallow assessment  Diet recommendations:  Mechanical soft, Finely chopped meat, Full liquids   Aspiration Precautions: small sips/bites, upright for meals, whole meds one by one, assist with feeding SLOW rate of intake please  General Precautions: Standard, fall, NPO  Communication strategies:  none    History:     6/18/2019 PEA arrest yesterday post EGD with ROSC after CPR and a few rounds of CPR. Given additional 2 units of PRBCs in Endoscopy given findings on EGD and based on discussion with GI. Intubated per anesthesia and admitted to ICU with SBP 120s-130s upon arrival. Post admission more alert and pulling at ETT therefore placed on Precedex drip. BP trended down to 90s with initiation of Neosynephrine drip. Lactic acid 4.5 and repeat H&H 15.4&46.0 likely erroneous immediately after PRBC. Serial H&H overnight with H&H 12.5&36.1 and down to 10.2&30.3 this AM. Remains intubated-Pulmonary consulted. GI on board with plans for CT of abdomen with IR/TIPS procedure today by IR. onversation with wife per IR in regards to procedure with high risk for hemodynamic instability during the procedure although no other alternative currently and family with desire for aggressive treatment.   6/19/2019 Remains intubated on Precedex and Aaron. IR procedure yesterday with DIPS. GI note recommend surgical evaluation. Additional PRBCs yesterday. H&H this MA 11.2&33.1 with slight trend down to 10.7&31.4 later in morning. HR and BP stable on pressors. K+ 3.6 BUN 9 creatinine .8 Mg 1.7  6/20/2019 Remains intubated and sedated. On Aaron with stable BP currently-wean in process. H&H 10.9&32.8 this AM and stable over the past 24 hours-will change H&Hs to Q12hrs. BMP with K+ 3.4 BUN 10 creatinine .7   down from 383  down from 206 albumin 1.4. Negative 6.9 liters since admission. General surgery (Dr. 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 BRENNON Kimball-consult placed. Pulmonary on board with evaluation for extubation in process        Past Medical History:   Diagnosis Date    Alcohol abuse     Cirrhosis     Edema     Hypertension     Seizures     Stricture esophagus        Past Surgical History:   Procedure Laterality Date    Angiogram, Lower Arterial, Bilateral Bilateral 6/10/2019    Performed by Fabien Gonzales MD at [facility] CATH LAB/EP    Aortogram, Abdominal N/A 6/10/2019    Performed by Fabien Gonzales MD at [facility] CATH LAB/EP    Atherectomy, Peripheral Blood Vessel N/A 6/11/2019    Performed by Fabien Gonzales MD at [facility] CATH LAB/EP    COLONOSCOPY      COLONOSCOPY N/A 6/17/2019    Performed by Juan Sutton MD at [facility] ENDO    COLONOSCOPY N/A 5/5/2015    Performed by Mario Martínez MD at [facility] ENDO    EGD (ESOPHAGOGASTRODUODENOSCOPY) N/A 6/17/2019    Performed by Juan Sutton MD at [facility] ENDO    EGD (ESOPHAGOGASTRODUODENOSCOPY) N/A 5/13/2019    Performed by Dillan Salinas MD at [facility] ENDO    EGD with dilitation  03/04/2019    EMBOLIZATION, BLOOD VESSEL N/A 6/18/2019    Performed by [facility] Diagnostic Provider at [facility] OR    ESOPHAGOGASTRODUODENOSCOPY (EGD) N/A 3/29/2019    Performed by Dillan Salinas MD at [facility] ENDO    ESOPHAGOGASTRODUODENOSCOPY (EGD) N/A 3/18/2019    Performed by Omer Pritchard MD at [facility] ENDO    ESOPHAGOGASTRODUODENOSCOPY (EGD) N/A 3/4/2019    Performed by Madhu Schmidt MD at [facility] ENDO    ESOPHAGOGASTRODUODENOSCOPY (EGD) N/A 5/5/2015    Performed by Mario Martínez MD at [facility] ENDO    Filter Protection, Peripheral  6/11/2019    Performed by Fabien Gonzales MD at [facility] CATH LAB/EP    PTA, PERIPHERAL VESSEL N/A 6/11/2019    Performed by Fabien  "MD Christian at [facility] CATH LAB/EP    PTA, Peroneal  6/11/2019    Performed by Fabien Gonzales MD at [facility] CATH LAB/EP    PTA, Superficial Femoral Artery  6/11/2019    Performed by Fabien Gonzales MD at [facility] CATH LAB/EP    ULTRASOUND, INTRAVASCULAR N/A 6/11/2019    Performed by Fabien Gonzales MD at [facility] CATH LAB/EP       Social History: Prior to admit, pt was living at home with his wife.    Prior Intubation HX:  Intubated on 6/18/19 extubated on 6/20 at noon with no issues.     Modified Barium Swallow: none on file.     Chest X-Rays:  Some patchy increased opacity right lower lung field.  No pneumothorax    Prior diet: [facility] soft diet and thin liquids, (level 5) on initial eval date.     Subjective     SLP re-consulted s/p extubation earlier this afternoon. RN cleared SLP for ok to eval swallow. Pt found semi-reclined in bed.   Patient goals: "Are you going to stand there because I just had 3 cups of water with no problems."    Pain/Comfort:  · Pain Rating 1: 0/10    Objective:    Pt alert and awake. Voice is clear and able to be understood. Pt's wife and family member at bedside. All parties encouraging pt to be patient and not rush SLP.     Oral Musculature Evaluation  · Oral Musculature: general weakness  · Structural Abnormalities: none observed   · Dentition: edentulous  · Secretion Management: adequate  · Mucosal Quality: good, adequate  · Mandibular Strength and Mobility: (fair)  · Oral Labial Strength and Mobility: (fair)  · Lingual Strength and Mobility: (dcr'd strength noted)  · Velar Elevation: (adequate lift)  · Buccal Strength and Mobility: decreased tone  · Volitional Cough: elicited, non productive cough  · Volitional Swallow: elicited, slight delay, multiple swallows palpated  · Voice Prior to PO Intake: clear voice low volume is present, extubated at noon today  · Oral Musculature Comments: None    Bedside Swallow Eval:   Consistencies Assessed:  · Thin liquids ice chips x3, water by cup rim " x4, water by straw w/4  · Puree self regulated bites of pudding     Oral Phase:   · Slow oral transit time    Pharyngeal Phase:   · decreased hyolaryngeal excursion to palpation  · delayed swallow initation  · no overt clinical signs/symptoms of aspiration  · multiple spontaneous swallows    Compensatory Strategies  · Alternate sips and bites  · Slow rate of intake  · Double swallows  · Crush meds please    Treatment: Pt to be seen on acute unit to ensure safety with PO diet, diet modification and education on compensatory swallow guidelines/techniques.      Assessment:     Sachin Calloway is a 58 y.o. male admitted for Critical lower limb ischemia who presents with recent extubation on 6/20. Ongoing swallow assessment to ensure safety with PO diet and for possible diet advancement.     Goals:   Multidisciplinary Problems     SLP Goals        Problem: SLP Goal    Goal Priority Disciplines Outcome   SLP Goal     SLP Ongoing (interventions implemented as appropriate)   Description:  Updated Short Term Goals:  1. Pt will complete a clinical swallow evaluation to determine safest diet. MET 6/20  2. Pt will tolerate full liquid diet with no audible s/s of dysphagia.   3. Pt will tolerate advanced trials of soft solids with no audible s/s of dysphagia.   4. SLP will recommend calorie count via dietician consult to ensure Pt is meeting adequate nutrition/hydration needs orally.                     Plan:     · Patient to be seen:  3 x/week   · Plan of Care expires:  07/12/19  · Plan of Care reviewed with:  patient, spouse   · SLP Follow-Up:  Yes       Discharge recommendations:  (TBD)   Barriers to Discharge:  Medical acuity level     Time Tracking:     SLP Treatment Date:   06/20/19  Speech Start Time:  0248  Speech Stop Time:  0300     Speech Total Time (min):  12 min    Billable Minutes: Eval Swallow and Oral Function 12    DAYLIN Drummond, CCC-SLP  06/20/2019

## 2019-06-20 NOTE — ASSESSMENT & PLAN NOTE
-BLE angiogram with bilateral SFA CTOs with 2 vessel r/o on the left and single vessel via peroneal on right  -successful revascularization of RLE 6/11 with RSFA, right pop and right peroneal PTA with DCB; anticipated need for PTA of right AT however deferred given acute decompensation, cardiac arrest and duodenal varix   -will continue to hold DAPT and statin therapy

## 2019-06-20 NOTE — SUBJECTIVE & OBJECTIVE
Review of Systems   Unable to perform ROS: intubated     Objective:     Vital Signs (Most Recent):  Temp: 96.8 °F (36 °C) (06/20/19 0910)  Pulse: 96 (06/20/19 0910)  Resp: (!) 38 (06/20/19 0910)  BP: 133/84 (06/20/19 0910)  SpO2: 98 % (06/20/19 0910) Vital Signs (24h Range):  Temp:  [91.6 °F (33.1 °C)-98.8 °F (37.1 °C)] 96.8 °F (36 °C)  Pulse:  [] 96  Resp:  [11-38] 38  SpO2:  [89 %-100 %] 98 %  BP: ()/(50-84) 133/84     Weight: 50.7 kg (111 lb 12.4 oz)  Body mass index is 15.16 kg/m².     SpO2: 98 %  O2 Device (Oxygen Therapy): ventilator      Intake/Output Summary (Last 24 hours) at 6/20/2019 1026  Last data filed at 6/20/2019 0900  Gross per 24 hour   Intake 100 ml   Output 602 ml   Net -502 ml       Lines/Drains/Airways     Central Venous Catheter Line                 Percutaneous Central Line Insertion/Assessment - triple lumen  06/17/19 1700 right internal jugular 2 days          Drain                 Urethral Catheter 06/17/19 1414 Straight-tip 16 Fr. 2 days         Rectal Tube 06/19/19 0930 fecal management system 1 day          Airway                 Airway - Non-Surgical 06/17/19 1324 Endotracheal Tube 2 days          Arterial Line                 Arterial Line 06/17/19 1821 Right Radial 2 days          Peripheral Intravenous Line                 Peripheral IV - Single Lumen 06/17/19 0652 20 G Anterior;Distal;Left Forearm 3 days         Peripheral IV - Single Lumen 06/17/19 1300 22 G Right Forearm 2 days                Physical Exam   Constitutional: He appears cachectic. He appears toxic. He has a sickly appearance. No distress.   Cardiovascular: Normal rate and regular rhythm. Exam reveals no gallop.   No murmur heard.  Pulmonary/Chest: Effort normal and breath sounds normal. No respiratory distress. He has no wheezes.   Abdominal: Soft. Bowel sounds are normal. He exhibits no distension. There is no tenderness.   Neurological: He is alert.   Eyes open spontaneously; responding  appropriately with nods of the head   Skin: Skin is warm and dry.       Significant Labs:     No results for input(s): CPK, CPKMB, TROPONINI, MB in the last 24 hours.   Recent Labs   Lab 06/20/19  0439   CALCIUM 7.3*   PROT 4.0*      K 3.5   CO2 21*      BUN 10   CREATININE 0.7   ALKPHOS 287*   *   *   BILITOT 0.9     Recent Labs   Lab 06/20/19 0439   WBC 14.57*   RBC 3.97*   HGB 10.9*  10.9*   HCT 32.8*  32.8*      MCV 83   MCH 27.5   MCHC 33.2

## 2019-06-21 PROBLEM — R53.81 DEBILITY: Status: ACTIVE | Noted: 2019-06-21

## 2019-06-21 LAB
ABO + RH BLD: NORMAL
ALBUMIN SERPL BCP-MCNC: 1.2 G/DL (ref 3.5–5.2)
ALP SERPL-CCNC: 258 U/L (ref 55–135)
ALT SERPL W/O P-5'-P-CCNC: 121 U/L (ref 10–44)
ANION GAP SERPL CALC-SCNC: 6 MMOL/L (ref 8–16)
APTT BLDCRRT: 47.6 SEC (ref 21–32)
AST SERPL-CCNC: 116 U/L (ref 10–40)
BASOPHILS # BLD AUTO: 0.06 K/UL (ref 0–0.2)
BASOPHILS NFR BLD: 0.4 % (ref 0–1.9)
BILIRUB SERPL-MCNC: 0.6 MG/DL (ref 0.1–1)
BLD GP AB SCN CELLS X3 SERPL QL: NORMAL
BLD PROD TYP BPU: NORMAL
BLOOD UNIT EXPIRATION DATE: NORMAL
BLOOD UNIT TYPE CODE: 5100
BLOOD UNIT TYPE: NORMAL
BUN SERPL-MCNC: 11 MG/DL (ref 6–20)
CALCIUM SERPL-MCNC: 7.3 MG/DL (ref 8.7–10.5)
CHLORIDE SERPL-SCNC: 108 MMOL/L (ref 95–110)
CO2 SERPL-SCNC: 21 MMOL/L (ref 23–29)
CODING SYSTEM: NORMAL
CREAT SERPL-MCNC: 0.7 MG/DL (ref 0.5–1.4)
DIFFERENTIAL METHOD: ABNORMAL
DISPENSE STATUS: NORMAL
EOSINOPHIL # BLD AUTO: 0.1 K/UL (ref 0–0.5)
EOSINOPHIL NFR BLD: 0.6 % (ref 0–8)
ERYTHROCYTE [DISTWIDTH] IN BLOOD BY AUTOMATED COUNT: 17 % (ref 11.5–14.5)
EST. GFR  (AFRICAN AMERICAN): >60 ML/MIN/1.73 M^2
EST. GFR  (NON AFRICAN AMERICAN): >60 ML/MIN/1.73 M^2
GLUCOSE SERPL-MCNC: 134 MG/DL (ref 70–110)
HCT VFR BLD AUTO: 22.3 % (ref 40–54)
HCT VFR BLD AUTO: 22.7 % (ref 40–54)
HCT VFR BLD AUTO: 24.8 % (ref 40–54)
HCT VFR BLD AUTO: 25.4 % (ref 40–54)
HCT VFR BLD AUTO: 26.6 % (ref 40–54)
HGB BLD-MCNC: 7.2 G/DL (ref 14–18)
HGB BLD-MCNC: 7.4 G/DL (ref 14–18)
HGB BLD-MCNC: 8.1 G/DL (ref 14–18)
HGB BLD-MCNC: 8.2 G/DL (ref 14–18)
HGB BLD-MCNC: 8.7 G/DL (ref 14–18)
INR PPP: 1.3 (ref 0.8–1.2)
LYMPHOCYTES # BLD AUTO: 1.2 K/UL (ref 1–4.8)
LYMPHOCYTES NFR BLD: 8.4 % (ref 18–48)
MCH RBC QN AUTO: 27.3 PG (ref 27–31)
MCHC RBC AUTO-ENTMCNC: 32.3 G/DL (ref 32–36)
MCV RBC AUTO: 85 FL (ref 82–98)
MONOCYTES # BLD AUTO: 1 K/UL (ref 0.3–1)
MONOCYTES NFR BLD: 7.2 % (ref 4–15)
NEUTROPHILS # BLD AUTO: 11.5 K/UL (ref 1.8–7.7)
NEUTROPHILS NFR BLD: 83.4 % (ref 38–73)
NUM UNITS TRANS PACKED RBC: NORMAL
PLATELET # BLD AUTO: 139 K/UL (ref 150–350)
PMV BLD AUTO: 8.8 FL (ref 9.2–12.9)
POTASSIUM SERPL-SCNC: 3.4 MMOL/L (ref 3.5–5.1)
PROT SERPL-MCNC: 3.8 G/DL (ref 6–8.4)
PROTHROMBIN TIME: 13.9 SEC (ref 9–12.5)
RBC # BLD AUTO: 3 M/UL (ref 4.6–6.2)
SODIUM SERPL-SCNC: 135 MMOL/L (ref 136–145)
WBC # BLD AUTO: 13.79 K/UL (ref 3.9–12.7)

## 2019-06-21 PROCEDURE — 92526 ORAL FUNCTION THERAPY: CPT

## 2019-06-21 PROCEDURE — 85730 THROMBOPLASTIN TIME PARTIAL: CPT

## 2019-06-21 PROCEDURE — 63600175 PHARM REV CODE 636 W HCPCS: Performed by: STUDENT IN AN ORGANIZED HEALTH CARE EDUCATION/TRAINING PROGRAM

## 2019-06-21 PROCEDURE — 99291 PR CRITICAL CARE, E/M 30-74 MINUTES: ICD-10-PCS | Mod: ,,, | Performed by: INTERNAL MEDICINE

## 2019-06-21 PROCEDURE — 86901 BLOOD TYPING SEROLOGIC RH(D): CPT

## 2019-06-21 PROCEDURE — C9113 INJ PANTOPRAZOLE SODIUM, VIA: HCPCS | Performed by: INTERNAL MEDICINE

## 2019-06-21 PROCEDURE — 86920 COMPATIBILITY TEST SPIN: CPT

## 2019-06-21 PROCEDURE — 63600175 PHARM REV CODE 636 W HCPCS: Performed by: INTERNAL MEDICINE

## 2019-06-21 PROCEDURE — 85025 COMPLETE CBC W/AUTO DIFF WBC: CPT

## 2019-06-21 PROCEDURE — 85014 HEMATOCRIT: CPT

## 2019-06-21 PROCEDURE — 80053 COMPREHEN METABOLIC PANEL: CPT

## 2019-06-21 PROCEDURE — 99291 CRITICAL CARE FIRST HOUR: CPT | Mod: ,,, | Performed by: INTERNAL MEDICINE

## 2019-06-21 PROCEDURE — 36430 TRANSFUSION BLD/BLD COMPNT: CPT

## 2019-06-21 PROCEDURE — 20000000 HC ICU ROOM

## 2019-06-21 PROCEDURE — 63600175 PHARM REV CODE 636 W HCPCS: Performed by: NURSE PRACTITIONER

## 2019-06-21 PROCEDURE — 85610 PROTHROMBIN TIME: CPT

## 2019-06-21 PROCEDURE — P9016 RBC LEUKOCYTES REDUCED: HCPCS

## 2019-06-21 PROCEDURE — 85018 HEMOGLOBIN: CPT | Mod: 91

## 2019-06-21 PROCEDURE — 25000003 PHARM REV CODE 250: Performed by: NURSE PRACTITIONER

## 2019-06-21 RX ORDER — HYDROCODONE BITARTRATE AND ACETAMINOPHEN 500; 5 MG/1; MG/1
TABLET ORAL
Status: DISCONTINUED | OUTPATIENT
Start: 2019-06-21 | End: 2019-06-25 | Stop reason: HOSPADM

## 2019-06-21 RX ADMIN — PIPERACILLIN AND TAZOBACTAM 4.5 G: 4; .5 INJECTION, POWDER, LYOPHILIZED, FOR SOLUTION INTRAVENOUS; PARENTERAL at 08:06

## 2019-06-21 RX ADMIN — PIPERACILLIN AND TAZOBACTAM 4.5 G: 4; .5 INJECTION, POWDER, LYOPHILIZED, FOR SOLUTION INTRAVENOUS; PARENTERAL at 01:06

## 2019-06-21 RX ADMIN — PIPERACILLIN AND TAZOBACTAM 4.5 G: 4; .5 INJECTION, POWDER, LYOPHILIZED, FOR SOLUTION INTRAVENOUS; PARENTERAL at 03:06

## 2019-06-21 RX ADMIN — PANTOPRAZOLE SODIUM 40 MG: 40 INJECTION, POWDER, FOR SOLUTION INTRAVENOUS at 08:06

## 2019-06-21 RX ADMIN — OXYCODONE HYDROCHLORIDE 5 MG: 5 TABLET ORAL at 11:06

## 2019-06-21 RX ADMIN — PIPERACILLIN AND TAZOBACTAM 4.5 G: 4; .5 INJECTION, POWDER, LYOPHILIZED, FOR SOLUTION INTRAVENOUS; PARENTERAL at 11:06

## 2019-06-21 RX ADMIN — VANCOMYCIN HYDROCHLORIDE 750 MG: 750 INJECTION, POWDER, LYOPHILIZED, FOR SOLUTION INTRAVENOUS at 12:06

## 2019-06-21 NOTE — ASSESSMENT & PLAN NOTE
- suspect small bowel variceal bleeding s/p ir shunt  - monitor hct, s/s of bleeding  - critically ill

## 2019-06-21 NOTE — PT/OT/SLP PROGRESS
"Speech Language Pathology Treatment    Patient Name:  Sachin Calloway   MRN:  [id number]  Admitting Diagnosis: Critical lower limb ischemia    Recommendations:                 General Recommendations:  Dysphagia therapy and Diet follow up  Diet recommendations:  Mechanical soft, Finely chopped meat, Other (see comments)(Extra gravy), Liquid Diet Level: Thin   Aspiration Precautions: Ensure pt fully awake/alert for intake, HOB at 90* for intake, remain upright 30 mins s/p meal, small bites/sips, alternate bites/sips, ensure pt has swallowed prior to next bite as pt with prolonged mastication/bolus holding . Strict aspiration precautions.  General Precautions: Standard, aspiration, respiratory, fall  Communication strategies:  none    Subjective     Pt seen at the bedside for ongoing swallow assessment. SLP checked w/ RN, Lang, prior to visit who was in room assessing pt. Pt finishing breakfast tray upon entry and agreeable to SLP tx.   Patient goals: Pt stated, "You want me to eat all this?"     Pain/Comfort:  · Pain Rating 1: 0/10    Objective:     Has the patient been evaluated by SLP for swallowing?   Yes  Keep patient NPO? No   Current Respiratory Status: room air      Pt's HOB elevated to 75* angle. Breakfast tray present at the bedside. He reported no difficulty with AM meal. Given SLP assist, pt consumed tsp bites grits x2, tsp bite yogurt x1, tsp bites diced peaches x4, and straw sips juice x5. Cough produced x1 s/p thin liquid, but no other overt s/s of aspiration were appreciated. Pt with excessively prolonged oral phase, and as a result delayed pharyngeal swallow initiation. Pharyngeal swallow initiation quickened with liquids compared to soft solids. SLP cued pt to quicken oral phase and avoid oral bolus holding; however, he continued to state, "I eat slow." SLP reviewed diet options with pt and difficulty with mastication. Pt reported desire to trial diet upgrade of Mechanical Soft for now and if any " overt s/s of aspiration occur or if difficulty ensues, then he would like to downgrade to full liquid diet. Diet REC: Mechanical Soft (Finely chopped meats, extra gravy)/Thin liquids given assist with meals. All questions answered. SLP to f/u.    Assessment:     Sachin Calloway is a 58 y.o. male with an SLP diagnosis of Dysphagia.  Pt progressing towards goals. Good tolerance of PO trials this date; however, length of meal time and difficulty with oral phase pose a problem to nutrition. Calorie count warranted to ensure pt receiving adequate nutrition/hydration. SLP reviewed recs w/ pt's RNs.     Goals:   Multidisciplinary Problems     SLP Goals        Problem: SLP Goal    Goal Priority Disciplines Outcome   SLP Goal     SLP Ongoing (interventions implemented as appropriate)   Description:  Updated Short Term Goals:  1. Pt will complete a clinical swallow evaluation to determine safest diet. MET 6/20  2. Pt will tolerate full liquid diet with no audible s/s of dysphagia. --MET 6/21  3. Pt will tolerate advanced trials of soft solids with no audible s/s of dysphagia.   4. SLP will recommend calorie count via dietician consult to ensure Pt is meeting adequate nutrition/hydration needs orally.                      Plan:     · Patient to be seen:  3 x/week   · Plan of Care expires:  07/12/19  · Plan of Care reviewed with:  patient, other (see comments)(SHAINA Croft)   · SLP Follow-Up:  Yes       Discharge recommendations:  other (see comments)(TBD)   Barriers to Discharge:  Critical status    Time Tracking:     SLP Treatment Date:   06/21/19  Speech Start Time:  0847  Speech Stop Time:  0904     Speech Total Time (min):  17 min    Billable Minutes: Treatment Swallowing Dysfunction 17 mins    Daisy Little CCC-SLP  06/21/2019

## 2019-06-21 NOTE — ASSESSMENT & PLAN NOTE
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 S/p EGD 6/17 with severe erosive esophagitis and actively bleeding duodenal varix that is not amenable to endoscopic therapy

## 2019-06-21 NOTE — ASSESSMENT & PLAN NOTE
-BLE angiogram with bilateral SFA CTOs with 2 vessel r/o on the left and single vessel via peroneal on right  -successful revascularization of RLE 6/11 with RSFA, right pop and right peroneal PTA with DCB; anticipated need for PTA of right AT however given continued anemia/GIB and inability to tolerate DAPT no plans for proceeding with further revascularization    -will continue to hold DAPT and statin therapy

## 2019-06-21 NOTE — PROGRESS NOTES
[facility] Infectious Disease Consult     Primary Team: Cardiology   Consultant Attending: Anum   Date of Admit: 6/10/2019    History      Sachin Calloway is a 58 y.o. male with a relevant history of alcoholic liver cirrhosis, HTN, former EtOH abuse, active tobacco use, PAD who presents for peripheral angiogram    The patient presented to [facility] on 6/10/2019 with a primary complaint of non healing right foot wound with doppler showing PAD. Had arthrectomy and PTA on 6/11.     Subsequently had GI bleeding, persistent leukocytosis and coded during an EGD (bradycardia followed by PEA). Then went on to have DIPS    Interval events/subjective      WBC improved, afebrile but hypothermic    Extubated, still on phenylephrine 0.25    GPCs growing from 1/4 bottles     Allergies:  Review of patient's allergies indicates:  No Known Allergies    Medications:   In-Hospital Scheduled Medications:   collagenase   Topical (Top) Daily    pantoprazole  40 mg Intravenous BID    piperacillin-tazobactam (ZOSYN) IVPB  4.5 g Intravenous Q8H    vancomycin (VANCOCIN) IVPB  750 mg Intravenous Q24H      In-Hospital PRN Medications:  sodium chloride, sodium chloride, aluminum-magnesium hydroxide-simethicone, Dextrose 10% Bolus, Dextrose 10% Bolus, glucagon (human recombinant), ondansetron, ondansetron, oxyCODONE, polyethylene glycol, simethicone   In-Hospital IV Infusion Medications:   dexmedetomidine (PRECEDEX) infusion Stopped (06/20/19 0900)    norepinephrine bitartrate-D5W Stopped (06/18/19 1800)    phenylephrine 0.5 mcg/kg/min (06/21/19 0629)    vasopressin (PITRESSIN) infusion Stopped (06/18/19 1819)      Home Medications:  Prior to Admission medications    Medication Sig Start Date End Date Taking? Authorizing Provider   acetaminophen-codeine 300-30mg (TYLENOL #3) 300-30 mg Tab Take by mouth.   Yes Historical Provider, MD   furosemide (LASIX) 80 MG tablet Take 1 tablet (80 mg total) by mouth once daily. 4/25/19 7/18/20 Yes Juan BRAR  MD Gavi   pantoprazole (PROTONIX) 40 MG tablet Take 1 tablet (40 mg total) by mouth once daily. 19 Yes Dillan Salinas MD   potassium chloride SA (K-DUR,KLOR-CON) 20 MEQ tablet Take 1 tablet (20 mEq total) by mouth once daily. 19  Yes Juan Gatica MD   spironolactone (ALDACTONE) 50 MG tablet Take 1 tablet (50 mg total) by mouth once daily. 5/15/19 8/7/20 Yes Juan Gatica MD     Antibiotics and Day Number of Therapy:  Vanc -  Piptazo -  Vanc dosing currently at 750mg IV q24    Objective   Last 24 Hour Vital Signs:  BP  Min: 82/54  Max: 140/73  Temp  Av.5 °F (35.8 °C)  Min: 94.1 °F (34.5 °C)  Max: 97.7 °F (36.5 °C)  Pulse  Av.4  Min: 65  Max: 97  Resp  Av.8  Min: 12  Max: 42  SpO2  Av.8 %  Min: 95 %  Max: 100 %  Height  Av' (182.9 cm)  Min: 6' (182.9 cm)  Max: 6' (182.9 cm)  Weight  Av.7 kg (111 lb 12.4 oz)  Min: 50.7 kg (111 lb 12.4 oz)  Max: 50.7 kg (111 lb 12.4 oz)    Lines, Drains, Airway:  Central line     Physical Examination:  Examination  General: well appearing, comfortable   HEENT: extubated   Neck: no thyromegaly, no JVD   Cardiac: no murmurs, pulse regular    Pulmonary/Chest: chest clear, no respiratory distress   GI/Rectal: no organomegaly, no masses, non tender, normal bowel sounds, rectal exam deferred   : deferred   Msk: normal motor screening exam  Vascular: warm peripheries but difficult to assess pulses   Lymph nodes: none palpated  Skin/ Extremities: left side no rash, no pedal edema, but has ischemia of his toes  Right side eschar on dorsum of the foot   Right foot dressed, not unwrapped      Neurology/ Mental status: alert oriented                 Laboratory:  CBC:   Lab Results   Component Value Date    WBC 13.79 (H) 2019    HGB 8.1 (L) 2019     (L) 2019    MCV 85 2019    RDW 17.0 (H) 2019     Estimated Creatinine Clearance: 81.5 mL/min (based on SCr of 0.7 mg/dL).    Microbiology  Data:  Negative blood cultures     Vanc trough 6/13/19  12, then 20.4 on 6/16 then 18.3 on 6/18     Radiology:  No gas noted in xray of right foot     MRI of right foot shows no osteo    CT abd   No evidence for active contrast extravasation within the GI tract.  High-density material within the stomach and duodenum may reflect blood products.  Mural thickening of the distal duodenum may reflect focal inflammation.  Given evidence for chronic pancreatitis, possibly secondary.  Diffuse mucosal thickening of the small bowel with wall thickening of the sigmoid colon and rectum may reflect portal enteropathy/colopathy.  Small wedge-shaped hypoattenuating areas in the bilateral renal cortices may reflect sequela of infarcts.  Small bilateral pleural effusions with right lower lobe consolidation.  Small hiatal hernia.  Fluid within the lower esophagus in keeping with gastroesophageal reflux.  Large volume of ascites.  Extensive calcified plaque throughout the abdomen and pelvis.    Assessment     Sachin Calloway is a 58 y.o. male with alcoholic liver cirrhosis and PAD s/p right foot angioplasty who presented with gangrene of the right foot and leukocytosis. Vancomycin and piptazo have been ongoing.     Now post code during EGD for GI bleed. Had DIPS procedure and paracentesis (no cell count or culture sent). His WBC spike post code is downtrending. Pressor requirements decreasing.     His blood cultures are growing GPCs from one bottle, which may turn out to be a contaminant.      Recommendations     Gangrene rt foot  - continue piptazo 4.5g q8  - continue 750mg qday vanc   - since revascularization and amputation do not seem imminent, will do a 14 day course of the above antibiotics (end date 6/26/19)   - after course of IV antibiotic completed, will consider suppressive therapy with oral antibiotic if any clinical evidence of persistent infection in the right lower extremity   - will follow up on identification of  organisms in positive blood culture 6/17/19    Thank you for this consult. We will follow.      Kimber Flood  Fellow, [facility]

## 2019-06-21 NOTE — ASSESSMENT & PLAN NOTE
-H&H 7.1&22.8 6/14 &6/15 with trend down to 6.9&22.0 6/16  -transfused with 2U PRBCs with improvement in H&H to 10.4&32.0 6/17  with slight drop to 9.5   -EGD 6/17 with following results:   LA Grade D (one or more mucosal breaks involving at least 75% of esophageal circumference) esophagitis with no bleeding was found in the lower third of the esophagus.   A deformity was found in the prepyloric region of the stomach.   Red blood was found in the gastric body and duodenum.   Active bleeding with adherent clot was found in the third portion of the duodenum associated with a duodenal varix.  -additional 2units PRBCs given 6/17 post cardiac arrest and repeat transfusion 6/18 post DIPS yesterday   -H&H stabilized and sserial H&Hs changed to Q12hrs; H&H 8.2&25.4 this AM down from 10.9&32.8 yesterday; will place back on H&H Q4hrs   -baseline 9-10/29-30 since early 2018  -will continue to hold ASA/Plavix for now   -General Surgery (Dr. Kirk) consult for surgical intervention for duodenal varix with second opinion by Dr. Kimball and no plans for surgical intervention; IR reconsulted

## 2019-06-21 NOTE — SUBJECTIVE & OBJECTIVE
Subjective:     Interval History: Seen/examined this morning. No overt bleeding, awake on vent, remained on pressors critically ill. Liquid dark stool, no overt melena.     Review of Systems   Unable to perform ROS: Intubated     Objective:     Vital Signs (Most Recent):  Temp: 97.2 °F (36.2 °C) (06/20/19 1948)  Pulse: 83 (06/20/19 1948)  Resp: (!) 28 (06/20/19 1948)  BP: 95/62 (06/20/19 1948)  SpO2: 100 % (06/20/19 1948) Vital Signs (24h Range):  Temp:  [94.1 °F (34.5 °C)-97.9 °F (36.6 °C)] 97.2 °F (36.2 °C)  Pulse:  [] 83  Resp:  [17-53] 28  SpO2:  [94 %-100 %] 100 %  BP: ()/(50-84) 95/62     Weight: 50.7 kg (111 lb 12.4 oz) (06/20/19 1100)  Body mass index is 15.16 kg/m².      Intake/Output Summary (Last 24 hours) at 6/20/2019 2243  Last data filed at 6/20/2019 2000  Gross per 24 hour   Intake 1905 ml   Output 1026 ml   Net 879 ml       Lines/Drains/Airways     Central Venous Catheter Line                 Percutaneous Central Line Insertion/Assessment - triple lumen  06/17/19 1700 right internal jugular 3 days          Drain                 Urethral Catheter 06/17/19 1414 Straight-tip 16 Fr. 3 days         Rectal Tube 06/19/19 0930 fecal management system 1 day          Peripheral Intravenous Line                 Peripheral IV - Single Lumen 06/17/19 1300 22 G Right Forearm 3 days                Physical Exam   Constitutional:   Sedated   HENT:   Head: Normocephalic.   ETT in place   Eyes: Pupils are equal, round, and reactive to light. No scleral icterus.   Neck: Normal range of motion.   Cardiovascular: Regular rhythm. Exam reveals no friction rub.   Tachycardic   Pulmonary/Chest:   Transmitted ventilatory breath sounds. Symmetric chest expansion.   Abdominal: Soft. Bowel sounds are normal. He exhibits no distension.   Musculoskeletal: He exhibits edema.   Neurological:   Sedated   Skin: Skin is warm and dry.       Significant Labs:  CBC:   Recent Labs   Lab 06/19/19  0415  06/20/19  0020  06/20/19  0439 06/20/19  1642   WBC 19.19*  --   --  14.57*  --    HGB 11.2*  11.2*   < > 10.1* 10.9*  10.9* 9.1*   HCT 33.1*  33.1*   < > 30.1* 32.8*  32.8* 27.7*     --   --  150  --     < > = values in this interval not displayed.     CMP:   Recent Labs   Lab 06/20/19 0439   GLU 85   CALCIUM 7.3*   ALBUMIN 1.4*   PROT 4.0*      K 3.5   CO2 21*      BUN 10   CREATININE 0.7   ALKPHOS 287*   *   *   BILITOT 0.9         Significant Imaging:  Imaging results within the past 24 hours have been reviewed.

## 2019-06-21 NOTE — ASSESSMENT & PLAN NOTE
Multifactorial in this patient but certainly primarily related to pt's active GI bleed.   -agree with repeat transfusion today  -Hgb 7.2

## 2019-06-21 NOTE — SUBJECTIVE & OBJECTIVE
Review of Systems   Constitution: Negative for chills, decreased appetite, diaphoresis and fever.   Cardiovascular: Negative for chest pain, claudication, cyanosis, dyspnea on exertion, irregular heartbeat, leg swelling, near-syncope, orthopnea, palpitations, paroxysmal nocturnal dyspnea and syncope.   Respiratory: Negative for cough, hemoptysis, shortness of breath and wheezing.    Gastrointestinal: Negative for bloating, abdominal pain, constipation, diarrhea, melena, nausea and vomiting.   Neurological: Negative for dizziness and weakness.     Objective:     Vital Signs (Most Recent):  Temp: 97.7 °F (36.5 °C) (06/21/19 1030)  Pulse: 91 (06/21/19 1030)  Resp: (!) 31 (06/21/19 1030)  BP: (!) 95/59 (06/21/19 1030)  SpO2: 100 % (06/21/19 1030) Vital Signs (24h Range):  Temp:  [94.1 °F (34.5 °C)-97.7 °F (36.5 °C)] 97.7 °F (36.5 °C)  Pulse:  [65-97] 91  Resp:  [12-42] 31  SpO2:  [95 %-100 %] 100 %  BP: ()/(50-82) 95/59     Weight: 50.7 kg (111 lb 12.4 oz)  Body mass index is 15.16 kg/m².     SpO2: 100 %  O2 Device (Oxygen Therapy): room air      Intake/Output Summary (Last 24 hours) at 6/21/2019 1046  Last data filed at 6/21/2019 0833  Gross per 24 hour   Intake 2271.8 ml   Output 861 ml   Net 1410.8 ml       Lines/Drains/Airways     Central Venous Catheter Line                 Percutaneous Central Line Insertion/Assessment - triple lumen  06/17/19 1700 right internal jugular 3 days          Drain                 Urethral Catheter 06/17/19 1414 Straight-tip 16 Fr. 3 days         Rectal Tube 06/19/19 0930 fecal management system 2 days          Peripheral Intravenous Line                 Peripheral IV - Single Lumen 06/17/19 1300 22 G Right Forearm 3 days                Physical Exam   Constitutional: He is oriented to person, place, and time. He appears cachectic. He has a sickly appearance. He appears ill. No distress.   Cardiovascular: Normal rate and regular rhythm. Exam reveals no gallop.   No murmur  heard.  Pulmonary/Chest: Effort normal and breath sounds normal. No respiratory distress. He has no wheezes.   Abdominal: Soft. Bowel sounds are normal. He exhibits no distension. There is no tenderness.   Neurological: He is alert and oriented to person, place, and time.   Skin: Skin is warm and dry.       Significant Labs:     Recent Labs   Lab 06/21/19  0340   *   K 3.4*      CO2 21*   BUN 11   CREATININE 0.7     Recent Labs   Lab 06/21/19  0340   CALCIUM 7.3*   PROT 3.8*   *   K 3.4*   CO2 21*      BUN 11   CREATININE 0.7   ALKPHOS 258*   *   *   BILITOT 0.6     Recent Labs   Lab 06/21/19  0340 06/21/19  0824   WBC 13.79*  --    RBC 3.00*  --    HGB 8.2* 8.1*   HCT 25.4* 24.8*   *  --    MCV 85  --    MCH 27.3  --    MCHC 32.3  --        Significant Imaging:     TTE 6/17/2019    · Normal left ventricular systolic function. The estimated ejection fraction is 55%  · Normal right ventricular systolic function.  · Indeterminate left ventricular diastolic function.  · Limited echo windows

## 2019-06-21 NOTE — PROGRESS NOTES
[facility]  Cardiology  Progress Note    Patient Name: Sachin Calloway  MRN: [id number]  Admission Date: 6/10/2019  Hospital Length of Stay: 9 days  Code Status: Full Code   Attending Physician: Toi Arevalo MD   Primary Care Physician: Juan Gatica MD  Expected Discharge Date:   Principal Problem:Critical lower limb ischemia    Subjective:     Hospital Course:   6/10/2019 Admitted for elective LE angiogram due to nonhealing wound to right foot. Taken to cath lab for the procedure via right radial access with following results:    · Severe bilateral PAD with CLI of right foot  · On the right there is an ostial SFA  which is calcified and reconstitutes via profunda collaterals distally. There is single vessel runoff via peroneal which has a severe proximal stenosis and distally supplies collaterals to a plantar arch. No identifiable DP, although there is a proximal portion of AT  · On the left there is a severe stenosis at proximal SFA with mid SFA  with reconstitution distally. There is two vessel runoff via AT into a DP and peroneal which supplies collaterals to a plantar arch  · Staged intervention of LSFA and peroneal first. Will then consider staged intervention of RATA and if unsuccessful will consider AV flow reversal     6/11/2019 NPO for RLE intervention today   6/12/2019 Taken to cath lab yesterday for intervention via LCFA access with following results:    · LCFA antegrade access with crossover sheath used  · Successful RSFA  intervention with atherectomy followed by IVUS guided PTA with 4.0 balloon in POP and mid to distal SFA and 5.0 balloon in ostial and proximal SFA. This was followed by PTA with DCB to POP/mid and distal SFA with 4.0 balloon and 5.0 balloon ostial/mid SFA all with excellent results  · Successful PTA to proximal peroneal with 3.0 x 60 balloon with excellent result  · Will likely need staged intervention of AT/DP and if unsuccessful, AV flow  reversal  · Plavix/ASA/statin  · Continue aggressive wound care and management per Podiatry    Tolerated procedure well and recovered on telemetry unit. LCFA access site with no active bleeding, hematoma or ecchymosis. Podiatry consulted with plans for surgery once demarcation occurs. ID consulted with recs for blood cultures along with empiric antibiotics with IV Vanc and Zosyn   6/13/2019 H&H 7.7 & 24.3 with WBC up to 16.11 from 15.17. BMP with unchanged creatinine. Blood cultures with prelim results NGTD. HR and BP stable. Dietian consulted with recs noted-will liberalize diet and order supplements. Remains on IV Vanc and Zosyn for now. PT/OT on board   6/14/2019 WBC down slightly to 15.4 this AM. H&H down slightly to 7.1&22.8. BP stable. Episode of ST with HR up to 140s this AM nonsustained and patient asymptomatic. ASA discontinued. Will consult GI due to downtrending H&H   6/15/2019 PAD with CLI requiring intervention and likely surgery. Continue medical management. No intervention planned until patient more stable. Continue antibiotics for gangrene and possible Osteo-blood cultures with NGTD. ID and podiatry following. Anemia. GI onboard and plan EGD/Colonscopy Monday. hgb 7.3 today. Transfuse if <7.   6/16/2019 Remains on IV abx currently. MRI with no evidence of osteomyelitis. ID and podiatry following. H&H down to 6.9 & 22.0 will transfuse with 2units PRBCs. Plan for EGD/Colonscopy Monday 6/17/2019 WBCs up to 18K this AM. H&H improved to 10.4&32.0 this AM. BMP stable. NPO for EGD/colonoscopy today   6/18/2019 PEA arrest yesterday post EGD with ROSC after CPR and a few rounds of CPR. Given additional 2 units of PRBCs in Endoscopy given findings on EGD and based on discussion with GI. Intubated per anesthesia and admitted to ICU with SBP 120s-130s upon arrival. Post admission more alert and pulling at ETT therefore placed on Precedex drip. BP trended down to 90s with initiation of Neosynephrine drip. Lactic  acid 4.5 and repeat H&H 15.4&46.0 likely erroneous immediately after PRBC. Serial H&H overnight with H&H 12.5&36.1 and down to 10.2&30.3 this AM. Remains intubated-Pulmonary consulted. GI on board with plans for CT of abdomen with IR/TIPS procedure today by IR. onversation with wife per IR in regards to procedure with high risk for hemodynamic instability during the procedure although no other alternative currently and family with desire for aggressive treatment.   6/19/2019 Remains intubated on Precedex and Aaron. IR procedure yesterday with DIPS. GI note recommend surgical evaluation. Additional PRBCs yesterday. H&H this MA 11.2&33.1 with slight trend down to 10.7&31.4 later in morning. HR and BP stable on pressors. K+ 3.6 BUN 9 creatinine .8 Mg 1.7  6/20/2019 Remains intubated and sedated. On Aaron with stable BP currently-wean in process. H&H 10.9&32.8 this AM and stable over the past 24 hours-will change H&Hs to Q12hrs. BMP with K+ 3.4 BUN 10 creatinine .7  down from 383  down from 206 albumin 1.4. Negative 6.9 liters since admission. General surgery (Dr. 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 BRENNON Kimball-consult placed. Pulmonary on board with evaluation for extubation in process   6/21/2019 Extubated yesterday and stable for respiratory standpoint overnight. Remains on IV Aaron with wean in process-down to .5mcg/kg/min from 1.25mcg/kg/min yesterday. Serial H&H in process with decrease in H&H this morning to 8.2&25.4 from 10.0&31.4 yesterday. BMP WNL. AST and ALT trending down. IR reconsulted for evaluation of repeat procedure for duodenal varix. Will consult [facility] for assistance with management of multiple medical issues         Review of Systems   Constitution: Negative for chills, decreased appetite, diaphoresis and fever.   Cardiovascular: Negative for chest pain, claudication,  cyanosis, dyspnea on exertion, irregular heartbeat, leg swelling, near-syncope, orthopnea, palpitations, paroxysmal nocturnal dyspnea and syncope.   Respiratory: Negative for cough, hemoptysis, shortness of breath and wheezing.    Gastrointestinal: Negative for bloating, abdominal pain, constipation, diarrhea, melena, nausea and vomiting.   Neurological: Negative for dizziness and weakness.     Objective:     Vital Signs (Most Recent):  Temp: 97.7 °F (36.5 °C) (06/21/19 1030)  Pulse: 91 (06/21/19 1030)  Resp: (!) 31 (06/21/19 1030)  BP: (!) 95/59 (06/21/19 1030)  SpO2: 100 % (06/21/19 1030) Vital Signs (24h Range):  Temp:  [94.1 °F (34.5 °C)-97.7 °F (36.5 °C)] 97.7 °F (36.5 °C)  Pulse:  [65-97] 91  Resp:  [12-42] 31  SpO2:  [95 %-100 %] 100 %  BP: ()/(50-82) 95/59     Weight: 50.7 kg (111 lb 12.4 oz)  Body mass index is 15.16 kg/m².     SpO2: 100 %  O2 Device (Oxygen Therapy): room air      Intake/Output Summary (Last 24 hours) at 6/21/2019 1046  Last data filed at 6/21/2019 0833  Gross per 24 hour   Intake 2271.8 ml   Output 861 ml   Net 1410.8 ml       Lines/Drains/Airways     Central Venous Catheter Line                 Percutaneous Central Line Insertion/Assessment - triple lumen  06/17/19 1700 right internal jugular 3 days          Drain                 Urethral Catheter 06/17/19 1414 Straight-tip 16 Fr. 3 days         Rectal Tube 06/19/19 0930 fecal management system 2 days          Peripheral Intravenous Line                 Peripheral IV - Single Lumen 06/17/19 1300 22 G Right Forearm 3 days                Physical Exam   Constitutional: He is oriented to person, place, and time. He appears cachectic. He has a sickly appearance. He appears ill. No distress.   Cardiovascular: Normal rate and regular rhythm. Exam reveals no gallop.   No murmur heard.  Pulmonary/Chest: Effort normal and breath sounds normal. No respiratory distress. He has no wheezes.   Abdominal: Soft. Bowel sounds are normal. He  exhibits no distension. There is no tenderness.   Neurological: He is alert and oriented to person, place, and time.   Skin: Skin is warm and dry.       Significant Labs:     Recent Labs   Lab 06/21/19  0340   *   K 3.4*      CO2 21*   BUN 11   CREATININE 0.7     Recent Labs   Lab 06/21/19  0340   CALCIUM 7.3*   PROT 3.8*   *   K 3.4*   CO2 21*      BUN 11   CREATININE 0.7   ALKPHOS 258*   *   *   BILITOT 0.6     Recent Labs   Lab 06/21/19  0340 06/21/19  0824   WBC 13.79*  --    RBC 3.00*  --    HGB 8.2* 8.1*   HCT 25.4* 24.8*   *  --    MCV 85  --    MCH 27.3  --    MCHC 32.3  --        Significant Imaging:     TTE 6/17/2019    · Normal left ventricular systolic function. The estimated ejection fraction is 55%  · Normal right ventricular systolic function.  · Indeterminate left ventricular diastolic function.  · Limited echo windows    Assessment and Plan:     Brief HPI: Seen on AM rounds with Dr. Allison while working with speech therapy. Denies any complaints currently. Reviewed surgical opinion with patient and informed on plan for reconsultation of IR as well as consultation of Hospital Medicine-verbalized understanding. Will update wife when available at the bedside     * Critical lower limb ischemia  -BLE angiogram with bilateral SFA CTOs with 2 vessel r/o on the left and single vessel via peroneal on right  -successful revascularization of RLE 6/11 with RSFA, right pop and right peroneal PTA with DCB; anticipated need for PTA of right AT however given continued anemia/GIB and inability to tolerate DAPT no plans for proceeding with further revascularization    -will continue to hold DAPT and statin therapy     Gastrointestinal hemorrhage  -EGD with severe esphagitis and duodenal varix  -IR/DIPS procedure 6/11 with recommendation for surgical evaluation  -serial H&Hs stable over past 24hours; will continue H&Hs Q12hrs for now  -initial surgical consult  recommendation reviewed; awaiting second opinion by Dr. BRENNON Kimball       Leukocytosis  -WBCs up to 37K post arrest with trend won to 26K-19K-14K; no 13K this  AM  -initial blood cultures from 6/12 with NGTD; repeat blood cultures 6/17 with 2 out of 4 GPC on prelim report   -remains on IV Zosyn and Vanc and will continue for now  -ID on board and continue to follow recs     Cardiac arrest  -PEA arrest preceded by SB at completion of EGD 6/10  -responded to few rounds of Epi and ROSC after ACLS/CPR  -intubated and admitted to ICU  -initial pH 6.9 with improvement 7.2-7.3 with vent and bicarb  -etiology likely hypoxia related  -remains intubated and sedated on pressor support; Pulm CC on board for vent management with evaluation for extubation today     Hypoalbuminemia  -related to poor nutritional status  -albumin this AM 1.2  -dietian on board; on Darrian, Boost and Beneprotein  -repeat ST consult since extubation; dietary recommendations noted  -continue daily  CMPs    Severe protein-calorie malnutrition  -consult dietian  -diet changed to regular  -on Darrian BID and Benefiber TID       Gangrene of right foot  -Podiatry and ID on board  -initial plans by  Podiatry for TMA once demarcation; ongoing discussions with Podiatry per IC staff (Dr. Gonzales) with high risk for limb loss and no plans for surgical intervention given acute decompensation and recent cardiac arrest     Anemia  -H&H 7.1&22.8 6/14 &6/15 with trend down to 6.9&22.0 6/16  -transfused with 2U PRBCs with improvement in H&H to 10.4&32.0 6/17  with slight drop to 9.5   -EGD 6/17 with following results:   LA Grade D (one or more mucosal breaks involving at least 75% of esophageal circumference) esophagitis with no bleeding was found in the lower third of the esophagus.   A deformity was found in the prepyloric region of the stomach.   Red blood was found in the gastric body and duodenum.   Active bleeding with adherent clot was found in the third portion  of the duodenum associated with a duodenal varix.  -additional 2units PRBCs given 6/17 post cardiac arrest and repeat transfusion 6/18 post DIPS yesterday   -H&H stabilized and sserial H&Hs changed to Q12hrs; H&H 8.2&25.4 this AM down from 10.9&32.8 yesterday; will place back on H&H Q4hrs   -baseline 9-10/29-30 since early 2018  -will continue to hold ASA/Plavix for now   -General Surgery (Dr. Kirk) consult for surgical intervention for duodenal varix with second opinion by Dr. Kimball and no plans for surgical intervention; IR reconsulted     Alcoholic cirrhosis of liver with ascites  - down from 291 yesterday;  down from 167 yesterday  -on Aldactone at home- will continue to hold   -EGD with concern for advanced cirrhosis per GI interpretation; IR/DIPS procedure for duodenal varix; General surgery consulted for surgical evaluation with no plans for surgical intervention; IR reconsulted   -daily CMP in AM         VTE Risk Mitigation (From admission, onward)        Ordered     Reason for No Pharmacological VTE Prophylaxis  Once      06/17/19 1353     IP VTE HIGH RISK PATIENT  Once      06/17/19 1353     Place SERGO hose  Until discontinued      06/17/19 1346     Place sequential compression device  Until discontinued      06/17/19 1346          MAURICE French, ANP  Cardiology  [facility]

## 2019-06-21 NOTE — ASSESSMENT & PLAN NOTE
-related to poor nutritional status  -albumin this AM 1.2  -dietian on board; on Darrian, Boost and Beneprotein  -repeat ST consult since extubation; dietary recommendations noted  -continue daily  CMPs

## 2019-06-21 NOTE — ASSESSMENT & PLAN NOTE
- down from 291 yesterday;  down from 167 yesterday  -on Aldactone at home- will continue to hold   -EGD with concern for advanced cirrhosis per GI interpretation; IR/DIPS procedure for duodenal varix; General surgery consulted for surgical evaluation with no plans for surgical intervention; IR reconsulted   -daily CMP in AM

## 2019-06-21 NOTE — PROGRESS NOTES
[facility]  Gastroenterology  Progress Note    Patient Name: Sachin Calloway  MRN: [id number]  Admission Date: 6/10/2019  Hospital Length of Stay: 8 days  Code Status: Prior   Attending Provider: Toi Arevalo MD  Consulting Provider: Bill Arias MD  Primary Care Physician: Juna Gatica MD  Principal Problem: Critical lower limb ischemia      Subjective:     Interval History: Seen/examined this morning. No overt bleeding, awake on vent, remained on pressors critically ill. Liquid dark stool, no overt melena.     Review of Systems   Unable to perform ROS: Intubated     Objective:     Vital Signs (Most Recent):  Temp: 97.2 °F (36.2 °C) (06/20/19 1948)  Pulse: 83 (06/20/19 1948)  Resp: (!) 28 (06/20/19 1948)  BP: 95/62 (06/20/19 1948)  SpO2: 100 % (06/20/19 1948) Vital Signs (24h Range):  Temp:  [94.1 °F (34.5 °C)-97.9 °F (36.6 °C)] 97.2 °F (36.2 °C)  Pulse:  [] 83  Resp:  [17-53] 28  SpO2:  [94 %-100 %] 100 %  BP: ()/(50-84) 95/62     Weight: 50.7 kg (111 lb 12.4 oz) (06/20/19 1100)  Body mass index is 15.16 kg/m².      Intake/Output Summary (Last 24 hours) at 6/20/2019 2243  Last data filed at 6/20/2019 2000  Gross per 24 hour   Intake 1905 ml   Output 1026 ml   Net 879 ml       Lines/Drains/Airways     Central Venous Catheter Line                 Percutaneous Central Line Insertion/Assessment - triple lumen  06/17/19 1700 right internal jugular 3 days          Drain                 Urethral Catheter 06/17/19 1414 Straight-tip 16 Fr. 3 days         Rectal Tube 06/19/19 0930 fecal management system 1 day          Peripheral Intravenous Line                 Peripheral IV - Single Lumen 06/17/19 1300 22 G Right Forearm 3 days                Physical Exam   Constitutional:   Sedated   HENT:   Head: Normocephalic.   ETT in place   Eyes: Pupils are equal, round, and reactive to light. No scleral icterus.   Neck: Normal range of motion.   Cardiovascular: Regular rhythm. Exam reveals no  friction rub.   Tachycardic   Pulmonary/Chest:   Transmitted ventilatory breath sounds. Symmetric chest expansion.   Abdominal: Soft. Bowel sounds are normal. He exhibits no distension.   Musculoskeletal: He exhibits edema.   Neurological:   Sedated   Skin: Skin is warm and dry.       Significant Labs:  CBC:   Recent Labs   Lab 06/19/19  0415  06/20/19  0020 06/20/19  0439 06/20/19  1642   WBC 19.19*  --   --  14.57*  --    HGB 11.2*  11.2*   < > 10.1* 10.9*  10.9* 9.1*   HCT 33.1*  33.1*   < > 30.1* 32.8*  32.8* 27.7*     --   --  150  --     < > = values in this interval not displayed.     CMP:   Recent Labs   Lab 06/20/19  0439   GLU 85   CALCIUM 7.3*   ALBUMIN 1.4*   PROT 4.0*      K 3.5   CO2 21*      BUN 10   CREATININE 0.7   ALKPHOS 287*   *   *   BILITOT 0.9         Significant Imaging:  Imaging results within the past 24 hours have been reviewed.    Assessment/Plan:     Anemia  - Acute source from suspected clotted, oozing duodenal varix. S/p EGD 6/17 with severe erosive esophagitis and actively bleeding duodenal varix that is not amenable to endoscopic therapy      Alcoholic cirrhosis of liver with ascites  - suspect small bowel variceal bleeding s/p ir shunt  - monitor hct, s/s of bleeding  - critically ill        Thank you for your consult. I will follow-up with patient. Please contact us if you have any additional questions.    Bill Arias MD  Gastroenterology  [facility]

## 2019-06-21 NOTE — PLAN OF CARE
Problem: Adult Inpatient Plan of Care  Goal: Plan of Care Review  Outcome: Ongoing (interventions implemented as appropriate)     06/21/19 2747   Plan of Care Review   Plan of Care Reviewed With patient;spouse     Today Mr. Calloway received 1 unit of PRBC. H/H trended down to 7.2. Pt continues to have black stools with rectal tube in place. Aaron drip was discontinued at 1039 this morning. BP and MAP have been stable. Reed catheter remains in place. The patient has poor UOP today. His total output as of this evening is 270 cc. Temp is up to 98.1. Avani hugger removed. Plans for IR embolization on Monday. Will continue to monitor.

## 2019-06-21 NOTE — NURSING
Consult was called in to IR on yesterday to Dr. Polk per IR nurse. Please see note in patient's chart.

## 2019-06-21 NOTE — PT/OT/SLP PROGRESS
Occupational Therapy  Missed Visit    Patient Name:  Sachin Calloway   MRN:  [id number]    Patient not seen today secondary to  RN hold, pt scheduled for IR intervention soon. Will follow-up .    Michael Pryor OT  6/21/2019

## 2019-06-21 NOTE — ASSESSMENT & PLAN NOTE
-WBCs up to 37K post arrest with trend won to 26K-19K-14K; no 13K this  AM  -initial blood cultures from 6/12 with NGTD; repeat blood cultures 6/17 with 2 out of 4 GPC on prelim report   -remains on IV Zosyn and Vanc and will continue for now  -ID on board and continue to follow recs

## 2019-06-21 NOTE — HPI
Mr. Calloway is a 57 yo man with liver cirrhosis 2/2 former etoh abuse, active tobacco use, PAD with critical lower limb ischemia, PAD, who presented to the hospital for evaluation of critical lower limb ischemia of right foot. He had a right foot ulcer with necrosis. Staged intervention of the RLE PAD was done on 6/10 and 6/12 with placement of balloons. Podiatry and ID were consulted initially, and pt was placed on empiric vanc and zosyn. MRI showed no osteomyelitis. Hgb downtrended to 7.1 and GI was consulted. Hgb was 6.9, and pt was transfused 2 units PRBC. EGD was done 6/18, after which patient had PEA arrest and was intubated. 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 DIPS procedure was done with IR, after which GI recommended surgical evaluation. H&H continued to trend down. Pt required sedation and pressors. Two general surgeons deemed pt not a surgical candidate. Pt was extubated 6/20 and IR was reconsulted. Hospital medicine was consulted on 6/21 for assistance in management of multiple medical issues. Pt received another unit of blood.   Notably, pt's Cr remained stable during admission at around 0.7. His albumin was only 1.4, indicating malnutrition and a poor prognostic factor in the context of the patient's multiple wounds. At 6' pt weighs only 111 lbs, and has been losing weight for the past year and has known esophageal stricture. He also has sacral and hip decubiti. He has a very poor functional status.     Discussed with patient, wife and sister in law in the room regarding multiple co-morbidities with progressive clinical declined. Patient requested for amputation of the gangrenous right foot. Explained the S/p EGD on 6/17 with severe erosive esophagitis and actively bleeding duodenal varix is not amenable to endoscopic therapy  Discussed goals of care and advanced directive. Patient and family request for family meeting tomorrow 6/22 at  2pm

## 2019-06-21 NOTE — PLAN OF CARE
Problem: Adult Inpatient Plan of Care  Goal: Plan of Care Review  Outcome: Ongoing (interventions implemented as appropriate)     06/21/19 6333   Plan of Care Review   Plan of Care Reviewed With patient;spouse     Today Mr. Calloway received 1 unit of PRBC. H/H trended down to 7.2. Pt continues to have black stools with rectal tube in place. Aaron drip was discontinued at 1039 this morning. BP and MAP have been stable. Reed catheter remains in place. The patient has poor UOP today. His total output as of this evening is 270 cc. Plans for IR embolization on Monday. Will continue to monitor.

## 2019-06-21 NOTE — PROGRESS NOTES
[facility] follow up  /65   Pulse 92   Temp 97 °F (36.1 °C)   Resp (!) 23   Ht 6' (1.829 m)   Wt 50.7 kg (111 lb 12.4 oz)   SpO2 96%   BMI 15.16 kg/m²   I/O last 3 completed shifts:  In: 2401.8 [P.O.:1605; I.V.:216.8; NG/GT:30; IV Piggyback:550]  Out: 1561 [Urine:1186; Stool:375]  I/O this shift:  In: -   Out: 165 [Urine:165]  Recent Results (from the past 336 hour(s))   CBC auto differential    Collection Time: 06/21/19  3:40 AM   Result Value Ref Range    WBC 13.79 (H) 3.90 - 12.70 K/uL    Hemoglobin 8.2 (L) 14.0 - 18.0 g/dL    Hematocrit 25.4 (L) 40.0 - 54.0 %    Platelets 139 (L) 150 - 350 K/uL   CBC auto differential    Collection Time: 06/20/19  4:39 AM   Result Value Ref Range    WBC 14.57 (H) 3.90 - 12.70 K/uL    Hemoglobin 10.9 (L) 14.0 - 18.0 g/dL    Hematocrit 32.8 (L) 40.0 - 54.0 %    Platelets 150 150 - 350 K/uL   CBC auto differential    Collection Time: 06/19/19  4:15 AM   Result Value Ref Range    WBC 19.19 (H) 3.90 - 12.70 K/uL    Hemoglobin 11.2 (L) 14.0 - 18.0 g/dL    Hematocrit 33.1 (L) 40.0 - 54.0 %    Platelets 190 150 - 350 K/uL   blood count still decreasing , waiting IR involvement   Continue present treatment , check pt/ptt/ inr.

## 2019-06-21 NOTE — PLAN OF CARE
Problem: Adult Inpatient Plan of Care  Goal: Plan of Care Review  Outcome: Ongoing (interventions implemented as appropriate)  Afebrile during the night - hypothermic. Warming blanket on pt. VSS - remains on low dose of Aaron for BP support. Awake, alert, oriented. Flexiseal in place - x1 bowel movement. Linen changed. Black/green. Urine output adequate. Tolerating PO intake. Denies any SOB or pain. Safety precautions in place.

## 2019-06-21 NOTE — PT/OT/SLP PROGRESS
Physical Therapy      Patient Name:  Sachin Calloway   MRN:  [id number]    Patient not seen today secondary to pt with active duodenal bleed-going to IR. Will follow-up .    Janet Vergara, PT   6/21/2019

## 2019-06-21 NOTE — PLAN OF CARE
Problem: SLP Goal  Goal: SLP Goal  Updated Short Term Goals:  1. Pt will complete a clinical swallow evaluation to determine safest diet. MET 6/20  2. Pt will tolerate full liquid diet with no audible s/s of dysphagia. --MET 6/21  3. Pt will tolerate advanced trials of soft solids with no audible s/s of dysphagia.   4. SLP will recommend calorie count via dietician consult to ensure Pt is meeting adequate nutrition/hydration needs orally.    Outcome: Ongoing (interventions implemented as appropriate)  6/21: Pt progressing towards goals. Observed to be finishing breakfast upon arrival. Pt consumed tsp bites grits and diced peaches as well as straw/cup sips liquid. Cough produced x1 s/p swallow thin liquid, but no other s/s of aspiration were appreciated. Pt with excessively prolonged oral phase but deemed safe for diet upgrade of [facility] Soft (Finely chopped meats, extra gravy)/Thin liquids given assist with meals. SLP to f/u.

## 2019-06-22 PROBLEM — E87.6 HYPOKALEMIA: Status: ACTIVE | Noted: 2019-06-22

## 2019-06-22 LAB
ALBUMIN SERPL BCP-MCNC: 1.2 G/DL (ref 3.5–5.2)
ALP SERPL-CCNC: 233 U/L (ref 55–135)
ALT SERPL W/O P-5'-P-CCNC: 81 U/L (ref 10–44)
ANION GAP SERPL CALC-SCNC: 3 MMOL/L (ref 8–16)
AST SERPL-CCNC: 61 U/L (ref 10–40)
BASOPHILS # BLD AUTO: 0.03 K/UL (ref 0–0.2)
BASOPHILS NFR BLD: 0.3 % (ref 0–1.9)
BILIRUB SERPL-MCNC: 0.6 MG/DL (ref 0.1–1)
BUN SERPL-MCNC: 9 MG/DL (ref 6–20)
CALCIUM SERPL-MCNC: 7.1 MG/DL (ref 8.7–10.5)
CHLORIDE SERPL-SCNC: 107 MMOL/L (ref 95–110)
CO2 SERPL-SCNC: 24 MMOL/L (ref 23–29)
CREAT SERPL-MCNC: 0.6 MG/DL (ref 0.5–1.4)
DIFFERENTIAL METHOD: ABNORMAL
EOSINOPHIL # BLD AUTO: 0.1 K/UL (ref 0–0.5)
EOSINOPHIL NFR BLD: 0.9 % (ref 0–8)
ERYTHROCYTE [DISTWIDTH] IN BLOOD BY AUTOMATED COUNT: 16.6 % (ref 11.5–14.5)
EST. GFR  (AFRICAN AMERICAN): >60 ML/MIN/1.73 M^2
EST. GFR  (NON AFRICAN AMERICAN): >60 ML/MIN/1.73 M^2
GLUCOSE SERPL-MCNC: 87 MG/DL (ref 70–110)
HCT VFR BLD AUTO: 26.1 % (ref 40–54)
HCT VFR BLD AUTO: 26.5 % (ref 40–54)
HCT VFR BLD AUTO: 26.8 % (ref 40–54)
HCT VFR BLD AUTO: 27.6 % (ref 40–54)
HCT VFR BLD AUTO: 27.8 % (ref 40–54)
HGB BLD-MCNC: 8.6 G/DL (ref 14–18)
HGB BLD-MCNC: 8.7 G/DL (ref 14–18)
HGB BLD-MCNC: 8.9 G/DL (ref 14–18)
HGB BLD-MCNC: 9.1 G/DL (ref 14–18)
HGB BLD-MCNC: 9.3 G/DL (ref 14–18)
LYMPHOCYTES # BLD AUTO: 1 K/UL (ref 1–4.8)
LYMPHOCYTES NFR BLD: 10 % (ref 18–48)
MCH RBC QN AUTO: 27.9 PG (ref 27–31)
MCHC RBC AUTO-ENTMCNC: 32.5 G/DL (ref 32–36)
MCV RBC AUTO: 86 FL (ref 82–98)
MONOCYTES # BLD AUTO: 0.8 K/UL (ref 0.3–1)
MONOCYTES NFR BLD: 8.3 % (ref 4–15)
NEUTROPHILS # BLD AUTO: 8 K/UL (ref 1.8–7.7)
NEUTROPHILS NFR BLD: 80.2 % (ref 38–73)
PLATELET # BLD AUTO: 126 K/UL (ref 150–350)
PMV BLD AUTO: 9.3 FL (ref 9.2–12.9)
POTASSIUM SERPL-SCNC: 3.2 MMOL/L (ref 3.5–5.1)
PROT SERPL-MCNC: 3.9 G/DL (ref 6–8.4)
RBC # BLD AUTO: 3.08 M/UL (ref 4.6–6.2)
SODIUM SERPL-SCNC: 134 MMOL/L (ref 136–145)
VANCOMYCIN TROUGH SERPL-MCNC: 11.3 UG/ML (ref 10–22)
WBC # BLD AUTO: 10 K/UL (ref 3.9–12.7)

## 2019-06-22 PROCEDURE — 63600175 PHARM REV CODE 636 W HCPCS: Performed by: NURSE PRACTITIONER

## 2019-06-22 PROCEDURE — 25000003 PHARM REV CODE 250: Performed by: NURSE PRACTITIONER

## 2019-06-22 PROCEDURE — 99291 PR CRITICAL CARE, E/M 30-74 MINUTES: ICD-10-PCS | Mod: ,,, | Performed by: INTERNAL MEDICINE

## 2019-06-22 PROCEDURE — 80074 ACUTE HEPATITIS PANEL: CPT

## 2019-06-22 PROCEDURE — 25000003 PHARM REV CODE 250: Performed by: STUDENT IN AN ORGANIZED HEALTH CARE EDUCATION/TRAINING PROGRAM

## 2019-06-22 PROCEDURE — 97530 THERAPEUTIC ACTIVITIES: CPT

## 2019-06-22 PROCEDURE — 85014 HEMATOCRIT: CPT

## 2019-06-22 PROCEDURE — 99291 CRITICAL CARE FIRST HOUR: CPT | Mod: ,,, | Performed by: INTERNAL MEDICINE

## 2019-06-22 PROCEDURE — C9113 INJ PANTOPRAZOLE SODIUM, VIA: HCPCS | Performed by: INTERNAL MEDICINE

## 2019-06-22 PROCEDURE — 85014 HEMATOCRIT: CPT | Mod: 91

## 2019-06-22 PROCEDURE — 99232 PR SUBSEQUENT HOSPITAL CARE,LEVL II: ICD-10-PCS | Mod: ,,, | Performed by: INTERNAL MEDICINE

## 2019-06-22 PROCEDURE — 63600175 PHARM REV CODE 636 W HCPCS: Performed by: INTERNAL MEDICINE

## 2019-06-22 PROCEDURE — 85018 HEMOGLOBIN: CPT | Mod: 91

## 2019-06-22 PROCEDURE — 20000000 HC ICU ROOM

## 2019-06-22 PROCEDURE — 85025 COMPLETE CBC W/AUTO DIFF WBC: CPT

## 2019-06-22 PROCEDURE — 63600175 PHARM REV CODE 636 W HCPCS: Performed by: PHYSICIAN ASSISTANT

## 2019-06-22 PROCEDURE — 80202 ASSAY OF VANCOMYCIN: CPT

## 2019-06-22 PROCEDURE — 99232 SBSQ HOSP IP/OBS MODERATE 35: CPT | Mod: ,,, | Performed by: INTERNAL MEDICINE

## 2019-06-22 PROCEDURE — 85018 HEMOGLOBIN: CPT

## 2019-06-22 PROCEDURE — 63600175 PHARM REV CODE 636 W HCPCS: Performed by: STUDENT IN AN ORGANIZED HEALTH CARE EDUCATION/TRAINING PROGRAM

## 2019-06-22 PROCEDURE — 97110 THERAPEUTIC EXERCISES: CPT

## 2019-06-22 PROCEDURE — 80053 COMPREHEN METABOLIC PANEL: CPT

## 2019-06-22 RX ORDER — POTASSIUM CHLORIDE 20 MEQ/15ML
40 SOLUTION ORAL ONCE
Status: DISCONTINUED | OUTPATIENT
Start: 2019-06-22 | End: 2019-06-22

## 2019-06-22 RX ORDER — POTASSIUM CHLORIDE 7.45 MG/ML
10 INJECTION INTRAVENOUS
Status: COMPLETED | OUTPATIENT
Start: 2019-06-22 | End: 2019-06-22

## 2019-06-22 RX ADMIN — PIPERACILLIN AND TAZOBACTAM 4.5 G: 4; .5 INJECTION, POWDER, LYOPHILIZED, FOR SOLUTION INTRAVENOUS; PARENTERAL at 08:06

## 2019-06-22 RX ADMIN — PIPERACILLIN AND TAZOBACTAM 4.5 G: 4; .5 INJECTION, POWDER, LYOPHILIZED, FOR SOLUTION INTRAVENOUS; PARENTERAL at 04:06

## 2019-06-22 RX ADMIN — VANCOMYCIN HYDROCHLORIDE 750 MG: 750 INJECTION, POWDER, LYOPHILIZED, FOR SOLUTION INTRAVENOUS at 12:06

## 2019-06-22 RX ADMIN — POTASSIUM CHLORIDE 10 MEQ: 7.46 INJECTION, SOLUTION INTRAVENOUS at 10:06

## 2019-06-22 RX ADMIN — POTASSIUM CHLORIDE 10 MEQ: 7.46 INJECTION, SOLUTION INTRAVENOUS at 11:06

## 2019-06-22 RX ADMIN — COLLAGENASE SANTYL: 250 OINTMENT TOPICAL at 08:06

## 2019-06-22 RX ADMIN — PANTOPRAZOLE SODIUM 40 MG: 40 INJECTION, POWDER, FOR SOLUTION INTRAVENOUS at 10:06

## 2019-06-22 RX ADMIN — PANTOPRAZOLE SODIUM 40 MG: 40 INJECTION, POWDER, FOR SOLUTION INTRAVENOUS at 08:06

## 2019-06-22 NOTE — PROGRESS NOTES
Pharmacokinetic Assessment Follow Up: IV Vancomycin    Vancomycin serum concentration assessment(s):    The trough level was drawned correctly and can be used to guide therapy at this time. The measurement is below the desired definitive target range of 15 to 20 mcg/mL.    Vancomycin Regimen Plan:    Change regimen to Vancomycin 500 mg IV every 12hour with next serum trough concentration measured at 1200 prior to 4th dose on 6/24/19.     Pharmacy will continue to follow and monitor vancomycin.    Please contact pharmacy at extension [phone] for questions regarding this assessment.    Thank you for the consult,   Lazara Baez     Patient brief summary:  Sachin Calloway is a 58 y.o. male initiated on antimicrobial therapy with IV Vancomycin for treatment of suspected skin & soft tissue    The patient received a loading dose, followed by the current treatment regimen: vancomycin 750 mg IV every 24 hours    Drug Allergies:   Review of patient's allergies indicates:  No Known Allergies    Actual Body Weight:   50.7 kg    Renal Function:   Estimated Creatinine Clearance: 95.1 mL/min (based on SCr of 0.6 mg/dL).,     Dialysis Method (if applicable):  NA     CBC (last 72 hours):  Recent Labs   Lab Result Units 06/19/19  1714 06/19/19  2110 06/20/19  0020 06/20/19  0439 06/20/19  1642 06/21/19  0340 06/21/19  0824 06/21/19  1233 06/21/19  1553 06/21/19  2200 06/22/19  0028 06/22/19  0400 06/22/19  0824 06/22/19  1116   WBC K/uL  --   --   --  14.57*  --  13.79*  --   --   --   --   --  10.00  --   --    Hemoglobin g/dL 10.0* 10.0* 10.1* 10.9*  10.9* 9.1* 8.2* 8.1* 7.4* 7.2* 8.7* 8.7* 8.6* 9.1* 8.9*   Hematocrit % 29.3* 29.7* 30.1* 32.8*  32.8* 27.7* 25.4* 24.8* 22.7* 22.3* 26.6* 26.1* 26.5* 27.6* 26.8*   Platelets K/uL  --   --   --  150  --  139*  --   --   --   --   --  126*  --   --    Gran% %  --   --   --  86.2*  --  83.4*  --   --   --   --   --  80.2*  --   --    Lymph% %  --   --   --  6.9*  --  8.4*  --   --    --   --   --  10.0*  --   --    Mono% %  --   --   --  6.3  --  7.2  --   --   --   --   --  8.3  --   --    Eosinophil% %  --   --   --  0.0  --  0.6  --   --   --   --   --  0.9  --   --    Basophil% %  --   --   --  0.3  --  0.4  --   --   --   --   --  0.3  --   --    Differential Method   --   --   --  Automated  --  Automated  --   --   --   --   --  Automated  --   --        Metabolic Panel (last 72 hours):  Recent Labs   Lab Result Units 06/20/19  0439 06/21/19  0340 06/22/19  0400   Sodium mmol/L 137 135* 134*   Potassium mmol/L 3.5 3.4* 3.2*   Chloride mmol/L 108 108 107   CO2 mmol/L 21* 21* 24   Glucose mg/dL 85 134* 87   BUN, Bld mg/dL 10 11 9   Creatinine mg/dL 0.7 0.7 0.6   Albumin g/dL 1.4* 1.2* 1.2*   Total Bilirubin mg/dL 0.9 0.6 0.6   Alkaline Phosphatase U/L 287* 258* 233*   AST U/L 291* 116* 61*   ALT U/L 206* 121* 81*       Vancomycin Administrations:  vancomycin given in the last 96 hours                     vancomycin 750 mg in dextrose 5 % 250 mL IVPB (ready to mix system) (mg) 750 mg New Bag 06/22/19 1256     750 mg New Bag 06/21/19 1244     750 mg New Bag 06/20/19 1220                      Drug levels (last 3 results):  Recent Labs   Lab Result Units 06/22/19  1116   Vancomycin-Trough ug/mL 11.3       Microbiologic Results:  Microbiology Results (last 7 days)       Procedure Component Value Units Date/Time    Blood culture [[id number]] Collected:  06/17/19 2247    Order Status:  Completed Specimen:  Blood from Line, Jugular, Internal Right Updated:  06/22/19 0800     Blood Culture, Routine Gram stain aer bottle: Gram positive cocci      Blood Culture, Routine Results called to and read back by:Rebekah Mendez RN 06/19/2019 06:05     Blood Culture, Routine 06/21/2019  Smear review: No organims seen on restain of aer bottle.     Blood Culture, Routine Culture:  ~No growth to date     Blood Culture, Routine Results called to and read back by:Una Lebron RN 06/21/2019  15:47    Narrative:        Central line    Blood culture [[id number]] Collected:  06/17/19 2247    Order Status:  Completed Specimen:  Blood from Line, Arterial, Right Updated:  06/22/19 0612     Blood Culture, Routine No growth to date     Blood Culture, Routine No Growth to date     Blood Culture, Routine No Growth to date     Blood Culture, Routine No Growth to date    Narrative:       Arterial line    Blood culture [[id number]] Collected:  06/12/19 0814    Order Status:  Completed Specimen:  Blood Updated:  06/17/19 1212     Blood Culture, Routine No growth after 5 days.    Blood culture [[id number]] Collected:  06/12/19 0815    Order Status:  Completed Specimen:  Blood Updated:  06/17/19 1212     Blood Culture, Routine No growth after 5 days.

## 2019-06-22 NOTE — NURSING
"2105: x1 unit of pRBC done on pt. H/H will be checked one hour post transfusion.     2220: H/H resulted. 8.7/ 26.6. Clarified with blood bank for new policy. Spoke with Barney gloria states can not release blood to transfuse unless H/H is less than 8. Will need to talk to director in order to approve transfusion. Charge RN and house supervisor notified of situation.    2234: Call center called for on call cardiologist. Awaiting phone call    2236: Estefany called back. Updated on situation. Asked if he wants to give another unit. Stated "pt is bleeding out. He needs at least one more unit of blood."     2242: Barney gloria called back. Communicated conversation with primary MD. States she will have to call her director to approve. Gave her Dr. Allison number to give to her director if escalation is needed.     2233: Called Barney gloria for an update regarding next step. States someone is trying to get in contact with director.   "

## 2019-06-22 NOTE — PROGRESS NOTES
[facility]  Gastroenterology  Progress Note    Patient Name: Sachin Calloway  MRN: [id number]  Admission Date: 6/10/2019  Hospital Length of Stay: 10 days  Code Status: Full Code   Attending Provider: Toi Arevalo MD  Consulting Provider: Bill Arias MD  Primary Care Physician: Juan Gatica MD  Principal Problem: Critical lower limb ischemia      Subjective:     Interval History: Doing ok, extubated. Off pressors. No melena, hematochezia.     Review of Systems   Constitutional: Negative for appetite change and unexpected weight change.   Respiratory: Negative for apnea and chest tightness.    Cardiovascular: Negative for chest pain and palpitations.   Gastrointestinal: Negative for abdominal distention and abdominal pain.     Objective:     Vital Signs (Most Recent):  Temp: 98.1 °F (36.7 °C) (06/22/19 1130)  Pulse: (!) 112 (06/22/19 0930)  Resp: (!) 35 (06/22/19 0930)  BP: 136/79 (06/22/19 0930)  SpO2: 100 % (06/22/19 0930) Vital Signs (24h Range):  Temp:  [96.4 °F (35.8 °C)-98.1 °F (36.7 °C)] 98.1 °F (36.7 °C)  Pulse:  [] 112  Resp:  [10-35] 35  SpO2:  [94 %-100 %] 100 %  BP: ()/(57-79) 136/79     Weight: 50.7 kg (111 lb 12.4 oz) (06/20/19 1100)  Body mass index is 15.16 kg/m².      Intake/Output Summary (Last 24 hours) at 6/22/2019 1207  Last data filed at 6/22/2019 0940  Gross per 24 hour   Intake 546.67 ml   Output 450 ml   Net 96.67 ml       Lines/Drains/Airways     Central Venous Catheter Line                 Percutaneous Central Line Insertion/Assessment - triple lumen  06/17/19 1700 right internal jugular 4 days          Drain                 Urethral Catheter 06/17/19 1414 Straight-tip 16 Fr. 4 days          Peripheral Intravenous Line                 Peripheral IV - Single Lumen 06/17/19 1300 22 G Right Forearm 4 days                Physical Exam   Constitutional: No distress.   HENT:   Head: Normocephalic and atraumatic.   Eyes: Pupils are equal, round, and reactive to  light. No scleral icterus.   Neck: Normal range of motion.   Cardiovascular: Regular rhythm. Exam reveals no friction rub.   Tachycardic   Pulmonary/Chest: Effort normal. No respiratory distress.   Abdominal: Soft. Bowel sounds are normal. He exhibits no distension.   Musculoskeletal: Normal range of motion. He exhibits no edema.   Skin: Skin is warm and dry. He is not diaphoretic.   Psychiatric: He has a normal mood and affect. His behavior is normal.   Nursing note and vitals reviewed.      Significant Labs:  CBC:   Recent Labs   Lab 06/21/19  0340  06/22/19  0400 06/22/19  0824 06/22/19  1116   WBC 13.79*  --  10.00  --   --    HGB 8.2*   < > 8.6* 9.1* 8.9*   HCT 25.4*   < > 26.5* 27.6* 26.8*   *  --  126*  --   --     < > = values in this interval not displayed.         Significant Imaging:  Imaging results within the past 24 hours have been reviewed.    Assessment/Plan:     Anemia  - source from suspected clotted, oozing duodenal varix  - s/p EGD 6/17 with severe erosive esophagitis and actively bleeding duodenal varix  - daily cbc    Alcoholic cirrhosis of liver with ascites  - suspect small bowel variceal bleeding s/p ir shunt  - monitor hct,stable  - s/s of bleeding  - more stable        Thank you for your consult. I will follow-up with patient. Please contact us if you have any additional questions.    Bill Arias MD  Gastroenterology  [facility]

## 2019-06-22 NOTE — ASSESSMENT & PLAN NOTE
Pt with extremely poor functional and nutritional status. Pt currently too unstable for any surgical or otherwise aggressive interventions. Will continue to discuss pt's status with family and patient. Patient is palliative care/hospice candidate.   Pt does not seem to have any insight into his condition.

## 2019-06-22 NOTE — CONSULTS
[facility] Hospital Medicine  Consult Note    Patient Name: Sachin Calloway  MRN: [id number]  Admission Date: 6/10/2019  Hospital Length of Stay: 9 days  Attending Physician: Isidra Crane MD  Primary Care Provider: Juan Gatica MD           Patient information was obtained from patient, spouse/SO, relative(s) and ER records.     Inpatient consult to [facility]  Consult performed by: Rita Walden PA-C  Consult ordered by: MAURICE Hartmann, ANP        Subjective:     Principal Problem: Critical lower limb ischemia    Chief Complaint: No chief complaint on file.       HPI: Mr. Calloway is a 59 yo man with liver cirrhosis 2/2 former etoh abuse, active tobacco use, PAD with critical lower limb ischemia, PAD, who presented to the hospital for evaluation of critical lower limb ischemia of right foot. He had a right foot ulcer with necrosis. Staged intervention of the RLE PAD was done on 6/10 and 6/12 with placement of balloons. Podiatry and ID were consulted initially, and pt was placed on empiric vanc and zosyn. MRI showed no osteomyelitis. Hgb downtrended to 7.1 and GI was consulted. Hgb was 6.9, and pt was transfused 2 units PRBC. EGD was done 6/18, after which patient had PEA arrest and was intubated. 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 DIPS procedure was done with IR, after which GI recommended surgical evaluation. H&H continued to trend down. Pt required sedation and pressors. Two general surgeons deemed pt not a surgical candidate. Pt was extubated 6/20 and IR was reconsulted. Hospital medicine was consulted on 6/21 for assistance in management of multiple medical issues. Pt received another unit of blood.   Notably, pt's Cr remained stable during admission at around 0.7. His albumin was only 1.4, indicating malnutrition and a poor prognostic factor in the context of the patient's multiple wounds.  At 6' pt weighs only 111 lbs, and has been losing weight for the past year and has known esophageal stricture. He also has sacral and hip decubiti. He has a very poor functional status.     Discussed with patient, wife and sister in law in the room regarding multiple co-morbidities with progressive clinical declined. Patient requested for amputation of the gangrenous right foot. Explained the S/p EGD on 6/17 with severe erosive esophagitis and actively bleeding duodenal varix is not amenable to endoscopic therapy  Discussed goals of care and advanced directive. Patient and family request for family meeting tomorrow 6/22 at 2pm      Past Medical History:   Diagnosis Date    Alcohol abuse     Cirrhosis     Edema     Hypertension     Seizures     Stricture esophagus        Past Surgical History:   Procedure Laterality Date    Angiogram, Lower Arterial, Bilateral Bilateral 6/10/2019    Performed by [facility] Gonzales [facility] at [facility] CATH LAB/EP    Aortogram, Abdominal N/A 6/10/2019    Performed by [facility] CATH LAB/EP    Atherectomy, Peripheral Blood Vessel N/A 6/11/2019    Performed by [facility] at [facility] CATH LAB/EP    COLONOSCOPY      COLONOSCOPY N/A 6/17/2019    Performed by Juan Sutton MD at [facility] ENDO    COLONOSCOPY N/A 5/5/2015    Performed by Mario Martínez MD at [facility] ENDO    EGD (ESOPHAGOGASTRODUODENOSCOPY) N/A 6/17/2019    Performed by Juan Sutton MD at [facility] ENDO    EGD (ESOPHAGOGASTRODUODENOSCOPY) N/A 5/13/2019    Performed by Dillan Salinas MD at [facility] ENDO    EGD with dilitation  03/04/2019    EMBOLIZATION, BLOOD VESSEL N/A 6/18/2019    Performed by [facility] Provider [facility] OR    ESOPHAGOGASTRODUODENOSCOPY (EGD) N/A 3/29/2019    Performed by Dillan Salinas MD at [facility] ENDO    ESOPHAGOGASTRODUODENOSCOPY (EGD) N/A 3/18/2019    Performed by Omer Pritchard MD at [facility] ENDO    ESOPHAGOGASTRODUODENOSCOPY (EGD) N/A 3/4/2019    Performed by Madhu JAUREGUI  MD Brayan at [facility] ENDO    ESOPHAGOGASTRODUODENOSCOPY (EGD) N/A 5/5/2015    Performed by Mario Martínez MD at [facility] ENDO    Filter Protection, Peripheral  6/11/2019    Performed by [facility] MD at [facility] CATH LAB/EP    PTA, PERIPHERAL VESSEL N/A 6/11/2019    Performed by Fab[facility] Gonzales [facility] at [facility] CATH LAB/EP    PTA, Peroneal  6/11/2019    Performed by Fab[facility] MD at [facility] CATH LAB/EP    PTA, Superficial Femoral Artery  6/11/2019    Performed by Fab[facility] MD at [facility] CATH LAB/EP    ULTRASOUND, INTRAVASCULAR N/A 6/11/2019    Performed by Fabien Gonzales MD at [facility] CATH LAB/EP       Review of patient's allergies indicates:  No Known Allergies    No current facility-administered medications on file prior to encounter.      Current Outpatient Medications on File Prior to Encounter   Medication Sig    acetaminophen-codeine 300-30mg (TYLENOL #3) 300-30 mg Tab Take by mouth.    furosemide (LASIX) 80 MG tablet Take 1 tablet (80 mg total) by mouth once daily.    pantoprazole (PROTONIX) 40 MG tablet Take 1 tablet (40 mg total) by mouth once daily.    potassium chloride SA (K-DUR,KLOR-CON) 20 MEQ tablet Take 1 tablet (20 mEq total) by mouth once daily.    spironolactone (ALDACTONE) 50 MG tablet Take 1 tablet (50 mg total) by mouth once daily.     Family History     Problem Relation (Age of Onset)    Cancer Father        Tobacco Use    Smoking status: Current Every Day Smoker     Packs/day: 0.50     Years: 43.00     Pack years: 21.50     Types: Cigarettes     Start date: 1976    Smokeless tobacco: Never Used    Tobacco comment: Pt enrolled in [employer].  Ambulatory referral to Smoking Cessation program.   Substance and Sexual Activity    Alcohol use: Yes     Comment: 6 pack every day (3-4)    Drug use: No    Sexual activity: Not on file     Review of Systems   Constitutional: Positive for activity change, appetite change and fatigue. Negative for fever.   HENT: Negative for ear  discharge, sinus pressure and tinnitus.    Eyes: Negative for itching.   Respiratory: Negative for apnea, shortness of breath and wheezing.    Cardiovascular: Negative for chest pain.   Gastrointestinal: Positive for abdominal distention. Negative for abdominal pain, nausea and rectal pain.   Endocrine: Negative for polydipsia and polyphagia.   Genitourinary: Negative for dysuria and flank pain.   Musculoskeletal: Positive for arthralgias.   Skin: Positive for color change and wound.   Neurological: Positive for weakness. Negative for syncope, light-headedness and headaches.   Psychiatric/Behavioral: Negative for behavioral problems, decreased concentration and sleep disturbance.     Objective:     Vital Signs (Most Recent):  Temp: 98.1 °F (36.7 °C) (06/21/19 1909)  Pulse: 102 (06/21/19 1909)  Resp: 16 (06/21/19 1909)  BP: 104/67 (06/21/19 1730)  SpO2: 100 % (06/21/19 1909) Vital Signs (24h Range):  Temp:  [95.9 °F (35.5 °C)-98.1 °F (36.7 °C)] 98.1 °F (36.7 °C)  Pulse:  [] 102  Resp:  [12-35] 16  SpO2:  [96 %-100 %] 100 %  BP: ()/(51-67) 104/67     Weight: 50.7 kg (111 lb 12.4 oz)  Body mass index is 15.16 kg/m².    Physical Exam   Constitutional: He is oriented to person, place, and time. He appears well-developed.   cachetic   HENT:   Head: Normocephalic and atraumatic.   Eyes: Pupils are equal, round, and reactive to light. EOM are normal.   Neck: Normal range of motion. Neck supple.   Cardiovascular: Normal rate, regular rhythm and normal heart sounds. Exam reveals no gallop and no friction rub.   No murmur heard.  Pulmonary/Chest: Effort normal and breath sounds normal.   Abdominal: Soft. Bowel sounds are normal. There is no tenderness.   Musculoskeletal: Normal range of motion.   gangrene R foot   Neurological: He is alert and oriented to person, place, and time.   Skin: Capillary refill takes less than 2 seconds. There is pallor.   Gangrene right foot   Psychiatric: His speech is normal and  behavior is normal. Thought content normal. His mood appears not anxious. Cognition and memory are normal. He does not exhibit a depressed mood.         CRANIAL NERVES     CN III, IV, VI   Pupils are equal, round, and reactive to light.  Extraocular motions are normal.        Significant Labs:   Blood Culture: No results for input(s): LABBLOO in the last 48 hours.  CBC:   Recent Labs   Lab 06/20/19  0439  06/21/19  0340 06/21/19  0824 06/21/19  1233 06/21/19  1553   WBC 14.57*  --  13.79*  --   --   --    HGB 10.9*  10.9*   < > 8.2* 8.1* 7.4* 7.2*   HCT 32.8*  32.8*   < > 25.4* 24.8* 22.7* 22.3*     --  139*  --   --   --     < > = values in this interval not displayed.     CMP:   Recent Labs   Lab 06/20/19  0439 06/21/19  0340    135*   K 3.5 3.4*    108   CO2 21* 21*   GLU 85 134*   BUN 10 11   CREATININE 0.7 0.7   CALCIUM 7.3* 7.3*   PROT 4.0* 3.8*   ALBUMIN 1.4* 1.2*   BILITOT 0.9 0.6   ALKPHOS 287* 258*   * 116*   * 121*   ANIONGAP 8 6*   EGFRNONAA >60 >60     Coagulation:   Recent Labs   Lab 06/21/19  1447   INR 1.3*   APTT 47.6*     Lactic Acid: No results for input(s): LACTATE in the last 48 hours.  Lipase: No results for input(s): LIPASE in the last 48 hours.  Lipid Panel: No results for input(s): CHOL, HDL, LDLCALC, TRIG, CHOLHDL in the last 48 hours.  Troponin: No results for input(s): TROPONINI in the last 48 hours.  TSH:   Recent Labs   Lab 06/06/19  1448   TSH 2.560     Urine Culture: No results for input(s): LABURIN in the last 48 hours.  Urine Studies: No results for input(s): COLORU, APPEARANCEUA, PHUR, SPECGRAV, PROTEINUA, GLUCUA, KETONESU, BILIRUBINUA, OCCULTUA, NITRITE, UROBILINOGEN, LEUKOCYTESUR, RBCUA, WBCUA, BACTERIA, SQUAMEPITHEL, HYALINECASTS in the last 48 hours.    Invalid input(s): RUBY    Significant Imaging: none    Assessment/Plan:     * Critical lower limb ischemia  Per primary, cardiology.     Debility  Pt with extremely poor functional and  nutritional status. Pt currently too unstable for any surgical or otherwise aggressive interventions. Will continue to discuss pt's status with family and patient. Patient is palliative care/hospice candidate.     Leukocytosis  Continue vanc and zosyn per ID    Severe protein-calorie malnutrition  Continue to encourage PO    Anemia  Multifactorial in this patient but certainly primarily related to pt's active GI bleed.   -agree with repeat transfusion today  -Hgb 7.2      VTE Risk Mitigation (From admission, onward)        Ordered     Reason for No Pharmacological VTE Prophylaxis  Once      06/17/19 1353     IP VTE HIGH RISK PATIENT  Once      06/17/19 1353     Place SERGO hose  Until discontinued      06/17/19 1346     Place sequential compression device  Until discontinued      06/17/19 1346          Critical care time spent on the evaluation and treatment of severe organ dysfunction, review of pertinent labs and imaging studies, discussions with consulting providers and discussions with patient/family: 45 minutes.    Thank you for your consult. none    Isidra Crane MD  Department of Hospital Medicine   [facility]

## 2019-06-22 NOTE — PLAN OF CARE
Problem: Adult Inpatient Plan of Care  Goal: Plan of Care Review  Outcome: Ongoing (interventions implemented as appropriate)  Patient and family had a meeting discussing goals in treatment, possible DNR and Hospice flip, family is still unsure, will transfer to the floor if family decides to place patient inpatient hospice. No longer needing pressors, VSS, no signs of distress noted, will continue to monitor and tx per protocol and MD orders.

## 2019-06-22 NOTE — ASSESSMENT & PLAN NOTE
Multifactorial in this patient but certainly primarily related to pt's active GI bleed.   -Hgb 7.2 > 9.2 s/p 1 unit PRBC

## 2019-06-22 NOTE — ASSESSMENT & PLAN NOTE
-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 BRENNON Kimball         Continue with close monitoring  H/H 9.1/27.6 today

## 2019-06-22 NOTE — HPI
Sachin Calloway is a 58 y.o. male with a relevant history of alcoholic liver cirrhosis, HTN, former EtOH abuse, active tobacco use, PAD who presents for peripheral angiogram     The patient presented to [facility] on 6/10/2019 with a primary complaint of non healing right foot wound with doppler showing PAD. Had arthrectomy and PTA with good results on 6/11. Was assessed by podiatry who felt that the right foot appearance was concerning for gangrene with superimposed infection     Although he was afebrile and had no pain, his WBC was elevated and has remained elevated. Broad spec antibiotics were started and blood cultures drawn. ID consulted 6/12/19.     Subsequently had GI bleeding, persistent leukocytosis and coded during an EGD (bradycardia followed by PEA) on 6/17. Then went on to have DIPS (Direct Intrahepatic PortoCaval Shunt Placement), SMA and Celiac Arteriogram, Large volume paracentesis on 6/18 by IR. No cultures sent from paracentesis.   6/20 extubated  6/22 - BC from 6/17 - originally reported as GPC on gram stain in 1 of 2 sets - on 6/21 the gram stain was corrected to be no organisms seen - so far BC NGTD.

## 2019-06-22 NOTE — PT/OT/SLP PROGRESS
Physical Therapy Treatment    Patient Name:  Sachin Calloway   MRN:  [id number]    Recommendations:     Discharge Recommendations:  (TBD)   Discharge Equipment Recommendations: (TBD pending progress)   Barriers to discharge: None    Assessment:     Sachin Calloway is a 58 y.o. male admitted with a medical diagnosis of Critical lower limb ischemia.  He presents with the following impairments/functional limitations:  weakness, impaired endurance, gait instability, impaired functional mobilty, impaired self care skills, impaired balance, decreased upper extremity function, impaired coordination, decreased lower extremity function, decreased coordination, edema, impaired fine motor, impaired cardiopulmonary response to activity, decreased ROM Patient tolerated increased activity today with improved BP; with increased HOB position, BP 98/57 HR 99 O2 sats 102; after exercise with bed in chair position, /73  O2 sats 100% initially, then 116/69 after 6 minutes; will cont with POC..    Rehab Prognosis: Fair; patient would benefit from acute skilled PT services to address these deficits and reach maximum level of function.    Recent Surgery: Procedure(s) (LRB):  EMBOLIZATION, BLOOD VESSEL (N/A) 4 Days Post-Op    Plan:     During this hospitalization, patient to be seen 6 x/week to address the identified rehab impairments via gait training, therapeutic activities, therapeutic exercises and progress toward the following goals:    · Plan of Care Expires:  07/20/19    Subjective     Pain/Comfort:  · Pain Rating 1: 0/10  · Pain Rating Post-Intervention 1: 0/10      Objective:     Communicated with nurse prior to session.  Patient found with angelo catheter(ICU monitoring, bear hugger blanket) upon PT entry to room.     General Precautions: Standard, aspiration, respiratory, fall   Orthopedic Precautions:N/A   Braces: N/A     Functional Mobility:  · Bed Mobility:     · Rolling Left:  minimum assistance and moderate assistance  with use of side rail  · Rolling Right: minimum assistance and moderate assistance with use of side rail  · Scooting: total assistance, dependence and of 2 persons with use of drawsheet      AM-PAC 6 CLICK MOBILITY  Turning over in bed (including adjusting bedclothes, sheets and blankets)?: 2  Sitting down on and standing up from a chair with arms (e.g., wheelchair, bedside commode, etc.): 1  Moving from lying on back to sitting on the side of the bed?: 2  Moving to and from a bed to a chair (including a wheelchair)?: 1  Need to walk in hospital room?: 1  Climbing 3-5 steps with a railing?: 1  Basic Mobility Total Score: 8       Therapeutic Activities and Exercises:   Patient performed AROM ex in supine and with bed in chair position 2x 10 reps each for BLEs and 10-15 reps for BUEs in all ranges of available motion; pt rolling L/R x 2 reps for cleansing between sets of exercise and before placement with bed in chair position; pt able to assist with bed mobility and positioning of legs during cleansing; refer to above for vitals.    Patient left with bed in chair position with all lines intact, call button in reach and nurse notified..    GOALS:   Multidisciplinary Problems     Physical Therapy Goals        Problem: Physical Therapy Goal    Goal Priority Disciplines Outcome Goal Variances Interventions   Physical Therapy Goal     PT, PT/OT Ongoing (interventions implemented as appropriate)     Description:  Goals to be met by: 2019     Patient will increase functional independence with mobility by performin. Supine to sit with Modified [place]  2. Sit to stand transfer with Minimal Assistance  3. Bed to chair transfer with Contact Guard Assistance using Rolling Walker  4. Gait  x 20 feet with Contact Guard Assistance using Rolling Walker.              Problem: Physical Therapy Goal    Goal Priority Disciplines Outcome Goal Variances Interventions   Physical Therapy Goal     PT, PT/OT Ongoing  (interventions implemented as appropriate)     Description:  Goals to be met by: 2019     Patient will increase functional independence with mobility by performin. Supine <> sit with Minimal Assistance  2. Sit to stand transfer with Minimal Assistance  3. Bed to chair transfer with Contact Guard Assistance using Rolling Walker  4. Gait  x 20 feet with Contact Guard Assistance using Rolling Walker.   5. Tolerate 10 reps x 2 BLE AROM ex                    Time Tracking:     PT Received On: 19  PT Start Time: 1054     PT Stop Time: 1145  PT Total Time (min): 51 min     Billable Minutes: Therapeutic Activity 15 minutes and Therapeutic Exercise 30 minutes    Treatment Type: Treatment  PT/PTA: PT     PTA Visit Number: 0     Janet Vergara, PT  2019

## 2019-06-22 NOTE — PROGRESS NOTES
[facility]  Cardiology  Progress Note    Patient Name: Sachin Calloway  MRN: [id number]  Admission Date: 6/10/2019  Hospital Length of Stay: 10 days  Code Status: Full Code   Attending Physician: Toi Arevalo MD   Primary Care Physician: Juan Gatica MD  Expected Discharge Date:   Principal Problem:Critical lower limb ischemia    Subjective:     Hospital Course:   6/10/2019 Admitted for elective LE angiogram due to nonhealing wound to right foot. Taken to cath lab for the procedure via right radial access with following results:    · Severe bilateral PAD with CLI of right foot  · On the right there is an ostial SFA  which is calcified and reconstitutes via profunda collaterals distally. There is single vessel runoff via peroneal which has a severe proximal stenosis and distally supplies collaterals to a plantar arch. No identifiable DP, although there is a proximal portion of AT  · On the left there is a severe stenosis at proximal SFA with mid SFA  with reconstitution distally. There is two vessel runoff via AT into a DP and peroneal which supplies collaterals to a plantar arch  · Staged intervention of LSFA and peroneal first. Will then consider staged intervention of RATA and if unsuccessful will consider AV flow reversal     6/11/2019 NPO for RLE intervention today   6/12/2019 Taken to cath lab yesterday for intervention via LCFA access with following results:    · LCFA antegrade access with crossover sheath used  · Successful RSFA  intervention with atherectomy followed by IVUS guided PTA with 4.0 balloon in POP and mid to distal SFA and 5.0 balloon in ostial and proximal SFA. This was followed by PTA with DCB to POP/mid and distal SFA with 4.0 balloon and 5.0 balloon ostial/mid SFA all with excellent results  · Successful PTA to proximal peroneal with 3.0 x 60 balloon with excellent result  · Will likely need staged intervention of AT/DP and if unsuccessful, AV flow  reversal  · Plavix/ASA/statin  · Continue aggressive wound care and management per Podiatry    Tolerated procedure well and recovered on telemetry unit. LCFA access site with no active bleeding, hematoma or ecchymosis. Podiatry consulted with plans for surgery once demarcation occurs. ID consulted with recs for blood cultures along with empiric antibiotics with IV Vanc and Zosyn   6/13/2019 H&H 7.7 & 24.3 with WBC up to 16.11 from 15.17. BMP with unchanged creatinine. Blood cultures with prelim results NGTD. HR and BP stable. Dietian consulted with recs noted-will liberalize diet and order supplements. Remains on IV Vanc and Zosyn for now. PT/OT on board   6/14/2019 WBC down slightly to 15.4 this AM. H&H down slightly to 7.1&22.8. BP stable. Episode of ST with HR up to 140s this AM nonsustained and patient asymptomatic. ASA discontinued. Will consult GI due to downtrending H&H   6/15/2019 PAD with CLI requiring intervention and likely surgery. Continue medical management. No intervention planned until patient more stable. Continue antibiotics for gangrene and possible Osteo-blood cultures with NGTD. ID and podiatry following. Anemia. GI onboard and plan EGD/Colonscopy Monday. hgb 7.3 today. Transfuse if <7.   6/16/2019 Remains on IV abx currently. MRI with no evidence of osteomyelitis. ID and podiatry following. H&H down to 6.9 & 22.0 will transfuse with 2units PRBCs. Plan for EGD/Colonscopy Monday 6/17/2019 WBCs up to 18K this AM. H&H improved to 10.4&32.0 this AM. BMP stable. NPO for EGD/colonoscopy today   6/18/2019 PEA arrest yesterday post EGD with ROSC after CPR and a few rounds of CPR. Given additional 2 units of PRBCs in Endoscopy given findings on EGD and based on discussion with GI. Intubated per anesthesia and admitted to ICU with SBP 120s-130s upon arrival. Post admission more alert and pulling at ETT therefore placed on Precedex drip. BP trended down to 90s with initiation of Neosynephrine drip. Lactic  acid 4.5 and repeat H&H 15.4&46.0 likely erroneous immediately after PRBC. Serial H&H overnight with H&H 12.5&36.1 and down to 10.2&30.3 this AM. Remains intubated-Pulmonary consulted. GI on board with plans for CT of abdomen with IR/TIPS procedure today by IR. onversation with wife per IR in regards to procedure with high risk for hemodynamic instability during the procedure although no other alternative currently and family with desire for aggressive treatment.   6/19/2019 Remains intubated on Precedex and Aaron. IR procedure yesterday with DIPS. GI note recommend surgical evaluation. Additional PRBCs yesterday. H&H this MA 11.2&33.1 with slight trend down to 10.7&31.4 later in morning. HR and BP stable on pressors. K+ 3.6 BUN 9 creatinine .8 Mg 1.7  6/20/2019 Remains intubated and sedated. On Aaron with stable BP currently-wean in process. H&H 10.9&32.8 this AM and stable over the past 24 hours-will change H&Hs to Q12hrs. BMP with K+ 3.4 BUN 10 creatinine .7  down from 383  down from 206 albumin 1.4. Negative 6.9 liters since admission. General surgery (Dr. 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 BRENNON Kimball-consult placed. Pulmonary on board with evaluation for extubation in process   6/21/2019 Extubated yesterday and stable for respiratory standpoint overnight. Remains on IV Aaron with wean in process-down to .5mcg/kg/min from 1.25mcg/kg/min yesterday. Serial H&H in process with decrease in H&H this morning to 8.2&25.4 from 10.0&31.4 yesterday. BMP WNL. AST and ALT trending down. IR reconsulted for evaluation of repeat procedure for duodenal varix. Will consult [facility] Medicine for assistance with management of multiple medical issues   6/22/2019 transfused 1 u 6/21/019, H/H stable, IM/palliative care service had long discussion regarding his overall prognosis. Family meeting will take place today.  Continue with serial H/H. Abx per ID. No longer requiring pressors. Palliative paracentesis if needed.     Interval History:     ROS  Objective:     Vital Signs (Most Recent):  Temp: 97.9 °F (36.6 °C) (06/22/19 0930)  Pulse: (!) 112 (06/22/19 0930)  Resp: (!) 35 (06/22/19 0930)  BP: 136/79 (06/22/19 0930)  SpO2: 100 % (06/22/19 0930) Vital Signs (24h Range):  Temp:  [96.4 °F (35.8 °C)-98.1 °F (36.7 °C)] 97.9 °F (36.6 °C)  Pulse:  [] 112  Resp:  [10-35] 35  SpO2:  [94 %-100 %] 100 %  BP: ()/(56-79) 136/79     Weight: 50.7 kg (111 lb 12.4 oz)  Body mass index is 15.16 kg/m².     SpO2: 100 %  O2 Device (Oxygen Therapy): room air      Intake/Output Summary (Last 24 hours) at 6/22/2019 0942  Last data filed at 6/22/2019 0940  Gross per 24 hour   Intake 546.67 ml   Output 535 ml   Net 11.67 ml       Lines/Drains/Airways     Central Venous Catheter Line                 Percutaneous Central Line Insertion/Assessment - triple lumen  06/17/19 1700 right internal jugular 4 days          Drain                 Urethral Catheter 06/17/19 1414 Straight-tip 16 Fr. 4 days          Peripheral Intravenous Line                 Peripheral IV - Single Lumen 06/17/19 1300 22 G Right Forearm 4 days                Physical Exam   Constitutional: He is oriented to person, place, and time. Vital signs are normal. He appears well-developed and well-nourished. He appears cachectic. He has a sickly appearance. He appears ill. He is not intubated.   anasarca   HENT:   Head: Normocephalic and atraumatic.   Right Ear: External ear normal.   Left Ear: External ear normal.   Mouth/Throat: Oropharynx is clear and moist.   Eyes: Pupils are equal, round, and reactive to light. Conjunctivae and EOM are normal. Right eye exhibits no discharge. Left eye exhibits no discharge. No scleral icterus.   Neck: Normal range of motion. Neck supple. Normal carotid pulses, no hepatojugular reflux and no JVD present. Carotid bruit is not present. No  tracheal deviation present. No thyromegaly present.   Cardiovascular: Normal rate, regular rhythm, S1 normal and S2 normal.  No extrasystoles are present. PMI is not displaced. Exam reveals no gallop, no S3, no distant heart sounds, no friction rub and no midsystolic click.   No murmur heard.  Pulses:       Carotid pulses are 2+ on the right side, and 2+ on the left side.       Radial pulses are 2+ on the right side, and 2+ on the left side.        Femoral pulses are 2+ on the right side, and 2+ on the left side.       Popliteal pulses are 2+ on the right side, and 2+ on the left side.        Dorsalis pedis pulses are 0 on the right side, and 0 on the left side.        Posterior tibial pulses are 0 on the right side, and 0 on the left side.   Pulmonary/Chest: Effort normal. No accessory muscle usage or stridor. No apnea, no tachypnea and no bradypnea. He is not intubated. No respiratory distress. He has decreased breath sounds in the right lower field and the left lower field. He has no wheezes. He has no rales. He exhibits no tenderness and no bony tenderness.   Abdominal: He exhibits ascites. He exhibits no distension, no pulsatile liver, no abdominal bruit, no pulsatile midline mass and no mass. There is splenomegaly and hepatomegaly. There is no tenderness. There is no rebound and no guarding.   Musculoskeletal: Normal range of motion. He exhibits no edema or tenderness.   Lymphadenopathy:     He has no cervical adenopathy.   Neurological: He is alert and oriented to person, place, and time. He has normal reflexes. No cranial nerve deficit. Coordination normal.   Skin: Skin is warm. No rash noted. No erythema. No pallor.       R foot gangrene    Psychiatric: His behavior is normal. Judgment and thought content normal. He exhibits a depressed mood.       Significant Labs:       LABS  CBC  Recent Labs   Lab 06/20/19  0439  06/21/19  0340  06/22/19  0028 06/22/19  0400 06/22/19  0824   WBC 14.57*  --  13.79*  --    --  10.00  --    RBC 3.97*  --  3.00*  --   --  3.08*  --    HGB 10.9*  10.9*   < > 8.2*   < > 8.7* 8.6* 9.1*   HCT 32.8*  32.8*   < > 25.4*   < > 26.1* 26.5* 27.6*     --  139*  --   --  126*  --    MCV 83  --  85  --   --  86  --    MCH 27.5  --  27.3  --   --  27.9  --    MCHC 33.2  --  32.3  --   --  32.5  --     < > = values in this interval not displayed.     BMP  Recent Labs   Lab 06/20/19 0439 06/21/19 0340 06/22/19  0400    135* 134*   K 3.5 3.4* 3.2*   CO2 21* 21* 24    108 107   BUN 10 11 9   CREATININE 0.7 0.7 0.6   GLU 85 134* 87       POCT-Glucose  POCT Glucose   Date Value Ref Range Status   06/20/2019 78 70 - 110 mg/dL Final   06/20/2019 47 (LL) 70 - 110 mg/dL Final   06/20/2019 138 (H) 70 - 110 mg/dL Final   06/19/2019 111 (H) 70 - 110 mg/dL Final   06/19/2019 69 (L) 70 - 110 mg/dL Final   06/19/2019 42 (LL) 70 - 110 mg/dL Final   06/19/2019 79 70 - 110 mg/dL Final       Recent Labs   Lab 06/17/19 2222 06/18/19 0346 06/19/19  0743 06/20/19  0439 06/21/19  0340 06/22/19  0400   CALCIUM 6.6* 7.1* 7.0* 7.3* 7.3* 7.1*   MG 1.3* 1.6 1.7  --   --   --      LFT  Recent Labs   Lab 06/20/19 0439 06/21/19  0340 06/22/19  0400   PROT 4.0* 3.8* 3.9*   ALBUMIN 1.4* 1.2* 1.2*   BILITOT 0.9 0.6 0.6   * 116* 61*   ALKPHOS 287* 258* 233*   * 121* 81*     GFR     COAGS  Recent Labs   Lab 06/18/19  0346 06/21/19  1447   INR 1.2 1.3*   APTT  --  47.6*     CE  Recent Labs   Lab 06/17/19  1345 06/17/19  1407   TROPONINI 0.125* 0.112*     ABGs  No results for input(s): PH, PCO2, PO2, HCO3, POCSATURATED, BE in the last 24 hours.  BNP  Recent Labs   Lab 06/17/19  1405   *       LAST HbA1c  No results found for: HGBA1C    Lipid panel  Lab Results   Component Value Date    CHOL 68 (L) 06/12/2019    CHOL 148 03/27/2015     Lab Results   Component Value Date    HDL 20 (L) 06/12/2019    HDL 60 03/27/2015     Lab Results   Component Value Date    LDLCALC 33.8 (L) 06/12/2019     LDLCALC 69.2 03/27/2015     Lab Results   Component Value Date    TRIG 71 06/12/2019    TRIG 94 03/27/2015     Lab Results   Component Value Date    CHOLHDL 29.4 06/12/2019    CHOLHDL 40.5 03/27/2015          Patient Active Problem List    Diagnosis Date Noted    Debility 06/21/2019    Gastrointestinal hemorrhage     Lactic acidosis 06/18/2019    Leukocytosis 06/18/2019    Hypocalcemia 06/18/2019    Cardiac arrest 06/17/2019    Severe protein-calorie malnutrition 06/12/2019    Hypoalbuminemia 06/12/2019    Gangrene of right foot 06/11/2019    Sacral decubitus ulcer 06/11/2019    Pressure ulcer of left hip, stage 3 06/11/2019    Critical lower limb ischemia 06/10/2019    Panlobular emphysema 05/14/2019    Anasarca 05/14/2019    Esophageal stricture 05/13/2019    Alcoholic cirrhosis of liver with ascites 05/02/2019    Anemia 05/02/2019    Esophageal stenosis 03/29/2019    Dysphagia 05/05/2015    Seizure disorder 03/25/2015    Clavicle fracture 03/23/2015     Assessment and Plan:     Brief HPI:     * Critical lower limb ischemia  -BLE angiogram with bilateral SFA CTOs with 2 vessel r/o on the left and single vessel via peroneal on right  -successful revascularization of RLE 6/11 with RSFA, right pop and right peroneal PTA with DCB; anticipated need for PTA of right AT however given continued anemia/GIB and inability to tolerate DAPT no plans for proceeding with further revascularization    -will continue to hold DAPT and statin therapy     Gastrointestinal hemorrhage  -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 BRENNON Kimball         Continue with close monitoring  H/H 9.1/27.6 today    Hypocalcemia  -Ca 6.1 this AM  -corrected to 8.2 due to  hypoalbuminemia      Leukocytosis  -WBCs up to 37K post arrest with trend won to 26K-19K-14K; 13K, 10 K this  AM  -initial blood cultures from 6/12 with NGTD; repeat blood cultures 6/17 with 2 out of 4 GPC on prelim report   -remains on IV Zosyn and Vanc and will continue for now  -ID on board and continue to follow recs     Lactic acidosis  -multifactoral-cardiac arrest, hypotension, infectious etiology and active bleeding   -initial lactic acid 4.5 with trend down to 3.0 and up to 4.7; down to 2.4 this AM   -continue supportive care with pressors and vent    Cardiac arrest  -PEA arrest preceded by SB at completion of EGD 6/10  -responded to few rounds of Epi and ROSC after ACLS/CPR  -intubated and admitted to ICU  -initial pH 6.9 with improvement 7.2-7.3 with vent and bicarb  -etiology likely hypoxia related  -remains intubated and sedated on pressor support; Pulm CC on board for vent management with evaluation for extubation today     Hypoalbuminemia  -related to poor nutritional status  -recent albumin 1.2  -dietian on board; on Darrian, Boost and Beneprotein  -repeat ST consult since extubation; dietary recommendations noted  -continue daily  CMPs    Severe protein-calorie malnutrition  -consult dietian  -diet changed to regular  -on Darrian BID and Benefiber TID     Pressure ulcer of left hip, stage 3  -wound care RN and General surgery consulted     Sacral decubitus ulcer  -POA  -related to debility and prolonged bedrest  -Wound care RN consulted as well as General surgery (recs noted)     Gangrene of right foot  -Podiatry and ID on board  -initial plans by  Podiatry for TMA once demarcation; ongoing discussions with Podiatry per IC staff (Dr. Gonzales) with high risk for limb loss and no plans for surgical intervention given acute decompensation and recent cardiac arrest     Anemia  -H&H 7.1&22.8 6/14 &6/15 with trend down to 6.9&22.0 6/16  -transfused with 2U PRBCs with improvement in H&H to 10.4&32.0 6/17  with  slight drop to 9.5   -EGD 6/17 with following results:   LA Grade D (one or more mucosal breaks involving at least 75% of esophageal circumference) esophagitis with no bleeding was found in the lower third of the esophagus.   A deformity was found in the prepyloric region of the stomach.   Red blood was found in the gastric body and duodenum.   Active bleeding with adherent clot was found in the third portion of the duodenum associated with a duodenal varix.  -additional 2units PRBCs given 6/17 post cardiac arrest and repeat transfusion 6/18 post DIPS yesterday   -H&H stabilized and sserial H&Hs changed to Q12hrs; H&H 8.2&25.4 this AM down from 10.9&32.8 yesterday; will place back on H&H Q4hrs   -baseline 9-10/29-30 since early 2018  -will continue to hold ASA/Plavix for now   -General Surgery (Dr. Kirk) consult for surgical intervention for duodenal varix with second opinion by Dr. Kimball and no plans for surgical intervention; IR reconsulted     Alcoholic cirrhosis of liver with ascites  -AST 61 from 116, yesterday; ALT 81 from 121 yesterday  -on Aldactone at home- will continue to hold   -EGD with concern for advanced cirrhosis per GI interpretation; IR/DIPS procedure for duodenal varix; General surgery consulted for surgical evaluation with no plans for surgical intervention; IR reconsulted   -daily CMP in AM   -no procedures for now  -overall prognosis guarded to poor  -family meeting today to discuss code status and overall long term care           VTE Risk Mitigation (From admission, onward)        Ordered     Reason for No Pharmacological VTE Prophylaxis  Once      06/17/19 1353     IP VTE HIGH RISK PATIENT  Once      06/17/19 1353     Place SERGO hose  Until discontinued      06/17/19 1346     Place sequential compression device  Until discontinued      06/17/19 1346          Az Allison MD  Cardiology  [facility]

## 2019-06-22 NOTE — ASSESSMENT & PLAN NOTE
-WBCs up to 37K post arrest with trend won to 26K-19K-14K; 13K, 10 K this  AM  -initial blood cultures from 6/12 with NGTD; repeat blood cultures 6/17 with 2 out of 4 GPC on prelim report   -remains on IV Zosyn and Vanc and will continue for now  -ID on board and continue to follow recs

## 2019-06-22 NOTE — SUBJECTIVE & OBJECTIVE
Past Medical History:   Diagnosis Date    Alcohol abuse     Cirrhosis     Edema     Hypertension     Seizures     Stricture esophagus        Past Surgical History:   Procedure Laterality Date    Angiogram, Lower Arterial, Bilateral Bilateral 6/10/2019    Performed by [facility] Gonzales [facility] at [facility] CATH LAB/EP    Aortogram, Abdominal N/A 6/10/2019    Performed by [facility] at [facility] CATH LAB/EP    Atherectomy, Peripheral Blood Vessel N/A 6/11/2019    Performed by [facility] CATH LAB/EP    COLONOSCOPY      COLONOSCOPY N/A 6/17/2019    Performed by Juan Sutton MD at [facility] ENDO    COLONOSCOPY N/A 5/5/2015    Performed by Mario Martínez MD at [facility] ENDO    EGD (ESOPHAGOGASTRODUODENOSCOPY) N/A 6/17/2019    Performed by Juan Sutton MD at [facility] ENDO    EGD (ESOPHAGOGASTRODUODENOSCOPY) N/A 5/13/2019    Performed by Dillan Salinas MD at [facility] ENDO    EGD with dilitation  03/04/2019    EMBOLIZATION, BLOOD VESSEL N/A 6/18/2019    Performed by [facility] OR    ESOPHAGOGASTRODUODENOSCOPY (EGD) N/A 3/29/2019    Performed by Dillan Salinas MD at [facility] ENDO    ESOPHAGOGASTRODUODENOSCOPY (EGD) N/A 3/18/2019    Performed by Omer Pritchard MD at [facility] ENDO    ESOPHAGOGASTRODUODENOSCOPY (EGD) N/A 3/4/2019    Performed by Madhu Schmidt MD at [facility] ENDO    ESOPHAGOGASTRODUODENOSCOPY (EGD) N/A 5/5/2015    Performed by Mario Martínez MD at [facility] ENDO    Filter Protection, Peripheral  6/11/2019    Performed by Fabien Gonzales MD [facility] CATH LAB/EP    PTA, PERIPHERAL VESSEL N/A 6/11/2019    Performed by Fabien Gonzales [facility] CATH LAB/EP    PTA, Peroneal  6/11/2019    Performed by Fab[facility] CATH LAB/EP    PTA, Superficial Femoral Artery  6/11/2019    Performed by Fabien Gonzales MD [facility] CATH LAB/EP    ULTRASOUND, INTRAVASCULAR N/A 6/11/2019    Performed by Fabien Gonzales MD at [facility] CATH LAB/EP       Review of patient's  allergies indicates:  No Known Allergies    No current facility-administered medications on file prior to encounter.      Current Outpatient Medications on File Prior to Encounter   Medication Sig    acetaminophen-codeine 300-30mg (TYLENOL #3) 300-30 mg Tab Take by mouth.    furosemide (LASIX) 80 MG tablet Take 1 tablet (80 mg total) by mouth once daily.    pantoprazole (PROTONIX) 40 MG tablet Take 1 tablet (40 mg total) by mouth once daily.    potassium chloride SA (K-DUR,KLOR-CON) 20 MEQ tablet Take 1 tablet (20 mEq total) by mouth once daily.    spironolactone (ALDACTONE) 50 MG tablet Take 1 tablet (50 mg total) by mouth once daily.     Family History     Problem Relation (Age of Onset)    Cancer Father        Tobacco Use    Smoking status: Current Every Day Smoker     Packs/day: 0.50     Years: 43.00     Pack years: 21.50     Types: Cigarettes     Start date: 1976    Smokeless tobacco: Never Used    Tobacco comment: Pt enrolled in [employer].  Ambulatory referral to Smoking Cessation program.   Substance and Sexual Activity    Alcohol use: Yes     Comment: 6 pack every day (3-4)    Drug use: No    Sexual activity: Not on file     Review of Systems   Constitutional: Positive for activity change, appetite change and fatigue. Negative for fever.   HENT: Negative for ear discharge, sinus pressure and tinnitus.    Eyes: Negative for itching.   Respiratory: Negative for apnea, shortness of breath and wheezing.    Cardiovascular: Negative for chest pain.   Gastrointestinal: Positive for abdominal distention. Negative for abdominal pain, nausea and rectal pain.   Endocrine: Negative for polydipsia and polyphagia.   Genitourinary: Negative for dysuria and flank pain.   Musculoskeletal: Positive for arthralgias.   Skin: Positive for color change and wound.   Neurological: Positive for weakness. Negative for syncope, light-headedness and headaches.   Psychiatric/Behavioral: Negative for behavioral problems,  decreased concentration and sleep disturbance.     Objective:     Vital Signs (Most Recent):  Temp: 98.1 °F (36.7 °C) (06/21/19 1909)  Pulse: 102 (06/21/19 1909)  Resp: 16 (06/21/19 1909)  BP: 104/67 (06/21/19 1730)  SpO2: 100 % (06/21/19 1909) Vital Signs (24h Range):  Temp:  [95.9 °F (35.5 °C)-98.1 °F (36.7 °C)] 98.1 °F (36.7 °C)  Pulse:  [] 102  Resp:  [12-35] 16  SpO2:  [96 %-100 %] 100 %  BP: ()/(51-67) 104/67     Weight: 50.7 kg (111 lb 12.4 oz)  Body mass index is 15.16 kg/m².    Physical Exam   Constitutional: He is oriented to person, place, and time. He appears well-developed.   cachetic   HENT:   Head: Normocephalic and atraumatic.   Eyes: Pupils are equal, round, and reactive to light. EOM are normal.   Neck: Normal range of motion. Neck supple.   Cardiovascular: Normal rate, regular rhythm and normal heart sounds. Exam reveals no gallop and no friction rub.   No murmur heard.  Pulmonary/Chest: Effort normal and breath sounds normal.   Abdominal: Soft. Bowel sounds are normal. There is no tenderness.   Musculoskeletal: Normal range of motion.   gangrene R foot   Neurological: He is alert and oriented to person, place, and time.   Skin: Capillary refill takes less than 2 seconds. There is pallor.   Gangrene right foot   Psychiatric: His speech is normal and behavior is normal. Thought content normal. His mood appears not anxious. Cognition and memory are normal. He does not exhibit a depressed mood.         CRANIAL NERVES     CN III, IV, VI   Pupils are equal, round, and reactive to light.  Extraocular motions are normal.        Significant Labs:   Blood Culture: No results for input(s): LABBLOO in the last 48 hours.  CBC:   Recent Labs   Lab 06/20/19  0439  06/21/19  0340 06/21/19  0824 06/21/19  1233 06/21/19  1553   WBC 14.57*  --  13.79*  --   --   --    HGB 10.9*  10.9*   < > 8.2* 8.1* 7.4* 7.2*   HCT 32.8*  32.8*   < > 25.4* 24.8* 22.7* 22.3*     --  139*  --   --   --     < > =  values in this interval not displayed.     CMP:   Recent Labs   Lab 06/20/19  0439 06/21/19  0340    135*   K 3.5 3.4*    108   CO2 21* 21*   GLU 85 134*   BUN 10 11   CREATININE 0.7 0.7   CALCIUM 7.3* 7.3*   PROT 4.0* 3.8*   ALBUMIN 1.4* 1.2*   BILITOT 0.9 0.6   ALKPHOS 287* 258*   * 116*   * 121*   ANIONGAP 8 6*   EGFRNONAA >60 >60     Coagulation:   Recent Labs   Lab 06/21/19  1447   INR 1.3*   APTT 47.6*     Lactic Acid: No results for input(s): LACTATE in the last 48 hours.  Lipase: No results for input(s): LIPASE in the last 48 hours.  Lipid Panel: No results for input(s): CHOL, HDL, LDLCALC, TRIG, CHOLHDL in the last 48 hours.  Troponin: No results for input(s): TROPONINI in the last 48 hours.  TSH:   Recent Labs   Lab 06/06/19  1448   TSH 2.560     Urine Culture: No results for input(s): LABURIN in the last 48 hours.  Urine Studies: No results for input(s): COLORU, APPEARANCEUA, PHUR, SPECGRAV, PROTEINUA, GLUCUA, KETONESU, BILIRUBINUA, OCCULTUA, NITRITE, UROBILINOGEN, LEUKOCYTESUR, RBCUA, WBCUA, BACTERIA, SQUAMEPITHEL, HYALINECASTS in the last 48 hours.    Invalid input(s): WRIGHTSUR    Significant Imaging: none

## 2019-06-22 NOTE — HOSPITAL COURSE
Pt received one unit of blood 6/21. Family meeting 6/22 - discussed poor prognosis with 20 family members. Pt and wife agreed to DNR status. Pt was transferred to the floor. Plan for dc hospice.

## 2019-06-22 NOTE — PLAN OF CARE
Problem: Adult Inpatient Plan of Care  Goal: Plan of Care Review  Outcome: Ongoing (interventions implemented as appropriate)  Afebrile during the night. Warming blanket placed on pt. VSS. Denies any pain or SOB. Only received 1 unit of pRBCs. No bowel movement noted. Urine output adequate. Safety precautions in place.

## 2019-06-22 NOTE — SUBJECTIVE & OBJECTIVE
Subjective:     Interval History: Doing ok, extubated. Off pressors. No melena, hematochezia.     Review of Systems   Constitutional: Negative for appetite change and unexpected weight change.   Respiratory: Negative for apnea and chest tightness.    Cardiovascular: Negative for chest pain and palpitations.   Gastrointestinal: Negative for abdominal distention and abdominal pain.     Objective:     Vital Signs (Most Recent):  Temp: 98.1 °F (36.7 °C) (06/22/19 1130)  Pulse: (!) 112 (06/22/19 0930)  Resp: (!) 35 (06/22/19 0930)  BP: 136/79 (06/22/19 0930)  SpO2: 100 % (06/22/19 0930) Vital Signs (24h Range):  Temp:  [96.4 °F (35.8 °C)-98.1 °F (36.7 °C)] 98.1 °F (36.7 °C)  Pulse:  [] 112  Resp:  [10-35] 35  SpO2:  [94 %-100 %] 100 %  BP: ()/(57-79) 136/79     Weight: 50.7 kg (111 lb 12.4 oz) (06/20/19 1100)  Body mass index is 15.16 kg/m².      Intake/Output Summary (Last 24 hours) at 6/22/2019 1207  Last data filed at 6/22/2019 0940  Gross per 24 hour   Intake 546.67 ml   Output 450 ml   Net 96.67 ml       Lines/Drains/Airways     Central Venous Catheter Line                 Percutaneous Central Line Insertion/Assessment - triple lumen  06/17/19 1700 right internal jugular 4 days          Drain                 Urethral Catheter 06/17/19 1414 Straight-tip 16 Fr. 4 days          Peripheral Intravenous Line                 Peripheral IV - Single Lumen 06/17/19 1300 22 G Right Forearm 4 days                Physical Exam   Constitutional: No distress.   HENT:   Head: Normocephalic and atraumatic.   Eyes: Pupils are equal, round, and reactive to light. No scleral icterus.   Neck: Normal range of motion.   Cardiovascular: Regular rhythm. Exam reveals no friction rub.   Tachycardic   Pulmonary/Chest: Effort normal. No respiratory distress.   Abdominal: Soft. Bowel sounds are normal. He exhibits no distension.   Musculoskeletal: Normal range of motion. He exhibits no edema.   Skin: Skin is warm and dry. He is not  diaphoretic.   Psychiatric: He has a normal mood and affect. His behavior is normal.   Nursing note and vitals reviewed.      Significant Labs:  CBC:   Recent Labs   Lab 06/21/19  0340  06/22/19  0400 06/22/19  0824 06/22/19  1116   WBC 13.79*  --  10.00  --   --    HGB 8.2*   < > 8.6* 9.1* 8.9*   HCT 25.4*   < > 26.5* 27.6* 26.8*   *  --  126*  --   --     < > = values in this interval not displayed.         Significant Imaging:  Imaging results within the past 24 hours have been reviewed.

## 2019-06-22 NOTE — SUBJECTIVE & OBJECTIVE
"Interval History: Sitting up in bed eating breakfast. Told me that "if he can eat some protein he can kick this". No complaints.     Review of Systems   Respiratory: Negative for cough and shortness of breath.    Cardiovascular: Negative for chest pain and leg swelling.   Gastrointestinal: Negative for abdominal pain, nausea and vomiting.   Genitourinary: Negative for decreased urine volume and difficulty urinating.     Objective:     Vital Signs (Most Recent):  Temp: 97.9 °F (36.6 °C) (06/22/19 0930)  Pulse: (!) 112 (06/22/19 0930)  Resp: (!) 35 (06/22/19 0930)  BP: 136/79 (06/22/19 0930)  SpO2: 100 % (06/22/19 0930) Vital Signs (24h Range):  Temp:  [96.4 °F (35.8 °C)-98.1 °F (36.7 °C)] 97.9 °F (36.6 °C)  Pulse:  [] 112  Resp:  [10-35] 35  SpO2:  [94 %-100 %] 100 %  BP: ()/(56-79) 136/79     Weight: 50.7 kg (111 lb 12.4 oz)  Body mass index is 15.16 kg/m².    Intake/Output Summary (Last 24 hours) at 6/22/2019 0955  Last data filed at 6/22/2019 0940  Gross per 24 hour   Intake 546.67 ml   Output 490 ml   Net 56.67 ml      Physical Exam   Constitutional:   Severely cachectic   Cardiovascular: Normal rate and regular rhythm.   Pulmonary/Chest: Effort normal and breath sounds normal.   Abdominal:   Hepatomegaly   Musculoskeletal: He exhibits no edema.   Neurological: He is alert.       Significant Labs:   BMP:   Recent Labs   Lab 06/22/19  0400   GLU 87   *   K 3.2*      CO2 24   BUN 9   CREATININE 0.6   CALCIUM 7.1*     CBC:   Recent Labs   Lab 06/21/19  0340  06/22/19  0028 06/22/19  0400 06/22/19  0824   WBC 13.79*  --   --  10.00  --    HGB 8.2*   < > 8.7* 8.6* 9.1*   HCT 25.4*   < > 26.1* 26.5* 27.6*   *  --   --  126*  --     < > = values in this interval not displayed.       Significant Imaging: I have reviewed all pertinent imaging results/findings within the past 24 hours.  "

## 2019-06-22 NOTE — ASSESSMENT & PLAN NOTE
- suspect small bowel variceal bleeding s/p ir shunt  - monitor hct,stable  - s/s of bleeding  - more stable

## 2019-06-22 NOTE — ASSESSMENT & PLAN NOTE
Sachin Calloway is a 58 y.o. male with alcoholic liver cirrhosis and PAD s/p right foot angioplasty who presented with gangrene of the right foot and leukocytosis. Vancomycin and piptazo have been ongoing. Rifgh foot gangrene - appears to have changed from wet to dry.    Rec:  - continue piptazo 4.5g q8  - continue 750mg q day vanc   - since revascularization and amputation do not seem imminent, will do a 14 day course of the above antibiotics (end date 6/26/19)   - after course of IV antibiotic completed, will consider suppressive therapy with oral antibiotic if any clinical evidence of persistent infection in the right lower extremity   - blood culture 6/17/19 - gram stain was corrected from [facility] to no organisms seen - so far NGTD. Continue to watch BC.

## 2019-06-22 NOTE — ASSESSMENT & PLAN NOTE
-related to poor nutritional status  -recent albumin 1.2  -dietian on board; on Darrian, Boost and Beneprotein  -repeat ST consult since extubation; dietary recommendations noted  -continue daily  CMPs

## 2019-06-22 NOTE — NURSING
0030: Spoke to pathologist on call Dr. Arline Larose, she explained the critical shortage policy and stated that patient can't receive blood with H/H 8.7/26.6 as of 2200. The criteria explained was non-bleeding is 7/21 and severe symptomatic bleeding is 8/24. She stated she called Dr. Mandujano with no answer, I verified cell number with her and it was correct.     We are currently awaiting results for H/H due at 0000, will call  with results and Dr. Larose cell number, with further details on policy. Pt VSS, resting comfortably, no SS of distress noted. No bloody BM currently. Will continue to monitor.       0055: Spoke to Dr. Mandujano, updated him on critical shortage policy and current H/H of 8.7/26.1, /73, HR 96, not on pressors. He stated to follow policy and will recheck H/H in four hours.

## 2019-06-22 NOTE — ASSESSMENT & PLAN NOTE
- source from suspected clotted, oozing duodenal varix  - s/p EGD 6/17 with severe erosive esophagitis and actively bleeding duodenal varix  - daily cbc

## 2019-06-22 NOTE — SUBJECTIVE & OBJECTIVE
Interval History: Patient asking about if he is going to get another paracentesis to take fluid off his belly - I told him that I don't know to ask the primary team. He has not complaints today.    Review of Systems   Gastrointestinal: Negative for diarrhea, nausea and vomiting.        Rectal tube removed; no more black stool noted by perez     Antibiotics (From admission, onward)    Start     Stop Route Frequency Ordered    06/20/19 1200  vancomycin 750 mg in dextrose 5 % 250 mL IVPB (ready to mix system)      -- IV Every 24 hours (non-standard times) 06/20/19 1025    06/12/19 0830  piperacillin-tazobactam 4.5 g in dextrose 5 % 100 mL IVPB (ready to mix system)      -- IV Every 8 hours (non-standard times) 06/12/19 0721        Objective:     Vital Signs (Most Recent):  Temp: 98.1 °F (36.7 °C) (06/22/19 1130)  Pulse: (!) 112 (06/22/19 0930)  Resp: (!) 35 (06/22/19 0930)  BP: 136/79 (06/22/19 0930)  SpO2: 100 % (06/22/19 0930) Vital Signs (24h Range):  Temp:  [96.4 °F (35.8 °C)-98.1 °F (36.7 °C)] 98.1 °F (36.7 °C)  Pulse:  [] 112  Resp:  [10-35] 35  SpO2:  [94 %-100 %] 100 %  BP: ()/(57-79) 136/79     Weight: 50.7 kg (111 lb 12.4 oz)  Body mass index is 15.16 kg/m².    Estimated Creatinine Clearance: 95.1 mL/min (based on SCr of 0.6 mg/dL).    Physical Exam   Cardiovascular: Normal heart sounds.   Pulmonary/Chest: Breath sounds normal.   Abdominal: Bowel sounds are normal. He exhibits distension. There is no tenderness.   Musculoskeletal: He exhibits no edema.   Right foot bandaged; left great toe, 2nd toe tip and last toe with ischemic changes - red lacy pattern no change from yesterday   Neurological: He is alert.       Significant Labs:   Blood Culture:   Recent Labs   Lab 06/12/19  0814 06/12/19  0815 06/17/19  2247   LABBLOO No growth after 5 days. No growth after 5 days. Gram stain aer bottle: Gram positive cocci   Results called to and read back by:Rebekah Mendez RN 06/19/2019 06:05  06/21/2019   Smear review: No organims seen on restain of aer bottle.  Culture:  ~No growth to date  Results called to and read back by:Una Lebron RN 2019  15:47  No growth to date  No Growth to date  No Growth to date  No Growth to date     CBC:   Recent Labs   Lab 19  0340  19  0400 19  0824 19  1116   WBC 13.79*  --  10.00  --   --    HGB 8.2*   < > 8.6* 9.1* 8.9*   HCT 25.4*   < > 26.5* 27.6* 26.8*   *  --  126*  --   --     < > = values in this interval not displayed.     CMP:   Recent Labs   Lab 19  03419  0400   * 134*   K 3.4* 3.2*    107   CO2 21* 24   * 87   BUN 11 9   CREATININE 0.7 0.6   CALCIUM 7.3* 7.1*   PROT 3.8* 3.9*   ALBUMIN 1.2* 1.2*   BILITOT 0.6 0.6   ALKPHOS 258* 233*   * 61*   * 81*   ANIONGAP 6* 3*   EGFRNONAA >60 >60     Wound Culture:   Recent Labs   Lab 19  0953   LABAERO No growth       Significant Imagin/18 ct abdomen and pelvis - No evidence for active contrast extravasation within the GI tract.  High-density material within the stomach and duodenum may reflect blood products.  Mural thickening of the distal duodenum may reflect focal inflammation.  Given evidence for chronic pancreatitis, possibly secondary.  Diffuse mucosal thickening of the small bowel with wall thickening of the sigmoid colon and rectum may reflect portal enteropathy/colopathy.  Small wedge-shaped hypoattenuating areas in the bilateral renal cortices may reflect sequela of infarcts.  Small bilateral pleural effusions with right lower lobe consolidation.  Small hiatal hernia.  Fluid within the lower esophagus in keeping with gastroesophageal reflux.  Large volume of ascites.  Extensive calcified plaque throughout the abdomen and pelvis.

## 2019-06-22 NOTE — PROGRESS NOTES
Surgery follow up  /71   Pulse 99   Temp 97 °F (36.1 °C)   Resp (!) 25   Ht 6' (1.829 m)   Wt 50.7 kg (111 lb 12.4 oz)   SpO2 100%   BMI 15.16 kg/m²   I/O last 3 completed shifts:  In: 1043.5 [P.O.:355; I.V.:41.8; Blood:346.7; IV Piggyback:300]  Out: 990 [Urine:765; Stool:225]  I/O this shift:  In: 790 [P.O.:240; IV Piggyback:550]  Out: 145 [Urine:145]  Recent Results (from the past 336 hour(s))   CBC auto differential    Collection Time: 06/22/19  4:00 AM   Result Value Ref Range    WBC 10.00 3.90 - 12.70 K/uL    Hemoglobin 8.6 (L) 14.0 - 18.0 g/dL    Hematocrit 26.5 (L) 40.0 - 54.0 %    Platelets 126 (L) 150 - 350 K/uL   CBC auto differential    Collection Time: 06/21/19  3:40 AM   Result Value Ref Range    WBC 13.79 (H) 3.90 - 12.70 K/uL    Hemoglobin 8.2 (L) 14.0 - 18.0 g/dL    Hematocrit 25.4 (L) 40.0 - 54.0 %    Platelets 139 (L) 150 - 350 K/uL   CBC auto differential    Collection Time: 06/20/19  4:39 AM   Result Value Ref Range    WBC 14.57 (H) 3.90 - 12.70 K/uL    Hemoglobin 10.9 (L) 14.0 - 18.0 g/dL    Hematocrit 32.8 (L) 40.0 - 54.0 %    Platelets 150 150 - 350 K/uL   right foot unchanged , decubitus unchanged , continue present treatment.

## 2019-06-22 NOTE — PLAN OF CARE
Problem: Physical Therapy Goal  Goal: Physical Therapy Goal  Goals to be met by: 2019     Patient will increase functional independence with mobility by performin. Supine <> sit with Minimal Assistance  2. Sit to stand transfer with Minimal Assistance  3. Bed to chair transfer with Contact Guard Assistance using Rolling Walker  4. Gait  x 20 feet with Contact Guard Assistance using Rolling Walker.   5. Tolerate 10 reps x 2 BLE AROM ex   Outcome: Ongoing (interventions implemented as appropriate)  Patient tolerated increased activity today with improved BP; with increased HOB position, BP 98/57 HR 99 O2 sats 102; after exercise with bed in chair position, /73  O2 sats 100% initially, then 116/69 after 6 minutes; will cont with POC.

## 2019-06-22 NOTE — NURSING
Called blood bank for an update. Spoke with Barney gloria. Stated called the medical director and left a voicemail. Will call again if medical director does not call back.

## 2019-06-22 NOTE — ASSESSMENT & PLAN NOTE
Pt with extremely poor functional and nutritional status. Pt currently too unstable for any surgical or otherwise aggressive interventions. Will continue to discuss pt's status with family and patient. Patient is palliative care/hospice candidate.

## 2019-06-22 NOTE — PROGRESS NOTES
[facility]  Infectious Disease  Progress Note    Patient Name: Sahcin Calloway  MRN: [id number]  Admission Date: 6/10/2019  Length of Stay: 10 days  Attending Physician: Toi Arevalo MD  Primary Care Provider: Juan Gatica MD    Isolation Status: No active isolations  Assessment/Plan:      Sacral decubitus ulcer  Patient with sacral  decubitus ulcer     Rec:  Continue wound care and surgery for follow up    Gangrene of right foot  Sachin Calloway is a 58 y.o. male with alcoholic liver cirrhosis and PAD s/p right foot angioplasty who presented with gangrene of the right foot and leukocytosis. Vancomycin and piptazo have been ongoing. Rifgh foot gangrene - appears to have changed from wet to dry.    Rec:  - continue piptazo 4.5g q8  - continue 750mg q day vanc   - since revascularization and amputation do not seem imminent, will do a 14 day course of the above antibiotics (end date 6/26/19)   - after course of IV antibiotic completed, will consider suppressive therapy with oral antibiotic if any clinical evidence of persistent infection in the right lower extremity   - blood culture 6/17/19  - gram stain was corrected from GPC to no organisms seen - so far NGTD. Continue to watch BC.         Anticipated Disposition: unclear a this point.    Thank you for your consult. I will follow-up with patient. Please contact us if you have any additional questions.[phone].     Cristina Robbins MD  Infectious Disease  [facility]    Subjective:     Principal Problem:Critical lower limb ischemia    HPI: Sachin Calloway is a 58 y.o. male with a relevant history of alcoholic liver cirrhosis, HTN, former EtOH abuse, active tobacco use, PAD who presents for peripheral angiogram     The patient presented to [facility] on 6/10/2019 with a primary complaint of non healing right foot wound with doppler showing PAD. Had arthrectomy and PTA with good results on 6/11. Was assessed by podiatry who felt that the  right foot appearance was concerning for gangrene with superimposed infection     Although he was afebrile and had no pain, his WBC was elevated and has remained elevated. Broad spec antibiotics were started and blood cultures drawn. ID consulted 6/12/19.     Subsequently had GI bleeding, persistent leukocytosis and coded during an EGD (bradycardia followed by PEA) on 6/17. Then went on to have DIPS (Direct Intrahepatic PortoCaval Shunt Placement), SMA and Celiac Arteriogram, Large volume paracentesis on 6/18 by IR. No cultures sent from paracentesis.   6/20 extubated  6/22 - BC from 6/17 - originally reported as GPC on gram stain in 1 of 2 sets - on 6/21 the gram stain was corrected to be no organisms seen - so far BC NGTD.         Interval History: Patient asking about if he is going to get another paracentesis to take fluid off his belly - I told him that I don't know to ask the primary team. He has not complaints today.    Review of Systems   Gastrointestinal: Negative for diarrhea, nausea and vomiting.        Rectal tube removed; no more black stool noted by perez     Antibiotics (From admission, onward)    Start     Stop Route Frequency Ordered    06/20/19 1200  vancomycin 750 mg in dextrose 5 % 250 mL IVPB (ready to mix system)      -- IV Every 24 hours (non-standard times) 06/20/19 1025    06/12/19 0830  piperacillin-tazobactam 4.5 g in dextrose 5 % 100 mL IVPB (ready to mix system)      -- IV Every 8 hours (non-standard times) 06/12/19 0721        Objective:     Vital Signs (Most Recent):  Temp: 98.1 °F (36.7 °C) (06/22/19 1130)  Pulse: (!) 112 (06/22/19 0930)  Resp: (!) 35 (06/22/19 0930)  BP: 136/79 (06/22/19 0930)  SpO2: 100 % (06/22/19 0930) Vital Signs (24h Range):  Temp:  [96.4 °F (35.8 °C)-98.1 °F (36.7 °C)] 98.1 °F (36.7 °C)  Pulse:  [] 112  Resp:  [10-35] 35  SpO2:  [94 %-100 %] 100 %  BP: ()/(57-79) 136/79     Weight: 50.7 kg (111 lb 12.4 oz)  Body mass index is 15.16  kg/m².    Estimated Creatinine Clearance: 95.1 mL/min (based on SCr of 0.6 mg/dL).    Physical Exam   Cardiovascular: Normal heart sounds.   Pulmonary/Chest: Breath sounds normal.   Abdominal: Bowel sounds are normal. He exhibits distension. There is no tenderness.   Musculoskeletal: He exhibits no edema.   Right foot bandaged; left great toe, 2nd toe tip and last toe with ischemic changes - red lacy pattern no change from yesterday   Neurological: He is alert.       Significant Labs:   Blood Culture:   Recent Labs   Lab 19  0814 19  0815 19  2247   LABBLOO No growth after 5 days. No growth after 5 days. Gram stain aer bottle: Gram positive cocci   Results called to and read back by:Rebekah Mendez RN 2019 06:05  2019  Smear review: No organims seen on restain of aer bottle.  Culture:  ~No growth to date  Results called to and read back by:Una Lebron RN 2019  15:47  No growth to date  No Growth to date  No Growth to date  No Growth to date     CBC:   Recent Labs   Lab 19  0340  19  0400 19  0824 19  1116   WBC 13.79*  --  10.00  --   --    HGB 8.2*   < > 8.6* 9.1* 8.9*   HCT 25.4*   < > 26.5* 27.6* 26.8*   *  --  126*  --   --     < > = values in this interval not displayed.     CMP:   Recent Labs   Lab 19  0340 19  0400   * 134*   K 3.4* 3.2*    107   CO2 21* 24   * 87   BUN 11 9   CREATININE 0.7 0.6   CALCIUM 7.3* 7.1*   PROT 3.8* 3.9*   ALBUMIN 1.2* 1.2*   BILITOT 0.6 0.6   ALKPHOS 258* 233*   * 61*   * 81*   ANIONGAP 6* 3*   EGFRNONAA >60 >60     Wound Culture:   Recent Labs   Lab 19  0953   LABAERO No growth       Significant Imagin/18 ct abdomen and pelvis - No evidence for active contrast extravasation within the GI tract.  High-density material within the stomach and duodenum may reflect blood products.  Mural thickening of the distal duodenum may reflect focal inflammation.   Given evidence for chronic pancreatitis, possibly secondary.  Diffuse mucosal thickening of the small bowel with wall thickening of the sigmoid colon and rectum may reflect portal enteropathy/colopathy.  Small wedge-shaped hypoattenuating areas in the bilateral renal cortices may reflect sequela of infarcts.  Small bilateral pleural effusions with right lower lobe consolidation.  Small hiatal hernia.  Fluid within the lower esophagus in keeping with gastroesophageal reflux.  Large volume of ascites.  Extensive calcified plaque throughout the abdomen and pelvis.

## 2019-06-22 NOTE — ASSESSMENT & PLAN NOTE
-AST 61 from 116, yesterday; ALT 81 from 121 yesterday  -on Aldactone at home- will continue to hold   -EGD with concern for advanced cirrhosis per GI interpretation; IR/DIPS procedure for duodenal varix; General surgery consulted for surgical evaluation with no plans for surgical intervention; IR reconsulted   -daily CMP in AM   -no procedures for now  -overall prognosis guarded to poor  -family meeting today to discuss code status and overall long term care

## 2019-06-22 NOTE — PROGRESS NOTES
[facility] Hospital Medicine  Progress Note    Patient Name: Sachin Calloway  MRN: [id number]  Patient Class: IP- Inpatient   Admission Date: 6/10/2019  Length of Stay: 10 days  Attending Physician: Toi Arevalo MD  Primary Care Provider: Juan Gatica MD        Subjective:     Principal Problem:Critical lower limb ischemia      HPI:  Mr. Calloway is a 59 yo man with liver cirrhosis 2/2 former etoh abuse, active tobacco use, PAD with critical lower limb ischemia, PAD, who presented to the hospital for evaluation of critical lower limb ischemia of right foot. He had a right foot ulcer with necrosis. Staged intervention of the RLE PAD was done on 6/10 and 6/12 with placement of balloons. Podiatry and ID were consulted initially, and pt was placed on empiric vanc and zosyn. MRI showed no osteomyelitis. Hgb downtrended to 7.1 and GI was consulted. Hgb was 6.9, and pt was transfused 2 units PRBC. EGD was done 6/18, after which patient had PEA arrest and was intubated. 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 DIPS procedure was done with IR, after which GI recommended surgical evaluation. H&H continued to trend down. Pt required sedation and pressors. Two general surgeons deemed pt not a surgical candidate. Pt was extubated 6/20 and IR was reconsulted. Hospital medicine was consulted on 6/21 for assistance in management of multiple medical issues. Pt received another unit of blood.   Notably, pt's Cr remained stable during admission at around 0.7. His albumin was only 1.4, indicating malnutrition and a poor prognostic factor in the context of the patient's multiple wounds. At 6' pt weighs only 111 lbs, and has been losing weight for the past year and has known esophageal stricture. He also has sacral and hip decubiti. He has a very poor functional status.     Discussed with patient, wife and sister in law in the room regarding multiple  "co-morbidities with progressive clinical declined. Patient requested for amputation of the gangrenous right foot. Explained the S/p EGD on 6/17 with severe erosive esophagitis and actively bleeding duodenal varix is not amenable to endoscopic therapy  Discussed goals of care and advanced directive. Patient and family request for family meeting tomorrow 6/22 at 2pm      Overview/Hospital Course:  Pt received one unit of blood 6/21. Plan for plan of care meeting today at 2 pm.     Interval History: Sitting up in bed eating breakfast. Told me that "if he can eat some protein he can kick this". No complaints.     Review of Systems   Respiratory: Negative for cough and shortness of breath.    Cardiovascular: Negative for chest pain and leg swelling.   Gastrointestinal: Negative for abdominal pain, nausea and vomiting.   Genitourinary: Negative for decreased urine volume and difficulty urinating.     Objective:     Vital Signs (Most Recent):  Temp: 97.9 °F (36.6 °C) (06/22/19 0930)  Pulse: (!) 112 (06/22/19 0930)  Resp: (!) 35 (06/22/19 0930)  BP: 136/79 (06/22/19 0930)  SpO2: 100 % (06/22/19 0930) Vital Signs (24h Range):  Temp:  [96.4 °F (35.8 °C)-98.1 °F (36.7 °C)] 97.9 °F (36.6 °C)  Pulse:  [] 112  Resp:  [10-35] 35  SpO2:  [94 %-100 %] 100 %  BP: ()/(56-79) 136/79     Weight: 50.7 kg (111 lb 12.4 oz)  Body mass index is 15.16 kg/m².    Intake/Output Summary (Last 24 hours) at 6/22/2019 0955  Last data filed at 6/22/2019 0940  Gross per 24 hour   Intake 546.67 ml   Output 490 ml   Net 56.67 ml      Physical Exam   Constitutional:   Severely cachectic   Cardiovascular: Normal rate and regular rhythm.   Pulmonary/Chest: Effort normal and breath sounds normal.   Abdominal:   Hepatomegaly   Musculoskeletal: He exhibits no edema.   Neurological: He is alert.       Significant Labs:   BMP:   Recent Labs   Lab 06/22/19  0400   GLU 87   *   K 3.2*      CO2 24   BUN 9   CREATININE 0.6   CALCIUM 7.1* "     CBC:   Recent Labs   Lab 06/21/19  0340  06/22/19  0028 06/22/19  0400 06/22/19  0824   WBC 13.79*  --   --  10.00  --    HGB 8.2*   < > 8.7* 8.6* 9.1*   HCT 25.4*   < > 26.1* 26.5* 27.6*   *  --   --  126*  --     < > = values in this interval not displayed.       Significant Imaging: I have reviewed all pertinent imaging results/findings within the past 24 hours.      Assessment/Plan:      * Critical lower limb ischemia  Per primary, cardiology. Currently pt is in need of R BKA, but will not tolerate surgery.     Anemia  Multifactorial in this patient but certainly primarily related to pt's active GI bleed.   -Hgb 7.2 > 9.2 s/p 1 unit PRBC    Severe protein-calorie malnutrition  Continue to encourage PO. Albumin 1.2, declining     Leukocytosis  Resolved today. Continue vanc and zosyn per ID    Debility  Pt with extremely poor functional and nutritional status. Pt currently too unstable for any surgical or otherwise aggressive interventions. Will continue to discuss pt's status with family and patient. Patient is palliative care/hospice candidate.   Pt does not seem to have any insight into his condition.     Hypokalemia  K 3.2. Replace IV in pt with poor PO tolerance      VTE Risk Mitigation (From admission, onward)        Ordered     Reason for No Pharmacological VTE Prophylaxis  Once      06/17/19 1353     IP VTE HIGH RISK PATIENT  Once      06/17/19 1353     Place SERGO hose  Until discontinued      06/17/19 1346     Place sequential compression device  Until discontinued      06/17/19 1346          Critical care time spent on the evaluation and treatment of severe organ dysfunction, review of pertinent labs and imaging studies, discussions with consulting providers and discussions with patient/family: 55 minutes.      Rita Walden PA-C  Department of Hospital Medicine   [facility]

## 2019-06-22 NOTE — SUBJECTIVE & OBJECTIVE
Interval History:     ROS  Objective:     Vital Signs (Most Recent):  Temp: 97.9 °F (36.6 °C) (06/22/19 0930)  Pulse: (!) 112 (06/22/19 0930)  Resp: (!) 35 (06/22/19 0930)  BP: 136/79 (06/22/19 0930)  SpO2: 100 % (06/22/19 0930) Vital Signs (24h Range):  Temp:  [96.4 °F (35.8 °C)-98.1 °F (36.7 °C)] 97.9 °F (36.6 °C)  Pulse:  [] 112  Resp:  [10-35] 35  SpO2:  [94 %-100 %] 100 %  BP: ()/(56-79) 136/79     Weight: 50.7 kg (111 lb 12.4 oz)  Body mass index is 15.16 kg/m².     SpO2: 100 %  O2 Device (Oxygen Therapy): room air      Intake/Output Summary (Last 24 hours) at 6/22/2019 0942  Last data filed at 6/22/2019 0940  Gross per 24 hour   Intake 546.67 ml   Output 535 ml   Net 11.67 ml       Lines/Drains/Airways     Central Venous Catheter Line                 Percutaneous Central Line Insertion/Assessment - triple lumen  06/17/19 1700 right internal jugular 4 days          Drain                 Urethral Catheter 06/17/19 1414 Straight-tip 16 Fr. 4 days          Peripheral Intravenous Line                 Peripheral IV - Single Lumen 06/17/19 1300 22 G Right Forearm 4 days                Physical Exam   Constitutional: He is oriented to person, place, and time. Vital signs are normal. He appears well-developed and well-nourished. He appears cachectic. He has a sickly appearance. He appears ill. He is not intubated.   anasarca   HENT:   Head: Normocephalic and atraumatic.   Right Ear: External ear normal.   Left Ear: External ear normal.   Mouth/Throat: Oropharynx is clear and moist.   Eyes: Pupils are equal, round, and reactive to light. Conjunctivae and EOM are normal. Right eye exhibits no discharge. Left eye exhibits no discharge. No scleral icterus.   Neck: Normal range of motion. Neck supple. Normal carotid pulses, no hepatojugular reflux and no JVD present. Carotid bruit is not present. No tracheal deviation present. No thyromegaly present.   Cardiovascular: Normal rate, regular rhythm, S1 normal and  S2 normal.  No extrasystoles are present. PMI is not displaced. Exam reveals no gallop, no S3, no distant heart sounds, no friction rub and no midsystolic click.   No murmur heard.  Pulses:       Carotid pulses are 2+ on the right side, and 2+ on the left side.       Radial pulses are 2+ on the right side, and 2+ on the left side.        Femoral pulses are 2+ on the right side, and 2+ on the left side.       Popliteal pulses are 2+ on the right side, and 2+ on the left side.        Dorsalis pedis pulses are 0 on the right side, and 0 on the left side.        Posterior tibial pulses are 0 on the right side, and 0 on the left side.   Pulmonary/Chest: Effort normal. No accessory muscle usage or stridor. No apnea, no tachypnea and no bradypnea. He is not intubated. No respiratory distress. He has decreased breath sounds in the right lower field and the left lower field. He has no wheezes. He has no rales. He exhibits no tenderness and no bony tenderness.   Abdominal: He exhibits ascites. He exhibits no distension, no pulsatile liver, no abdominal bruit, no pulsatile midline mass and no mass. There is splenomegaly and hepatomegaly. There is no tenderness. There is no rebound and no guarding.   Musculoskeletal: Normal range of motion. He exhibits no edema or tenderness.   Lymphadenopathy:     He has no cervical adenopathy.   Neurological: He is alert and oriented to person, place, and time. He has normal reflexes. No cranial nerve deficit. Coordination normal.   Skin: Skin is warm. No rash noted. No erythema. No pallor.       R foot gangrene    Psychiatric: His behavior is normal. Judgment and thought content normal. He exhibits a depressed mood.       Significant Labs:       LABS  CBC  Recent Labs   Lab 06/20/19  0439  06/21/19  0340  06/22/19  0028 06/22/19  0400 06/22/19  0824   WBC 14.57*  --  13.79*  --   --  10.00  --    RBC 3.97*  --  3.00*  --   --  3.08*  --    HGB 10.9*  10.9*   < > 8.2*   < > 8.7* 8.6* 9.1*    HCT 32.8*  32.8*   < > 25.4*   < > 26.1* 26.5* 27.6*     --  139*  --   --  126*  --    MCV 83  --  85  --   --  86  --    MCH 27.5  --  27.3  --   --  27.9  --    MCHC 33.2  --  32.3  --   --  32.5  --     < > = values in this interval not displayed.     BMP  Recent Labs   Lab 06/20/19  0439 06/21/19  0340 06/22/19  0400    135* 134*   K 3.5 3.4* 3.2*   CO2 21* 21* 24    108 107   BUN 10 11 9   CREATININE 0.7 0.7 0.6   GLU 85 134* 87       POCT-Glucose  POCT Glucose   Date Value Ref Range Status   06/20/2019 78 70 - 110 mg/dL Final   06/20/2019 47 (LL) 70 - 110 mg/dL Final   06/20/2019 138 (H) 70 - 110 mg/dL Final   06/19/2019 111 (H) 70 - 110 mg/dL Final   06/19/2019 69 (L) 70 - 110 mg/dL Final   06/19/2019 42 (LL) 70 - 110 mg/dL Final   06/19/2019 79 70 - 110 mg/dL Final       Recent Labs   Lab 06/17/19  2222 06/18/19 0346 06/19/19  0743 06/20/19  0439 06/21/19  0340 06/22/19  0400   CALCIUM 6.6* 7.1* 7.0* 7.3* 7.3* 7.1*   MG 1.3* 1.6 1.7  --   --   --      LFT  Recent Labs   Lab 06/20/19  0439 06/21/19  0340 06/22/19  0400   PROT 4.0* 3.8* 3.9*   ALBUMIN 1.4* 1.2* 1.2*   BILITOT 0.9 0.6 0.6   * 116* 61*   ALKPHOS 287* 258* 233*   * 121* 81*     GFR     COAGS  Recent Labs   Lab 06/18/19  0346 06/21/19  1447   INR 1.2 1.3*   APTT  --  47.6*     CE  Recent Labs   Lab 06/17/19  1345 06/17/19  1407   TROPONINI 0.125* 0.112*     ABGs  No results for input(s): PH, PCO2, PO2, HCO3, POCSATURATED, BE in the last 24 hours.  BNP  Recent Labs   Lab 06/17/19  1405   *       LAST HbA1c  No results found for: HGBA1C    Lipid panel  Lab Results   Component Value Date    CHOL 68 (L) 06/12/2019    CHOL 148 03/27/2015     Lab Results   Component Value Date    HDL 20 (L) 06/12/2019    HDL 60 03/27/2015     Lab Results   Component Value Date    LDLCALC 33.8 (L) 06/12/2019    LDLCALC 69.2 03/27/2015     Lab Results   Component Value Date    TRIG 71 06/12/2019    TRIG 94 03/27/2015     Lab  Results   Component Value Date    CHOLHDL 29.4 06/12/2019    CHOLHDL 40.5 03/27/2015          Patient Active Problem List    Diagnosis Date Noted    Debility 06/21/2019    Gastrointestinal hemorrhage     Lactic acidosis 06/18/2019    Leukocytosis 06/18/2019    Hypocalcemia 06/18/2019    Cardiac arrest 06/17/2019    Severe protein-calorie malnutrition 06/12/2019    Hypoalbuminemia 06/12/2019    Gangrene of right foot 06/11/2019    Sacral decubitus ulcer 06/11/2019    Pressure ulcer of left hip, stage 3 06/11/2019    Critical lower limb ischemia 06/10/2019    Panlobular emphysema 05/14/2019    Anasarca 05/14/2019    Esophageal stricture 05/13/2019    Alcoholic cirrhosis of liver with ascites 05/02/2019    Anemia 05/02/2019    Esophageal stenosis 03/29/2019    Dysphagia 05/05/2015    Seizure disorder 03/25/2015    Clavicle fracture 03/23/2015

## 2019-06-23 LAB
ALBUMIN SERPL BCP-MCNC: 1.1 G/DL (ref 3.5–5.2)
ALP SERPL-CCNC: 209 U/L (ref 55–135)
ALT SERPL W/O P-5'-P-CCNC: 59 U/L (ref 10–44)
ANION GAP SERPL CALC-SCNC: 4 MMOL/L (ref 8–16)
AST SERPL-CCNC: 43 U/L (ref 10–40)
BACTERIA BLD CULT: NORMAL
BASOPHILS # BLD AUTO: 0.03 K/UL (ref 0–0.2)
BASOPHILS NFR BLD: 0.3 % (ref 0–1.9)
BILIRUB SERPL-MCNC: 0.5 MG/DL (ref 0.1–1)
BUN SERPL-MCNC: 7 MG/DL (ref 6–20)
CALCIUM SERPL-MCNC: 7 MG/DL (ref 8.7–10.5)
CHLORIDE SERPL-SCNC: 106 MMOL/L (ref 95–110)
CO2 SERPL-SCNC: 25 MMOL/L (ref 23–29)
CREAT SERPL-MCNC: 0.6 MG/DL (ref 0.5–1.4)
DIFFERENTIAL METHOD: ABNORMAL
EOSINOPHIL # BLD AUTO: 0.1 K/UL (ref 0–0.5)
EOSINOPHIL NFR BLD: 0.8 % (ref 0–8)
ERYTHROCYTE [DISTWIDTH] IN BLOOD BY AUTOMATED COUNT: 16.6 % (ref 11.5–14.5)
EST. GFR  (AFRICAN AMERICAN): >60 ML/MIN/1.73 M^2
EST. GFR  (NON AFRICAN AMERICAN): >60 ML/MIN/1.73 M^2
GLUCOSE SERPL-MCNC: 129 MG/DL (ref 70–110)
HCT VFR BLD AUTO: 25 % (ref 40–54)
HCT VFR BLD AUTO: 25.6 % (ref 40–54)
HGB BLD-MCNC: 8.1 G/DL (ref 14–18)
HGB BLD-MCNC: 8.4 G/DL (ref 14–18)
LYMPHOCYTES # BLD AUTO: 1 K/UL (ref 1–4.8)
LYMPHOCYTES NFR BLD: 10.5 % (ref 18–48)
MCH RBC QN AUTO: 27.9 PG (ref 27–31)
MCHC RBC AUTO-ENTMCNC: 32.4 G/DL (ref 32–36)
MCV RBC AUTO: 86 FL (ref 82–98)
MONOCYTES # BLD AUTO: 0.9 K/UL (ref 0.3–1)
MONOCYTES NFR BLD: 9.4 % (ref 4–15)
NEUTROPHILS # BLD AUTO: 7.5 K/UL (ref 1.8–7.7)
NEUTROPHILS NFR BLD: 78.6 % (ref 38–73)
PLATELET # BLD AUTO: 133 K/UL (ref 150–350)
PMV BLD AUTO: 8.8 FL (ref 9.2–12.9)
POTASSIUM SERPL-SCNC: 3.4 MMOL/L (ref 3.5–5.1)
PROT SERPL-MCNC: 3.9 G/DL (ref 6–8.4)
RBC # BLD AUTO: 2.9 M/UL (ref 4.6–6.2)
SODIUM SERPL-SCNC: 135 MMOL/L (ref 136–145)
WBC # BLD AUTO: 9.6 K/UL (ref 3.9–12.7)

## 2019-06-23 PROCEDURE — 25000003 PHARM REV CODE 250: Performed by: NURSE PRACTITIONER

## 2019-06-23 PROCEDURE — 85025 COMPLETE CBC W/AUTO DIFF WBC: CPT

## 2019-06-23 PROCEDURE — 99291 CRITICAL CARE FIRST HOUR: CPT | Mod: ,,, | Performed by: INTERNAL MEDICINE

## 2019-06-23 PROCEDURE — 85018 HEMOGLOBIN: CPT

## 2019-06-23 PROCEDURE — C9113 INJ PANTOPRAZOLE SODIUM, VIA: HCPCS | Performed by: INTERNAL MEDICINE

## 2019-06-23 PROCEDURE — 85014 HEMATOCRIT: CPT

## 2019-06-23 PROCEDURE — 63600175 PHARM REV CODE 636 W HCPCS: Performed by: NURSE PRACTITIONER

## 2019-06-23 PROCEDURE — 63600175 PHARM REV CODE 636 W HCPCS: Performed by: INTERNAL MEDICINE

## 2019-06-23 PROCEDURE — 63600175 PHARM REV CODE 636 W HCPCS: Performed by: FAMILY MEDICINE

## 2019-06-23 PROCEDURE — 80053 COMPREHEN METABOLIC PANEL: CPT

## 2019-06-23 PROCEDURE — 99291 PR CRITICAL CARE, E/M 30-74 MINUTES: ICD-10-PCS | Mod: ,,, | Performed by: INTERNAL MEDICINE

## 2019-06-23 PROCEDURE — 63600175 PHARM REV CODE 636 W HCPCS: Performed by: STUDENT IN AN ORGANIZED HEALTH CARE EDUCATION/TRAINING PROGRAM

## 2019-06-23 PROCEDURE — 25000003 PHARM REV CODE 250: Performed by: FAMILY MEDICINE

## 2019-06-23 PROCEDURE — 11000001 HC ACUTE MED/SURG PRIVATE ROOM

## 2019-06-23 RX ORDER — POTASSIUM CHLORIDE 7.45 MG/ML
10 INJECTION INTRAVENOUS
Status: COMPLETED | OUTPATIENT
Start: 2019-06-23 | End: 2019-06-23

## 2019-06-23 RX ADMIN — PANTOPRAZOLE SODIUM 40 MG: 40 INJECTION, POWDER, FOR SOLUTION INTRAVENOUS at 08:06

## 2019-06-23 RX ADMIN — COLLAGENASE SANTYL: 250 OINTMENT TOPICAL at 09:06

## 2019-06-23 RX ADMIN — OXYCODONE HYDROCHLORIDE 5 MG: 5 TABLET ORAL at 03:06

## 2019-06-23 RX ADMIN — VANCOMYCIN HYDROCHLORIDE 500 MG: 500 INJECTION, POWDER, LYOPHILIZED, FOR SOLUTION INTRAVENOUS at 01:06

## 2019-06-23 RX ADMIN — SODIUM CHLORIDE TAB 1 GM 1 G: 1 TAB at 09:06

## 2019-06-23 RX ADMIN — POTASSIUM CHLORIDE 10 MEQ: 7.46 INJECTION, SOLUTION INTRAVENOUS at 08:06

## 2019-06-23 RX ADMIN — PANTOPRAZOLE SODIUM 40 MG: 40 INJECTION, POWDER, FOR SOLUTION INTRAVENOUS at 09:06

## 2019-06-23 RX ADMIN — PIPERACILLIN AND TAZOBACTAM 4.5 G: 4; .5 INJECTION, POWDER, LYOPHILIZED, FOR SOLUTION INTRAVENOUS; PARENTERAL at 05:06

## 2019-06-23 RX ADMIN — PIPERACILLIN AND TAZOBACTAM 4.5 G: 4; .5 INJECTION, POWDER, LYOPHILIZED, FOR SOLUTION INTRAVENOUS; PARENTERAL at 01:06

## 2019-06-23 RX ADMIN — POTASSIUM CHLORIDE 10 MEQ: 7.46 INJECTION, SOLUTION INTRAVENOUS at 09:06

## 2019-06-23 RX ADMIN — PIPERACILLIN AND TAZOBACTAM 4.5 G: 4; .5 INJECTION, POWDER, LYOPHILIZED, FOR SOLUTION INTRAVENOUS; PARENTERAL at 09:06

## 2019-06-23 RX ADMIN — OXYCODONE HYDROCHLORIDE 5 MG: 5 TABLET ORAL at 08:06

## 2019-06-23 NOTE — PROGRESS NOTES
[facility]  Cardiology  Progress Note    Patient Name: Sachin Calloway  MRN: [id number]  Admission Date: 6/10/2019  Hospital Length of Stay: 11 days  Code Status: Full Code   Attending Physician: Toi Arevalo MD   Primary Care Physician: Juan Gatica MD  Expected Discharge Date:   Principal Problem:Critical lower limb ischemia    Subjective:     Hospital Course:   6/10/2019 Admitted for elective LE angiogram due to nonhealing wound to right foot. Taken to cath lab for the procedure via right radial access with following results:    · Severe bilateral PAD with CLI of right foot  · On the right there is an ostial SFA  which is calcified and reconstitutes via profunda collaterals distally. There is single vessel runoff via peroneal which has a severe proximal stenosis and distally supplies collaterals to a plantar arch. No identifiable DP, although there is a proximal portion of AT  · On the left there is a severe stenosis at proximal SFA with mid SFA  with reconstitution distally. There is two vessel runoff via AT into a DP and peroneal which supplies collaterals to a plantar arch  · Staged intervention of LSFA and peroneal first. Will then consider staged intervention of RATA and if unsuccessful will consider AV flow reversal     6/11/2019 NPO for RLE intervention today   6/12/2019 Taken to cath lab yesterday for intervention via LCFA access with following results:    · LCFA antegrade access with crossover sheath used  · Successful RSFA  intervention with atherectomy followed by IVUS guided PTA with 4.0 balloon in POP and mid to distal SFA and 5.0 balloon in ostial and proximal SFA. This was followed by PTA with DCB to POP/mid and distal SFA with 4.0 balloon and 5.0 balloon ostial/mid SFA all with excellent results  · Successful PTA to proximal peroneal with 3.0 x 60 balloon with excellent result  · Will likely need staged intervention of AT/DP and if unsuccessful, AV flow  reversal  · Plavix/ASA/statin  · Continue aggressive wound care and management per Podiatry    Tolerated procedure well and recovered on telemetry unit. LCFA access site with no active bleeding, hematoma or ecchymosis. Podiatry consulted with plans for surgery once demarcation occurs. ID consulted with recs for blood cultures along with empiric antibiotics with IV Vanc and Zosyn   6/13/2019 H&H 7.7 & 24.3 with WBC up to 16.11 from 15.17. BMP with unchanged creatinine. Blood cultures with prelim results NGTD. HR and BP stable. Dietian consulted with recs noted-will liberalize diet and order supplements. Remains on IV Vanc and Zosyn for now. PT/OT on board   6/14/2019 WBC down slightly to 15.4 this AM. H&H down slightly to 7.1&22.8. BP stable. Episode of ST with HR up to 140s this AM nonsustained and patient asymptomatic. ASA discontinued. Will consult GI due to downtrending H&H   6/15/2019 PAD with CLI requiring intervention and likely surgery. Continue medical management. No intervention planned until patient more stable. Continue antibiotics for gangrene and possible Osteo-blood cultures with NGTD. ID and podiatry following. Anemia. GI onboard and plan EGD/Colonscopy Monday. hgb 7.3 today. Transfuse if <7.   6/16/2019 Remains on IV abx currently. MRI with no evidence of osteomyelitis. ID and podiatry following. H&H down to 6.9 & 22.0 will transfuse with 2units PRBCs. Plan for EGD/Colonscopy Monday 6/17/2019 WBCs up to 18K this AM. H&H improved to 10.4&32.0 this AM. BMP stable. NPO for EGD/colonoscopy today   6/18/2019 PEA arrest yesterday post EGD with ROSC after CPR and a few rounds of CPR. Given additional 2 units of PRBCs in Endoscopy given findings on EGD and based on discussion with GI. Intubated per anesthesia and admitted to ICU with SBP 120s-130s upon arrival. Post admission more alert and pulling at ETT therefore placed on Precedex drip. BP trended down to 90s with initiation of Neosynephrine drip. Lactic  acid 4.5 and repeat H&H 15.4&46.0 likely erroneous immediately after PRBC. Serial H&H overnight with H&H 12.5&36.1 and down to 10.2&30.3 this AM. Remains intubated-Pulmonary consulted. GI on board with plans for CT of abdomen with IR/TIPS procedure today by IR. onversation with wife per IR in regards to procedure with high risk for hemodynamic instability during the procedure although no other alternative currently and family with desire for aggressive treatment.   6/19/2019 Remains intubated on Precedex and Aaron. IR procedure yesterday with DIPS. GI note recommend surgical evaluation. Additional PRBCs yesterday. H&H this MA 11.2&33.1 with slight trend down to 10.7&31.4 later in morning. HR and BP stable on pressors. K+ 3.6 BUN 9 creatinine .8 Mg 1.7  6/20/2019 Remains intubated and sedated. On Aaron with stable BP currently-wean in process. H&H 10.9&32.8 this AM and stable over the past 24 hours-will change H&Hs to Q12hrs. BMP with K+ 3.4 BUN 10 creatinine .7  down from 383  down from 206 albumin 1.4. Negative 6.9 liters since admission. General surgery (Dr. 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 BRENNON Kimball-consult placed. Pulmonary on board with evaluation for extubation in process   6/21/2019 Extubated yesterday and stable for respiratory standpoint overnight. Remains on IV Aaron with wean in process-down to .5mcg/kg/min from 1.25mcg/kg/min yesterday. Serial H&H in process with decrease in H&H this morning to 8.2&25.4 from 10.0&31.4 yesterday. BMP WNL. AST and ALT trending down. IR reconsulted for evaluation of repeat procedure for duodenal varix. Will consult [facility] for assistance with management of multiple medical issues   6/22/2019 transfused 1 u 6/21/019, H/H stable, IM/palliative care service had long discussion regarding his overall prognosis. Family meeting will take place today.  Continue with serial H/H. Abx per ID. No longer requiring pressors. Palliative paracentesis if needed.   6/23/2019: patient agreed to DNR status. Transfer to floor then DC home with comfort care        Interval History:     ROS  Objective:     Vital Signs (Most Recent):  Temp: 97.7 °F (36.5 °C) (06/23/19 1000)  Pulse: 102 (06/23/19 1000)  Resp: (!) 23 (06/23/19 1000)  BP: 134/80 (06/23/19 1000)  SpO2: 100 % (06/23/19 1000) Vital Signs (24h Range):  Temp:  [96.8 °F (36 °C)-98.8 °F (37.1 °C)] 97.7 °F (36.5 °C)  Pulse:  [] 102  Resp:  [14-50] 23  SpO2:  [98 %-100 %] 100 %  BP: ()/(57-84) 134/80     Weight: 50.7 kg (111 lb 12.4 oz)  Body mass index is 15.16 kg/m².     SpO2: 100 %  O2 Device (Oxygen Therapy): room air      Intake/Output Summary (Last 24 hours) at 6/23/2019 1106  Last data filed at 6/23/2019 0923  Gross per 24 hour   Intake 1090 ml   Output 535 ml   Net 555 ml       Lines/Drains/Airways     Central Venous Catheter Line                 Percutaneous Central Line Insertion/Assessment - triple lumen  06/17/19 1700 right internal jugular 5 days          Drain                 Urethral Catheter 06/17/19 1414 Straight-tip 16 Fr. 5 days                Physical Exam  Constitutional: He is oriented to person, place, and time. Vital signs are normal. He appears well-developed and well-nourished. He appears cachectic. He has a sickly appearance. He appears ill. He is not intubated.   anasarca   HENT:   Head: Normocephalic and atraumatic.   Right Ear: External ear normal.   Left Ear: External ear normal.   Mouth/Throat: Oropharynx is clear and moist.   Eyes: Pupils are equal, round, and reactive to light. Conjunctivae and EOM are normal. Right eye exhibits no discharge. Left eye exhibits no discharge. No scleral icterus.   Neck: Normal range of motion. Neck supple. Normal carotid pulses, no hepatojugular reflux and no JVD present. Carotid bruit is not present. No tracheal deviation present. No thyromegaly  present.   Cardiovascular: Normal rate, regular rhythm, S1 normal and S2 normal.  No extrasystoles are present. PMI is not displaced. Exam reveals no gallop, no S3, no distant heart sounds, no friction rub and no midsystolic click.   No murmur heard.  Pulses:       Carotid pulses are 2+ on the right side, and 2+ on the left side.       Radial pulses are 2+ on the right side, and 2+ on the left side.        Femoral pulses are 2+ on the right side, and 2+ on the left side.       Popliteal pulses are 2+ on the right side, and 2+ on the left side.        Dorsalis pedis pulses are 0 on the right side, and 0 on the left side.        Posterior tibial pulses are 0 on the right side, and 0 on the left side.   Pulmonary/Chest: Effort normal. No accessory muscle usage or stridor. No apnea, no tachypnea and no bradypnea. He is not intubated. No respiratory distress. He has decreased breath sounds in the right lower field and the left lower field. He has no wheezes. He has no rales. He exhibits no tenderness and no bony tenderness.   Abdominal: He exhibits ascites. He exhibits no distension, no pulsatile liver, no abdominal bruit, no pulsatile midline mass and no mass. There is splenomegaly and hepatomegaly. There is no tenderness. There is no rebound and no guarding.   Musculoskeletal: Normal range of motion. He exhibits no edema or tenderness.   Lymphadenopathy:     He has no cervical adenopathy.   Neurological: He is alert and oriented to person, place, and time. He has normal reflexes. No cranial nerve deficit. Coordination normal.   Skin: Skin is warm. No rash noted. No erythema. No pallor.       R foot gangrene    Psychiatric: His behavior is normal. Judgment and thought content normal. He exhibits a depressed mood.      Significant Labs:     LABS  CBC  Recent Labs   Lab 06/21/19  0340  06/22/19  0400  06/22/19  1607 06/23/19  0100 06/23/19  0514   WBC 13.79*  --  10.00  --   --   --  9.60   RBC 3.00*  --  3.08*  --   --    --  2.90*   HGB 8.2*   < > 8.6*   < > 9.3* 8.4* 8.1*   HCT 25.4*   < > 26.5*   < > 27.8* 25.6* 25.0*   *  --  126*  --   --   --  133*   MCV 85  --  86  --   --   --  86   MCH 27.3  --  27.9  --   --   --  27.9   MCHC 32.3  --  32.5  --   --   --  32.4    < > = values in this interval not displayed.     BMP  Recent Labs   Lab 06/21/19  0340 06/22/19  0400 06/23/19  0514   * 134* 135*   K 3.4* 3.2* 3.4*   CO2 21* 24 25    107 106   BUN 11 9 7   CREATININE 0.7 0.6 0.6   * 87 129*       POCT-Glucose  POCT Glucose   Date Value Ref Range Status   06/20/2019 78 70 - 110 mg/dL Final   06/20/2019 47 (LL) 70 - 110 mg/dL Final       Recent Labs   Lab 06/17/19  2222 06/18/19  0346 06/19/19  0743  06/21/19  0340 06/22/19  0400 06/23/19  0514   CALCIUM 6.6* 7.1* 7.0*   < > 7.3* 7.1* 7.0*   MG 1.3* 1.6 1.7  --   --   --   --     < > = values in this interval not displayed.     LFT  Recent Labs   Lab 06/21/19 0340 06/22/19  0400 06/23/19  0514   PROT 3.8* 3.9* 3.9*   ALBUMIN 1.2* 1.2* 1.1*   BILITOT 0.6 0.6 0.5   * 61* 43*   ALKPHOS 258* 233* 209*   * 81* 59*     GFR     COAGS  Recent Labs   Lab 06/18/19  0346 06/21/19  1447   INR 1.2 1.3*   APTT  --  47.6*     CE  Recent Labs   Lab 06/17/19  1345 06/17/19  1407   TROPONINI 0.125* 0.112*     ABGs  No results for input(s): PH, PCO2, PO2, HCO3, POCSATURATED, BE in the last 24 hours.  BNP  Recent Labs   Lab 06/17/19  1405   *       LAST HbA1c  No results found for: HGBA1C    Lipid panel  Lab Results   Component Value Date    CHOL 68 (L) 06/12/2019    CHOL 148 03/27/2015     Lab Results   Component Value Date    HDL 20 (L) 06/12/2019    HDL 60 03/27/2015     Lab Results   Component Value Date    LDLCALC 33.8 (L) 06/12/2019    LDLCALC 69.2 03/27/2015     Lab Results   Component Value Date    TRIG 71 06/12/2019    TRIG 94 03/27/2015     Lab Results   Component Value Date    CHOLHDL 29.4 06/12/2019    CHOLHDL 40.5 03/27/2015         Significant Imaging:         Assessment and Plan:     Brief HPI:     * Critical lower limb ischemia  -BLE angiogram with bilateral SFA CTOs with 2 vessel r/o on the left and single vessel via peroneal on right  -successful revascularization of RLE 6/11 with RSFA, right pop and right peroneal PTA with DCB; anticipated need for PTA of right AT however given continued anemia/GIB and inability to tolerate DAPT no plans for proceeding with further revascularization    -will continue to hold DAPT and statin therapy     Gastrointestinal hemorrhage  -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 BRENNON Kimball         Continue with close monitoring  H/H 9.1/27.6 today    Hypocalcemia  -Ca 6.1 this AM  -corrected to 8.2 due to hypoalbuminemia      Leukocytosis  -WBCs up to 37K post arrest with trend won to 26K-19K-14K; 13K, 10 K this  AM  -initial blood cultures from 6/12 with NGTD; repeat blood cultures 6/17 with 2 out of 4 GPC on prelim report   -remains on IV Zosyn and Vanc and will continue for now  -ID on board and continue to follow recs     Lactic acidosis  -multifactoral-cardiac arrest, hypotension, infectious etiology and active bleeding   -initial lactic acid 4.5 with trend down to 3.0 and up to 4.7; down to 2.4 this AM   -continue supportive care with pressors and vent    Cardiac arrest  -PEA arrest preceded by SB at completion of EGD 6/10  -responded to few rounds of Epi and ROSC after ACLS/CPR  -intubated and admitted to ICU  -initial pH 6.9 with improvement 7.2-7.3 with vent and bicarb  -etiology likely hypoxia related  -remains intubated and sedated on pressor support; Pulm CC on board for vent management with evaluation for extubation today     Hypoalbuminemia  -related to poor  nutritional status  -recent albumin 1.2  -dietian on board; on Darrian, Boost and Beneprotein  -repeat ST consult since extubation; dietary recommendations noted  -continue daily  CMPs    Severe protein-calorie malnutrition  -consult dietian  -diet changed to regular  -on Darrian BID and Benefiber TID     Pressure ulcer of left hip, stage 3  -wound care RN and General surgery consulted     Sacral decubitus ulcer  -POA  -related to debility and prolonged bedrest  -Wound care RN consulted as well as General surgery (recs noted)     Gangrene of right foot  -Podiatry and ID on board  -initial plans by  Podiatry for TMA once demarcation; ongoing discussions with Podiatry per IC staff (Dr. Gonzales) with high risk for limb loss and no plans for surgical intervention given acute decompensation and recent cardiac arrest     Anemia  -H&H 7.1&22.8 6/14 &6/15 with trend down to 6.9&22.0 6/16  -transfused with 2U PRBCs with improvement in H&H to 10.4&32.0 6/17  with slight drop to 9.5   -EGD 6/17 with following results:   LA Grade D (one or more mucosal breaks involving at least 75% of esophageal circumference) esophagitis with no bleeding was found in the lower third of the esophagus.   A deformity was found in the prepyloric region of the stomach.   Red blood was found in the gastric body and duodenum.   Active bleeding with adherent clot was found in the third portion of the duodenum associated with a duodenal varix.  -additional 2units PRBCs given 6/17 post cardiac arrest and repeat transfusion 6/18 post DIPS yesterday   -H&H stabilized and sserial H&Hs changed to Q12hrs; -baseline 9-10/29-30 since early 2018  -will continue to hold ASA/Plavix for now   -General Surgery (Dr. Kirk) consult for surgical intervention for duodenal varix with second opinion by Dr. Kimball and no plans for surgical intervention; IR reconsulted     H/H stable 8/25    Alcoholic cirrhosis of liver with ascites  -AST 61 from 116, yesterday; ALT 81  from 121 yesterday  -on Aldactone at home- will continue to hold   -EGD with concern for advanced cirrhosis per GI interpretation; IR/DIPS procedure for duodenal varix; General surgery consulted for surgical evaluation with no plans for surgical intervention; IR reconsulted   -daily CMP in AM   -no procedures for now  -overall prognosis guarded to poor  -family meeting today to discuss code status and overall long term care           VTE Risk Mitigation (From admission, onward)        Ordered     Reason for No Pharmacological VTE Prophylaxis  Once      06/17/19 1353     IP VTE HIGH RISK PATIENT  Once      06/17/19 1353     Place SERGO hose  Until discontinued      06/17/19 1346     Place sequential compression device  Until discontinued      06/17/19 1346            Transfer to floor after establishing DNR   DC home soon with comfort measures        Az Allison MD  Cardiology  [facility]

## 2019-06-23 NOTE — PLAN OF CARE
Problem: Adult Inpatient Plan of Care  Goal: Plan of Care Review  Outcome: Ongoing (interventions implemented as appropriate)  Patient is AAOx's 4, plans are to transition to comfort care and transfer patient to the floor, wife to meet with Dr. Fuentes to discuss DNR status or continue with full code, no signs of acute distress noted, VSS, will continue to monitor per protocol and tx per MD orders.

## 2019-06-23 NOTE — PROGRESS NOTES
Surgery follow up  /80   Pulse 102   Temp 97.7 °F (36.5 °C)   Resp (!) 23   Ht 6' (1.829 m)   Wt 50.7 kg (111 lb 12.4 oz)   SpO2 100%   BMI 15.16 kg/m²   I/O last 3 completed shifts:  In: 1436.7 [P.O.:240; Blood:346.7; IV Piggyback:850]  Out: 730 [Urine:730]  I/O this shift:  In: -   Out: 130 [Urine:130]  Recent Results (from the past 336 hour(s))   CBC auto differential    Collection Time: 06/23/19  5:14 AM   Result Value Ref Range    WBC 9.60 3.90 - 12.70 K/uL    Hemoglobin 8.1 (L) 14.0 - 18.0 g/dL    Hematocrit 25.0 (L) 40.0 - 54.0 %    Platelets 133 (L) 150 - 350 K/uL   CBC auto differential    Collection Time: 06/22/19  4:00 AM   Result Value Ref Range    WBC 10.00 3.90 - 12.70 K/uL    Hemoglobin 8.6 (L) 14.0 - 18.0 g/dL    Hematocrit 26.5 (L) 40.0 - 54.0 %    Platelets 126 (L) 150 - 350 K/uL   CBC auto differential    Collection Time: 06/21/19  3:40 AM   Result Value Ref Range    WBC 13.79 (H) 3.90 - 12.70 K/uL    Hemoglobin 8.2 (L) 14.0 - 18.0 g/dL    Hematocrit 25.4 (L) 40.0 - 54.0 %    Platelets 139 (L) 150 - 350 K/uL     Recent Results (from the past 336 hour(s))   Basic metabolic panel    Collection Time: 06/14/19  5:06 AM   Result Value Ref Range    Sodium 135 (L) 136 - 145 mmol/L    Potassium 4.1 3.5 - 5.1 mmol/L    Chloride 105 95 - 110 mmol/L    CO2 24 23 - 29 mmol/L    BUN, Bld 11 6 - 20 mg/dL    Creatinine 0.7 0.5 - 1.4 mg/dL    Calcium 7.4 (L) 8.7 - 10.5 mg/dL    Anion Gap 6 (L) 8 - 16 mmol/L   Basic metabolic panel    Collection Time: 06/13/19  4:54 AM   Result Value Ref Range    Sodium 138 136 - 145 mmol/L    Potassium 4.2 3.5 - 5.1 mmol/L    Chloride 104 95 - 110 mmol/L    CO2 25 23 - 29 mmol/L    BUN, Bld 12 6 - 20 mg/dL    Creatinine 0.8 0.5 - 1.4 mg/dL    Calcium 7.6 (L) 8.7 - 10.5 mg/dL    Anion Gap 9 8 - 16 mmol/L   Basic metabolic panel    Collection Time: 06/10/19  1:27 PM   Result Value Ref Range    Sodium 137 136 - 145 mmol/L    Potassium 3.2 (L) 3.5 - 5.1 mmol/L     Chloride 100 95 - 110 mmol/L    CO2 26 23 - 29 mmol/L    BUN, Bld 16 6 - 20 mg/dL    Creatinine 0.7 0.5 - 1.4 mg/dL    Calcium 7.9 (L) 8.7 - 10.5 mg/dL    Anion Gap 11 8 - 16 mmol/L   doing well .will need right bka.

## 2019-06-23 NOTE — SUBJECTIVE & OBJECTIVE
Interval History:     ROS  Objective:     Vital Signs (Most Recent):  Temp: 97.7 °F (36.5 °C) (06/23/19 1000)  Pulse: 102 (06/23/19 1000)  Resp: (!) 23 (06/23/19 1000)  BP: 134/80 (06/23/19 1000)  SpO2: 100 % (06/23/19 1000) Vital Signs (24h Range):  Temp:  [96.8 °F (36 °C)-98.8 °F (37.1 °C)] 97.7 °F (36.5 °C)  Pulse:  [] 102  Resp:  [14-50] 23  SpO2:  [98 %-100 %] 100 %  BP: ()/(57-84) 134/80     Weight: 50.7 kg (111 lb 12.4 oz)  Body mass index is 15.16 kg/m².     SpO2: 100 %  O2 Device (Oxygen Therapy): room air      Intake/Output Summary (Last 24 hours) at 6/23/2019 1106  Last data filed at 6/23/2019 0923  Gross per 24 hour   Intake 1090 ml   Output 535 ml   Net 555 ml       Lines/Drains/Airways     Central Venous Catheter Line                 Percutaneous Central Line Insertion/Assessment - triple lumen  06/17/19 1700 right internal jugular 5 days          Drain                 Urethral Catheter 06/17/19 1414 Straight-tip 16 Fr. 5 days                Physical Exam  Constitutional: He is oriented to person, place, and time. Vital signs are normal. He appears well-developed and well-nourished. He appears cachectic. He has a sickly appearance. He appears ill. He is not intubated.   anasarca   HENT:   Head: Normocephalic and atraumatic.   Right Ear: External ear normal.   Left Ear: External ear normal.   Mouth/Throat: Oropharynx is clear and moist.   Eyes: Pupils are equal, round, and reactive to light. Conjunctivae and EOM are normal. Right eye exhibits no discharge. Left eye exhibits no discharge. No scleral icterus.   Neck: Normal range of motion. Neck supple. Normal carotid pulses, no hepatojugular reflux and no JVD present. Carotid bruit is not present. No tracheal deviation present. No thyromegaly present.   Cardiovascular: Normal rate, regular rhythm, S1 normal and S2 normal.  No extrasystoles are present. PMI is not displaced. Exam reveals no gallop, no S3, no distant heart sounds, no friction  rub and no midsystolic click.   No murmur heard.  Pulses:       Carotid pulses are 2+ on the right side, and 2+ on the left side.       Radial pulses are 2+ on the right side, and 2+ on the left side.        Femoral pulses are 2+ on the right side, and 2+ on the left side.       Popliteal pulses are 2+ on the right side, and 2+ on the left side.        Dorsalis pedis pulses are 0 on the right side, and 0 on the left side.        Posterior tibial pulses are 0 on the right side, and 0 on the left side.   Pulmonary/Chest: Effort normal. No accessory muscle usage or stridor. No apnea, no tachypnea and no bradypnea. He is not intubated. No respiratory distress. He has decreased breath sounds in the right lower field and the left lower field. He has no wheezes. He has no rales. He exhibits no tenderness and no bony tenderness.   Abdominal: He exhibits ascites. He exhibits no distension, no pulsatile liver, no abdominal bruit, no pulsatile midline mass and no mass. There is splenomegaly and hepatomegaly. There is no tenderness. There is no rebound and no guarding.   Musculoskeletal: Normal range of motion. He exhibits no edema or tenderness.   Lymphadenopathy:     He has no cervical adenopathy.   Neurological: He is alert and oriented to person, place, and time. He has normal reflexes. No cranial nerve deficit. Coordination normal.   Skin: Skin is warm. No rash noted. No erythema. No pallor.       R foot gangrene    Psychiatric: His behavior is normal. Judgment and thought content normal. He exhibits a depressed mood.      Significant Labs:     LABS  CBC  Recent Labs   Lab 06/21/19  0340  06/22/19  0400  06/22/19  1607 06/23/19  0100 06/23/19  0514   WBC 13.79*  --  10.00  --   --   --  9.60   RBC 3.00*  --  3.08*  --   --   --  2.90*   HGB 8.2*   < > 8.6*   < > 9.3* 8.4* 8.1*   HCT 25.4*   < > 26.5*   < > 27.8* 25.6* 25.0*   *  --  126*  --   --   --  133*   MCV 85  --  86  --   --   --  86   MCH 27.3  --  27.9  --    --   --  27.9   MCHC 32.3  --  32.5  --   --   --  32.4    < > = values in this interval not displayed.     BMP  Recent Labs   Lab 06/21/19  0340 06/22/19  0400 06/23/19  0514   * 134* 135*   K 3.4* 3.2* 3.4*   CO2 21* 24 25    107 106   BUN 11 9 7   CREATININE 0.7 0.6 0.6   * 87 129*       POCT-Glucose  POCT Glucose   Date Value Ref Range Status   06/20/2019 78 70 - 110 mg/dL Final   06/20/2019 47 (LL) 70 - 110 mg/dL Final       Recent Labs   Lab 06/17/19  2222 06/18/19  0346 06/19/19  0743  06/21/19  0340 06/22/19  0400 06/23/19  0514   CALCIUM 6.6* 7.1* 7.0*   < > 7.3* 7.1* 7.0*   MG 1.3* 1.6 1.7  --   --   --   --     < > = values in this interval not displayed.     LFT  Recent Labs   Lab 06/21/19  0340 06/22/19  0400 06/23/19  0514   PROT 3.8* 3.9* 3.9*   ALBUMIN 1.2* 1.2* 1.1*   BILITOT 0.6 0.6 0.5   * 61* 43*   ALKPHOS 258* 233* 209*   * 81* 59*     GFR     COAGS  Recent Labs   Lab 06/18/19  0346 06/21/19  1447   INR 1.2 1.3*   APTT  --  47.6*     CE  Recent Labs   Lab 06/17/19  1345 06/17/19  1407   TROPONINI 0.125* 0.112*     ABGs  No results for input(s): PH, PCO2, PO2, HCO3, POCSATURATED, BE in the last 24 hours.  BNP  Recent Labs   Lab 06/17/19  1405   *       LAST HbA1c  No results found for: HGBA1C    Lipid panel  Lab Results   Component Value Date    CHOL 68 (L) 06/12/2019    CHOL 148 03/27/2015     Lab Results   Component Value Date    HDL 20 (L) 06/12/2019    HDL 60 03/27/2015     Lab Results   Component Value Date    LDLCALC 33.8 (L) 06/12/2019    LDLCALC 69.2 03/27/2015     Lab Results   Component Value Date    TRIG 71 06/12/2019    TRIG 94 03/27/2015     Lab Results   Component Value Date    CHOLHDL 29.4 06/12/2019    CHOLHDL 40.5 03/27/2015        Significant Imaging:

## 2019-06-23 NOTE — PLAN OF CARE
Problem: Adult Inpatient Plan of Care  Goal: Plan of Care Review  Outcome: Ongoing (interventions implemented as appropriate)  Afebrile during the night. VSS - sinus tach. Remains on warming blanket. Awake, alert, oriented. x3 bowel movements. Urine output adequate. Wound care performed. Safety precautions in place.

## 2019-06-23 NOTE — PROGRESS NOTES
[facility] Infectious Disease Consult     Primary Team: Cardiology   Consultant Attending: Rosendo  Date of Admit: 6/10/2019    History      Sachin Calloway is a 58 y.o. male with a relevant history of alcoholic liver cirrhosis, HTN, former EtOH abuse, active tobacco use, PAD who presents for peripheral angiogram    The patient presented to [facility] on 6/10/2019 with a primary complaint of non healing right foot wound with doppler showing PAD. Had arthrectomy and PTA on 6/11.     Subsequently had GI bleeding, persistent leukocytosis and coded during an EGD (bradycardia followed by PEA). Then went on to have DIPS but still has slowly downtrending Hb    Interval events/subjective      WBC improved, afebrile     No melena     Extubated, off pressors     Allergies:  Review of patient's allergies indicates:  No Known Allergies    Medications:   In-Hospital Scheduled Medications:   collagenase   Topical (Top) Daily    pantoprazole  40 mg Intravenous BID    piperacillin-tazobactam (ZOSYN) IVPB  4.5 g Intravenous Q8H    potassium chloride in water  10 mEq Intravenous Q1H    sodium chloride  1 g Oral Daily    vancomycin (VANCOCIN) IVPB  500 mg Intravenous Q12H      In-Hospital PRN Medications:  sodium chloride, sodium chloride, sodium chloride, aluminum-magnesium hydroxide-simethicone, Dextrose 10% Bolus, Dextrose 10% Bolus, glucagon (human recombinant), ondansetron, ondansetron, oxyCODONE, polyethylene glycol, simethicone   In-Hospital IV Infusion Medications:     Home Medications:  Prior to Admission medications    Medication Sig Start Date End Date Taking? Authorizing Provider   acetaminophen-codeine 300-30mg (TYLENOL #3) 300-30 mg Tab Take by mouth.   Yes Historical Provider, MD   furosemide (LASIX) 80 MG tablet Take 1 tablet (80 mg total) by mouth once daily. 4/25/19 7/18/20 Yes Juan Gatica MD   pantoprazole (PROTONIX) 40 MG tablet Take 1 tablet (40 mg total) by mouth once daily. 5/13/19 5/12/20 Yes Dillan Salinas MD    potassium chloride SA (K-DUR,KLOR-CON) 20 MEQ tablet Take 1 tablet (20 mEq total) by mouth once daily. 19  Yes Juan Gatica MD   spironolactone (ALDACTONE) 50 MG tablet Take 1 tablet (50 mg total) by mouth once daily. 5/15/19 8/7/20 Yes Juan Gatica MD     Antibiotics and Day Number of Therapy:  Vanc -  Piptazo -  Vanc dosing currently at 750mg IV q24    Objective   Last 24 Hour Vital Signs:  BP  Min: 98/57  Max: 145/84  Temp  Av.1 °F (36.7 °C)  Min: 96.8 °F (36 °C)  Max: 98.8 °F (37.1 °C)  Pulse  Av.3  Min: 97  Max: 112  Resp  Av.2  Min: 14  Max: 50  SpO2  Av %  Min: 100 %  Max: 100 %    Lines, Drains, Airway:  Central line     Physical Examination:  Examination  General: well appearing, comfortable   HEENT: extubated   Neck: no thyromegaly, no JVD   Cardiac: no murmurs, pulse regular    Pulmonary/Chest: chest clear, no respiratory distress   GI/Rectal: no organomegaly, no masses, non tender, normal bowel sounds, rectal exam deferred   : deferred   Msk: normal motor screening exam  Vascular: warm peripheries but difficult to assess pulses   Lymph nodes: none palpated  Skin/ Extremities: left side no rash, no pedal edema, but has ischemia of his toes on the left (first second fifth) with a mottled appearance   Right dressed but no proximal tenderness or swelling. Both feet warm to touch but difficult to palpate pulses   Right foot stable appearance of dry gangrene      Neurology/ Mental status: alert oriented     Laboratory:  CBC:   Lab Results   Component Value Date    WBC 9.60 2019    HGB 8.1 (L) 2019     (L) 2019    MCV 86 2019    RDW 16.6 (H) 2019     Estimated Creatinine Clearance: 95.1 mL/min (based on SCr of 0.6 mg/dL).    Microbiology Data:  Negative blood cultures     Vanc trough 19  12, then 20.4 on  then 18.3 on 6/18     Radiology:  No gas noted in xray of right foot     MRI of right foot shows no osteo    CT abd   No  evidence for active contrast extravasation within the GI tract.  High-density material within the stomach and duodenum may reflect blood products.  Mural thickening of the distal duodenum may reflect focal inflammation.  Given evidence for chronic pancreatitis, possibly secondary.  Diffuse mucosal thickening of the small bowel with wall thickening of the sigmoid colon and rectum may reflect portal enteropathy/colopathy.  Small wedge-shaped hypoattenuating areas in the bilateral renal cortices may reflect sequela of infarcts.  Small bilateral pleural effusions with right lower lobe consolidation.  Small hiatal hernia.  Fluid within the lower esophagus in keeping with gastroesophageal reflux.  Large volume of ascites.  Extensive calcified plaque throughout the abdomen and pelvis.    TSH 2.56 in 6/6/19    Assessment     Sachin Calloway is a 58 y.o. male with alcoholic liver cirrhosis and PAD s/p right foot angioplasty who presented with gangrene of the right foot and leukocytosis. Vancomycin and piptazo have been ongoing.     Now post code during EGD for GI bleed. Had DIPS procedure and paracentesis (no cell count or culture sent). Was on pressors, intubated and with leukocytosis post code.     His leukocytosis has decreased and WBC now normal, he is extubated and no longer on pressors. Right foot and sacral ulcer do not appear to be infected and are stable in appearance. It appears no aggressive measures are being planned based on goals of care discussions.     Recommendations     Gangrene rt foot  - continue piptazo 4.5g q8  - agree with the dose increase for vanc of 500mg q12, trough 15-20   - since revascularization and amputation do not seem imminent, and clinical evidence for sepsis is lacking at this point,  will complete a 14 day course  (end date 6/26/19) of the above antibiotics and then stop all antibiotics   - will discuss the possibility of amputation once out of the ICU    Thank you for this consult. We  will follow.      Kimber Flood  Fellow, [facility] Infectious Disease

## 2019-06-24 PROBLEM — Z71.89 GOALS OF CARE, COUNSELING/DISCUSSION: Status: ACTIVE | Noted: 2019-06-24

## 2019-06-24 PROBLEM — Z71.89 COUNSELING REGARDING ADVANCED CARE PLANNING AND GOALS OF CARE: Status: ACTIVE | Noted: 2019-06-24

## 2019-06-24 PROBLEM — Z51.5 PALLIATIVE CARE ENCOUNTER: Status: ACTIVE | Noted: 2019-06-24

## 2019-06-24 LAB
ALBUMIN SERPL BCP-MCNC: 1.1 G/DL (ref 3.5–5.2)
ALP SERPL-CCNC: 176 U/L (ref 55–135)
ALT SERPL W/O P-5'-P-CCNC: 49 U/L (ref 10–44)
ANION GAP SERPL CALC-SCNC: 2 MMOL/L (ref 8–16)
AST SERPL-CCNC: 32 U/L (ref 10–40)
BASOPHILS # BLD AUTO: 0.04 K/UL (ref 0–0.2)
BASOPHILS NFR BLD: 0.4 % (ref 0–1.9)
BILIRUB SERPL-MCNC: 0.7 MG/DL (ref 0.1–1)
BUN SERPL-MCNC: 5 MG/DL (ref 6–20)
CALCIUM SERPL-MCNC: 7.1 MG/DL (ref 8.7–10.5)
CHLORIDE SERPL-SCNC: 105 MMOL/L (ref 95–110)
CO2 SERPL-SCNC: 24 MMOL/L (ref 23–29)
CREAT SERPL-MCNC: 0.6 MG/DL (ref 0.5–1.4)
DIFFERENTIAL METHOD: ABNORMAL
EOSINOPHIL # BLD AUTO: 0.1 K/UL (ref 0–0.5)
EOSINOPHIL NFR BLD: 1 % (ref 0–8)
ERYTHROCYTE [DISTWIDTH] IN BLOOD BY AUTOMATED COUNT: 16.7 % (ref 11.5–14.5)
EST. GFR  (AFRICAN AMERICAN): >60 ML/MIN/1.73 M^2
EST. GFR  (NON AFRICAN AMERICAN): >60 ML/MIN/1.73 M^2
GLUCOSE SERPL-MCNC: 76 MG/DL (ref 70–110)
HAV IGM SERPL QL IA: NEGATIVE
HBV CORE IGM SERPL QL IA: NEGATIVE
HBV SURFACE AG SERPL QL IA: NEGATIVE
HCT VFR BLD AUTO: 24.7 % (ref 40–54)
HCV AB SERPL QL IA: NEGATIVE
HGB BLD-MCNC: 8.2 G/DL (ref 14–18)
LYMPHOCYTES # BLD AUTO: 1.2 K/UL (ref 1–4.8)
LYMPHOCYTES NFR BLD: 12.7 % (ref 18–48)
MCH RBC QN AUTO: 28.2 PG (ref 27–31)
MCHC RBC AUTO-ENTMCNC: 33.2 G/DL (ref 32–36)
MCV RBC AUTO: 85 FL (ref 82–98)
MONOCYTES # BLD AUTO: 1.1 K/UL (ref 0.3–1)
MONOCYTES NFR BLD: 11.6 % (ref 4–15)
NEUTROPHILS # BLD AUTO: 7.1 K/UL (ref 1.8–7.7)
NEUTROPHILS NFR BLD: 73.8 % (ref 38–73)
PLATELET # BLD AUTO: 134 K/UL (ref 150–350)
PMV BLD AUTO: 9.2 FL (ref 9.2–12.9)
POTASSIUM SERPL-SCNC: 3.7 MMOL/L (ref 3.5–5.1)
PROT SERPL-MCNC: 4.2 G/DL (ref 6–8.4)
RBC # BLD AUTO: 2.91 M/UL (ref 4.6–6.2)
SODIUM SERPL-SCNC: 131 MMOL/L (ref 136–145)
VANCOMYCIN TROUGH SERPL-MCNC: 12.9 UG/ML (ref 10–22)
WBC # BLD AUTO: 9.58 K/UL (ref 3.9–12.7)

## 2019-06-24 PROCEDURE — 25000003 PHARM REV CODE 250: Performed by: NURSE PRACTITIONER

## 2019-06-24 PROCEDURE — 97530 THERAPEUTIC ACTIVITIES: CPT

## 2019-06-24 PROCEDURE — 11000001 HC ACUTE MED/SURG PRIVATE ROOM

## 2019-06-24 PROCEDURE — 63600175 PHARM REV CODE 636 W HCPCS: Performed by: STUDENT IN AN ORGANIZED HEALTH CARE EDUCATION/TRAINING PROGRAM

## 2019-06-24 PROCEDURE — 85025 COMPLETE CBC W/AUTO DIFF WBC: CPT

## 2019-06-24 PROCEDURE — 63600175 PHARM REV CODE 636 W HCPCS: Performed by: INTERNAL MEDICINE

## 2019-06-24 PROCEDURE — C9113 INJ PANTOPRAZOLE SODIUM, VIA: HCPCS | Performed by: INTERNAL MEDICINE

## 2019-06-24 PROCEDURE — 25000003 PHARM REV CODE 250: Performed by: PHYSICIAN ASSISTANT

## 2019-06-24 PROCEDURE — 80202 ASSAY OF VANCOMYCIN: CPT

## 2019-06-24 PROCEDURE — 80053 COMPREHEN METABOLIC PANEL: CPT

## 2019-06-24 PROCEDURE — 25000003 PHARM REV CODE 250: Performed by: FAMILY MEDICINE

## 2019-06-24 PROCEDURE — 63600175 PHARM REV CODE 636 W HCPCS: Performed by: NURSE PRACTITIONER

## 2019-06-24 RX ORDER — PANTOPRAZOLE SODIUM 40 MG/1
40 TABLET, DELAYED RELEASE ORAL 2 TIMES DAILY
Status: DISCONTINUED | OUTPATIENT
Start: 2019-06-24 | End: 2019-06-25 | Stop reason: HOSPADM

## 2019-06-24 RX ADMIN — OXYCODONE HYDROCHLORIDE 5 MG: 5 TABLET ORAL at 08:06

## 2019-06-24 RX ADMIN — PANTOPRAZOLE SODIUM 40 MG: 40 INJECTION, POWDER, FOR SOLUTION INTRAVENOUS at 09:06

## 2019-06-24 RX ADMIN — PIPERACILLIN AND TAZOBACTAM 4.5 G: 4; .5 INJECTION, POWDER, LYOPHILIZED, FOR SOLUTION INTRAVENOUS; PARENTERAL at 09:06

## 2019-06-24 RX ADMIN — PIPERACILLIN AND TAZOBACTAM 4.5 G: 4; .5 INJECTION, POWDER, LYOPHILIZED, FOR SOLUTION INTRAVENOUS; PARENTERAL at 12:06

## 2019-06-24 RX ADMIN — PANTOPRAZOLE SODIUM 40 MG: 40 TABLET, DELAYED RELEASE ORAL at 08:06

## 2019-06-24 RX ADMIN — VANCOMYCIN HYDROCHLORIDE 500 MG: 500 INJECTION, POWDER, LYOPHILIZED, FOR SOLUTION INTRAVENOUS at 02:06

## 2019-06-24 RX ADMIN — COLLAGENASE SANTYL: 250 OINTMENT TOPICAL at 09:06

## 2019-06-24 RX ADMIN — SODIUM CHLORIDE TAB 1 GM 1 G: 1 TAB at 09:06

## 2019-06-24 RX ADMIN — VANCOMYCIN HYDROCHLORIDE 500 MG: 500 INJECTION, POWDER, LYOPHILIZED, FOR SOLUTION INTRAVENOUS at 12:06

## 2019-06-24 RX ADMIN — PIPERACILLIN AND TAZOBACTAM 4.5 G: 4; .5 INJECTION, POWDER, LYOPHILIZED, FOR SOLUTION INTRAVENOUS; PARENTERAL at 04:06

## 2019-06-24 NOTE — PROGRESS NOTES
[facility] Hospital Medicine  Progress Note    Patient Name: Sachin Calloway  MRN: [id number]  Patient Class: IP- Inpatient   Admission Date: 6/10/2019  Length of Stay: 11 days  Attending Physician: Toi Arevalo MD  Primary Care Provider: Juan Gatica MD        Subjective:     Principal Problem:Critical lower limb ischemia      HPI:  Mr. Calloway is a 57 yo man with liver cirrhosis 2/2 former etoh abuse, active tobacco use, PAD with critical lower limb ischemia, PAD, who presented to the hospital for evaluation of critical lower limb ischemia of right foot. He had a right foot ulcer with necrosis. Staged intervention of the RLE PAD was done on 6/10 and 6/12 with placement of balloons. Podiatry and ID were consulted initially, and pt was placed on empiric vanc and zosyn. MRI showed no osteomyelitis. Hgb downtrended to 7.1 and GI was consulted. Hgb was 6.9, and pt was transfused 2 units PRBC. EGD was done 6/18, after which patient had PEA arrest and was intubated. 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 DIPS procedure was done with IR, after which GI recommended surgical evaluation. H&H continued to trend down. Pt required sedation and pressors. Two general surgeons deemed pt not a surgical candidate. Pt was extubated 6/20 and IR was reconsulted. Hospital medicine was consulted on 6/21 for assistance in management of multiple medical issues. Pt received another unit of blood.   Notably, pt's Cr remained stable during admission at around 0.7. His albumin was only 1.4, indicating malnutrition and a poor prognostic factor in the context of the patient's multiple wounds. At 6' pt weighs only 111 lbs, and has been losing weight for the past year and has known esophageal stricture. He also has sacral and hip decubiti. He has a very poor functional status.     Discussed with patient, wife and sister in law in the room regarding multiple  co-morbidities with progressive clinical declined. Patient requested for amputation of the gangrenous right foot. Explained the S/p EGD on 6/17 with severe erosive esophagitis and actively bleeding duodenal varix is not amenable to endoscopic therapy  Discussed goals of care and advanced directive. Patient and family request for family meeting tomorrow 6/22 at 2pm      Overview/Hospital Course:  Pt received one unit of blood 6/21. Family meeting 6/22 - discussed poor prognosis with 20 family members. Pt and wife agreed to DNR status. Pt was transferred to the floor.     Interval History: Pt resting in bed. Feeling well, no complaints. In hunter hugger.     Review of Systems   Respiratory: Negative for cough and shortness of breath.    Cardiovascular: Negative for chest pain and leg swelling.   Gastrointestinal: Negative for abdominal distention and abdominal pain.   Genitourinary: Negative for decreased urine volume and difficulty urinating.   Musculoskeletal: Negative for arthralgias and myalgias.   Skin: Positive for color change and wound.     Objective:     Vital Signs (Most Recent):  Temp: 98.6 °F (37 °C) (06/23/19 1910)  Pulse: 109 (06/23/19 1910)  Resp: (!) 33 (06/23/19 1910)  BP: 135/89 (06/23/19 1910)  SpO2: 100 % (06/23/19 1910) Vital Signs (24h Range):  Temp:  [96.3 °F (35.7 °C)-98.8 °F (37.1 °C)] 98.6 °F (37 °C)  Pulse:  [] 109  Resp:  [14-33] 33  SpO2:  [98 %-100 %] 100 %  BP: (105-145)/(62-89) 135/89     Weight: 50.7 kg (111 lb 12.4 oz)  Body mass index is 15.16 kg/m².    Intake/Output Summary (Last 24 hours) at 6/23/2019 1918  Last data filed at 6/23/2019 1736  Gross per 24 hour   Intake 1274 ml   Output 560 ml   Net 714 ml      Physical Exam   Constitutional: He is oriented to person, place, and time. He appears cachectic. No distress.   Pulmonary/Chest: Effort normal. No respiratory distress.   Musculoskeletal: He exhibits no edema.   Neurological: He is alert and oriented to person, place, and  time.   Skin: Skin is warm and dry. He is not diaphoretic.   Dressing to R foot       Significant Labs:   BMP:   Recent Labs   Lab 06/23/19  0514   *   *   K 3.4*      CO2 25   BUN 7   CREATININE 0.6   CALCIUM 7.0*     CBC:   Recent Labs   Lab 06/22/19  0400  06/22/19  1607 06/23/19  0100 06/23/19  0514   WBC 10.00  --   --   --  9.60   HGB 8.6*   < > 9.3* 8.4* 8.1*   HCT 26.5*   < > 27.8* 25.6* 25.0*   *  --   --   --  133*    < > = values in this interval not displayed.       Significant Imaging: I have reviewed all pertinent imaging results/findings within the past 24 hours.      Assessment/Plan:      * Critical lower limb ischemia  Per primary, cardiology. Currently pt is in need of R BKA, but will not tolerate surgery. Hopefully plan to transition to comfort care soon.     Anemia  Multifactorial in this patient but certainly primarily related to pt's active GI bleed.   -Hgb 7.2 > 9.2 > 8.1 s/p 1 unit PRBC 6/21    Severe protein-calorie malnutrition  Continue to encourage PO. Albumin 1.1, trending down.    Leukocytosis  Resolved 6/22. Continue vanc and zosyn per ID    Debility  Pt with extremely poor functional and nutritional status. Pt currently too unstable for any surgical or otherwise aggressive interventions. Will continue to discuss pt's status with family and patient. Patient is palliative care/hospice candidate.   Pt does not seem to have any insight into his condition. Long discussion with pt/family. Pt now DNR.     Hypokalemia  K 3.4. Replaced IV in pt with poor PO tolerance      VTE Risk Mitigation (From admission, onward)        Ordered     Reason for No Pharmacological VTE Prophylaxis  Once      06/17/19 1353     IP VTE HIGH RISK PATIENT  Once      06/17/19 1353     Place SERGO hose  Until discontinued      06/17/19 1346     Place sequential compression device  Until discontinued      06/17/19 1346          Critical care time spent on the evaluation and treatment of severe  organ dysfunction, review of pertinent labs and imaging studies, discussions with consulting providers and discussions with patient/family: 55 minutes.      Rita Walden PA-C  Department of Hospital Medicine   [facility]

## 2019-06-24 NOTE — PROGRESS NOTES
[facility] Hospital Medicine  Progress Note    Patient Name: Sachin Calloway  MRN: [id number]  Patient Class: IP- Inpatient   Admission Date: 6/10/2019  Length of Stay: 12 days  Attending Physician: Toi Arevalo MD  Primary Care Provider: Juan Gatica MD        Subjective:     Principal Problem:Critical lower limb ischemia      HPI:  Mr. Calloway is a 59 yo man with liver cirrhosis 2/2 former etoh abuse, active tobacco use, PAD with critical lower limb ischemia, PAD, who presented to the hospital for evaluation of critical lower limb ischemia of right foot. He had a right foot ulcer with necrosis. Staged intervention of the RLE PAD was done on 6/10 and 6/12 with placement of balloons. Podiatry and ID were consulted initially, and pt was placed on empiric vanc and zosyn. MRI showed no osteomyelitis. Hgb downtrended to 7.1 and GI was consulted. Hgb was 6.9, and pt was transfused 2 units PRBC. EGD was done 6/18, after which patient had PEA arrest and was intubated. 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 DIPS procedure was done with IR, after which GI recommended surgical evaluation. H&H continued to trend down. Pt required sedation and pressors. Two general surgeons deemed pt not a surgical candidate. Pt was extubated 6/20 and IR was reconsulted. Hospital medicine was consulted on 6/21 for assistance in management of multiple medical issues. Pt received another unit of blood.   Notably, pt's Cr remained stable during admission at around 0.7. His albumin was only 1.4, indicating malnutrition and a poor prognostic factor in the context of the patient's multiple wounds. At 6' pt weighs only 111 lbs, and has been losing weight for the past year and has known esophageal stricture. He also has sacral and hip decubiti. He has a very poor functional status.     Discussed with patient, wife and sister in law in the room regarding multiple  co-morbidities with progressive clinical declined. Patient requested for amputation of the gangrenous right foot. Explained the S/p EGD on 6/17 with severe erosive esophagitis and actively bleeding duodenal varix is not amenable to endoscopic therapy  Discussed goals of care and advanced directive. Patient and family request for family meeting tomorrow 6/22 at 2pm      Overview/Hospital Course:  Pt received one unit of blood 6/21. Family meeting 6/22 - discussed poor prognosis with 20 family members. Pt and wife agreed to DNR status. Pt was transferred to the floor. Plan for dc hospice.     Interval History: Pt resting in bed, feeling okay. No complaints    Review of Systems   Constitutional: Negative for fatigue and fever.   Respiratory: Negative for cough and shortness of breath.    Cardiovascular: Negative for chest pain and leg swelling.   Gastrointestinal: Negative for abdominal pain and nausea.   Genitourinary: Negative for decreased urine volume and difficulty urinating.     Objective:     Vital Signs (Most Recent):  Temp: 97.6 °F (36.4 °C) (06/24/19 1127)  Pulse: 107 (06/24/19 1152)  Resp: 20 (06/24/19 1127)  BP: 116/79 (06/24/19 1127)  SpO2: 98 % (06/24/19 1127) Vital Signs (24h Range):  Temp:  [96.3 °F (35.7 °C)-98.6 °F (37 °C)] 97.6 °F (36.4 °C)  Pulse:  [] 107  Resp:  [18-36] 20  SpO2:  [98 %-100 %] 98 %  BP: (110-135)/(67-89) 116/79     Weight: 50.7 kg (111 lb 12.4 oz)  Body mass index is 15.16 kg/m².    Intake/Output Summary (Last 24 hours) at 6/24/2019 1255  Last data filed at 6/23/2019 1736  Gross per 24 hour   Intake 837 ml   Output 190 ml   Net 647 ml      Physical Exam   Constitutional: No distress.   Pulmonary/Chest: Effort normal. No respiratory distress.   Neurological: He is alert.   Skin: Skin is warm and dry. He is not diaphoretic.   R foot dressed       Significant Labs:   BMP:   Recent Labs   Lab 06/24/19  0525   GLU 76   *   K 3.7      CO2 24   BUN 5*   CREATININE 0.6    CALCIUM 7.1*     CBC:   Recent Labs   Lab 06/23/19  0100 06/23/19  0514 06/24/19  0525   WBC  --  9.60 9.58   HGB 8.4* 8.1* 8.2*   HCT 25.6* 25.0* 24.7*   PLT  --  133* 134*       Significant Imaging: I have reviewed all pertinent imaging results/findings within the past 24 hours.      Assessment/Plan:      * Critical lower limb ischemia  Per primary, cardiology. Currently pt is in need of R BKA, but will not tolerate surgery. Hopefully plan to transition to comfort care soon.     Anemia  Multifactorial in this patient but certainly primarily related to pt's active GI bleed.   -Hgb 7.2 > 9.2 > 8.1 s/p 1 unit PRBC 6/21    Severe protein-calorie malnutrition  Continue to encourage PO. Albumin 1.1, trending down.    Leukocytosis  Resolved 6/22. Continue vanc and zosyn per ID    Debility  Pt with extremely poor functional and nutritional status. Pt currently too unstable for any surgical or otherwise aggressive interventions. Will continue to discuss pt's status with family and patient. Patient is palliative care/hospice candidate.   Pt does not seem to have any insight into his condition. Long discussion with pt/family. Pt now DNR.     Hypokalemia  K 3.4. Replaced IV in pt with poor PO tolerance      VTE Risk Mitigation (From admission, onward)        Ordered     Reason for No Pharmacological VTE Prophylaxis  Once      06/17/19 1353     IP VTE HIGH RISK PATIENT  Once      06/17/19 1353     Place SERGO hose  Until discontinued      06/17/19 1346     Place sequential compression device  Until discontinued      06/17/19 1346                Rita Walden PA-C  Department of Hospital Medicine   [facility]

## 2019-06-24 NOTE — PT/OT/SLP PROGRESS
Physical Therapy      Patient Name:  Sachin Calloway   MRN:  [id number]    Patient not seen today secondary to pt declined, fatigued. Will follow-up .    Janet Vergara, PT   6/24/2019

## 2019-06-24 NOTE — ASSESSMENT & PLAN NOTE
Pt with extremely poor functional and nutritional status. Pt currently too unstable for any surgical or otherwise aggressive interventions. Will continue to discuss pt's status with family and patient. Patient is palliative care/hospice candidate.   Pt does not seem to have any insight into his condition. Long discussion with pt/family. Pt now DNR.

## 2019-06-24 NOTE — ASSESSMENT & PLAN NOTE
Multifactorial in this patient but certainly primarily related to pt's active GI bleed.   -Hgb 7.2 > 9.2 > 8.1 s/p 1 unit PRBC 6/21

## 2019-06-24 NOTE — NURSING TRANSFER
Nursing Transfer Note      6/23/2019     Transfer To: [id number]    Transfer via bed    Transfer with cardiac monitoring    Transported by Padmini RN and James RN    Medicines sent: yes; vanc IVP, protonix IV push    Chart send with patient: Yes    Notified: spouse at bedside with daughter    Patient reassessed at: SHAINA Mcdaniels at bedside to receive pt    Upon arrival to floor: cardiac monitor applied, patient oriented to room, call bell in reach and bed in lowest position

## 2019-06-24 NOTE — SUBJECTIVE & OBJECTIVE
Interval History: Pt resting in bed, feeling okay. No complaints    Review of Systems   Constitutional: Negative for fatigue and fever.   Respiratory: Negative for cough and shortness of breath.    Cardiovascular: Negative for chest pain and leg swelling.   Gastrointestinal: Negative for abdominal pain and nausea.   Genitourinary: Negative for decreased urine volume and difficulty urinating.     Objective:     Vital Signs (Most Recent):  Temp: 97.6 °F (36.4 °C) (06/24/19 1127)  Pulse: 107 (06/24/19 1152)  Resp: 20 (06/24/19 1127)  BP: 116/79 (06/24/19 1127)  SpO2: 98 % (06/24/19 1127) Vital Signs (24h Range):  Temp:  [96.3 °F (35.7 °C)-98.6 °F (37 °C)] 97.6 °F (36.4 °C)  Pulse:  [] 107  Resp:  [18-36] 20  SpO2:  [98 %-100 %] 98 %  BP: (110-135)/(67-89) 116/79     Weight: 50.7 kg (111 lb 12.4 oz)  Body mass index is 15.16 kg/m².    Intake/Output Summary (Last 24 hours) at 6/24/2019 1255  Last data filed at 6/23/2019 1736  Gross per 24 hour   Intake 837 ml   Output 190 ml   Net 647 ml      Physical Exam   Constitutional: No distress.   Pulmonary/Chest: Effort normal. No respiratory distress.   Neurological: He is alert.   Skin: Skin is warm and dry. He is not diaphoretic.   R foot dressed       Significant Labs:   BMP:   Recent Labs   Lab 06/24/19  0525   GLU 76   *   K 3.7      CO2 24   BUN 5*   CREATININE 0.6   CALCIUM 7.1*     CBC:   Recent Labs   Lab 06/23/19  0100 06/23/19  0514 06/24/19  0525   WBC  --  9.60 9.58   HGB 8.4* 8.1* 8.2*   HCT 25.6* 25.0* 24.7*   PLT  --  133* 134*       Significant Imaging: I have reviewed all pertinent imaging results/findings within the past 24 hours.

## 2019-06-24 NOTE — PT/OT/SLP PROGRESS
Physical Therapy      Patient Name:  Sachin Calloway   MRN:  [id number]    Patient not seen at this time secondary to pt being bathed and dressings changed Will follow-up .    Janet Vergara, PT   6/24/2019

## 2019-06-24 NOTE — PROGRESS NOTES
Pharmacokinetic Assessment Follow Up: IV Vancomycin    Vancomycin serum concentration assessment(s):    The trough level was drawned correctly and can be used to guide therapy at this time.    Vancomycin Regimen Plan:    Change regimen to Vancomycin 750 mg IV every 12hour with next serum trough concentration measured at 1230 prior to 4th dose on 6/26     Pharmacy will continue to follow and monitor vancomycin.    Please contact pharmacy at extension [phone] for questions regarding this assessment.    Thank you for the consult,   Merlene Patel     Patient brief summary:  Sachin Calloway is a 58 y.o. male initiated on antimicrobial therapy with IV Vancomycin for treatment of suspected skin & soft tissue    The patient received a loading dose, followed by the current treatment regimen: vancomycin 500 mg IV every 12 hours    Drug Allergies:   Review of patient's allergies indicates:  No Known Allergies    Actual Body Weight:   50kg    Renal Function:   Estimated Creatinine Clearance: 95.1 mL/min (based on SCr of 0.6 mg/dL).,     Dialysis Method (if applicable):  none     CBC (last 72 hours):  Recent Labs   Lab Result Units 06/21/19  1553 06/21/19  2200 06/22/19  0028 06/22/19  0400 06/22/19  0824 06/22/19  1116 06/22/19  1607 06/23/19  0100 06/23/19  0514 06/24/19  0525   WBC K/uL  --   --   --  10.00  --   --   --   --  9.60 9.58   Hemoglobin g/dL 7.2* 8.7* 8.7* 8.6* 9.1* 8.9* 9.3* 8.4* 8.1* 8.2*   Hematocrit % 22.3* 26.6* 26.1* 26.5* 27.6* 26.8* 27.8* 25.6* 25.0* 24.7*   Platelets K/uL  --   --   --  126*  --   --   --   --  133* 134*   Gran% %  --   --   --  80.2*  --   --   --   --  78.6* 73.8*   Lymph% %  --   --   --  10.0*  --   --   --   --  10.5* 12.7*   Mono% %  --   --   --  8.3  --   --   --   --  9.4 11.6   Eosinophil% %  --   --   --  0.9  --   --   --   --  0.8 1.0   Basophil% %  --   --   --  0.3  --   --   --   --  0.3 0.4   Differential Method   --   --   --  Automated  --   --   --   --  Automated  Automated       Metabolic Panel (last 72 hours):  Recent Labs   Lab Result Units 06/22/19  0400 06/23/19  0514 06/24/19  0525   Sodium mmol/L 134* 135* 131*   Potassium mmol/L 3.2* 3.4* 3.7   Chloride mmol/L 107 106 105   CO2 mmol/L 24 25 24   Glucose mg/dL 87 129* 76   BUN, Bld mg/dL 9 7 5*   Creatinine mg/dL 0.6 0.6 0.6   Albumin g/dL 1.2* 1.1* 1.1*   Total Bilirubin mg/dL 0.6 0.5 0.7   Alkaline Phosphatase U/L 233* 209* 176*   AST U/L 61* 43* 32   ALT U/L 81* 59* 49*       Vancomycin Administrations:  vancomycin given in the last 96 hours                     vancomycin 500 mg in dextrose 5 % 100 mL IVPB (ready to mix system) (mg) 500 mg New Bag 06/24/19 1400     500 mg New Bag  0047     500 mg New Bag 06/23/19 1312     500 mg New Bag  0121                      Drug levels (last 3 results):  Recent Labs   Lab Result Units 06/22/19  1116 06/24/19  1320   Vancomycin-Trough ug/mL 11.3 12.9       Microbiologic Results:  Microbiology Results (last 7 days)       Procedure Component Value Units Date/Time    Blood culture [[id number]] Collected:  06/17/19 2247    Order Status:  Completed Specimen:  Blood from Line, Jugular, Internal Right Updated:  06/23/19 1043     Blood Culture, Routine Gram stain aer bottle: Gram positive cocci      Blood Culture, Routine Results called to and read back by:Rebekah Mendez RN 06/19/2019 06:05     Blood Culture, Routine 06/21/2019  Smear review: No organims seen on restain of aer bottle.     Blood Culture, Routine Culture:  ~No growth to date     Blood Culture, Routine Results called to and read back by:Una Lebron RN 06/21/2019  15:47     Blood Culture, Routine No growth after 5 days    Narrative:       Central line    Blood culture [[id number]] Collected:  06/17/19 2247    Order Status:  Completed Specimen:  Blood from Line, Arterial, Right Updated:  06/23/19 0612     Blood Culture, Routine No growth after 5 days.    Narrative:       Arterial line

## 2019-06-24 NOTE — SUBJECTIVE & OBJECTIVE
Interval History: Pt resting in bed. Feeling well, no complaints. In hunter hugger.     Review of Systems   Respiratory: Negative for cough and shortness of breath.    Cardiovascular: Negative for chest pain and leg swelling.   Gastrointestinal: Negative for abdominal distention and abdominal pain.   Genitourinary: Negative for decreased urine volume and difficulty urinating.   Musculoskeletal: Negative for arthralgias and myalgias.   Skin: Positive for color change and wound.     Objective:     Vital Signs (Most Recent):  Temp: 98.6 °F (37 °C) (06/23/19 1910)  Pulse: 109 (06/23/19 1910)  Resp: (!) 33 (06/23/19 1910)  BP: 135/89 (06/23/19 1910)  SpO2: 100 % (06/23/19 1910) Vital Signs (24h Range):  Temp:  [96.3 °F (35.7 °C)-98.8 °F (37.1 °C)] 98.6 °F (37 °C)  Pulse:  [] 109  Resp:  [14-33] 33  SpO2:  [98 %-100 %] 100 %  BP: (105-145)/(62-89) 135/89     Weight: 50.7 kg (111 lb 12.4 oz)  Body mass index is 15.16 kg/m².    Intake/Output Summary (Last 24 hours) at 6/23/2019 1918  Last data filed at 6/23/2019 1736  Gross per 24 hour   Intake 1274 ml   Output 560 ml   Net 714 ml      Physical Exam   Constitutional: He is oriented to person, place, and time. He appears cachectic. No distress.   Pulmonary/Chest: Effort normal. No respiratory distress.   Musculoskeletal: He exhibits no edema.   Neurological: He is alert and oriented to person, place, and time.   Skin: Skin is warm and dry. He is not diaphoretic.   Dressing to R foot       Significant Labs:   BMP:   Recent Labs   Lab 06/23/19  0514   *   *   K 3.4*      CO2 25   BUN 7   CREATININE 0.6   CALCIUM 7.0*     CBC:   Recent Labs   Lab 06/22/19  0400  06/22/19  1607 06/23/19  0100 06/23/19  0514   WBC 10.00  --   --   --  9.60   HGB 8.6*   < > 9.3* 8.4* 8.1*   HCT 26.5*   < > 27.8* 25.6* 25.0*   *  --   --   --  133*    < > = values in this interval not displayed.       Significant Imaging: I have reviewed all pertinent imaging  results/findings within the past 24 hours.

## 2019-06-24 NOTE — PROGRESS NOTES
"[facility]  Adult Nutrition  Progress Note    SUMMARY       Recommendations    1. Add additional Boost Plus to each meet to help pt meet need and for wt gain.   2. Continue to encourage good po intake.  Goals: Pt to meet >/= 75% of EEN/EPN.  Nutrition Goal Status: new  Communication of RD Recs: (plan of care)    Reason for Assessment    Reason For Assessment: RD follow-up  Diagnosis: (critical lower limb ischemia)  Relevant Medical History: seizures, HTN, alcohol abuse, esophagus stricture, cirrhosis, edema  General Information Comments: Pt with improved appetite and intake. Reports drinking supplement well. Consumed 100% of breakfast today. NFPE -severe malnutrition.  Nutrition Discharge Planning: Pt to d/c on a mechanical soft level 5 diet.    Nutrition Risk Screen    Nutrition Risk Screen: large or nonhealing wound, burn or pressure injury    Nutrition/Diet History    Patient Reported Diet/Restrictions/Preferences: general  Food Preferences: no Church or cultural food prefs identified  Spiritual, Cultural Beliefs, Hoahaoism Practices, Values that Affect Care: no  Factors Affecting Nutritional Intake: decreased appetite    Anthropometrics    Temp: 97.6 °F (36.4 °C)  Height Method: Stated  Height: 6' 0.01" (182.9 cm)  Height (inches): 72.01 in  Weight Method: Bed Scale  Weight: 50.7 kg (111 lb 12.4 oz)  Weight (lb): 111.77 lb  Ideal Body Weight (IBW), Male: 178.06 lb  % Ideal Body Weight, Male (lb): 62.77 lb  % Ideal Body Weight Malnutrition: less than 70% -severe deficit  BMI (Calculated): 15.2  BMI Grade: less than 16 protein-energy malnutrition grade III  Usual Body Weight (UBW), k kg  % Usual Body Weight: 47.12  % Weight Change From Usual Weight: -52.98 %    Lab/Procedures/Meds    Pertinent Labs Reviewed: reviewed  Pertinent Labs Comments: Na 131L, BUN 5L, Zi 7.1L, Alb 1.1L  Pertinent Medications Reviewed: reviewed  Pertinent Medications Comments: pantoprazole, " vancomycin    Estimated/Assessed Needs    Weight Used For Calorie Calculations: 50.7 kg (111 lb 12.4 oz)  Energy Calorie Requirements (kcal): 1700 kcal daily  Energy Need Method: (x1.25)  Protein Requirements: 51-61 gm daily  Weight Used For Protein Calculations: 50.7 kg (111 lb 12.4 oz)(1.0-1.2 gm/kg)  Fluid Requirements (mL): 1 mL/kcal or per MD  Estimated Fluid Requirement Method: RDA Method  RDA Method (mL): 1700  CHO Requirement: 175 gm    Nutrition Prescription Ordered    Current Diet Order: Mechanical soft level 5  Oral Nutrition Supplement: Boost Plus, beneprotein, Darrian    Evaluation of Received Nutrient/Fluid Intake    I/O: reviewed  Energy Calories Required: not meeting needs  Protein Required: not meeting needs  Fluid Required: not meeting needs  Comments: LBM 6/23  % Intake of Estimated Energy Needs: 75 - 100 %  % Meal Intake: 75 - 100 %    Nutrition Risk    Level of Risk/Frequency of Follow-up: (f/u 1x/weekly)     Assessment and Plan    Severe protein-calorie malnutrition  Malnutrition in the context of Chronic Illness/Injury     Related to (etiology):  dysphagia     Signs and Symptoms (as evidenced by):  Energy Intake: <50% of estimated energy requirement for several months  Body Fat Depletion: severe depletion of orbitals, triceps and thoracic and lumbar region   Muscle Mass Depletion: severe depletion of temples, clavicle region, scapular region, interosseous muscle and lower extremities   Weight Loss: greater than 20% in 1 year      Interventions:  Commercial beverage     Nutrition Diagnosis Status:  Continues    Monitor and Evaluation    Food and Nutrient Intake: energy intake  Food and Nutrient Adminstration: diet order  Knowledge/Beliefs/Attitudes: food and nutrition knowledge/skill, beliefs and attitudes  Physical Activity and Function: nutrition-related ADLs and IADLs  Anthropometric Measurements: weight, weight change  Biochemical Data, Medical Tests and Procedures: lipid profile, electrolyte  and renal panel, gastrointestinal profile, glucose/endocrine profile, inflammatory profile  Nutrition-Focused Physical Findings: overall appearance     Malnutrition Assessment  Malnutrition Type: chronic illness  Weight Loss (Malnutrition): greater than 20% in 1 year  Energy Intake (Malnutrition): less than or equal to 50% for greater than or equal to 5 days  Fluid Accumulation (Malnutrition): mild   Orbital Region (Subcutaneous Fat Loss): moderate depletion  Upper Arm Region (Subcutaneous Fat Loss): severe depletion  Thoracic and Lumbar Region: severe depletion   Tenriism Region (Muscle Loss): severe depletion  Clavicle Bone Region (Muscle Loss): severe depletion  Clavicle and Acromion Bone Region (Muscle Loss): severe depletion  Scapular Bone Region (Muscle Loss): severe depletion  Patellar Region (Muscle Loss): severe depletion  Anterior Thigh Region (Muscle Loss): severe depletion  Posterior Calf Region (Muscle Loss): severe depletion   Edema (Fluid Accumulation): 2-->mild   Subcutaneous Fat Loss (Final Summary): severe protein-calorie malnutrition  Muscle Loss Evaluation (Final Summary): severe protein-calorie malnutrition  Fluid Accumulation Evaluation: mild    Severe Weight Loss (Malnutrition): greater than 20% in 1 year    Nutrition Follow-Up    RD Follow-up?: Yes

## 2019-06-24 NOTE — PLAN OF CARE
Plan for d/c to home with completed IV antibiotic 6/26.  Plan to return home with family, will discuss final decision with regards to continued home health vs home hospice.  Pt is not a surgical candidate at this time.  Call placed to wife Radhika [phone], unable to leave message but did speak with daughter in law who will relay message.  Pt has hospital bed, joana lift, w/c at home, likely will not need additional equipment at time of d/c.  Will cont to follow and remain available for d/c needs.     06/24/19 0420   Discharge Reassessment   Assessment Type Discharge Planning Reassessment   Do you have any problems affording any of your prescribed medications? No   Discharge Plan A Home with family;Home Health   Discharge Plan B Hospice/home   DME Needed Upon Discharge  none  (pt has hospital bed, joana lift at home)   Patient choice form signed by patient/caregiver N/A   Anticipated Discharge Disposition HospiceHome   Can the patient answer the patient profile reliably? Yes, cognitively intact   How does the patient rate their overall health at the present time? Fair   Describe the patient's ability to walk at the present time. Does not walk or unable to take any steps at all   How often would a person be available to care for the patient? Whenever needed   Number of comorbid conditions (as recorded on the chart) Three   During the past month, has the patient often been bothered by feeling down, depressed or hopeless? No   During the past month, has the patient often been bothered by little interest or pleasure in doing things? No

## 2019-06-24 NOTE — PT/OT/SLP PROGRESS
Occupational Therapy   Treatment    Name: Sachin Calloway  MRN: [id number]  Admitting Diagnosis:  Critical lower limb ischemia  6 Days Post-Op    Recommendations:     Discharge Recommendations: other (see comments)(24 hour care)  Discharge Equipment Recommendations:  other (see comments)(TBD)  Barriers to discharge:  None    Assessment:     Sachin Calloway is a 58 y.o. male with a medical diagnosis of Critical lower limb ischemia.  He presents with performance deficits affecting function are weakness, gait instability, decreased upper extremity function, impaired endurance, impaired balance, decreased lower extremity function, impaired self care skills, impaired functional mobilty, impaired skin.     Rehab Prognosis:  Poor; patient would benefit from acute skilled OT services to address these deficits and reach maximum level of function.       Plan:     Patient to be seen 5 x/week to address the above listed problems via self-care/home management, therapeutic activities, therapeutic exercises  · Plan of Care Expires: 07/20/19  · Plan of Care Reviewed with: patient    Subjective     Pain/Comfort:  · Pain Rating 1: 0/10  · Pain Rating Post-Intervention 1: 0/10    Objective:     Communicated with: nurse prior to session.  Patient found HOB elevated with SCD, angelo catheter, peripheral IV upon OT entry to room.    General Precautions: Standard, aspiration, respiratory, fall   Orthopedic Precautions:(BLE WBAT)   Braces:       Occupational Performance:     Bed Mobility:    · Patient completed Rolling/Turning to Left with  maximal assistance and total assistance  · Patient completed Rolling/Turning to Right with maximal assistance and total assistance     Functional Mobility/Transfers:  · NT; pt declined EOB sitting      AMPAC 6 Click ADL: 11    Treatment & Education:  Pt reports PTA, he stayed in bed mostly and wife was his caregiver.  Pt performed BUE aROM shld flex and chest press.  Pt with poor participation at this  time    Patient left HOB elevated with all lines intact, call button in reach and PCT presentEducation:      GOALS:   Multidisciplinary Problems     Occupational Therapy Goals        Problem: Occupational Therapy Goal    Goal Priority Disciplines Outcome Interventions   Occupational Therapy Goal     OT, PT/OT Ongoing (interventions implemented as appropriate)    Description:  Previous goals discontinued 2/2 change in status  New Goals to be met by: 07/20/2019     Patient will increase functional independence with ADLs by performing:    Feeding with Modified [place].  UE Dressing with Minimal Assistance.  Grooming while seated with Set-up Assistance.  Sitting at edge of bed x15 minutes with Stand-by Assistance.  Increased functional strength to WFL for self care.                         Time Tracking:     OT Date of Treatment: 06/24/19  OT Start Time: 1011  OT Stop Time: 1019  OT Total Time (min): 8 min    Billable Minutes:Therapeutic Activity 8    Michael Pryor OT  6/24/2019

## 2019-06-24 NOTE — PLAN OF CARE
CM met with wife Radhika, states family has agreed on [facility] at time of d/c.  Referral has been sent via [facility], pt to remain for IV antibiotics ending 6/26.      Estrella Ontiveros RN    [phone]

## 2019-06-24 NOTE — PLAN OF CARE
Problem: Adult Inpatient Plan of Care  Goal: Plan of Care Review  Outcome: Ongoing (interventions implemented as appropriate)  Plan of care reviewed with patient and family, understanding verbalized.  Pt remains ST on tele, HR in low 100s to 1-teens. Patient turned q 2 hours.  Bed alarm on, call light in reach, fall precautions in place.

## 2019-06-24 NOTE — PROGRESS NOTES
[facility] Infectious Disease Consult     Primary Team: Cardiology   Consultant Attending: Rosendo  Date of Admit: 6/10/2019    History      Sachin Calloway is a 58 y.o. male with a relevant history of alcoholic liver cirrhosis, HTN, former EtOH abuse, active tobacco use, PAD who presents for peripheral angiogram    The patient presented to [facility] on 6/10/2019 with a primary complaint of non healing right foot wound with doppler showing PAD. Had arthrectomy and PTA on 6/11.     Subsequently had GI bleeding, persistent leukocytosis and coded during an EGD (bradycardia followed by PEA). Then went on to have DIPS but still has slowly downtrending Hb    Interval events/subjective      WBC improved, afebrile, no complaints      Allergies:  Review of patient's allergies indicates:  No Known Allergies    Medications:   In-Hospital Scheduled Medications:   collagenase   Topical (Top) Daily    pantoprazole  40 mg Intravenous BID    piperacillin-tazobactam (ZOSYN) IVPB  4.5 g Intravenous Q8H    sodium chloride  1 g Oral Daily    vancomycin (VANCOCIN) IVPB  500 mg Intravenous Q12H      In-Hospital PRN Medications:  sodium chloride, sodium chloride, sodium chloride, aluminum-magnesium hydroxide-simethicone, Dextrose 10% Bolus, Dextrose 10% Bolus, glucagon (human recombinant), ondansetron, ondansetron, oxyCODONE, polyethylene glycol, simethicone   In-Hospital IV Infusion Medications:     Home Medications:  Prior to Admission medications    Medication Sig Start Date End Date Taking? Authorizing Provider   acetaminophen-codeine 300-30mg (TYLENOL #3) 300-30 mg Tab Take by mouth.   Yes Historical Provider, MD   furosemide (LASIX) 80 MG tablet Take 1 tablet (80 mg total) by mouth once daily. 4/25/19 7/18/20 Yes Juan Gatica MD   pantoprazole (PROTONIX) 40 MG tablet Take 1 tablet (40 mg total) by mouth once daily. 5/13/19 5/12/20 Yes Dillan Salinas MD   potassium chloride SA (K-DUR,KLOR-CON) 20 MEQ tablet Take 1 tablet (20 mEq  total) by mouth once daily. 19  Yes Juan Gatica MD   spironolactone (ALDACTONE) 50 MG tablet Take 1 tablet (50 mg total) by mouth once daily. 5/15/19 8/7/20 Yes Juan Gatica MD     Antibiotics and Day Number of Therapy:  Vanc -  Piptazo -  Vanc dosing currently at 750mg IV q24    Objective   Last 24 Hour Vital Signs:  BP  Min: 110/67  Max: 135/89  Temp  Av.9 °F (36.6 °C)  Min: 96.3 °F (35.7 °C)  Max: 98.6 °F (37 °C)  Pulse  Av.8  Min: 99  Max: 113  Resp  Av  Min: 18  Max: 36  SpO2  Av.8 %  Min: 98 %  Max: 100 %    Lines, Drains, Airway:  Central line     Physical Examination:  Examination  General: well appearing, comfortable   HEENT: extubated   Neck: no thyromegaly, no JVD   Cardiac: no murmurs, pulse regular    Pulmonary/Chest: chest clear, no respiratory distress   GI/Rectal: no organomegaly, no masses, non tender, normal bowel sounds, rectal exam deferred   : deferred   Msk: normal motor screening exam  Vascular: warm peripheries but difficult to assess pulses   Lymph nodes: none palpated  Skin/ Extremities: left side no rash, no pedal edema, but has ischemia of his toes on the left (first second fifth) with a mottled appearance   Right dressed but no proximal tenderness or swelling. Both feet warm to touch but difficult to palpate pulses   Right foot stable appearance of dry gangrene      Neurology/ Mental status: alert oriented     Laboratory:  CBC:   Lab Results   Component Value Date    WBC 9.58 2019    HGB 8.2 (L) 2019     (L) 2019    MCV 85 2019    RDW 16.7 (H) 2019     Estimated Creatinine Clearance: 95.1 mL/min (based on SCr of 0.6 mg/dL).    Microbiology Data:  Negative blood cultures     Vanc trough 19  12, then 20.4 on  then 18.3 on  then 11.3 on     Radiology:  No gas noted in xray of right foot     MRI of right foot shows no osteo    CT abd   No evidence for active contrast extravasation within the GI  tract.  High-density material within the stomach and duodenum may reflect blood products.  Mural thickening of the distal duodenum may reflect focal inflammation.  Given evidence for chronic pancreatitis, possibly secondary.  Diffuse mucosal thickening of the small bowel with wall thickening of the sigmoid colon and rectum may reflect portal enteropathy/colopathy.  Small wedge-shaped hypoattenuating areas in the bilateral renal cortices may reflect sequela of infarcts.  Small bilateral pleural effusions with right lower lobe consolidation.  Small hiatal hernia.  Fluid within the lower esophagus in keeping with gastroesophageal reflux.  Large volume of ascites.  Extensive calcified plaque throughout the abdomen and pelvis.    TSH 2.56 in 6/6/19    Assessment     Sachin Calloway is a 58 y.o. male with alcoholic liver cirrhosis and PAD s/p right foot angioplasty who presented with gangrene of the right foot and leukocytosis. Vancomycin and piptazo have been ongoing.     Now post code during EGD for GI bleed. Had DIPS procedure and paracentesis (no cell count or culture sent). Was on pressors, intubated and with leukocytosis post code.     His leukocytosis has decreased and WBC now normal, he is extubated and no longer on pressors. Right foot and sacral ulcer do not appear to be infected and are stable in appearance. It appears no aggressive measures are being planned based on goals of care discussions.     Recommendations     Gangrene rt foot  - continue piptazo 4.5g q8  - agree with the dose increase for vanc of 500mg q12, trough 15-20   - complete a 14 day course  (end date 6/26/19) of the above antibiotics and then can do 1 week of amoxiclav 875mg po bid for 1 week after irving end date 7/3/19  - based on discussion with primary team, amputation does not seem to be consistent with goals of care, will confirm this with the surgical/podiatry teams    Thank you for this consult. We will sign off.      Kimber  Aleena  Fellow, [facility] Infectious Disease

## 2019-06-24 NOTE — PLAN OF CARE
Problem: Occupational Therapy Goal  Goal: Occupational Therapy Goal  Previous goals discontinued 2/2 change in status  New Goals to be met by: 07/20/2019     Patient will increase functional independence with ADLs by performing:    Feeding with Modified [place].  UE Dressing with Minimal Assistance.  Grooming while seated with Set-up Assistance.  Sitting at edge of bed x15 minutes with Stand-by Assistance.  Increased functional strength to WFL for self care.        Outcome: Ongoing (interventions implemented as appropriate)  Pt with decreased participation.  Rec 24 hour care at discharge

## 2019-06-24 NOTE — ASSESSMENT & PLAN NOTE
Per primary, cardiology. Currently pt is in need of R BKA, but will not tolerate surgery. Hopefully plan to transition to comfort care soon.

## 2019-06-24 NOTE — PLAN OF CARE
Recommendations    1. Add additional Boost Plus to each meet to help pt meet need and for wt gain.   2. Continue to encourage good po intake.  Goals: Pt to meet >/= 75% of EEN/EPN.  Nutrition Goal Status: new

## 2019-06-24 NOTE — CONSULTS
"Consult Note  Palliative Care      Consult Requested By: Toi Arevalo MD  Reason for Consult: End of Life/Hospice    SUBJECTIVE:     History of Present Illness:  Disease Process: End Stage Liver Disease  57 y/o male with hx of alcoholic liver cirrhosis, HTN, former EtOH abuse, active tobacco use, PAD who presents for peripheral angiogram. Has had worsening right dorsal foot ulcer with necrotic appearing tissue and toes. LE arterial doppler with PAD. Denies CP, orthopnea, PND, syncope. Does not ambulate much and received paracentesis recently.    Palliative care has been consulted for end of Life/Hospice    Palliative care met with patient and family to establish rapport and assess patient and family understanding of disease. Family verbalized understanding but patient in denial, stating " I will eat more to kick this disease" questions and concerns addressed. Code status discussed and decision for DNR was made. Goal is to be discharge home with comfort care. Emotional comfort provided.        Past Medical History:   Diagnosis Date    Alcohol abuse     Cirrhosis     Edema     Hypertension     Seizures     Stricture esophagus      Past Surgical History:   Procedure Laterality Date    Angiogram, Lower Arterial, Bilateral Bilateral 6/10/2019    Performed by [facility] at [facility] CATH LAB/EP    Aortogram, Abdominal N/A 6/10/2019    [facility] by [facility] CATH LAB/EP    Atherectomy, Peripheral Blood Vessel N/A 6/11/2019    Performed by [facility] CATH LAB/EP    COLONOSCOPY      COLONOSCOPY N/A 6/17/2019    Performed by Juan Sutton MD at [facility] ENDO    COLONOSCOPY N/A 5/5/2015    Performed by Mario Martínez MD at [facility] ENDO    EGD (ESOPHAGOGASTRODUODENOSCOPY) N/A 6/17/2019    Performed by Juan Sutton [facility] at [facility] ENDO    EGD (ESOPHAGOGASTRODUODENOSCOPY) N/A 5/13/2019    Performed by Dillan Salinas MD at [facility] ENDO    EGD with dilitation  03/04/2019 "    EMBOLIZATION, BLOOD VESSEL N/A 6/18/2019    Performed by [facility] OR    ESOPHAGOGASTRODUODENOSCOPY (EGD) N/A 3/29/2019    Per[facility] by [facility]an [facility] at [facility] ENDO    ESOPHAGOGASTRODUODENOSCOPY (EGD) N/A 3/18/2019    Performed by Omer Pritchard MD at [facility] ENDO    ESOPHAGOGASTRODUODENOSCOPY (EGD) N/A 3/4/2019    Performed by Madhu Schmidt MD at [facility] ENDO    ESOPHAGOGASTRODUODENOSCOPY (EGD) N/A 5/5/2015    Performed by Mario Martínez [facility] at [facility] ENDO    Filter Protection, Peripheral  6/11/2019    Performed by [facility] Gonzales [facility] CATH LAB/EP    PTA, PERIPHERAL VESSEL N/A 6/11/2019    Performed by [facility] Gonzales [facility] CATH LAB/EP    PTA, Peroneal  6/11/2019    Performed by [facility] Gonzales [facility] CATH LAB/EP    PTA, Superficial Femoral Artery  6/11/2019    Performed by [facility] Gonzales [facility] at [facility] CATH LAB/EP    ULTRASOUND, INTRAVASCULAR N/A 6/11/2019    Performed by [facility] Gonzales [facility] at [facility] CATH LAB/EP     Family History   Problem Relation Age of Onset    Cancer Father      Social History     Tobacco Use    Smoking status: Current Every Day Smoker     Packs/day: 0.50     Years: 43.00     Pack years: 21.50     Types: Cigarettes     Start date: 1976    Smokeless tobacco: Never Used    Tobacco comment: Pt enrolled in [employer].  Ambulatory referral to Smoking Cessation program.   Substance Use Topics    Alcohol use: Yes     Comment: 6 pack every day (3-4)    Drug use: No       Mental Status: Oriented x3    ECOG Performance Status Grade: 3 - Confined to bed or chair 50% of waking hours    Review of Systems:  Constitutional: no fever , no chills  Respiratory: no cough  Cardiovascular: no chest pain  Gastrointestinal: no pain, positive for distension  Musculoskeletal: positive for wound    OBJECTIVE:     Pain Assessment: No pain reported at this time    Decision-Making Capacity: Patient answered questions, Family answered questions    Advanced  "Directives:  Living Will: Yes. Copy on chart: Yes  Do Not Resuscitate Status: Yes  Medical Power of : Yes. Agent's Name: spouse. Agent's Contact Number:   Registered Organ Donor: No    Living Arrangements: Lives with spouse    Psychosocial, Spiritual, Cultural:  Patient's most important priorities:  Wants to fight this disease    Patient's biggest concerns/fears:  Getting worse    Previous death/end of life care history:  "Recently lost dad"    Patient's goals/hopes:  To go home    ASSESSMENT/PLAN:       Recommendations:  Transition to comfort care  Code status: DNR  Patient and family discharge option is home with hospice      Isidra Crane MD, MPH  Palliative Medicine  [facility]         [phone]        > 50% of 35 min visit spent in chart review, face to face discussion with family, symptom assessment, coordination of care and emotional support    "

## 2019-06-25 VITALS
RESPIRATION RATE: 20 BRPM | HEIGHT: 72 IN | TEMPERATURE: 99 F | BODY MASS INDEX: 15.14 KG/M2 | HEART RATE: 113 BPM | DIASTOLIC BLOOD PRESSURE: 61 MMHG | WEIGHT: 111.75 LBS | SYSTOLIC BLOOD PRESSURE: 96 MMHG | OXYGEN SATURATION: 89 %

## 2019-06-25 LAB
ALBUMIN SERPL BCP-MCNC: 1 G/DL (ref 3.5–5.2)
ALP SERPL-CCNC: 147 U/L (ref 55–135)
ALT SERPL W/O P-5'-P-CCNC: 37 U/L (ref 10–44)
ANION GAP SERPL CALC-SCNC: 2 MMOL/L (ref 8–16)
AST SERPL-CCNC: 26 U/L (ref 10–40)
BASOPHILS # BLD AUTO: 0.06 K/UL (ref 0–0.2)
BASOPHILS NFR BLD: 0.6 % (ref 0–1.9)
BILIRUB SERPL-MCNC: 0.4 MG/DL (ref 0.1–1)
BUN SERPL-MCNC: 5 MG/DL (ref 6–20)
CALCIUM SERPL-MCNC: 7 MG/DL (ref 8.7–10.5)
CHLORIDE SERPL-SCNC: 106 MMOL/L (ref 95–110)
CO2 SERPL-SCNC: 24 MMOL/L (ref 23–29)
CREAT SERPL-MCNC: 0.5 MG/DL (ref 0.5–1.4)
DIFFERENTIAL METHOD: ABNORMAL
EOSINOPHIL # BLD AUTO: 0.2 K/UL (ref 0–0.5)
EOSINOPHIL NFR BLD: 1.9 % (ref 0–8)
ERYTHROCYTE [DISTWIDTH] IN BLOOD BY AUTOMATED COUNT: 16.9 % (ref 11.5–14.5)
EST. GFR  (AFRICAN AMERICAN): >60 ML/MIN/1.73 M^2
EST. GFR  (NON AFRICAN AMERICAN): >60 ML/MIN/1.73 M^2
GLUCOSE SERPL-MCNC: 103 MG/DL (ref 70–110)
HCT VFR BLD AUTO: 17.5 % (ref 40–54)
HGB BLD-MCNC: 5.7 G/DL (ref 14–18)
LYMPHOCYTES # BLD AUTO: 1.4 K/UL (ref 1–4.8)
LYMPHOCYTES NFR BLD: 14.9 % (ref 18–48)
MCH RBC QN AUTO: 28.1 PG (ref 27–31)
MCHC RBC AUTO-ENTMCNC: 32.6 G/DL (ref 32–36)
MCV RBC AUTO: 86 FL (ref 82–98)
MONOCYTES # BLD AUTO: 1.1 K/UL (ref 0.3–1)
MONOCYTES NFR BLD: 11.6 % (ref 4–15)
NEUTROPHILS # BLD AUTO: 6.6 K/UL (ref 1.8–7.7)
NEUTROPHILS NFR BLD: 71 % (ref 38–73)
PLATELET # BLD AUTO: 155 K/UL (ref 150–350)
PMV BLD AUTO: 9 FL (ref 9.2–12.9)
POTASSIUM SERPL-SCNC: 4.1 MMOL/L (ref 3.5–5.1)
PROT SERPL-MCNC: 3.7 G/DL (ref 6–8.4)
RBC # BLD AUTO: 2.03 M/UL (ref 4.6–6.2)
SODIUM SERPL-SCNC: 132 MMOL/L (ref 136–145)
WBC # BLD AUTO: 9.39 K/UL (ref 3.9–12.7)

## 2019-06-25 PROCEDURE — 85025 COMPLETE CBC W/AUTO DIFF WBC: CPT

## 2019-06-25 PROCEDURE — 25000003 PHARM REV CODE 250: Performed by: PHYSICIAN ASSISTANT

## 2019-06-25 PROCEDURE — 25000003 PHARM REV CODE 250: Performed by: STUDENT IN AN ORGANIZED HEALTH CARE EDUCATION/TRAINING PROGRAM

## 2019-06-25 PROCEDURE — 63600175 PHARM REV CODE 636 W HCPCS: Performed by: NURSE PRACTITIONER

## 2019-06-25 PROCEDURE — 80053 COMPREHEN METABOLIC PANEL: CPT

## 2019-06-25 PROCEDURE — 25000003 PHARM REV CODE 250: Performed by: NURSE PRACTITIONER

## 2019-06-25 PROCEDURE — 63600175 PHARM REV CODE 636 W HCPCS: Performed by: STUDENT IN AN ORGANIZED HEALTH CARE EDUCATION/TRAINING PROGRAM

## 2019-06-25 RX ORDER — OXYCODONE HYDROCHLORIDE 5 MG/1
5 TABLET ORAL EVERY 4 HOURS PRN
Qty: 30 TABLET | Refills: 0 | Status: SHIPPED | OUTPATIENT
Start: 2019-06-25

## 2019-06-25 RX ADMIN — PIPERACILLIN AND TAZOBACTAM 4.5 G: 4; .5 INJECTION, POWDER, LYOPHILIZED, FOR SOLUTION INTRAVENOUS; PARENTERAL at 03:06

## 2019-06-25 RX ADMIN — OXYCODONE HYDROCHLORIDE 5 MG: 5 TABLET ORAL at 10:06

## 2019-06-25 RX ADMIN — PANTOPRAZOLE SODIUM 40 MG: 40 TABLET, DELAYED RELEASE ORAL at 10:06

## 2019-06-25 RX ADMIN — VANCOMYCIN HYDROCHLORIDE 750 MG: 750 INJECTION, POWDER, LYOPHILIZED, FOR SOLUTION INTRAVENOUS at 12:06

## 2019-06-25 RX ADMIN — COLLAGENASE SANTYL: 250 OINTMENT TOPICAL at 10:06

## 2019-06-25 NOTE — PLAN OF CARE
CM notified after hours by [facility]'s representative of family meeting to sign admission paperwork this afternoon.  Plan remains for pt to continue IV antibiotics ending 6/26 and return home with hospice.    Estrella Ontiveros RN    [phone]

## 2019-06-25 NOTE — PLAN OF CARE
CM notified by Dr Allison pt does not need to complete IV antibiotics prior to d/c.  Pt can d/c home today.  Will arrange early hospice meeting with family and transport home once complete.    0830  Ashely with [facility] will meet with family at 9:00, aware pt is ready for d/c today.      Estrella Ontiveros RN    [phone]

## 2019-06-25 NOTE — NURSING
Patient had a 17 beat run of V Tach. BP 94/58 P 106 R 18. Patient in bed asleep. Asymptomatic. Dr Allison notified. No orders received. Will continue to monitor patient.

## 2019-06-25 NOTE — CARE UPDATE
[facility]  Department of Hospital Medicine  [address] [place] [address]  [phone] after hours  [phone] fax    HOSPICE  ORDERS    06/25/2019    Admit to Hospice:  [facility]  Diagnoses:   Active Hospital Problems    Diagnosis  POA    *Critical lower limb ischemia [I99.8]  Yes    Palliative care encounter [Z51.5]  Not Applicable    Goals of care, counseling/discussion [Z71.89]  Not Applicable    Counseling regarding advanced care planning and goals of care [Z71.89]  Not Applicable    Hypokalemia [E87.6]  No    Debility [R53.81]  Yes    Gastrointestinal hemorrhage [K92.2]  Yes    Lactic acidosis [E87.2]  Yes    Leukocytosis [D72.829]  Yes    Hypocalcemia [E83.51]  Yes    Cardiac arrest [I46.9]  Yes    Severe protein-calorie malnutrition [E43]  Yes    Hypoalbuminemia [E88.09]  Yes    Gangrene of right foot [I96]  Yes    Sacral decubitus ulcer [L89.159]  Yes    Pressure ulcer of left hip, stage 3 [L89.223]  Yes    Anasarca [R60.1]  Yes    Alcoholic cirrhosis of liver with ascites [K70.31]  Yes    Anemia [D64.9]  Yes      Resolved Hospital Problems   No resolved problems to display.       Hospice Qualifying Diagnoses:         Patient has a life expectancy < 6 months due to:  Primary Hospice Diagnosis:  End stage Liver disease, recurrent infection   Vital Signs: Routine per Hospice Protocol.    Code Status: DNR    Allergies: Review of patient's allergies indicates:  No Known Allergies    Diet: Regular    Activities: As tolerated    Nursing: Per Hospice Routine.        Routine Skin for Bedridden Patients: Apply moisture barrier cream to all skin folds and   wet areas in perineal area daily and after baths and all bowel movements.       Nursing to apply betadine wet to dry gauze dressing between the toes, xeroform gauze to the dorsal foot followed by dry gauze secured with kerlix and tape daily.)    Clean left hip and sacral  wound with wound cleanser. Apply barrier film to jyothi wounds, apply t santyl to wound beds, cover with gauze and foam border dressing, daily and as needed.       Sachin Calloway   Home Medication Instructions SHYANNE:[id number]    Printed on:06/25/19 0995   Medication Information                      Oxycodone 5 mg Q 4 hr PRN, pain              furosemide (LASIX) 80 MG tablet  Take 1 tablet (80 mg total) by mouth once daily.             pantoprazole (PROTONIX) 40 MG tablet  Take 1 tablet (40 mg total) by mouth once daily.             potassium chloride SA (K-DUR,KLOR-CON) 20 MEQ tablet  Take 1 tablet (20 mEq total) by mouth once daily.             spironolactone (ALDACTONE) 50 MG tablet  Take 1 tablet (50 mg total) by mouth once daily.                   Future Orders:  Hospice Medical Director may dictate new orders for comfortable care measures & sign death certificate.        _________________________________  Manny Miller NP  06/25/2019

## 2019-06-25 NOTE — PLAN OF CARE
Plan of care      All labs and antibiotics are discontinued  DC home today with hospice          Thanks        ZN

## 2019-06-25 NOTE — PLAN OF CARE
Problem: Adult Inpatient Plan of Care  Goal: Plan of Care Review  Outcome: Ongoing (interventions implemented as appropriate)  Plan of care reviewed with patient.Sinus tach on continuous heart monitor (low 100's), no true red alarms. Safety maintained at this time. Bed in lowest position, side rails up x 2. Call light in reach. Encouraged patient to use call light for assistance. Verbalized understanding, Spouse at the bedside. Patient receiving IV autobiotic, Tolerating well. Dressings to BLE wounds C/D/I. 15 F angelo in place draining clear, yellow, urine. VSS. Will continue to monitor patient.

## 2019-06-25 NOTE — PT/OT/SLP PROGRESS
Physical Therapy      Patient Name:  Sachin Calloway   MRN:  [id number]    Patient not seen today secondary to discharge to hospice. Will follow-up .    Janet Vergara, PT   6/25/2019

## 2019-06-25 NOTE — PLAN OF CARE
Problem: Adult Inpatient Plan of Care  Goal: Plan of Care Review  Pt on respiratory interventions as documented. No distress, SOB or increase in WOB noted at this time. Will continue to monitor.

## 2019-06-25 NOTE — PT/OT/SLP PROGRESS
Occupational Therapy  Discharge Note    Patient Name:  Sachin Calloway   MRN:  [id number]    Patient not seen today secondary to  discharge to hospice.     Michael Pryor OT  6/25/2019

## 2019-06-25 NOTE — PLAN OF CARE
Patient discharge instructions given and reviewed. Med rec reviewed with patient. Patient verbalized understanding. Education provided on new medication, Central line removed.  Pressure held.  Tip intact. Pt tolerated well.  Tele removed.  [facility] here to transfer pt home w/hospice.

## 2019-07-03 LAB — FUNGUS SPEC CULT: NORMAL

## 2019-07-29 LAB
ACID FAST MOD KINY STN SPEC: NORMAL
MYCOBACTERIUM SPEC QL CULT: NORMAL

## 2019-09-03 ENCOUNTER — TELEPHONE (OUTPATIENT)
Dept: GASTROENTEROLOGY | Facility: CLINIC | Age: 59
End: 2019-09-03

## 2019-09-05 ENCOUNTER — TELEPHONE (OUTPATIENT)
Dept: GASTROENTEROLOGY | Facility: CLINIC | Age: 59
End: 2019-09-05

## 2019-09-12 ENCOUNTER — TELEPHONE (OUTPATIENT)
Dept: GASTROENTEROLOGY | Facility: CLINIC | Age: 59
End: 2019-09-12

## 2019-10-04 ENCOUNTER — TELEPHONE (OUTPATIENT)
Dept: GASTROENTEROLOGY | Facility: CLINIC | Age: 59
End: 2019-10-04

## 2019-10-04 NOTE — TELEPHONE ENCOUNTER
Pt was last seen by Dr Sutton was in depot under Dr Salinas. Pls review and advise TKS    Spoke to Dr Sutton Nurse today will review order and chg from Dr Salinas to Dr Sutton.

## 2021-02-18 NOTE — TELEPHONE ENCOUNTER
----- Message from Amadeo Sheriff sent at 5/15/2019  3:20 PM CDT -----  Contact: lian from [facility] [phone]  Caller needed to verify the wound care orders he is billing for are okay and need to know if the patient is still supposed to be taking the spironolactone (ALDACTONE) 50 MG tablet. Please call.     Pulmonary emphysema, unspecified emphysema type

## 2021-07-01 ENCOUNTER — PATIENT MESSAGE (OUTPATIENT)
Dept: ADMINISTRATIVE | Facility: OTHER | Age: 61
End: 2021-07-01

## 2021-08-18 ENCOUNTER — TELEPHONE (OUTPATIENT)
Dept: ADMINISTRATIVE | Facility: HOSPITAL | Age: 61
End: 2021-08-18

## 2022-02-26 NOTE — ASSESSMENT & PLAN NOTE
Betadine wet to dry dressing daily per nursing. Moderate amount of serous drainage was noted concerning for diabetic wet gangrene. Due to large amount of tissue loss patient is high risk for infection and would recommend ID consultation. X-ray right foot ordered to assess for gas or bony destructive changes. Post revascularization recommend observation to further assess zone of demarcation prior to any definitive amputation.   <-------- Click here to INCLUDE CoVID-19 Discharge Instructions

## 2022-10-31 NOTE — ASSESSMENT & PLAN NOTE
-Podiatry and ID on board  -case discussed with Podiatry with initial plans for TMA once demarcation occurs per review of note; ongoing discussions with Podiatry with high risk for limb loss and deferment for now given acute decompensation   -initial blood cultures 6/12 with NGTD; repeat blood cultures yesterday; continue  IV Vanc and Zosyn; WBC up to 37K this AM      Voicemail unable to take messages

## 2024-01-01 NOTE — PROGRESS NOTES
Dr. Villavicencio was notified of calcium, mag and Cr. BUN function. Also no UO after the bolus.    Statement Selected

## 2024-03-19 NOTE — TRANSFER OF CARE
Anesthesia Transfer of Care Note    Patient: Sachin Calloway    Procedure(s) Performed: Procedure(s) (LRB):  EMBOLIZATION, BLOOD VESSEL (N/A)    Patient location: ICU    Anesthesia Type: general    Transport from OR: Transported from OR intubated on 100% O2 by AMBU with adequate controlled ventilation. Continuous ECG monitoring in transport. Continuous SpO2 monitoring in transport. Continuos invasive BP monitoring in transport. Continuous CVP monitoring in transport    Post pain: adequate analgesia    Post assessment: no apparent anesthetic complications and tolerated procedure well    Post vital signs: stable    Level of consciousness: sedated    Nausea/Vomiting: no nausea/vomiting    Complications: none    Transfer of care protocol was followed      Last vitals:   Visit Vitals  /75   Pulse 92   Temp 36.5 °C (97.7 °F)   Resp (!) 22   Ht 6' (1.829 m)   Wt 50.7 kg (111 lb 12.4 oz)   SpO2 100%   BMI 15.16 kg/m²     
Oriented - self; Oriented - place; Oriented - time

## 2024-04-30 NOTE — PLAN OF CARE
Orthopaedic Consult:    Date of Consult:  4/30/2024     Requesting Provider:  No ref. provider found     Consulting Provider:  Narinder Sparrow DO    REASON FOR CONSULTATION:    Chief Complaint   Patient presents with    Arm Injury       HISTORY OF THE PRESENT ILLNESS:  Grey Anderson is a 9 year old male who was seen in the emergency room.  His mother and father were at bedside.  The patient was playing soccer at school when he fell on his outstretched right wrist.  He noticed immediate pain and deformity.  He was sent by the school to urgent Care which x-rayed the wrist and found a nearly 100 percent personally displaced Salter-Yao 2 fracture of the right distal radius.  He was sent to the emergency room for reduction.  I saw him in the emergency room, again with parents at bedside.  He was complaining of pain in the wrist.  He denies numbness or tingling.  He denied any other injury.  There is no problem list on file for this patient.        Current Facility-Administered Medications   Medication    ketamine (KETALAR) injection 82 mg     Current Outpatient Medications   Medication Sig    albuterol (VENTOLIN) (2.5 MG/3ML) 0.083% nebulizer solution NEBULIZE 1 VIAL EVERY 4 HOURS AS NEEDED. INDICATIONS: ASTHMA    budesonide-formoterol (SYMBICORT) 160-4.5 MCG/ACT inhaler INHALE 2 PUFFS BY MOUTH EVERY 12 HOURS (RINSE MOUTH AFTER USE). INDICATIONS: ASTHMA    Lactobacillus (Probiotic Childrens) Chew Tab Once nightly..    Pediatric Multivit-Minerals-C (Childrens Multivitamin) 60 MG Chew Tab     ondansetron (ZOFRAN) 4 MG/5ML solution Take 2.5 mLs by mouth 2 times daily as needed for Nausea. (Patient not taking: Reported on 4/30/2024)    Cetirizine HCl 5 MG/5ML Syrup Take 1/4 tsp once to twice daily for inflammation from insect bites (Patient not taking: Reported on 4/30/2024)    amoxicillin-clavulanate (AUGMENTIN) 250-62.5 MG/5ML suspension Take 4ml twice daily for 7 days    hydrocortisone (CORTIZONE) 1 % cream Apply  Pt has been accepted by [facility] for home care today.  Discussed with wife, pt to transport home via ambulance at noon.  No additional needs, pt has all equipment at home that will be exchanged at a later time by hospice.         06/25/19 0943   Final Note   Assessment Type Final Discharge Note   Anticipated Discharge Disposition   (Home Hospice)   Hospital Follow Up  Appt(s) scheduled? Yes   Discharge plans and expectations educations in teach back method with documentation complete? Yes   Right Care Referral Info   Post Acute Recommendation Other       Estrella Ontiveros RN    [phone]   thin layer to skin rash or bites twice daily as needed (Patient not taking: Reported on 4/30/2024)       ALLERGIES:  No Known Allergies     PAST MEDICAL HISTORY:  There is no previous medical history on file.    PAST SURGICAL HISTORY:  There is no previous surgical history on file.    No family history on file.    Social History     Socioeconomic History    Marital status: Single     Spouse name: Not on file    Number of children: Not on file    Years of education: Not on file    Highest education level: Not on file   Occupational History    Not on file   Tobacco Use    Smoking status: Never    Smokeless tobacco: Not on file   Substance and Sexual Activity    Alcohol use: Not on file    Drug use: Not on file    Sexual activity: Not on file   Other Topics Concern    Not on file   Social History Narrative    Not on file     Social Determinants of Health     Financial Resource Strain: Not on file   Food Insecurity: Not on file   Transportation Needs: Not on file   Physical Activity: Not on file   Stress: Not on file   Social Connections: Not on file   Interpersonal Safety: Not on file          REVIEW OF SYSTEMS:  Constitutional:  Denies fevers, chills, or sweats.  Skin:  No rashes, ecchymosis, or ulcerations.  Eyes:  No change in visual acuity.  HEENT:  Denies nasal congestion or sore throat.  Cardiovascular:  Without chest pain, palpitations, syncope.  Respiratory:  Without cough, sputum, dyspnea.  Gastrointestinal:  Denies abdominal pain, nausea, or vomiting.  Genitourinary:  Denies nocturia, urgency, dysuria, frequency.  Musculoskeletal:  See HPI (History of Present Illness).  Neurologic:  No headaches, numbness, tingling, weakness.  Hematologic:  No history of DVT/PE's (deep vein thrombosis/pulmonary emboli), or bleeding diathesis.    ROS and PFSH reviewed with patient.    PHYSICAL EXAM:  Vital Signs:    Vitals:    04/30/24 1827 04/30/24 1832 04/30/24 1837 04/30/24 1842   BP: (!) 133/91 (!) 141/92 (!) 134/90 (!)  126/78   BP Location:   RUE - Right upper extremity LUE - Left upper extremity   Patient Position:  Sitting/High-Evans's Sitting/High-Evans's Sitting/High-Evans's   Pulse: 75 85 92 104   Resp: (!) 16 24 23 25   Temp:   99.9 °F (37.7 °C)    TempSrc:   Oral    SpO2: 100% 100% 99% 99%   Weight:         Constitutional:  Patient is pleasant, awake, alert, and oriented x3 and in no acute distress.  Integument:  Warm.  Dry.  No erythema.  No rash.    HENT:  Normocephalic.  Atraumatic.  Bilateral external ears normal.  Oropharynx moist. No oral exudates.  Nose normal.   Neck:  Normal range of motion.  No tenderness.  Supple.  Trachea midline.    Eyes:  Eyes exhibit normal tracking, extraocular motions intact, pupils equal, round and reactive to accommodation.   Cardiovascular:  Regular heart rate and rhythm.  No audible murmurs, rubs or gallops.   Respiratory:  Respirations are within normal limits, symmetric chest rise, no wheezes or retractions.   Gastrointestinal:  Soft.  No tenderness.  No masses.  No pulsatile masses. No rebound, guarding, or rigidity.   Neurologic:  Alert and oriented x3.  Normal motor function.  Normal sensory function. No focal deficits noted.      Musculoskeletal:    The patient is a healthy, well-nourished appearing 9 year old male.  Examination of the bilateral upper and lower extremities is negative for injury except for the right wrist which has an obvious dorsal deformity.  Skin is clean, dry, and intact.  Mild swelling, no ecchymosis.  Sensation is intact all fingers.  He does have some motion of all fingers, but very limited by pain.  Good capillary refill to all fingers.    LABORATORY:  No results found for: \"SODIUM\", \"POTASSIUM\", \"BUN\", \"CREATININE\", \"WBC\", \"HCT\", \"HGB\", \"PLT\", \"INR\", \"PTT\", \"GLUCOSE\", \"TSH\", \"BILIRUBIN\", \"DBIL\", \"AST\", \"CRP\", \"LIPASE\", \"AMYLASE\", \"ALT\", \"NH3\", \"LACTA\", \"PT\", \"STWBC\"    RADIOLOGY:  X-rays:  I personally reviewed x-rays of the right wrist taken in the  urgent care today.  The patient has a Salter-Yao 2 fracture of the right distal radius with almost 100 percent dorsal displacement and about 20 degrees of dorsal angulation.    ASSESSMENT:   Displaced Salter-Yao 2 fracture of the right distal radius.    RECOMMENDATIONS:  After discussion with the patient and his parents, decision was made to proceed with closed reduction.  The risks, benefits, potential complications were discussed with the family.  Verbal consent was obtained.  An attempt was made to provide a hematoma block with 5 milliliters of 1 percent lidocaine using sterile technique.  A partial block was obtained, but not sufficient for  Reduction, so procedural sedation was provided by the emergency room physician.  A closed reduction maneuver was performed, resulting in significantly improved alignment of the wrist.  The patient was placed in a well-padded, well-molded sugar-tong splint.  He tolerated this well.  Postreduction x-rays show a near anatomic reduction of the right distal radius.  Postreduction exam shows he remains neurovascularly intact.      His family will follow up in my office in 3 days.      Narinder Sparrow DO  4/30/2024  6:44 PM

## 2025-02-05 NOTE — PROGRESS NOTES
Problem: Sleep Disturbance  Goal: Adequate Sleep/Rest  Outcome: Progressing     Patient slept for approximately 7 hours overnight. During this time, his eyes appeared to be closed, and his breathing was even and unlabored. There were no signs of pain or discomfort, and the patient did not express any complaints. Safety rounds were conducted every 15 minutes throughout the night without any incidents, and no behavioral concerns were observed. Patient appeared to rest comfortably, with no changes in their condition. Staff will continue to monitor and provide support as needed.   [facility]  Cardiology  Progress Note    Patient Name: Sachin Calloway  MRN: [id number]  Admission Date: 6/10/2019  Hospital Length of Stay: 8 days  Code Status: Prior   Attending Physician: Toi Arevalo MD   Primary Care Physician: Juan Gatica MD  Expected Discharge Date:   Principal Problem:Critical lower limb ischemia    Subjective:     Hospital Course:   6/10/2019 Admitted for elective LE angiogram due to nonhealing wound to right foot. Taken to cath lab for the procedure via right radial access with following results:    · Severe bilateral PAD with CLI of right foot  · On the right there is an ostial SFA  which is calcified and reconstitutes via profunda collaterals distally. There is single vessel runoff via peroneal which has a severe proximal stenosis and distally supplies collaterals to a plantar arch. No identifiable DP, although there is a proximal portion of AT  · On the left there is a severe stenosis at proximal SFA with mid SFA  with reconstitution distally. There is two vessel runoff via AT into a DP and peroneal which supplies collaterals to a plantar arch  · Staged intervention of LSFA and peroneal first. Will then consider staged intervention of RATA and if unsuccessful will consider AV flow reversal     6/11/2019 NPO for RLE intervention today   6/12/2019 Taken to cath lab yesterday for intervention via LCFA access with following results:    · LCFA antegrade access with crossover sheath used  · Successful RSFA  intervention with atherectomy followed by IVUS guided PTA with 4.0 balloon in POP and mid to distal SFA and 5.0 balloon in ostial and proximal SFA. This was followed by PTA with DCB to POP/mid and distal SFA with 4.0 balloon and 5.0 balloon ostial/mid SFA all with excellent results  · Successful PTA to proximal peroneal with 3.0 x 60 balloon with excellent result  · Will likely need staged intervention of AT/DP and if unsuccessful, AV flow  reversal  · Plavix/ASA/statin  · Continue aggressive wound care and management per Podiatry    Tolerated procedure well and recovered on telemetry unit. LCFA access site with no active bleeding, hematoma or ecchymosis. Podiatry consulted with plans for surgery once demarcation occurs. ID consulted with recs for blood cultures along with empiric antibiotics with IV Vanc and Zosyn   6/13/2019 H&H 7.7 & 24.3 with WBC up to 16.11 from 15.17. BMP with unchanged creatinine. Blood cultures with prelim results NGTD. HR and BP stable. Dietian consulted with recs noted-will liberalize diet and order supplements. Remains on IV Vanc and Zosyn for now. PT/OT on board   6/14/2019 WBC down slightly to 15.4 this AM. H&H down slightly to 7.1&22.8. BP stable. Episode of ST with HR up to 140s this AM nonsustained and patient asymptomatic. ASA discontinued. Will consult GI due to downtrending H&H   6/15/2019 PAD with CLI requiring intervention and likely surgery. Continue medical management. No intervention planned until patient more stable. Continue antibiotics for gangrene and possible Osteo-blood cultures with NGTD. ID and podiatry following. Anemia. GI onboard and plan EGD/Colonscopy Monday. hgb 7.3 today. Transfuse if <7.   6/16/2019 Remains on IV abx currently. MRI with no evidence of osteomyelitis. ID and podiatry following. H&H down to 6.9 & 22.0 will transfuse with 2units PRBCs. Plan for EGD/Colonscopy Monday 6/17/2019 WBCs up to 18K this AM. H&H improved to 10.4&32.0 this AM. BMP stable. NPO for EGD/colonoscopy today   6/18/2019 PEA arrest yesterday post EGD with ROSC after CPR and a few rounds of CPR. Given additional 2 units of PRBCs in Endoscopy given findings on EGD and based on discussion with GI. Intubated per anesthesia and admitted to ICU with SBP 120s-130s upon arrival. Post admission more alert and pulling at ETT therefore placed on Precedex drip. BP trended down to 90s with initiation of Neosynephrine drip. Lactic  acid 4.5 and repeat H&H 15.4&46.0 likely erroneous immediately after PRBC. Serial H&H overnight with H&H 12.5&36.1 and down to 10.2&30.3 this AM. Remains intubated-Pulmonary consulted. GI on board with plans for CT of abdomen with IR/TIPS procedure today by IR. onversation with wife per IR in regards to procedure with high risk for hemodynamic instability during the procedure although no other alternative currently and family with desire for aggressive treatment.   6/19/2019 Remains intubated on Precedex and Aaron. IR procedure yesterday with DIPS. GI note recommend surgical evaluation. Additional PRBCs yesterday. H&H this MA 11.2&33.1 with slight trend down to 10.7&31.4 later in morning. HR and BP stable on pressors. K+ 3.6 BUN 9 creatinine .8 Mg 1.7  6/20/2019 Remains intubated and sedated. On Aaron with stable BP currently-wean in process. H&H 10.9&32.8 this AM and stable over the past 24 hours-will change H&Hs to Q12hrs. BMP with K+ 3.4 BUN 10 creatinine .7  down from 383  down from 206 albumin 1.4. Negative 6.9 liters since admission. General surgery (Dr. 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 BRENNON Kimball-consult placed. Pulmonary on board with evaluation for extubation in process           Review of Systems   Unable to perform ROS: intubated     Objective:     Vital Signs (Most Recent):  Temp: 96.8 °F (36 °C) (06/20/19 0910)  Pulse: 96 (06/20/19 0910)  Resp: (!) 38 (06/20/19 0910)  BP: 133/84 (06/20/19 0910)  SpO2: 98 % (06/20/19 0910) Vital Signs (24h Range):  Temp:  [91.6 °F (33.1 °C)-98.8 °F (37.1 °C)] 96.8 °F (36 °C)  Pulse:  [] 96  Resp:  [11-38] 38  SpO2:  [89 %-100 %] 98 %  BP: ()/(50-84) 133/84     Weight: 50.7 kg (111 lb 12.4 oz)  Body mass index is 15.16 kg/m².     SpO2: 98 %  O2 Device (Oxygen Therapy): ventilator      Intake/Output Summary (Last 24 hours) at 6/20/2019  1026  Last data filed at 6/20/2019 0900  Gross per 24 hour   Intake 100 ml   Output 602 ml   Net -502 ml       Lines/Drains/Airways     Central Venous Catheter Line                 Percutaneous Central Line Insertion/Assessment - triple lumen  06/17/19 1700 right internal jugular 2 days          Drain                 Urethral Catheter 06/17/19 1414 Straight-tip 16 Fr. 2 days         Rectal Tube 06/19/19 0930 fecal management system 1 day          Airway                 Airway - Non-Surgical 06/17/19 1324 Endotracheal Tube 2 days          Arterial Line                 Arterial Line 06/17/19 1821 Right Radial 2 days          Peripheral Intravenous Line                 Peripheral IV - Single Lumen 06/17/19 0652 20 G Anterior;Distal;Left Forearm 3 days         Peripheral IV - Single Lumen 06/17/19 1300 22 G Right Forearm 2 days                Physical Exam   Constitutional: He appears cachectic. He appears toxic. He has a sickly appearance. No distress.   Cardiovascular: Normal rate and regular rhythm. Exam reveals no gallop.   No murmur heard.  Pulmonary/Chest: Effort normal and breath sounds normal. No respiratory distress. He has no wheezes.   Abdominal: Soft. Bowel sounds are normal. He exhibits no distension. There is no tenderness.   Neurological: He is alert.   Eyes open spontaneously; responding appropriately with nods of the head   Skin: Skin is warm and dry.       Significant Labs:     No results for input(s): CPK, CPKMB, TROPONINI, MB in the last 24 hours.   Recent Labs   Lab 06/20/19  0439   CALCIUM 7.3*   PROT 4.0*      K 3.5   CO2 21*      BUN 10   CREATININE 0.7   ALKPHOS 287*   *   *   BILITOT 0.9     Recent Labs   Lab 06/20/19  0439   WBC 14.57*   RBC 3.97*   HGB 10.9*  10.9*   HCT 32.8*  32.8*      MCV 83   MCH 27.5   MCHC 33.2           Assessment and Plan:     Brief HPI: Seen on AM NP rounds with ICU RN at bedside. No family at bedside during rounds. Updated by ICU  RN. BP stable on Aaron with wean in process. Spoke with patient despite being intubated and answered questions appropriately. Updated patient on plan for Pulmonary evaluation for extubation today and awaiting surgical evaluation-appeared to understand. 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 BRENNON Kimball       Leukocytosis  -WBCs up to 37K post arrest with trend won to 26K-19K and now 14K this  AM  -initial blood cultures from 6/12 with NGTD; repeat blood cultures 6/17 with 2 out of 4 GPC on prelim report   -remains on IV Zosyn and Vanc and will continue for now  -ID on board and continue to follow recs     Cardiac arrest  -PEA arrest preceded by SB at completion of EGD 6/10  -responded to few rounds of Epi and ROSC after ACLS/CPR  -intubated and admitted to ICU  -initial pH 6.9 with improvement 7.2-7.3 with vent and bicarb  -etiology likely hypoxia related  -remains intubated and sedated on pressor support; Pulm CC on board for vent management with evaluation for extubation today     Hypoalbuminemia  -related to poor nutritional status  -last albumin 1.4  -dietian consulted and recs in place  -repeat CMP in AM       Gangrene of right foot  -Podiatry and ID on board  -initial plans by   Podiatry for TMA once demarcation; ongoing discussions with Podiatry per IC staff (Dr. Gonzales) with high risk for limb loss and no plans for surgical intervention given acute decompensation and recent cardiac arrest     Anemia  -H&H 7.1&22.8 6/14 &6/15 with trend down to 6.9&22.0 6/16  -transfused with 2U PRBCs with improvement in H&H to 10.4&32.0 6/17  with slight drop to 9.5   -EGD 6/17 with following results:   LA Grade D (one or more mucosal breaks involving at least 75% of esophageal circumference) esophagitis with no bleeding was found in the lower third of the esophagus.   A deformity was found in the prepyloric region of the stomach.   Red blood was found in the gastric body and duodenum.   Active bleeding with adherent clot was found in the third portion of the duodenum associated with a duodenal varix.  -additional 2units PRBCs given 6/17 post cardiac arrest and repeat transfusion 6/18 post DIPS yesterday   -serial H&Hs in process; H&H 10.9&32.8 this AM and stable over past 24 hours; will decrease serial H&Hs to [id number]   -baseline 9-10/29-30 since early 2018  -large bloody BM yesterday with Flexi Seal placed   -will continue to hold ASA/Plavix for now   -General Surgery (Dr. Kirk) consult for surgical intervention for duodenal varix; awaiting second opinion by Dr. Kimball     Alcoholic cirrhosis of liver with ascites  -AST/ down from 383 yesterday;  down from 206  -on Aldactone at home- continue to hold   -abdominal ultrasound from 5/2019 reviewed; EGD with concern for advanced cirrhosis per GI interpretation; IR/DIPS procedure for duodenal varix; General surgery consulted for surgical evaluation   -daily CMP in AM         VTE Risk Mitigation (From admission, onward)        Ordered     Reason for No Pharmacological VTE Prophylaxis  Once      06/17/19 1353     IP VTE HIGH RISK PATIENT  Once      06/17/19 1353     Place SERGO hose  Until discontinued      06/17/19 1346     Place  sequential compression device  Until discontinued      06/17/19 1346          Rosi Grullon APRN, ANP  Cardiology  [facility]

## 2025-02-05 NOTE — PROGRESS NOTES
Called stat to endoscopy code blue.  Patient was being ventilated by ambu bag / mask 100% oxygen.  EKG revealed what appeared as flat line with artifact.  No peripheral pulses palpable.  CPR initiated.  Epinephrine and atropine IVP.  Continued CPR.  Flat line EKG continued.  Second epinephrine IVP with CPR.  Sinus tachycardia appeared with palpable pulses and cuff BP's  approximately 180 systolic.  Intubated with EBBS / chest excursions 100% oxygen via ambu bag.  Additional IV started.  Dark blood observed exiting anus.  Blood ordered and begun.  ABG revealed metabolic / respiratory acidosis.  Increased minute ventilation and 2 amps sodium bicarbonate administered.  Patient stabilized, placed on monitors and transported to ICU.     Pt resting in bed, A,O4  Denies any pain, no N/V   Tolerating diet   Vitals stable,   CBI with almost clear urine   Clamped CBI as ordered  AV fistula intact , had BM x2  Refused weight check   Refused lab draw in AM   All needs met, will monitor

## 2025-03-17 NOTE — PROGRESS NOTES
"Surgery follow up  /81 (BP Location: Right arm, Patient Position: Lying)   Pulse 108   Temp 98 °F (36.7 °C) (Oral)   Resp 20   Ht 6' 0.01" (1.829 m)   Wt 50.7 kg (111 lb 12.4 oz)   SpO2 (!) 91%   BMI 15.16 kg/m²   I/O last 3 completed shifts:  In: 1274 [P.O.:774; IV Piggyback:500]  Out: 560 [Urine:560]  I/O this shift:  In: 780 [P.O.:780]  Out: 1 [Stool:1]  Doing better , all wounds and foot unchanged   Ok for comfort care   " [FreeTextEntry1] : PFTs 3/11/2025 FEV1 2.19 L, 101% FVC 3.56 L 98% FEV1/FVC 82 No large airway obstruction, no bronchodilator response TLC 93%, no restriction DLCO 85%, normal diffusion capacity PFTs normal  6-minute walk distance Patient walked 457m, O2 sat 94% at the end of the test, Lambert scale 0.   2/19/2025 to 3/6/2025 CPAP usage data: Total CPAP usage 81%, 13/16 days, greater than 4 hours use 31%, average usage on days used 3 hours and 38 minutes, AHI 14/hr  CPAP usage data Patient used the CPAP device 12 out of 30 days from 2/3/2025 to 3/4/2025. With 7 days usage less than 4 hours and 5 days usage more than 4 hours and average usage total days 1 hour and 31 minutes. AHI 11.8/hr  HSAT AHI 7.6/hr, O2 corine 85% <88% for 1.8 min during sleep study

## (undated) DEVICE — TUBING HPCIL ROT M/F ADPT 48IN

## (undated) DEVICE — CATH CAROTID NAV6 5MM 190CM

## (undated) DEVICE — CATH CXI ANG 2.6F .018IN 150CM

## (undated) DEVICE — VISE RADIFOCUS MULTI TORQUE

## (undated) DEVICE — TAPE RADIOPLAQUE VIPER TRAC

## (undated) DEVICE — CATH DIAMONDBACK 1.25M 145CM

## (undated) DEVICE — GLIDESHEATH SLENDER SS 5FR10CM

## (undated) DEVICE — CATH IMPULSE PIGTAIL 5FR 125CM

## (undated) DEVICE — SHEATH DESTINATION 6FR 45CM

## (undated) DEVICE — KIT LEFT HEART MANIFOLD CUSTOM

## (undated) DEVICE — HEMOSTAT VASC BAND REG 24CM

## (undated) DEVICE — CATH ULTRAVERSE 018 3X60X150

## (undated) DEVICE — GUIDEWIRE FIELDER XT.014X300CM

## (undated) DEVICE — GUIDEWIRE EMERALD 150CM PTFE

## (undated) DEVICE — SHEATH INTRODUCER 5FR 10CM

## (undated) DEVICE — COVER BAND BAG 40 X 40

## (undated) DEVICE — CATH ULTRAVERSE 018 5X150X150

## (undated) DEVICE — SHEATH INTRODUCER 6FR 11CM

## (undated) DEVICE — CATH LUTONIX DCB 018X130X4X220

## (undated) DEVICE — Device

## (undated) DEVICE — LUBRICANT VIPERSLIDE 100ML BAG

## (undated) DEVICE — CATH IMA INFINITI 4FRX100CM

## (undated) DEVICE — CATH ANG PIGTAIL 4FR INFINITY

## (undated) DEVICE — CONTRAST VISIPAQUE 150ML

## (undated) DEVICE — GUIDEWIRE VIPER FIRM .017IN

## (undated) DEVICE — CATH 5FR MP 125CM 5/BX

## (undated) DEVICE — SYR MARK 7 ARTERION 150ML

## (undated) DEVICE — GUIDEWIRE ADVNTG 035X260CM ANG

## (undated) DEVICE — PAD DEFIB CADENCE ADULT R2

## (undated) DEVICE — SEE MEDLINE ITEM 157187

## (undated) DEVICE — KIT INTRODUCER W/GUIDEWIRE

## (undated) DEVICE — COVER PROBE US 5.5X58L NON LTX

## (undated) DEVICE — INFLATOR ENCORE 26 BLLN INFL

## (undated) DEVICE — SET MICRO PUNCT 4FR/MPIS-401

## (undated) DEVICE — CATH LUTONIX DCB 018X130X5X100

## (undated) DEVICE — CATH ULTRAVERSE 018 4X300X150

## (undated) DEVICE — CATH EAGLE EYE ST .014X20X150